# Patient Record
Sex: FEMALE | Race: BLACK OR AFRICAN AMERICAN | NOT HISPANIC OR LATINO | URBAN - METROPOLITAN AREA
[De-identification: names, ages, dates, MRNs, and addresses within clinical notes are randomized per-mention and may not be internally consistent; named-entity substitution may affect disease eponyms.]

---

## 2018-05-30 ENCOUNTER — INPATIENT (INPATIENT)
Facility: HOSPITAL | Age: 58
LOS: 21 days | Discharge: NURSING, REHAB OR LONG-TERM CARE | DRG: 981 | End: 2018-06-21
Attending: PSYCHIATRY & NEUROLOGY | Admitting: PSYCHIATRY & NEUROLOGY
Payer: COMMERCIAL

## 2018-05-30 VITALS
TEMPERATURE: 98 F | RESPIRATION RATE: 17 BRPM | OXYGEN SATURATION: 96 % | DIASTOLIC BLOOD PRESSURE: 84 MMHG | HEART RATE: 74 BPM | SYSTOLIC BLOOD PRESSURE: 138 MMHG

## 2018-05-30 DIAGNOSIS — Z98.891 HISTORY OF UTERINE SCAR FROM PREVIOUS SURGERY: Chronic | ICD-10-CM

## 2018-05-30 DIAGNOSIS — Z90.12 ACQUIRED ABSENCE OF LEFT BREAST AND NIPPLE: Chronic | ICD-10-CM

## 2018-05-30 LAB
ALBUMIN SERPL ELPH-MCNC: 3.2 G/DL — LOW (ref 3.3–5)
ALBUMIN SERPL ELPH-MCNC: 4 G/DL — SIGNIFICANT CHANGE UP (ref 3.3–5)
ALP SERPL-CCNC: 69 U/L — SIGNIFICANT CHANGE UP (ref 40–120)
ALP SERPL-CCNC: 92 U/L — SIGNIFICANT CHANGE UP (ref 40–120)
ALT FLD-CCNC: 19 U/L — SIGNIFICANT CHANGE UP (ref 10–45)
ALT FLD-CCNC: 25 U/L — SIGNIFICANT CHANGE UP (ref 10–45)
ANION GAP SERPL CALC-SCNC: 13 MMOL/L — SIGNIFICANT CHANGE UP (ref 5–17)
ANION GAP SERPL CALC-SCNC: 18 MMOL/L — HIGH (ref 5–17)
APTT BLD: 22.4 SEC — LOW (ref 27.5–37.4)
AST SERPL-CCNC: 25 U/L — SIGNIFICANT CHANGE UP (ref 10–40)
AST SERPL-CCNC: 31 U/L — SIGNIFICANT CHANGE UP (ref 10–40)
BASE EXCESS BLDA CALC-SCNC: -3 MMOL/L — LOW (ref -2–3)
BASOPHILS NFR BLD AUTO: 0.4 % — SIGNIFICANT CHANGE UP (ref 0–2)
BILIRUB SERPL-MCNC: 0.6 MG/DL — SIGNIFICANT CHANGE UP (ref 0.2–1.2)
BILIRUB SERPL-MCNC: 0.6 MG/DL — SIGNIFICANT CHANGE UP (ref 0.2–1.2)
BUN SERPL-MCNC: 12 MG/DL — SIGNIFICANT CHANGE UP (ref 7–23)
BUN SERPL-MCNC: 13 MG/DL — SIGNIFICANT CHANGE UP (ref 7–23)
CALCIUM SERPL-MCNC: 8.1 MG/DL — LOW (ref 8.4–10.5)
CALCIUM SERPL-MCNC: 9.5 MG/DL — SIGNIFICANT CHANGE UP (ref 8.4–10.5)
CHLORIDE SERPL-SCNC: 101 MMOL/L — SIGNIFICANT CHANGE UP (ref 96–108)
CHLORIDE SERPL-SCNC: 103 MMOL/L — SIGNIFICANT CHANGE UP (ref 96–108)
CO2 SERPL-SCNC: 22 MMOL/L — SIGNIFICANT CHANGE UP (ref 22–31)
CO2 SERPL-SCNC: 23 MMOL/L — SIGNIFICANT CHANGE UP (ref 22–31)
CREAT SERPL-MCNC: 0.61 MG/DL — SIGNIFICANT CHANGE UP (ref 0.5–1.3)
CREAT SERPL-MCNC: 0.7 MG/DL — SIGNIFICANT CHANGE UP (ref 0.5–1.3)
EOSINOPHIL NFR BLD AUTO: 1.2 % — SIGNIFICANT CHANGE UP (ref 0–6)
GAS PNL BLDA: SIGNIFICANT CHANGE UP
GLUCOSE BLDC GLUCOMTR-MCNC: 129 MG/DL — HIGH (ref 70–99)
GLUCOSE BLDC GLUCOMTR-MCNC: 213 MG/DL — HIGH (ref 70–99)
GLUCOSE SERPL-MCNC: 180 MG/DL — HIGH (ref 70–99)
GLUCOSE SERPL-MCNC: 190 MG/DL — HIGH (ref 70–99)
HCO3 BLDA-SCNC: 22 MMOL/L — SIGNIFICANT CHANGE UP (ref 21–28)
HCT VFR BLD CALC: 23.3 % — LOW (ref 34.5–45)
HCT VFR BLD CALC: 33.9 % — LOW (ref 34.5–45)
HGB BLD-MCNC: 11.3 G/DL — LOW (ref 11.5–15.5)
HGB BLD-MCNC: 7.5 G/DL — LOW (ref 11.5–15.5)
INR BLD: 0.97 — SIGNIFICANT CHANGE UP (ref 0.88–1.16)
LACTATE SERPL-SCNC: 1.3 MMOL/L — SIGNIFICANT CHANGE UP (ref 0.5–2)
LACTATE SERPL-SCNC: 3 MMOL/L — HIGH (ref 0.5–2)
LIDOCAIN IGE QN: 23 U/L — SIGNIFICANT CHANGE UP (ref 7–60)
LYMPHOCYTES # BLD AUTO: 11.6 % — LOW (ref 13–44)
MAGNESIUM SERPL-MCNC: 1.9 MG/DL — SIGNIFICANT CHANGE UP (ref 1.6–2.6)
MCHC RBC-ENTMCNC: 27.6 PG — SIGNIFICANT CHANGE UP (ref 27–34)
MCHC RBC-ENTMCNC: 27.9 PG — SIGNIFICANT CHANGE UP (ref 27–34)
MCHC RBC-ENTMCNC: 32.2 G/DL — SIGNIFICANT CHANGE UP (ref 32–36)
MCHC RBC-ENTMCNC: 33.3 G/DL — SIGNIFICANT CHANGE UP (ref 32–36)
MCV RBC AUTO: 82.7 FL — SIGNIFICANT CHANGE UP (ref 80–100)
MCV RBC AUTO: 86.6 FL — SIGNIFICANT CHANGE UP (ref 80–100)
MONOCYTES NFR BLD AUTO: 4.6 % — SIGNIFICANT CHANGE UP (ref 2–14)
NEUTROPHILS NFR BLD AUTO: 82.2 % — HIGH (ref 43–77)
PCO2 BLDA: 36 MMHG — SIGNIFICANT CHANGE UP (ref 32–45)
PH BLDA: 7.4 — SIGNIFICANT CHANGE UP (ref 7.35–7.45)
PHOSPHATE SERPL-MCNC: 4.7 MG/DL — HIGH (ref 2.5–4.5)
PLATELET # BLD AUTO: 311 K/UL — SIGNIFICANT CHANGE UP (ref 150–400)
PLATELET # BLD AUTO: 389 K/UL — SIGNIFICANT CHANGE UP (ref 150–400)
PO2 BLDA: 330 MMHG — HIGH (ref 83–108)
POTASSIUM SERPL-MCNC: 3.2 MMOL/L — LOW (ref 3.5–5.3)
POTASSIUM SERPL-MCNC: 3.5 MMOL/L — SIGNIFICANT CHANGE UP (ref 3.5–5.3)
POTASSIUM SERPL-SCNC: 3.2 MMOL/L — LOW (ref 3.5–5.3)
POTASSIUM SERPL-SCNC: 3.5 MMOL/L — SIGNIFICANT CHANGE UP (ref 3.5–5.3)
PROT SERPL-MCNC: 5.6 G/DL — LOW (ref 6–8.3)
PROT SERPL-MCNC: 6.9 G/DL — SIGNIFICANT CHANGE UP (ref 6–8.3)
PROTHROM AB SERPL-ACNC: 10.8 SEC — SIGNIFICANT CHANGE UP (ref 9.8–12.7)
RBC # BLD: 2.69 M/UL — LOW (ref 3.8–5.2)
RBC # BLD: 4.1 M/UL — SIGNIFICANT CHANGE UP (ref 3.8–5.2)
RBC # FLD: 15.1 % — SIGNIFICANT CHANGE UP (ref 10.3–16.9)
RBC # FLD: 15.6 % — SIGNIFICANT CHANGE UP (ref 10.3–16.9)
SAO2 % BLDA: 100 % — SIGNIFICANT CHANGE UP (ref 95–100)
SODIUM SERPL-SCNC: 138 MMOL/L — SIGNIFICANT CHANGE UP (ref 135–145)
SODIUM SERPL-SCNC: 142 MMOL/L — SIGNIFICANT CHANGE UP (ref 135–145)
WBC # BLD: 10 K/UL — SIGNIFICANT CHANGE UP (ref 3.8–10.5)
WBC # BLD: 14.7 K/UL — HIGH (ref 3.8–10.5)
WBC # FLD AUTO: 10 K/UL — SIGNIFICANT CHANGE UP (ref 3.8–10.5)
WBC # FLD AUTO: 14.7 K/UL — HIGH (ref 3.8–10.5)

## 2018-05-30 PROCEDURE — 76705 ECHO EXAM OF ABDOMEN: CPT | Mod: 26

## 2018-05-30 PROCEDURE — 70450 CT HEAD/BRAIN W/O DYE: CPT | Mod: 26,59

## 2018-05-30 PROCEDURE — 70496 CT ANGIOGRAPHY HEAD: CPT | Mod: 26

## 2018-05-30 PROCEDURE — 61322 CRNEC/CRNOT DCMPRV W/O LOBEC: CPT

## 2018-05-30 PROCEDURE — 0042T: CPT

## 2018-05-30 PROCEDURE — 22999 UNLISTED PX ABDOMEN MUSCSKEL: CPT

## 2018-05-30 PROCEDURE — 93010 ELECTROCARDIOGRAM REPORT: CPT

## 2018-05-30 PROCEDURE — 71045 X-RAY EXAM CHEST 1 VIEW: CPT | Mod: 26

## 2018-05-30 PROCEDURE — 36217 PLACE CATHETER IN ARTERY: CPT

## 2018-05-30 PROCEDURE — 99291 CRITICAL CARE FIRST HOUR: CPT

## 2018-05-30 PROCEDURE — 70498 CT ANGIOGRAPHY NECK: CPT | Mod: 26

## 2018-05-30 PROCEDURE — 36245 INS CATH ABD/L-EXT ART 1ST: CPT

## 2018-05-30 PROCEDURE — 37242 VASC EMBOLIZE/OCCLUDE ARTERY: CPT

## 2018-05-30 PROCEDURE — 76942 ECHO GUIDE FOR BIOPSY: CPT | Mod: 26,59

## 2018-05-30 PROCEDURE — 74177 CT ABD & PELVIS W/CONTRAST: CPT | Mod: 26

## 2018-05-30 PROCEDURE — 99223 1ST HOSP IP/OBS HIGH 75: CPT | Mod: GC

## 2018-05-30 PROCEDURE — 99232 SBSQ HOSP IP/OBS MODERATE 35: CPT | Mod: GC

## 2018-05-30 RX ORDER — GLUCAGON INJECTION, SOLUTION 0.5 MG/.1ML
1 INJECTION, SOLUTION SUBCUTANEOUS ONCE
Qty: 0 | Refills: 0 | Status: DISCONTINUED | OUTPATIENT
Start: 2018-05-30 | End: 2018-06-14

## 2018-05-30 RX ORDER — DEXTROSE 50 % IN WATER 50 %
25 SYRINGE (ML) INTRAVENOUS ONCE
Qty: 0 | Refills: 0 | Status: DISCONTINUED | OUTPATIENT
Start: 2018-05-30 | End: 2018-06-14

## 2018-05-30 RX ORDER — SODIUM CHLORIDE 9 MG/ML
1000 INJECTION INTRAMUSCULAR; INTRAVENOUS; SUBCUTANEOUS
Qty: 0 | Refills: 0 | Status: DISCONTINUED | OUTPATIENT
Start: 2018-05-30 | End: 2018-05-30

## 2018-05-30 RX ORDER — HYDROMORPHONE HYDROCHLORIDE 2 MG/ML
0.5 INJECTION INTRAMUSCULAR; INTRAVENOUS; SUBCUTANEOUS ONCE
Qty: 0 | Refills: 0 | Status: DISCONTINUED | OUTPATIENT
Start: 2018-05-30 | End: 2018-05-30

## 2018-05-30 RX ORDER — NALOXONE HYDROCHLORIDE 4 MG/.1ML
0.4 SPRAY NASAL ONCE
Qty: 0 | Refills: 0 | Status: COMPLETED | OUTPATIENT
Start: 2018-05-30 | End: 2018-05-30

## 2018-05-30 RX ORDER — THROMBIN (BOVINE) 10000 UNIT
5000 KIT TOPICAL ONCE
Qty: 0 | Refills: 0 | Status: COMPLETED | OUTPATIENT
Start: 2018-05-30 | End: 2018-05-30

## 2018-05-30 RX ORDER — SODIUM CHLORIDE 9 MG/ML
1000 INJECTION, SOLUTION INTRAVENOUS
Qty: 0 | Refills: 0 | Status: DISCONTINUED | OUTPATIENT
Start: 2018-05-30 | End: 2018-05-31

## 2018-05-30 RX ORDER — SODIUM CHLORIDE 9 MG/ML
1000 INJECTION INTRAMUSCULAR; INTRAVENOUS; SUBCUTANEOUS ONCE
Qty: 0 | Refills: 0 | Status: COMPLETED | OUTPATIENT
Start: 2018-05-30 | End: 2018-05-30

## 2018-05-30 RX ORDER — DEXTROSE 50 % IN WATER 50 %
12.5 SYRINGE (ML) INTRAVENOUS ONCE
Qty: 0 | Refills: 0 | Status: DISCONTINUED | OUTPATIENT
Start: 2018-05-30 | End: 2018-06-14

## 2018-05-30 RX ORDER — HYDROMORPHONE HYDROCHLORIDE 2 MG/ML
0.5 INJECTION INTRAMUSCULAR; INTRAVENOUS; SUBCUTANEOUS EVERY 4 HOURS
Qty: 0 | Refills: 0 | Status: DISCONTINUED | OUTPATIENT
Start: 2018-05-30 | End: 2018-05-30

## 2018-05-30 RX ORDER — MORPHINE SULFATE 50 MG/1
4 CAPSULE, EXTENDED RELEASE ORAL ONCE
Qty: 0 | Refills: 0 | Status: DISCONTINUED | OUTPATIENT
Start: 2018-05-30 | End: 2018-05-30

## 2018-05-30 RX ORDER — SODIUM CHLORIDE 9 MG/ML
1000 INJECTION INTRAMUSCULAR; INTRAVENOUS; SUBCUTANEOUS
Qty: 0 | Refills: 0 | Status: DISCONTINUED | OUTPATIENT
Start: 2018-05-30 | End: 2018-05-31

## 2018-05-30 RX ORDER — ATORVASTATIN CALCIUM 80 MG/1
80 TABLET, FILM COATED ORAL AT BEDTIME
Qty: 0 | Refills: 0 | Status: DISCONTINUED | OUTPATIENT
Start: 2018-05-30 | End: 2018-06-21

## 2018-05-30 RX ORDER — HYDROMORPHONE HYDROCHLORIDE 2 MG/ML
1 INJECTION INTRAMUSCULAR; INTRAVENOUS; SUBCUTANEOUS EVERY 4 HOURS
Qty: 0 | Refills: 0 | Status: DISCONTINUED | OUTPATIENT
Start: 2018-05-30 | End: 2018-05-30

## 2018-05-30 RX ORDER — SODIUM CHLORIDE 9 MG/ML
1000 INJECTION, SOLUTION INTRAVENOUS
Qty: 0 | Refills: 0 | Status: DISCONTINUED | OUTPATIENT
Start: 2018-05-30 | End: 2018-06-14

## 2018-05-30 RX ORDER — ONDANSETRON 8 MG/1
4 TABLET, FILM COATED ORAL EVERY 6 HOURS
Qty: 0 | Refills: 0 | Status: DISCONTINUED | OUTPATIENT
Start: 2018-05-30 | End: 2018-06-21

## 2018-05-30 RX ORDER — ACETAMINOPHEN 500 MG
1000 TABLET ORAL ONCE
Qty: 0 | Refills: 0 | Status: COMPLETED | OUTPATIENT
Start: 2018-05-30 | End: 2018-05-30

## 2018-05-30 RX ORDER — INSULIN LISPRO 100/ML
VIAL (ML) SUBCUTANEOUS EVERY 6 HOURS
Qty: 0 | Refills: 0 | Status: DISCONTINUED | OUTPATIENT
Start: 2018-05-30 | End: 2018-06-04

## 2018-05-30 RX ORDER — DEXTROSE 50 % IN WATER 50 %
15 SYRINGE (ML) INTRAVENOUS ONCE
Qty: 0 | Refills: 0 | Status: DISCONTINUED | OUTPATIENT
Start: 2018-05-30 | End: 2018-06-14

## 2018-05-30 RX ADMIN — SODIUM CHLORIDE 1000 MILLILITER(S): 9 INJECTION INTRAMUSCULAR; INTRAVENOUS; SUBCUTANEOUS at 11:45

## 2018-05-30 RX ADMIN — HYDROMORPHONE HYDROCHLORIDE 1 MILLIGRAM(S): 2 INJECTION INTRAMUSCULAR; INTRAVENOUS; SUBCUTANEOUS at 13:20

## 2018-05-30 RX ADMIN — SODIUM CHLORIDE 2000 MILLILITER(S): 9 INJECTION INTRAMUSCULAR; INTRAVENOUS; SUBCUTANEOUS at 13:42

## 2018-05-30 RX ADMIN — HYDROMORPHONE HYDROCHLORIDE 0.5 MILLIGRAM(S): 2 INJECTION INTRAMUSCULAR; INTRAVENOUS; SUBCUTANEOUS at 11:45

## 2018-05-30 RX ADMIN — Medication 400 MILLIGRAM(S): at 19:45

## 2018-05-30 RX ADMIN — HYDROMORPHONE HYDROCHLORIDE 1 MILLIGRAM(S): 2 INJECTION INTRAMUSCULAR; INTRAVENOUS; SUBCUTANEOUS at 14:49

## 2018-05-30 RX ADMIN — HYDROMORPHONE HYDROCHLORIDE 0.5 MILLIGRAM(S): 2 INJECTION INTRAMUSCULAR; INTRAVENOUS; SUBCUTANEOUS at 12:25

## 2018-05-30 RX ADMIN — MORPHINE SULFATE 4 MILLIGRAM(S): 50 CAPSULE, EXTENDED RELEASE ORAL at 11:40

## 2018-05-30 RX ADMIN — HYDROMORPHONE HYDROCHLORIDE 0.5 MILLIGRAM(S): 2 INJECTION INTRAMUSCULAR; INTRAVENOUS; SUBCUTANEOUS at 13:29

## 2018-05-30 RX ADMIN — Medication 5000 INTERNATIONAL UNIT(S): at 18:44

## 2018-05-30 RX ADMIN — MORPHINE SULFATE 4 MILLIGRAM(S): 50 CAPSULE, EXTENDED RELEASE ORAL at 13:29

## 2018-05-30 RX ADMIN — NALOXONE HYDROCHLORIDE 0.4 MILLIGRAM(S): 4 SPRAY NASAL at 18:56

## 2018-05-30 NOTE — CONSULT NOTE ADULT - ATTENDING COMMENTS
pt with lethargy related to iv analgesics and benzo given periprocedurally for treatment of recuts sheath hematoma.  pt does follow commands to mvoe all extremities. plan to admit to SICU to monitor respiratory status as she recovers from sedation. monitor CBC and abd exam.

## 2018-05-30 NOTE — ED ADULT TRIAGE NOTE - NS ED NURSE DIRECT TO ROOM YN
No Pain not relieved by Medications/Persistent Nausea and Vomiting/Swelling that continues/Fever greater than 101/Inability to Tolerate Liquids or Foods/Bleeding that does not stop/Unable to Urinate/Increased Irritability or Sluggishness

## 2018-05-30 NOTE — H&P ADULT - NSHPLABSRESULTS_GEN_ALL_CORE
11.3   10.0  )-----------( 311      ( 30 May 2018 11:52 )             33.9   05-30    142  |  101  |  13  ----------------------------<  180<H>  3.2<L>   |  23  |  0.70    Ca    9.5      30 May 2018 11:52    TPro  6.9  /  Alb  4.0  /  TBili  0.6  /  DBili  x   /  AST  31  /  ALT  25  /  AlkPhos  92  05-30    Lactate, Blood (05.30.18 @ 11:51)    Lactate, Blood: 3.0 mmoL/L    < from: CT Abdomen and Pelvis w/ IV Cont (05.30.18 @ 12:12) >      EXAM:  CT ABDOMEN AND PELVIS IC                          PROCEDURE DATE:  05/30/2018          INTERPRETATION:    CT ABDOMEN and PELVIS with IV contrast dated 5/30/2018 12:12 PM    INDICATION: 58-year-old female with severe abdominal pain after episode   of coughing. History of breast cancer status post left mastectomy and   flap reconstruction 5 years ago.    TECHNIQUE: CT of the abdomen and pelvis was performed after intravenous   administration of contrast material. Axial, sagittal and coronal images   were produced and reviewed.  Enteric contrast was not administered,   limiting complete evaluation of the gastrointestinal tract.    COMPARISON: None.    FINDINGS:  There is a large expanding hematoma within the proximal aspect of the   right rectus abdominis muscle, measuring up to 12.6 x 1.2 x 7.7 cm in   size, and contains tubular curvilinear areas of opacified cotrast   material, indicative of active extravasation. There is also a small   hemorrhagic component in the more inferior region of the right rectus   abdominis muscle. There is mild surrounding perifascial stranding. There   is mild mass effect on the underlying liver, without evidence of   hemorrhage extension into the peritoneal cavity.      IMPRESSION:  1. Large expanding intramuscular hematoma within the right rectus   abdominis, with evidence of active contrast extravasationinternally.    2. Intrathoracic herniation of nearly the entire stomach, without   evidence of compromise at this time.        JERALD BONILLA M.D., ATTENDING RADIOLOGIST  This document has been electronically signed. May 30 2018 12:42PM

## 2018-05-30 NOTE — ED PROVIDER NOTE - OBJECTIVE STATEMENT
57 yo f with PMH of breast cancer s/p TRAM flap 5 years prior, one , HTN, anxiety presents to ED with severe right sided abdominal pain with bulge.  Pt states she had cough for past several days, but then suddenly devleoped worsening pain over right side of abdomen this morning.  Complains of nausea, but denies vomiting.  Normal bowel movements for past several days.  Pt required my immediate attention secondary to pain and concern for acute abdomen. 59 yo f with PMH of breast cancer s/p TRAM flap 5 years prior, one , HTN, anxiety presents to ED with severe right sided abdominal pain with bulge.  Pt states she had cough for past several days, but then suddenly devleoped worsening pain over right side of abdomen this morning.  Complains of nausea, but denies vomiting.  Normal bowel movements for past several days.  Pt states she was recently started on plavix secondary to artherosclerotic disease (high calcium score).  But no hx of CAD or CVA.  Pt required my immediate attention secondary to pain and concern for acute abdomen.

## 2018-05-30 NOTE — H&P ADULT - NSHPPHYSICALEXAM_GEN_ALL_CORE
Vital Signs Last 24 Hrs  T(C): 36.6 (30 May 2018 11:14), Max: 36.6 (30 May 2018 11:14)  T(F): 97.8 (30 May 2018 11:14), Max: 97.8 (30 May 2018 11:14)  HR: 65 (30 May 2018 12:24) (65 - 74)  BP: 175/105 (30 May 2018 12:24) (138/84 - 175/105)  BP(mean): --  RR: 16 (30 May 2018 12:24) (16 - 17)  SpO2: 96% (30 May 2018 12:24) (96% - 96%)    Gen: Age appropriate female in moderate distress 2/2 pain  CV: RRR  Resp: No increased work of breathing however tachypneic 2/2 pain  Abd: Firm palpable mass of the right abd from costal margin down to low tranverse scar, tender, nondistended  Ext: WWP

## 2018-05-30 NOTE — ED PROVIDER NOTE - MEDICAL DECISION MAKING DETAILS
Pt with severe abd pain, palpable mass, surgery immediately called after evaluating patient for acute abdomen, bedside echo shows large mass from liver to pelvis with areas of echogenicity, concern for hematoma with bleed vs incarcerated bowel, pt taken for ct abd/pelvis with iv contrast after POC chemistry and found to have (+) hematoma with active bleeding, surgery at bedside and will admit for platelets and IR intervention, surgery called IR for procedure, pt admitted to surgical tele.

## 2018-05-30 NOTE — PATIENT PROFILE ADULT. - DOES PATIENT HAVE ADVANCE DIRECTIVE
Spoke with pt. Advised bg log sheet was reviewed. Please change dinner ratio from 1:20 to 1:18. Please send in more readings in 2 weeks. Pt verbalized understanding.    Linda Diego RN, BSN, CDE   [facility]   Yes

## 2018-05-30 NOTE — H&P ADULT - HISTORY OF PRESENT ILLNESS
58F with a hx of left breast cancer s/p left mastectomy with TRAM flap reconstruction (4-5 years prior), recently placed on plavix (about a month ago) by her cardiologist 2/2 calcified arterial disease and is now presenting to the [facility] ED with a painful bulge in her right abdomen.  Pt reports that she's had a cough for several days and yesterday noticed a bulge in her right abdomen that has grown and only become more painful.  She at times has difficulty breathing and some lightheadedness but otherwise denies fevers, chills, chest pain, nausea, vomiting, diarrhea, dysuria.    PMH: HTN, CAD?, HLD, Anxiety, Depression  Meds: bisoprolol 5mg qd, lorazepam 1mg q8?, Plavix 5qd, rosuvastatin, HCTZ, buproprion, baclofen  NKDA  PSH: Left mastectomy with TRAM rotational flap reconstruction  Social: 1/2 bottle of wine most nights, remote cigarette hx, denies IVDA

## 2018-05-30 NOTE — ED PROVIDER NOTE - GASTROINTESTINAL, MLM
right sided abdominal bulge extending from liver to pelvis with severe tenderness guarding and rebound

## 2018-05-30 NOTE — H&P ADULT - ASSESSMENT
58F with a hx of TRAM flap reconstruction on plavix now with a spontaneous right rectus expanding hematoma    - Will admit to surgical service, telemetry bed  - Discussed with IR, will attempt embolization  - Due to plavix use will administer Platelets   - Pain control PRN  - CIWA protocol  - Serial CBCs  - Will continue IVF resuscitation and recheck lactate  - No abx indicated at this point in time  - Discussed with Chief and Dr. Dozier

## 2018-05-30 NOTE — ED ADULT NURSE REASSESSMENT NOTE - NS ED NURSE REASSESS COMMENT FT1
Received patient from IR procedure assessed patient not responding to painful stimuli and verbal stimuli and patient snoring RRT called patient hypotensive BP L arm 80/64  R arm 92/62 hr 91 pt o2 sat RA 97% .As per RRT and surgery team and attending 2 L of fluids given ,narcan 0.4 mg iv given, and labs send ..As per RRT patient moved to  recess room and patient for ICU.  Patient endorsed to GIGI Mcnair .GIGI Mcnair at the bed side aware pt situation
pt started on platelet transfusion [id number], tolerated well. safety maintained. will continue to monitor.
received pt from [facility] as an RRT. pt returned from IR procedure not responding to painful or verbal stimuli & snoring. pt was given Narcan IV. ptnow  responds to verbal stimuli. pt remains agitated but able to open eyes on demand. aaox1, resp easy & unlabored. will continue to monitor.
pt received from days; VS stable, responds to verbal stimuli, breathing unlabored , closely monitored
pt asleep in bed. responding to verbal stimuli. pt has PERRLA, pt remains on NRBM, resp easy & unlabored. will continue to monitor.

## 2018-05-30 NOTE — ED PROVIDER NOTE - CRITICAL CARE PROVIDED
direct patient care (not related to procedure)/consultation with other physicians/conducted a detailed discussion of DNR status/documentation/interpretation of diagnostic studies/additional history taking/consult w/ pt's family directly relating to pts condition/telephone consultation with the patient's family

## 2018-05-30 NOTE — PROGRESS NOTE ADULT - SUBJECTIVE AND OBJECTIVE BOX
Event note:   Upon reevaluation of pt after admission to [facility], pt more awake and following commands but noted to have Left Upper extremity hemiplegia with Left Lower extremity weakness. Stroke code was called, Primary team and Dr Mckinney at bedside. Will follow up ct scan

## 2018-05-30 NOTE — CONSULT NOTE ADULT - SUBJECTIVE AND OBJECTIVE BOX
HPI:  58F with a hx of left breast cancer s/p left mastectomy with TRAM flap reconstruction (4-5 years prior), recently placed on plavix (about a month ago) by her cardiologist 2/2 calcified arterial disease and is now presenting to the [facility] ED with a painful bulge in her right abdomen.  Pt reports that she's had a cough for several days and yesterday noticed a bulge in her right abdomen that has grown and only become more painful.  CT scan + for right rectus hematoma. Now s/p IR Rt groin accesss for dx angio, and rt abd percutaneous thrombin injection of 2 pseudoaneurysms in abd wall. Pt over sedated post procedure Rapid response called and pt given narcan with good response.     PMH: HTN, CAD?, HLD, Anxiety, Depression  Meds: bisoprolol 5mg qd, lorazepam 1mg q8?, Plavix 5qd, rosuvastatin, HCTZ, buproprion, baclofen  NKDA  PSH: Left mastectomy with TRAM rotational flap reconstruction  Social: 1/2 bottle of wine most nights, remote cigarette hx, denies IVDA (30 May 2018 13:13)      PAST MEDICAL & SURGICAL HISTORY:  Anxiety  Hypertension  Breast CA  Previous  section  History of mastectomy, left: with TRAM flap recon      ROS: See HPI    MEDICATIONS  (STANDING):  dextrose 5%. 1000 milliLiter(s) (50 mL/Hr) IV Continuous <Continuous>  dextrose 50% Injectable 12.5 Gram(s) IV Push once  dextrose 50% Injectable 25 Gram(s) IV Push once  dextrose 50% Injectable 25 Gram(s) IV Push once  insulin lispro (HumaLOG) corrective regimen sliding scale   SubCutaneous three times a day before meals  sodium chloride 0.9% 1000 milliLiter(s) (100 mL/Hr) IV Continuous <Continuous>    MEDICATIONS  (PRN):  dextrose 40% Gel 15 Gram(s) Oral once PRN Blood Glucose LESS THAN 70 milliGRAM(s)/deciliter  glucagon  Injectable 1 milliGRAM(s) IntraMuscular once PRN Glucose LESS THAN 70 milligrams/deciliter  ondansetron Injectable 4 milliGRAM(s) IV Push every 6 hours PRN Nausea      Allergies    No Known Allergies    Intolerances        SOCIAL HISTORY:  Smoke: Never Smoker  EtOH: occasional    FAMILY HISTORY:      Vital Signs Last 24 Hrs  T(C): 36.3 (30 May 2018 20:11), Max: 36.6 (30 May 2018 11:14)  T(F): 97.3 (30 May 2018 20:11), Max: 97.8 (30 May 2018 11:14)  HR: 68 (30 May 2018 22:00) (61 - 92)  BP: 114/81 (30 May 2018 22:00) (80/64 - 175/105)  BP(mean): 92 (30 May 2018 22:00) (86 - 92)  RR: 9 (30 May 2018 22:00) (8 - 20)  SpO2: 100% (30 May 2018 22:00) (96% - 100%)    PHYSICAL EXAM    Gen: NAD lethargic, opens eyes to voice/name however falls back asleep after a few minutes.  Neuro: A&Ox1 moving all extremities at will or to painful stimuli  CV: RRR Reg s1s2 no M  Pulm: CTAB No w/r/r  Abd: Left abd soft non tender Rt sided fullness from midline to rt flank + Tegaderm lateral to umbilicus c/d, +Rt groin access site: Soft C/D/I no hematoma noted   Ext: No C/C/E extremities warm to touch  Pulses: + Radial pulse B/L +DP b/l   MSK: No noticeable joint swelling  Psych: Unable to assess    LABS:                        7.5    14.7  )-----------( 389      ( 30 May 2018 19:17 )             23.3     05-30    138  |  103  |  12  ----------------------------<  190<H>  3.5   |  22  |  0.61    Ca    8.1<L>      30 May 2018 19:17  Phos  4.7     05-30  Mg     1.9     05-30    TPro  5.6<L>  /  Alb  3.2<L>  /  TBili  0.6  /  DBili  x   /  AST  25  /  ALT  19  /  AlkPhos  69  05-30    PT/INR - ( 30 May 2018 11:52 )   PT: 10.8 sec;   INR: 0.97          PTT - ( 30 May 2018 11:52 )  PTT:22.4 sec      RADIOLOGY & ADDITIONAL STUDIES:    Assessment and Plan:  58F with a hx of TRAM flap reconstruction on plavix now with a spontaneous right rectus expanding hematoma taken to IR for Rt groin accesss for dx angio, and rt abd percutaneous thrombin injection of 2 pseudoaneurysms in abd wall. Pt over sedated post procedure and given narcan.     Neuro: no narcotics ETOH abuse  CV: HD stable f/u CBC q4 hrs Banana bag @100   Pulm: Non-rebreather   GI: NPO NGT ETOH  abuse: Banana bag q24.   : Voids    ID:None   Endo: ISS  PPx: SCD no SQH 2* Bleeding risk   Lines: PIV  Wounds: None   PT/OT: Not ordered

## 2018-05-30 NOTE — H&P ADULT - ATTENDING COMMENTS
Patient seen and examined at bedside in ED following IR procedure when rapid response called.  Minimally responsive.  Abdomen soft, palpable mass in R abdomen consistent with known hematoma.    Repeat labs pending.    Patient given Narcan as patient received 150 mcg fentanyl, 1 mg Dilaudid, and 7 mg Versed during procedure.    Impression: 58F with rectus sheath hematoma.  -Admit to SICU for monitoring given issues with responsiveness and possible need for repeat dosing of Narcan.  -NPO  -Serial CBCs to evaluate for persistent bleeding

## 2018-05-31 ENCOUNTER — RESULT REVIEW (OUTPATIENT)
Age: 58
End: 2018-05-31

## 2018-05-31 LAB
ALBUMIN SERPL ELPH-MCNC: 3.3 G/DL — SIGNIFICANT CHANGE UP (ref 3.3–5)
ALP SERPL-CCNC: 64 U/L — SIGNIFICANT CHANGE UP (ref 40–120)
ALT FLD-CCNC: 19 U/L — SIGNIFICANT CHANGE UP (ref 10–45)
ANION GAP SERPL CALC-SCNC: 12 MMOL/L — SIGNIFICANT CHANGE UP (ref 5–17)
ANION GAP SERPL CALC-SCNC: 16 MMOL/L — SIGNIFICANT CHANGE UP (ref 5–17)
APTT BLD: 22 SEC — LOW (ref 27.5–37.4)
APTT BLD: 24.8 SEC — LOW (ref 27.5–37.4)
AST SERPL-CCNC: 27 U/L — SIGNIFICANT CHANGE UP (ref 10–40)
BASE EXCESS BLDA CALC-SCNC: -2 MMOL/L — SIGNIFICANT CHANGE UP (ref -2–3)
BASE EXCESS BLDA CALC-SCNC: -2.2 MMOL/L — LOW (ref -2–3)
BILIRUB SERPL-MCNC: 0.9 MG/DL — SIGNIFICANT CHANGE UP (ref 0.2–1.2)
BLD GP AB SCN SERPL QL: NEGATIVE — SIGNIFICANT CHANGE UP
BUN SERPL-MCNC: 4 MG/DL — LOW (ref 7–23)
BUN SERPL-MCNC: 5 MG/DL — LOW (ref 7–23)
BUN SERPL-MCNC: 6 MG/DL — LOW (ref 7–23)
BUN SERPL-MCNC: 9 MG/DL — SIGNIFICANT CHANGE UP (ref 7–23)
CA-I BLDA-SCNC: 0.97 MMOL/L — LOW (ref 1.12–1.3)
CALCIUM SERPL-MCNC: 7.7 MG/DL — LOW (ref 8.4–10.5)
CALCIUM SERPL-MCNC: 8.2 MG/DL — LOW (ref 8.4–10.5)
CALCIUM SERPL-MCNC: 8.5 MG/DL — SIGNIFICANT CHANGE UP (ref 8.4–10.5)
CALCIUM SERPL-MCNC: 9.3 MG/DL — SIGNIFICANT CHANGE UP (ref 8.4–10.5)
CHLORIDE SERPL-SCNC: 101 MMOL/L — SIGNIFICANT CHANGE UP (ref 96–108)
CHLORIDE SERPL-SCNC: 105 MMOL/L — SIGNIFICANT CHANGE UP (ref 96–108)
CHLORIDE SERPL-SCNC: 110 MMOL/L — HIGH (ref 96–108)
CHLORIDE SERPL-SCNC: 115 MMOL/L — HIGH (ref 96–108)
CHOLEST SERPL-MCNC: 175 MG/DL — SIGNIFICANT CHANGE UP (ref 10–199)
CO2 SERPL-SCNC: 20 MMOL/L — LOW (ref 22–31)
CO2 SERPL-SCNC: 23 MMOL/L — SIGNIFICANT CHANGE UP (ref 22–31)
COHGB MFR BLDA: 0.4 % — SIGNIFICANT CHANGE UP
CREAT SERPL-MCNC: 0.52 MG/DL — SIGNIFICANT CHANGE UP (ref 0.5–1.3)
CREAT SERPL-MCNC: 0.52 MG/DL — SIGNIFICANT CHANGE UP (ref 0.5–1.3)
CREAT SERPL-MCNC: 0.54 MG/DL — SIGNIFICANT CHANGE UP (ref 0.5–1.3)
CREAT SERPL-MCNC: 0.58 MG/DL — SIGNIFICANT CHANGE UP (ref 0.5–1.3)
GAS PNL BLDA: SIGNIFICANT CHANGE UP
GAS PNL BLDA: SIGNIFICANT CHANGE UP
GLUCOSE BLDC GLUCOMTR-MCNC: 122 MG/DL — HIGH (ref 70–99)
GLUCOSE BLDC GLUCOMTR-MCNC: 154 MG/DL — HIGH (ref 70–99)
GLUCOSE SERPL-MCNC: 118 MG/DL — HIGH (ref 70–99)
GLUCOSE SERPL-MCNC: 139 MG/DL — HIGH (ref 70–99)
GLUCOSE SERPL-MCNC: 144 MG/DL — HIGH (ref 70–99)
GLUCOSE SERPL-MCNC: 150 MG/DL — HIGH (ref 70–99)
HCO3 BLDA-SCNC: 22 MMOL/L — SIGNIFICANT CHANGE UP (ref 21–28)
HCO3 BLDA-SCNC: 23 MMOL/L — SIGNIFICANT CHANGE UP (ref 21–28)
HCT VFR BLD CALC: 26 % — LOW (ref 34.5–45)
HCT VFR BLD CALC: 28.1 % — LOW (ref 34.5–45)
HCT VFR BLD CALC: 28.4 % — LOW (ref 34.5–45)
HDLC SERPL-MCNC: 52 MG/DL — SIGNIFICANT CHANGE UP (ref 40–125)
HGB BLD-MCNC: 8.6 G/DL — LOW (ref 11.5–15.5)
HGB BLD-MCNC: 9.4 G/DL — LOW (ref 11.5–15.5)
HGB BLD-MCNC: 9.5 G/DL — LOW (ref 11.5–15.5)
HGB BLDA-MCNC: 8.6 G/DL — LOW (ref 11.5–15.5)
INR BLD: 1.02 — SIGNIFICANT CHANGE UP (ref 0.88–1.16)
INR BLD: 1.05 — SIGNIFICANT CHANGE UP (ref 0.88–1.16)
LIPID PNL WITH DIRECT LDL SERPL: 106 MG/DL — SIGNIFICANT CHANGE UP
MAGNESIUM SERPL-MCNC: 1.8 MG/DL — SIGNIFICANT CHANGE UP (ref 1.6–2.6)
MAGNESIUM SERPL-MCNC: 2.1 MG/DL — SIGNIFICANT CHANGE UP (ref 1.6–2.6)
MAGNESIUM SERPL-MCNC: 2.2 MG/DL — SIGNIFICANT CHANGE UP (ref 1.6–2.6)
MCHC RBC-ENTMCNC: 27.7 PG — SIGNIFICANT CHANGE UP (ref 27–34)
MCHC RBC-ENTMCNC: 27.7 PG — SIGNIFICANT CHANGE UP (ref 27–34)
MCHC RBC-ENTMCNC: 28 PG — SIGNIFICANT CHANGE UP (ref 27–34)
MCHC RBC-ENTMCNC: 33.1 G/DL — SIGNIFICANT CHANGE UP (ref 32–36)
MCHC RBC-ENTMCNC: 33.1 G/DL — SIGNIFICANT CHANGE UP (ref 32–36)
MCHC RBC-ENTMCNC: 33.8 G/DL — SIGNIFICANT CHANGE UP (ref 32–36)
MCV RBC AUTO: 82.9 FL — SIGNIFICANT CHANGE UP (ref 80–100)
MCV RBC AUTO: 83.6 FL — SIGNIFICANT CHANGE UP (ref 80–100)
MCV RBC AUTO: 83.8 FL — SIGNIFICANT CHANGE UP (ref 80–100)
METHGB MFR BLDA: 0.8 % — SIGNIFICANT CHANGE UP
O2 CT VFR BLDA CALC: 12.5 ML/DL — SIGNIFICANT CHANGE UP (ref 15–23)
OXYHGB MFR BLDA: 98 % — SIGNIFICANT CHANGE UP (ref 94–100)
PCO2 BLDA: 37 MMHG — SIGNIFICANT CHANGE UP (ref 32–45)
PCO2 BLDA: 39 MMHG — SIGNIFICANT CHANGE UP (ref 32–45)
PH BLDA: 7.38 — SIGNIFICANT CHANGE UP (ref 7.35–7.45)
PH BLDA: 7.4 — SIGNIFICANT CHANGE UP (ref 7.35–7.45)
PHOSPHATE SERPL-MCNC: 1.9 MG/DL — LOW (ref 2.5–4.5)
PHOSPHATE SERPL-MCNC: 2.1 MG/DL — LOW (ref 2.5–4.5)
PHOSPHATE SERPL-MCNC: 3.5 MG/DL — SIGNIFICANT CHANGE UP (ref 2.5–4.5)
PLATELET # BLD AUTO: 227 K/UL — SIGNIFICANT CHANGE UP (ref 150–400)
PLATELET # BLD AUTO: 252 K/UL — SIGNIFICANT CHANGE UP (ref 150–400)
PLATELET # BLD AUTO: 311 K/UL — SIGNIFICANT CHANGE UP (ref 150–400)
PO2 BLDA: 115 MMHG — HIGH (ref 83–108)
PO2 BLDA: 250 MMHG — HIGH (ref 83–108)
POTASSIUM BLDA-SCNC: 2.7 MMOL/L — LOW (ref 3.5–4.9)
POTASSIUM SERPL-MCNC: 2.9 MMOL/L — CRITICAL LOW (ref 3.5–5.3)
POTASSIUM SERPL-MCNC: 3.2 MMOL/L — LOW (ref 3.5–5.3)
POTASSIUM SERPL-MCNC: 3.6 MMOL/L — SIGNIFICANT CHANGE UP (ref 3.5–5.3)
POTASSIUM SERPL-MCNC: SIGNIFICANT CHANGE UP MMOL/L (ref 3.5–5.3)
POTASSIUM SERPL-SCNC: 2.9 MMOL/L — CRITICAL LOW (ref 3.5–5.3)
POTASSIUM SERPL-SCNC: 3.2 MMOL/L — LOW (ref 3.5–5.3)
POTASSIUM SERPL-SCNC: 3.6 MMOL/L — SIGNIFICANT CHANGE UP (ref 3.5–5.3)
POTASSIUM SERPL-SCNC: SIGNIFICANT CHANGE UP MMOL/L (ref 3.5–5.3)
PROT SERPL-MCNC: 5.5 G/DL — LOW (ref 6–8.3)
PROTHROM AB SERPL-ACNC: 11.3 SEC — SIGNIFICANT CHANGE UP (ref 9.8–12.7)
PROTHROM AB SERPL-ACNC: 11.7 SEC — SIGNIFICANT CHANGE UP (ref 9.8–12.7)
RBC # BLD: 3.11 M/UL — LOW (ref 3.8–5.2)
RBC # BLD: 3.39 M/UL — LOW (ref 3.8–5.2)
RBC # BLD: 3.39 M/UL — LOW (ref 3.8–5.2)
RBC # FLD: 15.1 % — SIGNIFICANT CHANGE UP (ref 10.3–16.9)
RBC # FLD: 15.3 % — SIGNIFICANT CHANGE UP (ref 10.3–16.9)
RBC # FLD: 15.5 % — SIGNIFICANT CHANGE UP (ref 10.3–16.9)
RH IG SCN BLD-IMP: POSITIVE — SIGNIFICANT CHANGE UP
SAO2 % BLDA: 98 % — SIGNIFICANT CHANGE UP (ref 95–100)
SAO2 % BLDA: 99 % — SIGNIFICANT CHANGE UP (ref 95–100)
SODIUM BLDA-SCNC: 137 MMOL/L — LOW (ref 138–146)
SODIUM SERPL-SCNC: 137 MMOL/L — SIGNIFICANT CHANGE UP (ref 135–145)
SODIUM SERPL-SCNC: 140 MMOL/L — SIGNIFICANT CHANGE UP (ref 135–145)
SODIUM SERPL-SCNC: 145 MMOL/L — SIGNIFICANT CHANGE UP (ref 135–145)
SODIUM SERPL-SCNC: 150 MMOL/L — HIGH (ref 135–145)
TOTAL CHOLESTEROL/HDL RATIO MEASUREMENT: 3.4 RATIO — SIGNIFICANT CHANGE UP (ref 3.3–7.1)
TRIGL SERPL-MCNC: 84 MG/DL — SIGNIFICANT CHANGE UP (ref 10–149)
TSH SERPL-MCNC: 0.61 UIU/ML — SIGNIFICANT CHANGE UP (ref 0.35–4.94)
WBC # BLD: 11.4 K/UL — HIGH (ref 3.8–10.5)
WBC # BLD: 13.7 K/UL — HIGH (ref 3.8–10.5)
WBC # BLD: 15.1 K/UL — HIGH (ref 3.8–10.5)
WBC # FLD AUTO: 11.4 K/UL — HIGH (ref 3.8–10.5)
WBC # FLD AUTO: 13.7 K/UL — HIGH (ref 3.8–10.5)
WBC # FLD AUTO: 15.1 K/UL — HIGH (ref 3.8–10.5)

## 2018-05-31 PROCEDURE — 70450 CT HEAD/BRAIN W/O DYE: CPT | Mod: 26

## 2018-05-31 PROCEDURE — 99292 CRITICAL CARE ADDL 30 MIN: CPT | Mod: 24

## 2018-05-31 PROCEDURE — 36215 PLACE CATHETER IN ARTERY: CPT

## 2018-05-31 PROCEDURE — 71045 X-RAY EXAM CHEST 1 VIEW: CPT | Mod: 26,76

## 2018-05-31 PROCEDURE — 93306 TTE W/DOPPLER COMPLETE: CPT | Mod: 26

## 2018-05-31 PROCEDURE — 99291 CRITICAL CARE FIRST HOUR: CPT

## 2018-05-31 PROCEDURE — 99231 SBSQ HOSP IP/OBS SF/LOW 25: CPT | Mod: GC

## 2018-05-31 PROCEDURE — 70450 CT HEAD/BRAIN W/O DYE: CPT | Mod: 26,77

## 2018-05-31 PROCEDURE — 99291 CRITICAL CARE FIRST HOUR: CPT | Mod: 24

## 2018-05-31 RX ORDER — LEVETIRACETAM 250 MG/1
1000 TABLET, FILM COATED ORAL EVERY 12 HOURS
Qty: 0 | Refills: 0 | Status: DISCONTINUED | OUTPATIENT
Start: 2018-05-31 | End: 2018-06-02

## 2018-05-31 RX ORDER — SODIUM CHLORIDE 5 G/100ML
500 INJECTION, SOLUTION INTRAVENOUS
Qty: 0 | Refills: 0 | Status: DISCONTINUED | OUTPATIENT
Start: 2018-05-31 | End: 2018-05-31

## 2018-05-31 RX ORDER — MAGNESIUM SULFATE 500 MG/ML
1 VIAL (ML) INJECTION ONCE
Qty: 0 | Refills: 0 | Status: COMPLETED | OUTPATIENT
Start: 2018-05-31 | End: 2018-05-31

## 2018-05-31 RX ORDER — ACETAMINOPHEN 500 MG
650 TABLET ORAL EVERY 6 HOURS
Qty: 0 | Refills: 0 | Status: DISCONTINUED | OUTPATIENT
Start: 2018-05-31 | End: 2018-06-17

## 2018-05-31 RX ORDER — MANNITOL
25 POWDER (GRAM) MISCELLANEOUS ONCE
Qty: 0 | Refills: 0 | Status: COMPLETED | OUTPATIENT
Start: 2018-05-31 | End: 2018-05-31

## 2018-05-31 RX ORDER — LEVETIRACETAM 250 MG/1
1000 TABLET, FILM COATED ORAL ONCE
Qty: 0 | Refills: 0 | Status: COMPLETED | OUTPATIENT
Start: 2018-05-31 | End: 2018-05-31

## 2018-05-31 RX ORDER — PROPOFOL 10 MG/ML
5 INJECTION, EMULSION INTRAVENOUS
Qty: 1000 | Refills: 0 | Status: DISCONTINUED | OUTPATIENT
Start: 2018-05-31 | End: 2018-05-31

## 2018-05-31 RX ORDER — SENNA PLUS 8.6 MG/1
2 TABLET ORAL AT BEDTIME
Qty: 0 | Refills: 0 | Status: DISCONTINUED | OUTPATIENT
Start: 2018-05-31 | End: 2018-06-06

## 2018-05-31 RX ORDER — POTASSIUM PHOSPHATE, MONOBASIC POTASSIUM PHOSPHATE, DIBASIC 236; 224 MG/ML; MG/ML
30 INJECTION, SOLUTION INTRAVENOUS ONCE
Qty: 0 | Refills: 0 | Status: COMPLETED | OUTPATIENT
Start: 2018-05-31 | End: 2018-05-31

## 2018-05-31 RX ORDER — DOCUSATE SODIUM 100 MG
100 CAPSULE ORAL THREE TIMES A DAY
Qty: 0 | Refills: 0 | Status: DISCONTINUED | OUTPATIENT
Start: 2018-05-31 | End: 2018-06-02

## 2018-05-31 RX ORDER — PANTOPRAZOLE SODIUM 20 MG/1
40 TABLET, DELAYED RELEASE ORAL DAILY
Qty: 0 | Refills: 0 | Status: DISCONTINUED | OUTPATIENT
Start: 2018-05-31 | End: 2018-06-02

## 2018-05-31 RX ORDER — MIDAZOLAM HYDROCHLORIDE 1 MG/ML
2 INJECTION, SOLUTION INTRAMUSCULAR; INTRAVENOUS ONCE
Qty: 0 | Refills: 0 | Status: DISCONTINUED | OUTPATIENT
Start: 2018-05-31 | End: 2018-05-31

## 2018-05-31 RX ORDER — SODIUM CHLORIDE 5 G/100ML
500 INJECTION, SOLUTION INTRAVENOUS
Qty: 0 | Refills: 0 | Status: DISCONTINUED | OUTPATIENT
Start: 2018-05-31 | End: 2018-06-01

## 2018-05-31 RX ORDER — CEFAZOLIN SODIUM 1 G
1000 VIAL (EA) INJECTION EVERY 8 HOURS
Qty: 0 | Refills: 0 | Status: COMPLETED | OUTPATIENT
Start: 2018-05-31 | End: 2018-06-01

## 2018-05-31 RX ORDER — POTASSIUM CHLORIDE 20 MEQ
20 PACKET (EA) ORAL ONCE
Qty: 0 | Refills: 0 | Status: COMPLETED | OUTPATIENT
Start: 2018-05-31 | End: 2018-05-31

## 2018-05-31 RX ORDER — DEXTROSE MONOHYDRATE, SODIUM CHLORIDE, AND POTASSIUM CHLORIDE 50; .745; 4.5 G/1000ML; G/1000ML; G/1000ML
1000 INJECTION, SOLUTION INTRAVENOUS
Qty: 0 | Refills: 0 | Status: DISCONTINUED | OUTPATIENT
Start: 2018-05-31 | End: 2018-05-31

## 2018-05-31 RX ORDER — MANNITOL
20 POWDER (GRAM) MISCELLANEOUS ONCE
Qty: 0 | Refills: 0 | Status: COMPLETED | OUTPATIENT
Start: 2018-05-31 | End: 2018-05-31

## 2018-05-31 RX ORDER — POTASSIUM CHLORIDE 20 MEQ
10 PACKET (EA) ORAL
Qty: 0 | Refills: 0 | Status: COMPLETED | OUTPATIENT
Start: 2018-05-31 | End: 2018-05-31

## 2018-05-31 RX ORDER — SODIUM CHLORIDE 9 MG/ML
30 INJECTION INTRAMUSCULAR; INTRAVENOUS; SUBCUTANEOUS ONCE
Qty: 0 | Refills: 0 | Status: COMPLETED | OUTPATIENT
Start: 2018-05-31 | End: 2018-05-31

## 2018-05-31 RX ORDER — PROPOFOL 10 MG/ML
5 INJECTION, EMULSION INTRAVENOUS
Qty: 1000 | Refills: 0 | Status: DISCONTINUED | OUTPATIENT
Start: 2018-05-31 | End: 2018-06-01

## 2018-05-31 RX ORDER — ACETAMINOPHEN 500 MG
1000 TABLET ORAL ONCE
Qty: 0 | Refills: 0 | Status: COMPLETED | OUTPATIENT
Start: 2018-05-31 | End: 2018-05-31

## 2018-05-31 RX ORDER — FENTANYL CITRATE 50 UG/ML
50 INJECTION INTRAVENOUS ONCE
Qty: 0 | Refills: 0 | Status: DISCONTINUED | OUTPATIENT
Start: 2018-05-31 | End: 2018-05-31

## 2018-05-31 RX ORDER — ACETAMINOPHEN 500 MG
650 TABLET ORAL EVERY 6 HOURS
Qty: 0 | Refills: 0 | Status: DISCONTINUED | OUTPATIENT
Start: 2018-05-31 | End: 2018-06-21

## 2018-05-31 RX ORDER — NOREPINEPHRINE BITARTRATE/D5W 8 MG/250ML
0.05 PLASTIC BAG, INJECTION (ML) INTRAVENOUS
Qty: 8 | Refills: 0 | Status: DISCONTINUED | OUTPATIENT
Start: 2018-05-31 | End: 2018-05-31

## 2018-05-31 RX ORDER — CHLORHEXIDINE GLUCONATE 213 G/1000ML
15 SOLUTION TOPICAL
Qty: 0 | Refills: 0 | Status: DISCONTINUED | OUTPATIENT
Start: 2018-05-31 | End: 2018-06-06

## 2018-05-31 RX ADMIN — Medication 100 MILLIGRAM(S): at 22:02

## 2018-05-31 RX ADMIN — Medication 400 MILLIGRAM(S): at 06:55

## 2018-05-31 RX ADMIN — Medication 100 MILLIEQUIVALENT(S): at 05:37

## 2018-05-31 RX ADMIN — PROPOFOL 2.34 MICROGRAM(S)/KG/MIN: 10 INJECTION, EMULSION INTRAVENOUS at 17:55

## 2018-05-31 RX ADMIN — LEVETIRACETAM 400 MILLIGRAM(S): 250 TABLET, FILM COATED ORAL at 12:07

## 2018-05-31 RX ADMIN — Medication 100 MILLIEQUIVALENT(S): at 11:07

## 2018-05-31 RX ADMIN — ATORVASTATIN CALCIUM 80 MILLIGRAM(S): 80 TABLET, FILM COATED ORAL at 22:02

## 2018-05-31 RX ADMIN — SODIUM CHLORIDE 50 MILLILITER(S): 5 INJECTION, SOLUTION INTRAVENOUS at 17:48

## 2018-05-31 RX ADMIN — FENTANYL CITRATE 50 MICROGRAM(S): 50 INJECTION INTRAVENOUS at 12:00

## 2018-05-31 RX ADMIN — CHLORHEXIDINE GLUCONATE 15 MILLILITER(S): 213 SOLUTION TOPICAL at 19:12

## 2018-05-31 RX ADMIN — Medication 100 MILLIEQUIVALENT(S): at 19:12

## 2018-05-31 RX ADMIN — SENNA PLUS 2 TABLET(S): 8.6 TABLET ORAL at 22:02

## 2018-05-31 RX ADMIN — DEXTROSE MONOHYDRATE, SODIUM CHLORIDE, AND POTASSIUM CHLORIDE 100 MILLILITER(S): 50; .745; 4.5 INJECTION, SOLUTION INTRAVENOUS at 11:18

## 2018-05-31 RX ADMIN — FENTANYL CITRATE 50 MICROGRAM(S): 50 INJECTION INTRAVENOUS at 12:07

## 2018-05-31 RX ADMIN — Medication 500 GRAM(S): at 11:05

## 2018-05-31 RX ADMIN — Medication 250 GRAM(S): at 10:35

## 2018-05-31 RX ADMIN — MIDAZOLAM HYDROCHLORIDE 2 MILLIGRAM(S): 1 INJECTION, SOLUTION INTRAMUSCULAR; INTRAVENOUS at 12:07

## 2018-05-31 RX ADMIN — Medication 100 MILLIEQUIVALENT(S): at 08:47

## 2018-05-31 RX ADMIN — Medication 100 GRAM(S): at 07:28

## 2018-05-31 RX ADMIN — POTASSIUM PHOSPHATE, MONOBASIC POTASSIUM PHOSPHATE, DIBASIC 85 MILLIMOLE(S): 236; 224 INJECTION, SOLUTION INTRAVENOUS at 19:12

## 2018-05-31 RX ADMIN — SODIUM CHLORIDE 100 MILLILITER(S): 9 INJECTION, SOLUTION INTRAVENOUS at 00:30

## 2018-05-31 RX ADMIN — SODIUM CHLORIDE 4000 MILLILITER(S): 9 INJECTION INTRAMUSCULAR; INTRAVENOUS; SUBCUTANEOUS at 00:30

## 2018-05-31 RX ADMIN — SODIUM CHLORIDE 30 MILLILITER(S): 9 INJECTION INTRAMUSCULAR; INTRAVENOUS; SUBCUTANEOUS at 11:10

## 2018-05-31 RX ADMIN — Medication 1000 MILLIGRAM(S): at 07:28

## 2018-05-31 RX ADMIN — Medication 4 MILLIGRAM(S): at 11:46

## 2018-05-31 NOTE — PROGRESS NOTE ADULT - SUBJECTIVE AND OBJECTIVE BOX
=================================  NEUROCRITICAL CARE ATTENDING NOTE  =================================    VERONIKA CALERO   MRN-[id number]  Summary:  58F with h/o breast cancer, s/p TRAM flap reconstruction, recently started on plavix presented with painful bulge in R abdomen.  Imaging showed large expanding intramuscular hematoma R rects abdominis.  Admitted to surgery , diagnostic angio, R abd percutaneous thrombin injection of 2 pseudoaneurysms.  Post-procedure, noted to be hypotensive, low respiratory rate, altered mental status.  Naloxone given with improved respiratory status, but AMS persisted and noted L-sided weakness.  Stroke code called.  Post-op, noted  L UE hemiplegia / L LE weakness.  Stroke code called.  CT showed large R MCA small SELINA infarcts, with mass effect.  Mannitol 50g IV given, 23.4% x1 given.  Intubated for airway protection.  Seizure noted during intubation, given 4 ativan, started on propofol drip.  Brought to OR for decompressive hemicraniectomy.  Overnight Events: No significant events overnight.    Past Medical History: Anxiety Hypertension Breast CA dyslipidemia CAD Hypertension   Allergies:  No Known Allergies  Home Meds: bisoprolol 5mg qd, lorazepam 1mg q8?, Plavix 5qd, rosuvastatin, HCTZ, buproprion, baclofen  Social: 1/2 bottle of wine most nights, remote cigarette hx, denies IVDA     PHYSICAL EXAMINATION  T(C): 36.8 ( @ 09:00), Max: 36.8 ( @ 01:30) HR: 112 ( @ 12:15) (56 - 112) BP: 177/109 ( @ 12:15) (80/64 - 193/87) RR: 22 ( @ 12:15) (5 - 55) SpO2: 100% ( @ 12:15) (86% - 100%)  NEUROLOGIC EXAMINATION:  Patient does not open eyes, does not follow commands, no verbal output, R gaze preference, pupils 3mm reactive and equal, moves R UE/LE spontaneously, withdraws to noxious on L UE/LE   GENERAL:  not intubated  EENT: anicteric  CARDIOVASC:  (+) S1 S2, tachycardic and regular rhythm  PULMONARY:  clear to auscultation bilaterally  ABDOMEN:  soft, nontender, with normoactive bowel sounds  EXTREMITIES:  no edema  SKIN:  no rash    LABS:  CAPILLARY BLOOD GLUCOSE 142 154 122 129 213               9.5    15.1  )-----------( 252      ( 31 May 2018 10:55 )             28.1     137  |  101  |  6<L>  ----------------------------<  144<H>  see note   |  20<L>  |  0.52    Ca    8.2<L>      31 May 2018 10:55  Phos  1.9       Mg     2.2         TPro  5.5<L>  /  Alb  3.3  /  TBili  0.9  /  DBili  x   /  AST  27  /  ALT  19  /  AlkPhos  64   @ 07:01  -   @ 07:00  IN: 1706 mL / OUT: 2420 mL / NET: -714 mL    Bacteriology:  CSF studies:  EEG:  Neuroimagin/31 CT head: evolving R MCA / SELINA infarction, global mass effect, MLS to L, early L lateral ventricle entrapment, hemorrhagic transformation suspected   CTP:  abnormal perfusion, mismatch volume 12ml   CTA:  acute occlusion R M2   CT head:  acute R MCA infarction, no ICH   CT abd:  large expanding intramuscular hematoma within R rectus abdominiw  Other imaging:    MEDICATIONS: atorvastatin 80mg HS mod ISS ativan IV q6h    IV FLUIDS:  DRIPS: levophed, propofol  DIET: NPO  Lines: Lyons  Drains:  Eddy  Wounds:    CODE STATUS:  Full Code                       GOALS OF CARE:  aggressive                      DISPOSITION:  ICU

## 2018-05-31 NOTE — STROKE CODE NOTE - NIH STROKE SCALE: 6B. MOTOR LEG, RIGHT, QM
(2) Some effort against gravity
(3) No effort against gravity/Patient would have spontaneous movement but not against gravity and not on command

## 2018-05-31 NOTE — CONSULT NOTE ADULT - SUBJECTIVE AND OBJECTIVE BOX
**STROKE CODE CONSULT NOTE**    Last known well time/Time of onset of symptoms: Stroke code called at 09:55 for worsening headache this morning    HPI: HPI:  58F with a hx of left breast cancer s/p left mastectomy with TRAM flap reconstruction (4-5 years prior), recently placed on plavix (about a month ago) by her cardiologist 2/2 calcified arterial disease and is now presenting to the [facility] ED with a painful bulge in her right abdomen.  Pt reports that she's had a cough for several days and yesterday noticed a bulge in her right abdomen that has grown and only become more painful.  She at times has difficulty breathing and some lightheadedness but otherwise denies fevers, chills, chest pain, nausea, vomiting, diarrhea, dysuria.    PMH: HTN, CAD?, HLD, Anxiety, Depression  Meds: bisoprolol 5mg qd, lorazepam 1mg q8?, Plavix 5qd, rosuvastatin, HCTZ, buproprion, baclofen  NKDA  PSH: Left mastectomy with TRAM rotational flap reconstruction  Social: 1/2 bottle of wine most nights, remote cigarette hx, denies IVDA (30 May 2018 13:13)    Stroke code called at 20:04 for new alteration of mental status and L sided weakness      PAST MEDICAL & SURGICAL HISTORY:  Anxiety  Hypertension  Breast CA  Previous  section  History of mastectomy, left: with TRAM flap recon      FAMILY HISTORY: As above      SOCIAL HISTORY: As above    ROS:  Unable to obtain    MEDICATIONS  (STANDING):  atorvastatin 80 milliGRAM(s) Oral at bedtime  dextrose 5%. 1000 milliLiter(s) (50 mL/Hr) IV Continuous <Continuous>  dextrose 50% Injectable 12.5 Gram(s) IV Push once  dextrose 50% Injectable 25 Gram(s) IV Push once  dextrose 50% Injectable 25 Gram(s) IV Push once  insulin lispro (HumaLOG) corrective regimen sliding scale   SubCutaneous every 6 hours  LORazepam   Injectable 0.5 milliGRAM(s) IV Push every 6 hours  mannitol 20% IVPB 25 Gram(s) IV Intermittent once  norepinephrine Infusion 0.05 MICROgram(s)/kG/Min (7.313 mL/Hr) IV Continuous <Continuous>  sodium chloride 0.9% 1000 milliLiter(s) (100 mL/Hr) IV Continuous <Continuous>  sodium chloride 0.9% with potassium chloride 20 mEq/L 1000 milliLiter(s) (100 mL/Hr) IV Continuous <Continuous>  sodium chloride 23.4% Injectable 30 milliLiter(s) IV Push once  sodium chloride 3%. 500 milliLiter(s) (30 mL/Hr) IV Continuous <Continuous>    MEDICATIONS  (PRN):  dextrose 40% Gel 15 Gram(s) Oral once PRN Blood Glucose LESS THAN 70 milliGRAM(s)/deciliter  glucagon  Injectable 1 milliGRAM(s) IntraMuscular once PRN Glucose LESS THAN 70 milligrams/deciliter  ondansetron Injectable 4 milliGRAM(s) IV Push every 6 hours PRN Nausea      Allergies    No Known Allergies    Intolerances        Vital Signs Last 24 Hrs  T(C): 36.8 (31 May 2018 09:00), Max: 36.8 (31 May 2018 01:30)  T(F): 98.3 (31 May 2018 09:00), Max: 98.3 (31 May 2018 09:00)  HR: 72 (31 May 2018 10:45) (56 - 102)  BP: 170/86 (31 May 2018 10:45) (80/64 - 182/93)  BP(mean): 138 (31 May 2018 10:45) (81 - 152)  RR: 19 (31 May 2018 10:45) (5 - 28)  SpO2: 99% (31 May 2018 10:45) (86% - 100%)    PHYSICAL EXAM:  Constitutional: WDWN; NAD  Cardiovascular: RRR, no appreciable murmurs; no carotid bruits  Neurologic:  Mental status: Awake, alert, oriented to self, unable to relay date or time. Unable to assess memory. Attention/concentration poor. Noted dysarthria, and becoming progressively more aphasic during examination.    Cranial nerves: PERRLA roughly 3mm each, Patient not able to track past midline to the left on examination, able to track to the R, corneal reflexes positive b/l, only protecting to visual threat on the R, not L visual fields, notable L sided facial droop not correcting with intent    Motor:  Normal bulk and tone, strength 5/5 in RUE and RLE, patient with minimal to no movement of LUE and LLE.   strength 5/5 in RUE.  Not participating with rapid repetitive movements.  Sensation: Withdraw from pain in RUE and RLE, not present in LUE and LLE  Coordination: No ataxia though limited in evaluation  Reflexes: downgoing toes bilaterally  Gait: Deferred    NIHSS: 21 - please refer to stroke code note    Fingerstick Blood Glucose: CAPILLARY BLOOD GLUCOSE  154 (31 May 2018 10:44)      POCT Blood Glucose.: 154 mg/dL (31 May 2018 10:16)       LABS:                        9.5    15.1  )-----------( 252      ( 31 May 2018 10:55 )             28.1         140  |  105  |  9   ----------------------------<  118<H>  3.2<L>   |  23  |  0.58    Ca    7.7<L>      31 May 2018 04:21  Phos  3.5       Mg     1.8         TPro  5.5<L>  /  Alb  3.3  /  TBili  0.9  /  DBili  x   /  AST  27  /  ALT  19  /  AlkPhos  64  05-    PT/INR - ( 31 May 2018 04:21 )   PT: 11.3 sec;   INR: 1.02          PTT - ( 31 May 2018 04:21 )  PTT:22.0 sec          RADIOLOGY & ADDITIONAL STUDIES:   CT Had: Evolving large right MCA and smaller SELINA infarctions with development of   global mass effect and midline shift to the left. Early left lateral   ventricle entrapment. Subcentimeter focus of hemorrhagic transformation   is suspected. Close interval follow-up is advised.    IV-tPA (Y/N): N                            Bolus time: N/A  Reason IV-tPA not given: Intra-abdominal bleeding s/p thrombin injection    ASSESSMENT/PLAN: **STROKE CODE CONSULT NOTE**    Last known well time/Time of onset of symptoms: Stroke code called at 09:55 for worsening headache this morning    HPI: HPI:  58F with a hx of left breast cancer s/p left mastectomy with TRAM flap reconstruction (4-5 years prior), recently placed on plavix (about a month ago) by her cardiologist 2/2 calcified arterial disease and is now presenting to the [facility] ED with a painful bulge in her right abdomen.  Pt reports that she's had a cough for several days and yesterday noticed a bulge in her right abdomen that has grown and only become more painful.  She at times has difficulty breathing and some lightheadedness but otherwise denies fevers, chills, chest pain, nausea, vomiting, diarrhea, dysuria.    PMH: HTN, CAD?, HLD, Anxiety, Depression  Meds: bisoprolol 5mg qd, lorazepam 1mg q8?, Plavix 5qd, rosuvastatin, HCTZ, buproprion, baclofen  NKDA  PSH: Left mastectomy with TRAM rotational flap reconstruction  Social: 1/2 bottle of wine most nights, remote cigarette hx, denies IVDA (30 May 2018 13:13)    Stroke code called at 20:04 for new alteration of mental status and L sided weakness      PAST MEDICAL & SURGICAL HISTORY:  Anxiety  Hypertension  Breast CA  Previous  section  History of mastectomy, left: with TRAM flap recon      FAMILY HISTORY: As above      SOCIAL HISTORY: As above    ROS:  Unable to obtain    MEDICATIONS  (STANDING):  atorvastatin 80 milliGRAM(s) Oral at bedtime  dextrose 5%. 1000 milliLiter(s) (50 mL/Hr) IV Continuous <Continuous>  dextrose 50% Injectable 12.5 Gram(s) IV Push once  dextrose 50% Injectable 25 Gram(s) IV Push once  dextrose 50% Injectable 25 Gram(s) IV Push once  insulin lispro (HumaLOG) corrective regimen sliding scale   SubCutaneous every 6 hours  LORazepam   Injectable 0.5 milliGRAM(s) IV Push every 6 hours  mannitol 20% IVPB 25 Gram(s) IV Intermittent once  norepinephrine Infusion 0.05 MICROgram(s)/kG/Min (7.313 mL/Hr) IV Continuous <Continuous>  sodium chloride 0.9% 1000 milliLiter(s) (100 mL/Hr) IV Continuous <Continuous>  sodium chloride 0.9% with potassium chloride 20 mEq/L 1000 milliLiter(s) (100 mL/Hr) IV Continuous <Continuous>  sodium chloride 23.4% Injectable 30 milliLiter(s) IV Push once  sodium chloride 3%. 500 milliLiter(s) (30 mL/Hr) IV Continuous <Continuous>    MEDICATIONS  (PRN):  dextrose 40% Gel 15 Gram(s) Oral once PRN Blood Glucose LESS THAN 70 milliGRAM(s)/deciliter  glucagon  Injectable 1 milliGRAM(s) IntraMuscular once PRN Glucose LESS THAN 70 milligrams/deciliter  ondansetron Injectable 4 milliGRAM(s) IV Push every 6 hours PRN Nausea      Allergies    No Known Allergies    Intolerances        Vital Signs Last 24 Hrs  T(C): 36.8 (31 May 2018 09:00), Max: 36.8 (31 May 2018 01:30)  T(F): 98.3 (31 May 2018 09:00), Max: 98.3 (31 May 2018 09:00)  HR: 72 (31 May 2018 10:45) (56 - 102)  BP: 170/86 (31 May 2018 10:45) (80/64 - 182/93)  BP(mean): 138 (31 May 2018 10:45) (81 - 152)  RR: 19 (31 May 2018 10:45) (5 - 28)  SpO2: 99% (31 May 2018 10:45) (86% - 100%)    PHYSICAL EXAM:  Constitutional: WDWN; NAD  Cardiovascular: RRR, no appreciable murmurs; no carotid bruits  Neurologic:  Mental status: Awake, alert, oriented to self, unable to relay date or time. Unable to assess memory. Attention/concentration poor. Noted dysarthria, and becoming progressively more aphasic during examination.    Cranial nerves: PERRLA roughly 3mm each, Patient not able to track past midline to the left on examination, able to track to the R, corneal reflexes positive b/l, only protecting to visual threat on the R, not L visual fields, notable L sided facial droop not correcting with intent; unable to test gag as patient biting down on all examination equipment, unable to be reoriented  Motor:  Normal bulk and tone, strength 5/5 in RUE and RLE, patient with minimal to no movement of LUE and LLE.   strength 5/5 in RUE.  Not participating with rapid repetitive movements.  Sensation: Withdraw from pain in RUE and RLE, not present in LUE and LLE  Coordination: No ataxia though limited in evaluation  Reflexes: downgoing toes bilaterally  Gait: Deferred    NIHSS: 21 - please refer to stroke code note    Fingerstick Blood Glucose: CAPILLARY BLOOD GLUCOSE  154 (31 May 2018 10:44)      POCT Blood Glucose.: 154 mg/dL (31 May 2018 10:16)       LABS:                        9.5    15.1  )-----------( 252      ( 31 May 2018 10:55 )             28.1         140  |  105  |  9   ----------------------------<  118<H>  3.2<L>   |  23  |  0.58    Ca    7.7<L>      31 May 2018 04:21  Phos  3.5       Mg     1.8         TPro  5.5<L>  /  Alb  3.3  /  TBili  0.9  /  DBili  x   /  AST  27  /  ALT  19  /  AlkPhos  64      PT/INR - ( 31 May 2018 04:21 )   PT: 11.3 sec;   INR: 1.02          PTT - ( 31 May 2018 04:21 )  PTT:22.0 sec          RADIOLOGY & ADDITIONAL STUDIES:   CT Had: Evolving large right MCA and smaller SELINA infarctions with development of   global mass effect and midline shift to the left. Early left lateral   ventricle entrapment. Subcentimeter focus of hemorrhagic transformation   is suspected. Close interval follow-up is advised.    IV-tPA (Y/N): N                            Bolus time: N/A  Reason IV-tPA not given: Intra-abdominal bleeding s/p thrombin injection    ASSESSMENT/PLAN: 59yo F with PMHx of HTN, HLD, anxiety/depression, left breast cancer s/p left mastectomy with TRAM flap reconstruction (4-5 years prior), recently on Plavix (~1 month) by her cardiologist 2/2 calcified arterial disease, being treated for expanding intramuscular hematoma within the right rectus abdominis s/p IR management. Courseourse complicated with hypotension and ?compromise in ventilation 2/2 medications s/p Narcan. Patient with persistent alteration of mental status last night, now with worsening headache and mental status.  - Stroke code called at 09:55 to evaluate symptoms  - Large R MCA and small SELINA infarcts seen evolving with mass effect and midline shift of left on CT w/L lateral ventricle entrapment with suspicion for hemorrhagic transformation  - Start Mannitol 50g IV, begin 3% saline infusion at 30cc/hr  - Patient is a candidate for mechanical thrombectomy, Neurosurgery attending aware of patient's change in status  - Further care per Neurosurgery team **STROKE CODE CONSULT NOTE**    Last known well time/Time of onset of symptoms: Stroke code called at 09:55 for worsening headache this morning    HPI: HPI:  58F with a hx of left breast cancer s/p left mastectomy with TRAM flap reconstruction (4-5 years prior), recently placed on plavix (about a month ago) by her cardiologist 2/2 calcified arterial disease and is now presenting to the [facility] ED with a painful bulge in her right abdomen.  Pt reports that she's had a cough for several days and yesterday noticed a bulge in her right abdomen that has grown and only become more painful.  She at times has difficulty breathing and some lightheadedness but otherwise denies fevers, chills, chest pain, nausea, vomiting, diarrhea, dysuria.    PMH: HTN, CAD?, HLD, Anxiety, Depression  Meds: bisoprolol 5mg qd, lorazepam 1mg q8?, Plavix 5qd, rosuvastatin, HCTZ, buproprion, baclofen  NKDA  PSH: Left mastectomy with TRAM rotational flap reconstruction  Social: 1/2 bottle of wine most nights, remote cigarette hx, denies IVDA (30 May 2018 13:13)    Stroke code called at 20:04 for new alteration of mental status and L sided weakness      PAST MEDICAL & SURGICAL HISTORY:  Anxiety  Hypertension  Breast CA  Previous  section  History of mastectomy, left: with TRAM flap recon      FAMILY HISTORY: As above      SOCIAL HISTORY: As above    ROS:  Unable to obtain    MEDICATIONS  (STANDING):  atorvastatin 80 milliGRAM(s) Oral at bedtime  dextrose 5%. 1000 milliLiter(s) (50 mL/Hr) IV Continuous <Continuous>  dextrose 50% Injectable 12.5 Gram(s) IV Push once  dextrose 50% Injectable 25 Gram(s) IV Push once  dextrose 50% Injectable 25 Gram(s) IV Push once  insulin lispro (HumaLOG) corrective regimen sliding scale   SubCutaneous every 6 hours  LORazepam   Injectable 0.5 milliGRAM(s) IV Push every 6 hours  mannitol 20% IVPB 25 Gram(s) IV Intermittent once  norepinephrine Infusion 0.05 MICROgram(s)/kG/Min (7.313 mL/Hr) IV Continuous <Continuous>  sodium chloride 0.9% 1000 milliLiter(s) (100 mL/Hr) IV Continuous <Continuous>  sodium chloride 0.9% with potassium chloride 20 mEq/L 1000 milliLiter(s) (100 mL/Hr) IV Continuous <Continuous>  sodium chloride 23.4% Injectable 30 milliLiter(s) IV Push once  sodium chloride 3%. 500 milliLiter(s) (30 mL/Hr) IV Continuous <Continuous>    MEDICATIONS  (PRN):  dextrose 40% Gel 15 Gram(s) Oral once PRN Blood Glucose LESS THAN 70 milliGRAM(s)/deciliter  glucagon  Injectable 1 milliGRAM(s) IntraMuscular once PRN Glucose LESS THAN 70 milligrams/deciliter  ondansetron Injectable 4 milliGRAM(s) IV Push every 6 hours PRN Nausea      Allergies    No Known Allergies    Intolerances        Vital Signs Last 24 Hrs  T(C): 36.8 (31 May 2018 09:00), Max: 36.8 (31 May 2018 01:30)  T(F): 98.3 (31 May 2018 09:00), Max: 98.3 (31 May 2018 09:00)  HR: 72 (31 May 2018 10:45) (56 - 102)  BP: 170/86 (31 May 2018 10:45) (80/64 - 182/93)  BP(mean): 138 (31 May 2018 10:45) (81 - 152)  RR: 19 (31 May 2018 10:45) (5 - 28)  SpO2: 99% (31 May 2018 10:45) (86% - 100%)    PHYSICAL EXAM:  Constitutional: WDWN; NAD  Cardiovascular: RRR, no appreciable murmurs; no carotid bruits  Neurologic:  Mental status: Awake, alert, oriented to self, unable to relay date or time. Unable to assess memory. Attention/concentration poor. Noted dysarthria, and becoming progressively more aphasic during examination.    Cranial nerves: PERRLA roughly 3mm each, Patient not able to track past midline to the left on examination, able to track to the R, corneal reflexes positive b/l, only protecting to visual threat on the R, not L visual fields, notable L sided facial droop not correcting with intent; unable to test gag as patient biting down on all examination equipment, unable to be reoriented  Motor:  Normal bulk and tone, strength 5/5 in RUE and RLE, patient with minimal to no movement of LUE and LLE.   strength 5/5 in RUE.  Not participating with rapid repetitive movements.  Sensation: Withdraw from pain in RUE and RLE, not present in LUE and LLE  Coordination: No ataxia though limited in evaluation  Reflexes: downgoing toes bilaterally  Gait: Deferred    NIHSS: 21 - please refer to stroke code note    Fingerstick Blood Glucose: CAPILLARY BLOOD GLUCOSE  154 (31 May 2018 10:44)      POCT Blood Glucose.: 154 mg/dL (31 May 2018 10:16)       LABS:                        9.5    15.1  )-----------( 252      ( 31 May 2018 10:55 )             28.1         140  |  105  |  9   ----------------------------<  118<H>  3.2<L>   |  23  |  0.58    Ca    7.7<L>      31 May 2018 04:21  Phos  3.5       Mg     1.8         TPro  5.5<L>  /  Alb  3.3  /  TBili  0.9  /  DBili  x   /  AST  27  /  ALT  19  /  AlkPhos  64      PT/INR - ( 31 May 2018 04:21 )   PT: 11.3 sec;   INR: 1.02          PTT - ( 31 May 2018 04:21 )  PTT:22.0 sec          RADIOLOGY & ADDITIONAL STUDIES:   CT Had: Evolving large right MCA and smaller SELINA infarctions with development of   global mass effect and midline shift to the left. Early left lateral   ventricle entrapment. Subcentimeter focus of hemorrhagic transformation   is suspected. Close interval follow-up is advised.    IV-tPA (Y/N): N                            Bolus time: N/A  Reason IV-tPA not given: Intra-abdominal bleeding s/p thrombin injection    ASSESSMENT/PLAN: 57yo F with PMHx of HTN, HLD, anxiety/depression, left breast cancer s/p left mastectomy with TRAM flap reconstruction (4-5 years prior), recently on Plavix (~1 month) by her cardiologist 2/2 calcified arterial disease, being treated for expanding intramuscular hematoma within the right rectus abdominis s/p IR management. Courseourse complicated with hypotension and ?compromise in ventilation 2/2 medications s/p Narcan. Patient with persistent alteration of mental status last night, now with worsening headache and mental status.  - Stroke code called at 09:55 to evaluate symptoms  - Large R MCA and small SELINA infarcts seen evolving with mass effect and midline shift of left on CT w/L lateral ventricle entrapment with suspicion for hemorrhagic transformation  - Start Mannitol 50g IV, begin 3% saline infusion at 30cc/hr  - Given repetitive shaking of RUE, would Keppra load with 1000mg now, then 500mg BID  - Patient is a candidate for mechanical thrombectomy, Neurosurgery attending aware of patient's change in status  - Further care per Neurosurgery team

## 2018-05-31 NOTE — CHART NOTE - NSCHARTNOTEFT_GEN_A_CORE
Pt noticed to have LUE and LLE weakness at 2000 upon admission to [facility]. Stroke code called. CT head showed acute infarct in distribution of right MCA. CTA head showed acute occlusion of distal M2 segment. She is not a candidate for systemic tPA given abdominal wall hemorrhage today. CT perfusion head imaging unable to be performed due to patient moving too much, and it was determined that risks of sedating her to keep her still outweighed the benefits. Per neurology (Dr. Moses), she is not a candidate for mechanical thrombectomy due to distal nature of occlusion. Neurology recommends keeping MAP above 90, using vasopressors if necessary. We have contacted neurosurgery/neurointerventional for second opinion on thrombectomy, will await their recommendations.    Case discussed with Jose Mckinney and Aurelia, general surgery team, and neurosurgery/neurointerventional team. Pt noticed to have LUE and LLE weakness at 2000 on 5/30/18 upon admission to [facility]. Stroke code called. CT head showed acute infarct in distribution of right MCA. CTA head showed acute occlusion of distal M2 segment. She is not a candidate for systemic tPA given abdominal wall hemorrhage today. CT perfusion head imaging unable to be performed due to patient moving too much, and it was determined that risks of sedating her to keep her still outweighed the benefits. Per neurology (Dr. Moses), she is not a candidate for mechanical thrombectomy due to distal nature of occlusion. Neurology recommends keeping MAP above 90, using vasopressors if necessary. We have contacted neurosurgery/neurointerventional for second opinion on thrombectomy, will await their recommendations.    Case discussed with Jose Mckinney and Aurelia, general surgery team, and neurosurgery/neurointerventional team. Pt noticed to have LUE and LLE weakness at 2000 on 5/30/18 upon admission to [facility]. Stroke code called. CT head showed acute infarct in distribution of right MCA. CTA head showed acute occlusion of distal M2 segment. She is not a candidate for systemic tPA given abdominal wall hemorrhage today. Per the CT tech Johnathan, CT perfusion head scan was performed but suboptimal due to patient moving too much, and it was determined that risks of sedating her to keep her still outweighed the benefits. Tech stated he would still upload the images anyway, but upon further review, images were actually never acquired by tech.    Per neurology (Dr. Moses), she is not a candidate for mechanical thrombectomy due to distal nature of occlusion based on CTA, and repeating perfusion imaging would be of no yield at this time in terms of deciding on thrombectomy or not. Neurology recommends keeping MAP above 90, using vasopressors if necessary.     SICU team has contacted neurosurgery/neurointerventional team for second opinion on thrombectomy; will await their recommendations.    Case discussed with Jose Mckinney and Aurelia, general surgery team, and neurosurgery/neurointerventional team. Pt noticed to have LUE and LLE weakness at 2000 on 5/30/18 upon admission to [facility]. Stroke code called. CT head showed acute infarct in distribution of right MCA. CTA head showed acute occlusion of distal M2 segment. She is not a candidate for systemic tPA given abdominal wall hemorrhage today. Per the CT tech Jonhathan, CT perfusion head scan was performed but suboptimal due to patient moving too much, and it was determined that risks of sedating her to keep her still outweighed the benefits. Tech stated he would still upload the images anyway, but upon further review, images were actually never acquired by tech.    Per neurology (Dr. Moses), she is not a candidate for mechanical thrombectomy due to distal nature of occlusion based on CTA, and repeating perfusion imaging would be of no yield at this time in terms of deciding on thrombectomy or not. Neurology recommends keeping MAP above 90, using vasopressors if necessary.     SICU team has contacted neurosurgery/neurointerventional team for second opinion on thrombectomy. Per Dr. Maciel, he does not recommend thrombectomy as well due to distal location of occlusion and existing ischemic changes with risk of conversion to hemorrhagic stroke. Goal sodium greater than 140 for now.    Case discussed with Jose Mckinney and Aurelia, general surgery team, and neurosurgery/neurointerventional team.

## 2018-05-31 NOTE — PROGRESS NOTE ADULT - SUBJECTIVE AND OBJECTIVE BOX
Asked to intubate this unresponsive patient S/P massive intracranial for airway protection.    Patient actively seizing prior to intubation.    Propofol 50mg plus succinylcholine 60mg given.    UDV of VC times one atraumatic attempt passed 7.5mm cuffed ETT without event.    Cuff inflated BS=BL,, qualitative CO2 positive. SAT=100%    Patient to go emergently to OR.

## 2018-05-31 NOTE — PROCEDURE NOTE - NSPROCDETAILS_GEN_ALL_CORE
location identified, draped/prepped, sterile technique used, needle inserted/introduced/connected to a pressurized flush line/hemostasis with direct pressure, dressing applied/Seldinger technique/all materials/supplies accounted for at end of procedure/positive blood return obtained via catheter/sutured in place
sterile dressing applied/lumen(s) aspirated and flushed/sterile technique, catheter placed/guidewire recovered

## 2018-05-31 NOTE — PROGRESS NOTE ADULT - ASSESSMENT
58F with abdominal wall hematoma, s/p percutaneous thrombin injection, right inferior epigastric artery embolization and angiogram of the right internal mammary artery (with no embolization) now with large R MCA infarct, possible hemorrhagic transformation. Given the location of the patient's infarct, there is a possibility that this could have been caused by intra-procedural catheter manipulation when catheterizing the right internal mammary artery to perform R MELINA angiogram. Tortuosity of the proximal R subclavian artery was noted, which made catheterization of the R MELINA somewhat difficult. Please see details of procedure above and in PACS.   The above was discussed with Dr. Bentley, the attending neurologist.     -Continue care as per Neurosurgery and Neurology teams  -Will follow patient post-operatively

## 2018-05-31 NOTE — CONSULT NOTE ADULT - SUBJECTIVE AND OBJECTIVE BOX
**STROKE CODE CONSULT NOTE**    Last known well time/Time of onset of symptoms: 4:30pm    HPI: patient is a 59yo F with PMHx of HTN, HLD, anxiety/depression, left breast cancer s/p left mastectomy with TRAM flap reconstruction (4-5 years prior), recently placed on plavix (about a month ago) by her cardiologist 2/2 calcified arterial disease who presented to [facility] with a painful bulge in her right abdomen. CT A&P done showed a large expanding intramuscular hematoma within the right rectus abdominis, with evidence of active contrast extravasation internally. Patient went for IR and was found to have 2 pseudo-aneurysms of the artery supplying the R rectus abd muscle and had ultrasound guided thrombin injection of them. During the procedure patient received 125 of fentanyl, versed and dilaudid. After the procedure patient was in the waiting area off monitor and when reassessed (about 10min later) she was found to be hypotensive with low respiratory rate as well as altered mental status for which patient got Narcan with improvement in respiratory status but not in mental status. At 8:04pm stroke code called for persistent AMS and left sided weakness. Unable to obtain history from patient given that she is unable to cooperate.    PAST MEDICAL & SURGICAL HISTORY:  Anxiety  Hypertension  Breast CA  Previous  section  History of mastectomy, left: with TRAM flap recon    SOCIAL HISTORY:  Unable to assess.    ROS:  Unable to assess.     MEDICATIONS  (STANDING):  atorvastatin 80 milliGRAM(s) Oral at bedtime  dextrose 5%. 1000 milliLiter(s) (50 mL/Hr) IV Continuous <Continuous>  dextrose 50% Injectable 12.5 Gram(s) IV Push once  dextrose 50% Injectable 25 Gram(s) IV Push once  dextrose 50% Injectable 25 Gram(s) IV Push once  insulin lispro (HumaLOG) corrective regimen sliding scale   SubCutaneous three times a day before meals  norepinephrine Infusion 0.05 MICROgram(s)/kG/Min (7.313 mL/Hr) IV Continuous <Continuous>  sodium chloride 0.9% 1000 milliLiter(s) (100 mL/Hr) IV Continuous <Continuous>  sodium chloride 0.9% Bolus 1000 milliLiter(s) IV Bolus once  sodium chloride 0.9%. 1000 milliLiter(s) (100 mL/Hr) IV Continuous <Continuous>    MEDICATIONS  (PRN):  dextrose 40% Gel 15 Gram(s) Oral once PRN Blood Glucose LESS THAN 70 milliGRAM(s)/deciliter  glucagon  Injectable 1 milliGRAM(s) IntraMuscular once PRN Glucose LESS THAN 70 milligrams/deciliter  ondansetron Injectable 4 milliGRAM(s) IV Push every 6 hours PRN Nausea    Allergies  No Known Allergies    Vital Signs Last 24 Hrs  T(C): 36.8 (31 May 2018 01:30), Max: 36.8 (31 May 2018 01:30)  T(F): 98.2 (31 May 2018 01:30), Max: 98.2 (31 May 2018 01:30)  HR: 66 (31 May 2018 01:30) (61 - 92)  BP: 143/76 (31 May 2018 01:30) (80/64 - 175/105)  BP(mean): 91 (31 May 2018 01:30) (81 - 99)  RR: 20 (31 May 2018 01:30) (8 - 20)  SpO2: 100% (31 May 2018 01:30) (96% - 100%)    PHYSICAL EXAM:  Constitutional: WDWN; NAD  Cardiovascular: RRR, no appreciable murmurs; no carotid bruits  Neurologic:  Mental status: Awake, alert and oriented x3.  Recent and remote memory intact.  Naming, repetition and comprehension intact.  Attention/concentration intact.  No dysarthria, no aphasia.  Fund of knowledge appropriate.    Cranial nerves: Fundoscopic exam demonstrated no abnormalities, pupils equally round and reactive to light, visual fields full, no nystagmus, extraocular muscles intact, V1 through V3 intact bilaterally and symmetric, face symmetric, hearing intact to finger rub, palate elevation symmetric, tongue was midline, sternocleidomastoid/shoulder shrug strength bilaterally 5/5.    Motor:  Normal bulk and tone, strength 5/5 in bilateral upper and lower extremities.   strength 5/5.  Rapid alternating movements intact and symmetric.   Sensation: Intact to light touch, proprioception, and pinprick.  No neglect.   Coordination: No dysmetria on finger-to-nose and heel-to-shin.  No clumsiness.  Reflexes: 2+ in upper and lower extremities, downgoing toes bilaterally  Gait: Narrow and steady. No ataxia.  Romberg negative     NIH Stroke Scale:   · NIH Stroke Scale: LOC	(2) Not alert; requires repeated stimulation to attend, or is obtunded and requires strong or painful stimulation to make movements (not stereotyped)	  · NIH Stroke Scale: LOC Question	(2) Answers neither question correctly	  · NIH Stroke Scale: LOC Command	(2) Performs neither task correctly	  · NIH Stroke Scale: Gaze	(0) Normal	  · NIH Stroke Scale: Visual	(0) No visual loss	  · NIH Stroke Scale: Facial	(1) Minor paralysis (flattened nasolabial fold, asymmetry on smiling)	  · NIH Stroke Scale: Arm Left	(4) No movement	  · NIH Stroke Scale: Arm Right	(3) No effort against gravity; Patient would have spontaneous movement but not against gravity and not on command	  · NIH Stroke Scale: Leg Left	(4) No movement	  · NIH Stroke Scale: Leg Right	(3) No effort against gravity; Patient would have spontaneous movement but not against gravity and not on command	  · NIH Stroke Scale: Ataxia	(0) Absent  Unable to assess since patient not following commands	  · NIH Stroke Scale: Sensory	(0) Normal; no sensory loss	  · NIH Stroke Scale: Language	(3) Mute, global aphasia; no usable speech or auditory comprehension	  · NIH Stroke Scale: Dysarthria	(UN) Intubated or other physical barrier	  · NIH Stroke Scale: Extinct Inattention	(2) Profound anselmo-inattention/extinction more than 1 modality	  · NIH Stroke Scale: Total	26	    Unable to fully assess gaze since patient not cooperating. Also ataxia not able to be assessed for same reason.     Fingerstick Blood Glucose: CAPILLARY BLOOD GLUCOSE  129 (31 May 2018 01:14)    POCT Blood Glucose.: 129 mg/dL (30 May 2018 20:16)    LABS:                        7.5    14.7  )-----------( 389      ( 30 May 2018 19:17 )             23.3     05-30    138  |  103  |  12  ----------------------------<  190<H>  3.5   |  22  |  0.61    Ca    8.1<L>      30 May 2018 19:17  Phos  4.7     05-30  Mg     1.9     05-30    TPro  5.6<L>  /  Alb  3.2<L>  /  TBili  0.6  /  DBili  x   /  AST  25  /  ALT  19  /  AlkPhos  69  05-30    PT/INR - ( 30 May 2018 11:52 )   PT: 10.8 sec;   INR: 0.97       PTT - ( 30 May 2018 11:52 )  PTT:22.4 sec    RADIOLOGY & ADDITIONAL STUDIES:    IV-tPA (Y/N):            No               Reason IV-tPA not given:      patient with intraabdominal bleed **STROKE CODE CONSULT NOTE**    Last known well time/Time of onset of symptoms: 4:30pm    HPI: patient is a 59yo F with PMHx of HTN, HLD, anxiety/depression, left breast cancer s/p left mastectomy with TRAM flap reconstruction (4-5 years prior), recently placed on plavix (about a month ago) by her cardiologist 2/2 calcified arterial disease who presented to [facility] with a painful bulge in her right abdomen. CT A&P done showed a large expanding intramuscular hematoma within the right rectus abdominis, with evidence of active contrast extravasation internally. Patient went for IR and was found to have 2 pseudo-aneurysms of the artery supplying the R rectus abd muscle and had ultrasound guided thrombin injection of them. During the procedure patient received 125 of fentanyl, versed and dilaudid. After the procedure patient was in the waiting area off monitor and when reassessed (about 10min later) she was found to be hypotensive with low respiratory rate as well as altered mental status for which patient got Narcan with improvement in respiratory status but not in mental status. At 8:04pm stroke code called for persistent AMS and left sided weakness. Unable to obtain history from patient given that she is unable to cooperate.    PAST MEDICAL & SURGICAL HISTORY:  Anxiety  Hypertension  Breast CA  Previous  section  History of mastectomy, left: with TRAM flap recon    SOCIAL HISTORY:  Unable to assess.    ROS:  Unable to assess.     MEDICATIONS  (STANDING):  atorvastatin 80 milliGRAM(s) Oral at bedtime  dextrose 5%. 1000 milliLiter(s) (50 mL/Hr) IV Continuous <Continuous>  dextrose 50% Injectable 12.5 Gram(s) IV Push once  dextrose 50% Injectable 25 Gram(s) IV Push once  dextrose 50% Injectable 25 Gram(s) IV Push once  insulin lispro (HumaLOG) corrective regimen sliding scale   SubCutaneous three times a day before meals  norepinephrine Infusion 0.05 MICROgram(s)/kG/Min (7.313 mL/Hr) IV Continuous <Continuous>  sodium chloride 0.9% 1000 milliLiter(s) (100 mL/Hr) IV Continuous <Continuous>  sodium chloride 0.9% Bolus 1000 milliLiter(s) IV Bolus once  sodium chloride 0.9%. 1000 milliLiter(s) (100 mL/Hr) IV Continuous <Continuous>    MEDICATIONS  (PRN):  dextrose 40% Gel 15 Gram(s) Oral once PRN Blood Glucose LESS THAN 70 milliGRAM(s)/deciliter  glucagon  Injectable 1 milliGRAM(s) IntraMuscular once PRN Glucose LESS THAN 70 milligrams/deciliter  ondansetron Injectable 4 milliGRAM(s) IV Push every 6 hours PRN Nausea    Allergies  No Known Allergies    Vital Signs Last 24 Hrs  T(C): 36.8 (31 May 2018 01:30), Max: 36.8 (31 May 2018 01:30)  T(F): 98.2 (31 May 2018 01:30), Max: 98.2 (31 May 2018 01:30)  HR: 66 (31 May 2018 01:30) (61 - 92)  BP: 143/76 (31 May 2018 01:30) (80/64 - 175/105)  BP(mean): 91 (31 May 2018 01:30) (81 - 99)  RR: 20 (31 May 2018 01:30) (8 - 20)  SpO2: 100% (31 May 2018 01:30) (96% - 100%)    PHYSICAL EXAM:  Constitutional: WDWN; NAD  Cardiovascular: RRR, no appreciable murmurs; no carotid bruits  Neurologic:  Mental status: Awake, alert   Cranial nerves: pupils equally round and reactive to light, no nystagmus, face asymmetric with L sided facial droop  Motor:  Normal bulk and tone. Strength intact in RU and RLE, L side with no movement  Sensation: Withdraw from pain in right side, not present in left side  Coordination: unable to assess.  Gait: deferred     NIH Stroke Scale:   · NIH Stroke Scale: LOC	(2) Not alert; requires repeated stimulation to attend, or is obtunded and requires strong or painful stimulation to make movements (not stereotyped)	  · NIH Stroke Scale: LOC Question	(2) Answers neither question correctly	  · NIH Stroke Scale: LOC Command	(2) Performs neither task correctly	  · NIH Stroke Scale: Gaze	(0) Normal	  · NIH Stroke Scale: Visual	(0) No visual loss	  · NIH Stroke Scale: Facial	(1) Minor paralysis (flattened nasolabial fold, asymmetry on smiling)	  · NIH Stroke Scale: Arm Left	(4) No movement	  · NIH Stroke Scale: Arm Right	(3) No effort against gravity; Patient would have spontaneous movement but not against gravity and not on command	  · NIH Stroke Scale: Leg Left	(4) No movement	  · NIH Stroke Scale: Leg Right	(3) No effort against gravity; Patient would have spontaneous movement but not against gravity and not on command	  · NIH Stroke Scale: Ataxia	(0) Absent  Unable to assess since patient not following commands	  · NIH Stroke Scale: Sensory	(0) Normal; no sensory loss	  · NIH Stroke Scale: Language	(3) Mute, global aphasia; no usable speech or auditory comprehension	  · NIH Stroke Scale: Dysarthria	(UN) Intubated or other physical barrier	  · NIH Stroke Scale: Extinct Inattention	(2) Profound anselmo-inattention/extinction more than 1 modality	  · NIH Stroke Scale: Total	26	    Unable to fully assess gaze since patient not cooperating. Also ataxia not able to be assessed for same reason.     Fingerstick Blood Glucose: CAPILLARY BLOOD GLUCOSE  129 (31 May 2018 01:14)    POCT Blood Glucose.: 129 mg/dL (30 May 2018 20:16)    LABS:                        7.5    14.7  )-----------( 389      ( 30 May 2018 19:17 )             23.3     05-30    138  |  103  |  12  ----------------------------<  190<H>  3.5   |  22  |  0.61    Ca    8.1<L>      30 May 2018 19:17  Phos  4.7     05-30  Mg     1.9     05-30    TPro  5.6<L>  /  Alb  3.2<L>  /  TBili  0.6  /  DBili  x   /  AST  25  /  ALT  19  /  AlkPhos  69  05-30    PT/INR - ( 30 May 2018 11:52 )   PT: 10.8 sec;   INR: 0.97       PTT - ( 30 May 2018 11:52 )  PTT:22.4 sec    RADIOLOGY & ADDITIONAL STUDIES:    IV-tPA (Y/N):            No               Reason IV-tPA not given:      patient with intraabdominal bleed

## 2018-05-31 NOTE — PROGRESS NOTE ADULT - SUBJECTIVE AND OBJECTIVE BOX
Patient has a large right MCA distribution stroke. CT indicates hypodensity throughout the right hemisphere with punctate hemorrhagic conversion. The is mass effect, subfalcine herniation and midline shift to the left due to cytotoxic cerebral edema associated with the stroke. There is effacement of the right lateral ventricle. Patient became acutely obtunded this morning requiring emergent intubation. Right hemicraniectomy indicated to prevent further progression of herniation syndrome. Risk including but not limited to brain hemorrhage, infection, permanent neurological deficit, need for reoperation, death, other medical/surgical complication discussed with  (Richard Calloway). He is in agreement to move forward with craniectomy.    West Johnson M.D.  Neurosurgery

## 2018-05-31 NOTE — STROKE CODE NOTE - NIH STROKE SCALE: 1A. LEVEL OF CONSCIOUSNESS, QM
(2) Not alert; requires repeated stimulation to attend, or is obtunded and requires strong or painful stimulation to make movements (not stereotyped)
(0) Alert; keenly responsive

## 2018-05-31 NOTE — STROKE CODE NOTE - CT READING
Other/Findings consistent with an acute right MCA territory infarction
Multiple evolving R sided infarctions in MCA and SELINA territories/Other

## 2018-05-31 NOTE — PROGRESS NOTE ADULT - SUBJECTIVE AND OBJECTIVE BOX
58F who presented yesterday with expanding right abdominal wall hematoma and active bleeding noted on CT, then underwent percutaneous thrombin injection of 2 questionable small pseudoaneurysms in the right abdominal wall, as well as embolization of the right inferior epigastric artery and angiography of the right internal mammary artery with no embolization. Angiogram of R MELINA was performed to look for additional source of bleeding in the R abdominal wall, as the R MELINA gives off arterial branches that supply the right abdominal wall. Since no active bleeding was noted from branches of the R MELINA, it was not embolized. Of note, angiography of the right brachiocephalic artery was done to look for the origin of the R MELINA off the R subclavian, which showed moderate vascular tortuosity at the origin of the R subclavian artery but no significant arterial plaque.     Patient was given sedation with Versed and Fentanyl during the procedure, as she was in significant discomfort from the hematoma. Post-procedure, she was given narcan due to presumed over-sedation. However, over-night patient was noted to have hemiplegia, code stroke was called, CT showed acute R MCA infarction, for which tPA was not given due to abdominal wall hemorrhage and patient was not deemed candidate for thrombectomy. A follow-up CT this morning demonstrated evolving R MCA infarct with possible hemorrhagic transformation as well as lateral ventricle entrapment. Plan for patient to go to the OR with neurosurgery for further care. Of note, patient was on Plavix for calcific arterial disease/?CAD before the embolization.

## 2018-05-31 NOTE — CONSULT NOTE ADULT - SUBJECTIVE AND OBJECTIVE BOX
HISTORY OF PRESENT ILLNESS:  59 yo alcoholic F with hx of breast CA, s/p TRAM flap reconstruction, HTN, HLD, anxiety p/w abdominal hematoma s/p IR embolization of R anterior abdominal wall vessel pseudoaneurysm. Post procedure course c/b unresponsiveness from narcotics s/p narcan administration, now more awake, found to have LUE paralysis at 10:00 PM, for which stroke code was called. She was found to have R M2 occlusion in CTA and acute R MCA territory infarction. No TPA was given. Plavix was held. Neurosurgery was consulted to evaluate the need for thrombectomy. Patient was last seen with normal exam was at 07:00 PM.  Patient is now on Levophed for a MAP goal of 90.     PAST MEDICAL & SURGICAL HISTORY:  Anxiety  Hypertension  Breast CA  Previous  section  History of mastectomy, left: with TRAM flap recon    EtOH use: Alcoholic     Allergies    No Known Allergies    Intolerances    REVIEW OF SYSTEMS    MEDICATIONS:  Antibiotics:    Neuro:  ondansetron Injectable 4 milliGRAM(s) IV Push every 6 hours PRN    Anticoagulation:    OTHER:  atorvastatin 80 milliGRAM(s) Oral at bedtime  dextrose 40% Gel 15 Gram(s) Oral once PRN  dextrose 50% Injectable 12.5 Gram(s) IV Push once  dextrose 50% Injectable 25 Gram(s) IV Push once  dextrose 50% Injectable 25 Gram(s) IV Push once  glucagon  Injectable 1 milliGRAM(s) IntraMuscular once PRN  insulin lispro (HumaLOG) corrective regimen sliding scale   SubCutaneous three times a day before meals  norepinephrine Infusion 0.05 MICROgram(s)/kG/Min IV Continuous <Continuous>    IVF:  dextrose 5%. 1000 milliLiter(s) IV Continuous <Continuous>  sodium chloride 0.9% 1000 milliLiter(s) IV Continuous <Continuous>  sodium chloride 0.9%. 1000 milliLiter(s) IV Continuous <Continuous>      Vital Signs Last 24 Hrs  T(C): 36.8 (31 May 2018 01:30), Max: 36.8 (31 May 2018 01:30)  T(F): 98.2 (31 May 2018 01:30), Max: 98.2 (31 May 2018 01:30)  HR: 66 (31 May 2018 01:30) (61 - 92)  BP: 143/76 (31 May 2018 01:30) (80/64 - 175/105)  BP(mean): 91 (31 May 2018 01:30) (81 - 99)  RR: 20 (31 May 2018 01:30) (8 - 20)  SpO2: 100% (31 May 2018 01:30) (96% - 100%)    PHYSICAL EXAM:  Constitutional: not intubated,   Respiratory: breathing non-labored, symmetrical chest wall movement; on facemask supplemental O2  Neurological:  AAOX0, non-verbal, FC intermittently, R gaze deviation, left facial droop.   LUE paralysis, RUE spontaneous, b/l spontaneous movement.   PERRL;   Pronator Drift: not assessed due to patient status     LABS:                        7.5    14.7  )-----------( 389      ( 30 May 2018 19:17 )             23.3     05-30    138  |  103  |  12  ----------------------------<  190<H>  3.5   |  22  |  0.61    Ca    8.1<L>      30 May 2018 19:17  Phos  4.7       Mg     1.9     -30    TPro  5.6<L>  /  Alb  3.2<L>  /  TBili  0.6  /  DBili  x   /  AST  25  /  ALT  19  /  AlkPhos  69  05-30    PT/INR - ( 30 May 2018 11:52 )   PT: 10.8 sec;   INR: 0.97          PTT - ( 30 May 2018 11:52 )  PTT:22.4 sec    RADIOLOGY & ADDITIONAL STUDIES:  < from: CT Angio Head w/ IV Cont (18 @ 23:29) >  IMPRESSION:  Findings consistent with acute occlusion involving the distal right M2   branch at the sylvian fissure with marked diminished right MCA cortical   branches most pronounced at the right frontal convexity. Findings   consistent with abrupt occlusion involving a distal right M2 branch,   (superior division).    < end of copied text >  < from: CT Brain Stroke Protocol (18 @ 23:22) >  IMPRESSION:     Findings consistent with an acute right MCA territory infarction. No   acute intracranial hemorrhage.    < end of copied text >    Assessment: 59 yo alcoholic AA F with R M2 occlusion and R MCA territory infarction.     Plan:  - No neuroendovascular intervention required, ischemic changes seen in CT as well as location of occlusion.    - care as per stroke neurology team   - reconsult PRN if needed.   d/w Dr. mcdaniel and agrees with above

## 2018-05-31 NOTE — PROGRESS NOTE ADULT - ASSESSMENT
POD 0 s/p rt hemicraniectomy for acute MCA stroke  q1h neuro checks  propfol held currently for best exam but can resume with RASS to -2  vEEG for sz's  cont keppra  HOB 30   drains to suction'  rpt CTH 10pm tonight  NGT to suction  sampson to gravity  replete hypokalemia  post op ancef  SCDs only  d/w Dr. Johnson and ICU house staff

## 2018-05-31 NOTE — PROCEDURE NOTE - PROCEDURE
<<-----Click on this checkbox to enter Procedure Central venous catheter insertion  05/31/2018  left subclaian  Active  ASALVATORE

## 2018-05-31 NOTE — PROGRESS NOTE ADULT - ASSESSMENT
58F with   1.  acute cerebrovascular accident involving R MCA / SELINA, malignant infarction, cerebral edema, brain compression s/p decompressive hemicraniectomy (05/31/2018, Dr. Johnson)  2.  Hypertension dyslipidemia   3.  h/o breast CA  4.  CAD  5.  R rectus abdominis intramuscular hematoma  6.  seizures    PLAN:   NEURO: neurochecks q1h, PRN pain meds, sedation with propofol  malignant R MCA infarction, s/p DHC:  complete stroke core measures  seizure disorder:  continue levetiracetam 1G BID, VEEG  ICP crises:  given 23.4%, mannitol, check POsms q6h  REHAB:  physical therapy evaluation and management - defer   EARLY MOB:  HOB up, bedrest    PULM:  full vent support, AC 12 450 40% +5  CARDIO:  SBP goal 100-180mm Hg, check echo with bubble study, insert central line  ENDO:  Blood sugar goals 140-180 mg/dL, continue insulin sliding scale; f/u A1C lipid profile, continue high-dose statins  GI:  start PPI for GI prophylaxis  DIET: NPO  RENAL:  3%@50cc/hr, recheck BMP in 4 hours, Na goal 145-150  HEM/ONC: check post-op Hb  VTE Prophylaxis: SCDs only, no DVT chemoprophylaxis for now as patient is high risk for bleed (?hemorrhagic conversion + fresh post-op); baseline LE Doppler for DVT suspected on admission (h/o breast CA)  ID: afebrile, no leukocytosis, periop ancef then d/c  Social: will update family  MISC:  h/o ETOH abuse, on propofol sedation 58F with   1.  acute cerebrovascular accident involving R MCA / SELINA, malignant infarction, cerebral edema, brain compression s/p decompressive hemicraniectomy (05/31/2018, Dr. Johnson)  2.  Hypertension dyslipidemia   3.  h/o breast CA  4.  CAD  5.  R rectus abdominis intramuscular hematoma  6.  seizures    PLAN:   NEURO: neurochecks q1h, PRN pain meds, sedation with propofol  malignant R MCA infarction, s/p DHC:  complete stroke core measures; no ASA for now, hemorrhagic conversion noted  seizure disorder:  continue levetiracetam 1G BID, VEEG  ICP crises:  given 23.4%, mannitol, POsm next blood draw  REHAB:  physical therapy evaluation and management - defer   EARLY MOB:  HOB up, bedrest    PULM:  full vent support, AC 12 450 40% +5  CARDIO:  SBP goal 120-160mm Hg, check echo with bubble study, insert central line  ENDO:  Blood sugar goals 140-180 mg/dL, continue insulin sliding scale; f/u A1C, continue high-dose statins  GI:  start PPI for GI prophylaxis  DIET: NPO  RENAL:  3%@50cc/hr, recheck BMP in 4 hours, Na goal 145-150  HEM/ONC: check post-op Hb  VTE Prophylaxis: SCDs only, no DVT chemoprophylaxis for now as patient is high risk for bleed (?hemorrhagic conversion + fresh post-op); baseline LE Doppler for DVT suspected on admission (h/o breast CA)  ID: afebrile, no leukocytosis, periop ancef then d/c  Social: will update family  MISC:  h/o ETOH abuse, on propofol sedation 58F with   1.  acute cerebrovascular accident involving R MCA / SELINA, malignant infarction, cerebral edema, brain compression s/p decompressive hemicraniectomy (05/31/2018, Dr. Johnson)  2.  Hypertension dyslipidemia   3.  h/o breast CA  4.  CAD  5.  R rectus abdominis intramuscular hematoma  6.  seizures    PLAN:   NEURO: neurochecks q1h, PRN pain meds, sedation with propofol  malignant R MCA infarction, s/p DHC:  complete stroke core measures; no ASA for now, hemorrhagic conversion noted  seizure disorder:  continue levetiracetam 1G BID, VEEG  ICP crises:  given 23.4%, mannitol, POsm next blood draw  REHAB:  physical therapy evaluation and management - defer   EARLY MOB:  HOB up, bedrest    PULM:  full vent support, AC 12 450 40% +5  CARDIO:  SBP goal 120-160mm Hg, check echo with bubble study, insert central line  ENDO:  Blood sugar goals 140-180 mg/dL, continue insulin sliding scale; f/u A1C, continue high-dose statins  GI:  start PPI for GI prophylaxis  DIET: NPO  RENAL:  3%@50cc/hr, recheck BMP in 4 hours, Na goal 145-150  HEM/ONC: check post-op Hb  VTE Prophylaxis: SCDs only, no DVT chemoprophylaxis for now as patient is high risk for bleed (?hemorrhagic conversion + fresh post-op); baseline LE Doppler for DVT suspected on admission (h/o breast CA)  ID: afebrile, no leukocytosis, periop ancef then d/c  Social: will update family  MISC:  h/o ETOH abuse, on propofol sedation    ATTENDING ATTESTATION:  I was physically present for the key portions of the evaluation and management (E/M) service provided.  I agree with the above history, physical and plan, which I have reviewed and edited where appropriate.    Patient at high risk for neurological deterioration or death due to:  ICU delirium, aspiration PNA, DVT / PE.  Critical care time, excluding procedures: 60 minutes spent on total encounter, more than 50% of the visit was spent counseling and/or coordinating care by the attending physician.     Plan discussed with RN, house staff.      ADDENDUM:  7: 15 p.m.  [address] – 58F with malignant infarction noted after diagnostic angio, R abd percutaneous thrombin injection of 2 pseudoaneurysms.  Worsening mental status this morning, intubated for airway protection, seizures during intubation.  Given mannitol 50g x1 and 23.4% x1 preop, and given mannitol 80g intraop.  Underwent C (05/31).  Still fresh post-op, waking up from anesthesia.  Pre-op exam:  does not open eyes, does not follow commands, no verbal output, R gaze preference, equal pupils, reactive, moves R UE/LE spontaneously, withdraws to noxious on L UE/LE. Na goal 145-150, on 3% at 50cc/hr.  f/u repeat BMP tonight. Also h/o ETOH abuse.  On propofol, VEEG, keppra.  Hemorrhagic conversion noted on CT scans, needs stability CT around 10p.m. tonight.  Off sedation, localizes R UE, withdraws R LE / L LE.    Additional critical care time, excluding procedures: 30 minutes.    TOTAL critical care time:  90 minutes spent on total encounter, more than 50% of the visit was spent counseling and/or coordinating care by the attending physician.

## 2018-05-31 NOTE — PROGRESS NOTE ADULT - SUBJECTIVE AND OBJECTIVE BOX
Post op Note    Dx: Rt MCA stroke    58y    Female   s/p right decompressive hemicraniectomy transferred to [facility] immediately psot op and then to SICU. Intra op pt recieved 2 units platelets, 1 unit PRBCs, 80g mannitol. Post op Lt TLC placed o/w no acute events. Pt kept on propofol sedation post op but currently held for best exam. Some hemorrhagic ocnversion noted on post op CTH.           T(C): 36.2 (05-31-18 @ 16:22), Max: 36.8 (05-31-18 @ 01:30)  HR: 70 (05-31-18 @ 18:05) (56 - 112)  BP: 120/62 (05-31-18 @ 18:05) (80/64 - 193/87)  RR: 12 (05-31-18 @ 18:05) (5 - 55)  SpO2: 100% (05-31-18 @ 18:05) (86% - 100%)  Wt(kg): --      Physical exam  sedated but moved all extremities post op  intubated on VAC  NGT to suction  motor exaxm limited 2/2 sedation  Incision: C/D/I  Drains intact to suction  distal pulses plapbale  Lt radial a line  sampson to gravity

## 2018-05-31 NOTE — STROKE CODE NOTE - NIH STROKE SCALE: 5B. MOTOR ARM, RIGHT, QM
(2) Some effort against gravity
Patient would have spontaneous movement but not against gravity and not on command/(3) No effort against gravity

## 2018-05-31 NOTE — STROKE CODE NOTE - NIH STROKE SCALE: 11. EXTINCTION AND INATTENTION, QM
(2) Profound anselmo-inattention/extinction more than 1 modality
(2) Profound anselmo-inattention/extinction more than 1 modality

## 2018-05-31 NOTE — STROKE CODE NOTE - NIH STROKE SCALE: 9. BEST LANGUAGE, QM
(2) Severe aphasia; all communication is through fragmentary expression; great need for inference, questioning, and guessing by the listener. Range of information that can be exchanged is limited; listener carries burden of communication. Examiner cannot identify materials provided from patient response.
(3) Mute, global aphasia; no usable speech or auditory comprehension

## 2018-05-31 NOTE — PROGRESS NOTE ADULT - SUBJECTIVE AND OBJECTIVE BOX
Overnight Events:       Allergies    No Known Allergies    Intolerances        Vital Signs Last 24 Hrs  T(C): 36.8 (31 May 2018 09:00), Max: 36.8 (31 May 2018 01:30)  T(F): 98.3 (31 May 2018 09:00), Max: 98.3 (31 May 2018 09:00)  HR: 64 (31 May 2018 09:15) (56 - 102)  BP: 154/87 (31 May 2018 09:15) (80/64 - 182/93)  BP(mean): 116 (31 May 2018 09:15) (81 - 130)  RR: 25 (31 May 2018 09:15) (5 - 28)  SpO2: 97% (31 May 2018 09:15) (86% - 100%)    05-30 @ 07:01  -  05-31 @ 07:00  --------------------------------------------------------  IN: 1706 mL / OUT: 2420 mL / NET: -714 mL    05-31 @ 07:01  -  05-31 @ 09:41  --------------------------------------------------------  IN: 301.2 mL / OUT: 210 mL / NET: 91.2 mL      05-30 @ 07:01  -  05-31 @ 07:00  --------------------------------------------------------  IN: 1706 mL / OUT: 2420 mL / NET: -714 mL    05-31 @ 07:01  -  05-31 @ 09:41  --------------------------------------------------------  IN: 301.2 mL / OUT: 210 mL / NET: 91.2 mL          LABS:  ABG - ( 30 May 2018 19:33 )  pH, Arterial: 7.40  pH, Blood: x     /  pCO2: 36    /  pO2: 330   / HCO3: 22    / Base Excess: -3.0  /  SaO2: 100                 CBC Full  -  ( 31 May 2018 04:21 )  WBC Count : 11.4 K/uL  Hemoglobin : 9.4 g/dL  Hematocrit : 28.4 %  Platelet Count - Automated : 227 K/uL  Mean Cell Volume : 83.8 fL  Mean Cell Hemoglobin : 27.7 pg  Mean Cell Hemoglobin Concentration : 33.1 g/dL  Auto Neutrophil # : x  Auto Lymphocyte # : x  Auto Monocyte # : x  Auto Eosinophil # : x  Auto Basophil # : x  Auto Neutrophil % : x  Auto Lymphocyte % : x  Auto Monocyte % : x  Auto Eosinophil % : x  Auto Basophil % : x    05-31    140  |  105  |  9   ----------------------------<  118<H>  3.2<L>   |  23  |  0.58    Ca    7.7<L>      31 May 2018 04:21  Phos  3.5     05-31  Mg     1.8     05-31    TPro  5.5<L>  /  Alb  3.3  /  TBili  0.9  /  DBili  x   /  AST  27  /  ALT  19  /  AlkPhos  64  05-31    PT/INR - ( 31 May 2018 04:21 )   PT: 11.3 sec;   INR: 1.02          PTT - ( 31 May 2018 04:21 )  PTT:22.0 sec        RADIOLOGY & ADDITIONAL STUDIES (The following images were personally reviewed):      Physical Exam:   Gen: resting in bed comfortablt, in no acute distress  PERRLA, EOMI  CV: RRR, hypertensive  Resp: non-labored breathing, CTAB  Abd: right abdomen firmness and tenderness, no rebound or guarding, no palpable masses or visible hematoma  R groin soft, no hematoma  Ext: WWP    A/p:   58F with a hx of TRAM flap reconstruction on plavix now with a spontaneous right rectus expanding hematoma taken to IR for Rt groin access for dx angio, and rt abd percutaneous thrombin injection of 2 pseudoaneurysms in abd wall. Pt over sedated post procedure and given narcan.     Neuro:RMCA stoke, no narcotics Hx of ETOH abuse Anxiety on Ativan at home.  CV: HD stable f/u CBC q4 hrs Banana bag /NS w/KCl@100 Lipitor, Levophed prn for Goal MAP> 90 f/u, bubble echo in am   Pulm: Satting well on Non-rebreather   GI: NPO ETOH  abuse: Banana bag q24.   : Voids    ID:None   Endo: ISS  PPx: SCD no SQH 2* Bleeding risk   Lines: PIV  Wounds: None   PT/OT: Orderd 5/31 OOB Overnight Events:       Allergies    No Known Allergies    Intolerances        Vital Signs Last 24 Hrs  T(C): 36.8 (31 May 2018 09:00), Max: 36.8 (31 May 2018 01:30)  T(F): 98.3 (31 May 2018 09:00), Max: 98.3 (31 May 2018 09:00)  HR: 64 (31 May 2018 09:15) (56 - 102)  BP: 154/87 (31 May 2018 09:15) (80/64 - 182/93)  BP(mean): 116 (31 May 2018 09:15) (81 - 130)  RR: 25 (31 May 2018 09:15) (5 - 28)  SpO2: 97% (31 May 2018 09:15) (86% - 100%)    05-30 @ 07:01  -  05-31 @ 07:00  --------------------------------------------------------  IN: 1706 mL / OUT: 2420 mL / NET: -714 mL    05-31 @ 07:01  -  05-31 @ 09:41  --------------------------------------------------------  IN: 301.2 mL / OUT: 210 mL / NET: 91.2 mL      05-30 @ 07:01  -  05-31 @ 07:00  --------------------------------------------------------  IN: 1706 mL / OUT: 2420 mL / NET: -714 mL    05-31 @ 07:01  -  05-31 @ 09:41  --------------------------------------------------------  IN: 301.2 mL / OUT: 210 mL / NET: 91.2 mL          LABS:  ABG - ( 30 May 2018 19:33 )  pH, Arterial: 7.40  pH, Blood: x     /  pCO2: 36    /  pO2: 330   / HCO3: 22    / Base Excess: -3.0  /  SaO2: 100                 CBC Full  -  ( 31 May 2018 04:21 )  WBC Count : 11.4 K/uL  Hemoglobin : 9.4 g/dL  Hematocrit : 28.4 %  Platelet Count - Automated : 227 K/uL  Mean Cell Volume : 83.8 fL  Mean Cell Hemoglobin : 27.7 pg  Mean Cell Hemoglobin Concentration : 33.1 g/dL  Auto Neutrophil # : x  Auto Lymphocyte # : x  Auto Monocyte # : x  Auto Eosinophil # : x  Auto Basophil # : x  Auto Neutrophil % : x  Auto Lymphocyte % : x  Auto Monocyte % : x  Auto Eosinophil % : x  Auto Basophil % : x    05-31    140  |  105  |  9   ----------------------------<  118<H>  3.2<L>   |  23  |  0.58    Ca    7.7<L>      31 May 2018 04:21  Phos  3.5     05-31  Mg     1.8     05-31    TPro  5.5<L>  /  Alb  3.3  /  TBili  0.9  /  DBili  x   /  AST  27  /  ALT  19  /  AlkPhos  64  05-31    PT/INR - ( 31 May 2018 04:21 )   PT: 11.3 sec;   INR: 1.02          PTT - ( 31 May 2018 04:21 )  PTT:22.0 sec        RADIOLOGY & ADDITIONAL STUDIES (The following images were personally reviewed):      Physical Exam:   Gen: resting in bed comfortablt, in no acute distress  Neuro: L fascial drop, motor0/5 in LUE/ 1/5 in LLE, RLE and RUE - sensation and motor grossly intact  PERRLA, EOMI  CV: RRR, hypertensive  Resp: non-labored breathingvon RA, CTAB  Abd: right abdomen firmness and tenderness, no rebound or guarding, no palpable masses or visible hematoma  R groin soft, no hematoma  Ext: WWP    A/p:   58F with a hx of TRAM flap reconstruction on plavix now with a spontaneous right rectus expanding hematoma taken to IR for Rt groin access for dx angio, and rt abd percutaneous thrombin injection of 2 pseudoaneurysms in abd wall. Pt over sedated post procedure and given narcan.     Neuro:RMCA stoke, no narcotics Hx of ETOH abuse Anxiety on Ativan at home.  CV: HD stable f/u CBC q4 hrs Banana bag /NS w/KCl@100 Lipitor, Levophed prn for Goal MAP> 90 f/u, bubble echo in am   Pulm: Satting well on Non-rebreather   GI: NPO ETOH  abuse: Banana bag q24.   : Voids    ID:None   Endo: ISS  PPx: SCD no SQH 2* Bleeding risk   Lines: PIV  Wounds: None   PT/OT: Orderd 5/31 OOB Overnight Events: Admitted with R rectus abdominis hematoma, underwent IR angio ( R groin access) with rt abd percutaneous thrombin injection of 2 pseudoaneurysms in abd wall and embolization of . Post op pt lethargic and rapid response called pt unresponsive 2* over sedation - given narcan and placed on non-rebreather ABG Co2 36. HGB 7.5 from 11.6 - given  2UPRBC.repeat CBC@4am:9.4( appropriate).   @10:30PM Pt not moving Left upper ext- stroke code called CT Head: RMCA infarct given 1LNS Bolus given for MAP> 90 and started levo( currently 0.7mcg). NSx consulted but unable to perform Thrombectomy due to location of lesion. Na goal > 140( currently at goal)       Allergies    No Known Allergies    Intolerances        Vital Signs Last 24 Hrs  T(C): 36.8 (31 May 2018 09:00), Max: 36.8 (31 May 2018 01:30)  T(F): 98.3 (31 May 2018 09:00), Max: 98.3 (31 May 2018 09:00)  HR: 64 (31 May 2018 09:15) (56 - 102)  BP: 154/87 (31 May 2018 09:15) (80/64 - 182/93)  BP(mean): 116 (31 May 2018 09:15) (81 - 130)  RR: 25 (31 May 2018 09:15) (5 - 28)  SpO2: 97% (31 May 2018 09:15) (86% - 100%)    05-30 @ 07:01  -  05-31 @ 07:00  --------------------------------------------------------  IN: 1706 mL / OUT: 2420 mL / NET: -714 mL    05-31 @ 07:01  -  05-31 @ 09:41  --------------------------------------------------------  IN: 301.2 mL / OUT: 210 mL / NET: 91.2 mL      05-30 @ 07:01 - 05-31 @ 07:00  --------------------------------------------------------  IN: 1706 mL / OUT: 2420 mL / NET: -714 mL    05-31 @ 07:01  - 05-31 @ 09:41  --------------------------------------------------------  IN: 301.2 mL / OUT: 210 mL / NET: 91.2 mL          LABS:  ABG - ( 30 May 2018 19:33 )  pH, Arterial: 7.40  pH, Blood: x     /  pCO2: 36    /  pO2: 330   / HCO3: 22    / Base Excess: -3.0  /  SaO2: 100           CBC Full  -  ( 31 May 2018 04:21 )  WBC Count : 11.4 K/uL  Hemoglobin : 9.4 g/dL  Hematocrit : 28.4 %  Platelet Count - Automated : 227 K/uL  Mean Cell Volume : 83.8 fL  Mean Cell Hemoglobin : 27.7 pg  Mean Cell Hemoglobin Concentration : 33.1 g/dL  Auto Neutrophil # : x  Auto Lymphocyte # : x  Auto Monocyte # : x  Auto Eosinophil # : x  Auto Basophil # : x  Auto Neutrophil % : x  Auto Lymphocyte % : x  Auto Monocyte % : x  Auto Eosinophil % : x  Auto Basophil % : x    05-31    140  |  105  |  9   ----------------------------<  118<H>  3.2<L>   |  23  |  0.58    Ca    7.7<L>      31 May 2018 04:21  Phos  3.5     05-31  Mg     1.8     05-31    TPro  5.5<L>  /  Alb  3.3  /  TBili  0.9  /  DBili  x   /  AST  27  /  ALT  19  /  AlkPhos  64  05-31    PT/INR - ( 31 May 2018 04:21 )   PT: 11.3 sec;   INR: 1.02          PTT - ( 31 May 2018 04:21 )  PTT:22.0 sec        RADIOLOGY & ADDITIONAL STUDIES (The following images were personally reviewed):      Physical Exam:   Gen: resting in bed comfortablt, in no acute distress  Neuro: L fascial drop, motor 0/5 in LUE/ 1/5 in LLE, RLE and RUE - sensation and motor grossly intact  PERRLA, EOMI  CV: RRR, hypertensive  Resp: non-labored breathingvon RA, CTAB  Abd: right abdomen firmness and tenderness, no rebound or guarding, no palpable masses or visible hematoma  R groin soft, no hematoma  Ext: WWP    A/p:   58F with a hx of TRAM flap reconstruction on plavix now with a spontaneous right rectus expanding hematoma taken to IR for Rt groin access for dx angio, and rt abd percutaneous thrombin injection of 2 pseudoaneurysms in abd wall. Pt over sedated post procedure and given narcan.     Neuro:RMCA stoke, no narcotics Hx of ETOH abuse Anxiety on Ativan at home.  CV: HD stable f/u CBC q4 hrs Banana bag /NS w/KCl@100 Lipitor, Levophed prn for Goal MAP> 90 -  currently off levo, f/u, bubble echo in am   Pulm: Satting well on NC  GI: NPO ETOH  abuse: Banana bag q24.   : Voids    ID:None   Endo: ISS  PPx: SCD no SQH 2* Bleeding risk   Lines: PIV  Wounds: None   PT/OT: Orderd 5/31 OOB Overnight Events: Admitted with R rectus abdominis hematoma, underwent IR angio ( R groin access) with rt abd percutaneous thrombin injection of 2 pseudoaneurysms in abd wall and embolization of inferior epigastric artery.. Post op pt lethargic and rapid response called pt unresponsive 2* over sedation - given narcan and placed on non-rebreather ABG Co2 36. HGB 7.5 from 11.6 - given  2UPRBC.repeat CBC@4am:9.4( appropriate).   @10:30PM Pt not moving Left upper ext- stroke code called CT Head: RMCA infarct given 1LNS Bolus given for MAP> 90 and started levo( currently 0.7mcg). NSx consulted but unable to perform Thrombectomy due to location of lesion. Na goal > 140( currently at goal)     In the morning       Allergies    No Known Allergies    Intolerances        Vital Signs Last 24 Hrs  T(C): 36.8 (31 May 2018 09:00), Max: 36.8 (31 May 2018 01:30)  T(F): 98.3 (31 May 2018 09:00), Max: 98.3 (31 May 2018 09:00)  HR: 64 (31 May 2018 09:15) (56 - 102)  BP: 154/87 (31 May 2018 09:15) (80/64 - 182/93)  BP(mean): 116 (31 May 2018 09:15) (81 - 130)  RR: 25 (31 May 2018 09:15) (5 - 28)  SpO2: 97% (31 May 2018 09:15) (86% - 100%)    05-30 @ 07:01  -  05-31 @ 07:00  --------------------------------------------------------  IN: 1706 mL / OUT: 2420 mL / NET: -714 mL    05-31 @ 07:01  -  05-31 @ 09:41  --------------------------------------------------------  IN: 301.2 mL / OUT: 210 mL / NET: 91.2 mL      05-30 @ 07:01 - 05-31 @ 07:00  --------------------------------------------------------  IN: 1706 mL / OUT: 2420 mL / NET: -714 mL    05-31 @ 07:01 - 05-31 @ 09:41  --------------------------------------------------------  IN: 301.2 mL / OUT: 210 mL / NET: 91.2 mL          LABS:  ABG - ( 30 May 2018 19:33 )  pH, Arterial: 7.40  pH, Blood: x     /  pCO2: 36    /  pO2: 330   / HCO3: 22    / Base Excess: -3.0  /  SaO2: 100           CBC Full  -  ( 31 May 2018 04:21 )  WBC Count : 11.4 K/uL  Hemoglobin : 9.4 g/dL  Hematocrit : 28.4 %  Platelet Count - Automated : 227 K/uL  Mean Cell Volume : 83.8 fL  Mean Cell Hemoglobin : 27.7 pg  Mean Cell Hemoglobin Concentration : 33.1 g/dL  Auto Neutrophil # : x  Auto Lymphocyte # : x  Auto Monocyte # : x  Auto Eosinophil # : x  Auto Basophil # : x  Auto Neutrophil % : x  Auto Lymphocyte % : x  Auto Monocyte % : x  Auto Eosinophil % : x  Auto Basophil % : x    05-31    140  |  105  |  9   ----------------------------<  118<H>  3.2<L>   |  23  |  0.58    Ca    7.7<L>      31 May 2018 04:21  Phos  3.5     05-31  Mg     1.8     05-31    TPro  5.5<L>  /  Alb  3.3  /  TBili  0.9  /  DBili  x   /  AST  27  /  ALT  19  /  AlkPhos  64  05-31    PT/INR - ( 31 May 2018 04:21 )   PT: 11.3 sec;   INR: 1.02          PTT - ( 31 May 2018 04:21 )  PTT:22.0 sec        RADIOLOGY & ADDITIONAL STUDIES (The following images were personally reviewed):      Physical Exam:   Gen: resting in bed comfortablt, in no acute distress  Neuro: L fascial drop, motor 0/5 in LUE/ 1/5 in LLE, RLE and RUE - sensation and motor grossly intact  PERRLA, EOMI  CV: RRR, hypertensive  Resp: non-labored breathingvon RA, CTAB  Abd: right abdomen firmness and tenderness, no rebound or guarding, no palpable masses or visible hematoma  R groin soft, no hematoma  Ext: WWP    A/p:   58F with a hx of TRAM flap reconstruction on plavix now with a spontaneous right rectus expanding hematoma taken to IR for Rt groin access for dx angio, and rt abd percutaneous thrombin injection of 2 pseudoaneurysms in abd wall. Pt over sedated post procedure and given narcan.     Neuro:RMCA stoke, no narcotics Hx of ETOH abuse Anxiety on Ativan at home.  CV: HD stable f/u CBC q4 hrs Banana bag /NS w/KCl@100 Lipitor, Levophed prn for Goal MAP> 90 -  currently off levo, f/u, bubble echo in am   Pulm: Satting well on NC  GI: NPO ETOH  abuse: Banana bag q24.   : Voids    ID:None   Endo: ISS  PPx: SCD no SQH 2* Bleeding risk   Lines: PIV  Wounds: None   PT/OT: Orderd 5/31 OOB Overnight Events: Admitted with R rectus abdominis hematoma, underwent IR angio ( R groin access) with rt abd percutaneous thrombin injection of 2 pseudoaneurysms in abd wall and embolization of inferior epigastric artery.. Post op pt lethargic and rapid response called pt unresponsive 2* over sedation - given narcan and placed on non-rebreather ABG Co2 36. HGB 7.5 from 11.6 - given  2UPRBC.repeat CBC@4am:9.4( appropriate).   @10:30PM Pt not moving Left upper ext- stroke code called CT Head: RMCA infarct given 1LNS Bolus given for MAP> 90 and started levo( currently 0.7mcg). NSx consulted but unable to perform Thrombectomy due to location of lesion. Na goal > 140( currently at goal)     In the morning  neuro exam unchanged but patient developed acute severe headache with hypertension MAP>100, levo was stopped and Dr. Yates was contacted and evaluated patient at bedside- stroke code called again, CT head showing evolwing R MCA stroke with mass effect and midline shift to the left and ventricle entrapment - neurosurgery consulted emergently for OR, Mannitol 25g given STAT, repeated in 30min, bolused 23% saline and started on 3%, transferred to Neurosurgery service.         Allergies    No Known Allergies    Intolerances        Vital Signs Last 24 Hrs  T(C): 36.8 (31 May 2018 09:00), Max: 36.8 (31 May 2018 01:30)  T(F): 98.3 (31 May 2018 09:00), Max: 98.3 (31 May 2018 09:00)  HR: 64 (31 May 2018 09:15) (56 - 102)  BP: 154/87 (31 May 2018 09:15) (80/64 - 182/93)  BP(mean): 116 (31 May 2018 09:15) (81 - 130)  RR: 25 (31 May 2018 09:15) (5 - 28)  SpO2: 97% (31 May 2018 09:15) (86% - 100%)    05-30 @ 07:01  -  05-31 @ 07:00  --------------------------------------------------------  IN: 1706 mL / OUT: 2420 mL / NET: -714 mL    05-31 @ 07:01 - 05-31 @ 09:41  --------------------------------------------------------  IN: 301.2 mL / OUT: 210 mL / NET: 91.2 mL      05-30 @ 07:01 - 05-31 @ 07:00  --------------------------------------------------------  IN: 1706 mL / OUT: 2420 mL / NET: -714 mL    05-31 @ 07:01 - 05-31 @ 09:41  --------------------------------------------------------  IN: 301.2 mL / OUT: 210 mL / NET: 91.2 mL          LABS:  ABG - ( 30 May 2018 19:33 )  pH, Arterial: 7.40  pH, Blood: x     /  pCO2: 36    /  pO2: 330   / HCO3: 22    / Base Excess: -3.0  /  SaO2: 100           CBC Full  -  ( 31 May 2018 04:21 )  WBC Count : 11.4 K/uL  Hemoglobin : 9.4 g/dL  Hematocrit : 28.4 %  Platelet Count - Automated : 227 K/uL  Mean Cell Volume : 83.8 fL  Mean Cell Hemoglobin : 27.7 pg  Mean Cell Hemoglobin Concentration : 33.1 g/dL  Auto Neutrophil # : x  Auto Lymphocyte # : x  Auto Monocyte # : x  Auto Eosinophil # : x  Auto Basophil # : x  Auto Neutrophil % : x  Auto Lymphocyte % : x  Auto Monocyte % : x  Auto Eosinophil % : x  Auto Basophil % : x    05-31    140  |  105  |  9   ----------------------------<  118<H>  3.2<L>   |  23  |  0.58    Ca    7.7<L>      31 May 2018 04:21  Phos  3.5     05-31  Mg     1.8     05-31    TPro  5.5<L>  /  Alb  3.3  /  TBili  0.9  /  DBili  x   /  AST  27  /  ALT  19  /  AlkPhos  64  05-31    PT/INR - ( 31 May 2018 04:21 )   PT: 11.3 sec;   INR: 1.02          PTT - ( 31 May 2018 04:21 )  PTT:22.0 sec        RADIOLOGY & ADDITIONAL STUDIES (The following images were personally reviewed):      Physical Exam:   Gen: resting in bed comfortably although complaining of headache,  Neuro: L fascial drop, motor 0/5 in LUE/ 1/5 in LLE, RLE and RUE - sensation and motor grossly intact  PERRLA, EOMI  CV: RRR, hypertensive  Resp: non-labored breathing on RA, CTAB  Abd: right abdomen firmness and tenderness, no rebound or guarding, no palpable masses or visible hematoma  R groin soft, no hematoma  Ext: WWP    A/p:   58F with a hx of TRAM flap reconstruction on plavix now with a spontaneous right rectus expanding hematoma taken to IR for Rt groin access for dx angio, and rt abd percutaneous thrombin injection of 2 pseudoaneurysms in abd wall. Pt over sedated post procedure and given narcan.     Neuro:RMCA stoke, no narcotics Hx of ETOH abuse Anxiety on Ativan at home.  CV: HD stable f/u CBC q4 hrs Banana bag /NS w/KCl@100 Lipitor, Levophed prn for Goal MAP> 90 -  currently off levo, f/u, bubble echo in am   Pulm: Satting well on NC  GI: NPO ETOH  abuse: Banana bag q24.   : Voids    ID:None   Endo: ISS  PPx: SCD no SQH 2* Bleeding risk   Lines: PIV  Wounds: None   PT/OT: Orderd 5/31 OOB

## 2018-05-31 NOTE — CONSULT NOTE ADULT - ASSESSMENT
patient is a 59yo F with PMHx of HTN, HLD, anxiety/depression, left breast cancer s/p left mastectomy with TRAM flap reconstruction (4-5 years prior), recently placed on plavix (about a month ago) by her cardiologist 2/2 calcified arterial disease who is being treated at [facility] for expanding intramuscular hematoma within the right rectus abdominis s/p IR management with course complicated with hypotension and ?compromise in ventilation 2/2 medications s/p narcan, who now presents with persistent AMS and left sided weakness.    # CVA. Patient acute right MCA (M2) territory infarction as noticed on CT head and CTA. Considering bleed and patient being out of the window, no tpa recommended. Also, given time of presentation and last known baseline, patient not a candidate for thrombectomy.   - At this point would recommend to maintain blood pressure with a MAP of at least 90 and use pressors support if needed.  - Lipitor 80mg qhs.   - No acute intervention recommended.

## 2018-06-01 LAB
ANION GAP SERPL CALC-SCNC: 10 MMOL/L — SIGNIFICANT CHANGE UP (ref 5–17)
ANION GAP SERPL CALC-SCNC: 10 MMOL/L — SIGNIFICANT CHANGE UP (ref 5–17)
ANION GAP SERPL CALC-SCNC: 12 MMOL/L — SIGNIFICANT CHANGE UP (ref 5–17)
BUN SERPL-MCNC: 4 MG/DL — LOW (ref 7–23)
BUN SERPL-MCNC: 5 MG/DL — LOW (ref 7–23)
BUN SERPL-MCNC: 5 MG/DL — LOW (ref 7–23)
CALCIUM SERPL-MCNC: 8.1 MG/DL — LOW (ref 8.4–10.5)
CALCIUM SERPL-MCNC: 8.2 MG/DL — LOW (ref 8.4–10.5)
CALCIUM SERPL-MCNC: 8.3 MG/DL — LOW (ref 8.4–10.5)
CHLORIDE SERPL-SCNC: 112 MMOL/L — HIGH (ref 96–108)
CHLORIDE SERPL-SCNC: 113 MMOL/L — HIGH (ref 96–108)
CHLORIDE SERPL-SCNC: 113 MMOL/L — HIGH (ref 96–108)
CO2 SERPL-SCNC: 24 MMOL/L — SIGNIFICANT CHANGE UP (ref 22–31)
CO2 SERPL-SCNC: 24 MMOL/L — SIGNIFICANT CHANGE UP (ref 22–31)
CO2 SERPL-SCNC: 25 MMOL/L — SIGNIFICANT CHANGE UP (ref 22–31)
CREAT SERPL-MCNC: 0.53 MG/DL — SIGNIFICANT CHANGE UP (ref 0.5–1.3)
CREAT SERPL-MCNC: 0.53 MG/DL — SIGNIFICANT CHANGE UP (ref 0.5–1.3)
CREAT SERPL-MCNC: 0.58 MG/DL — SIGNIFICANT CHANGE UP (ref 0.5–1.3)
GLUCOSE SERPL-MCNC: 116 MG/DL — HIGH (ref 70–99)
GLUCOSE SERPL-MCNC: 131 MG/DL — HIGH (ref 70–99)
GLUCOSE SERPL-MCNC: 133 MG/DL — HIGH (ref 70–99)
HBA1C BLD-MCNC: 5.3 % — SIGNIFICANT CHANGE UP (ref 4–5.6)
HCT VFR BLD CALC: 23.1 % — LOW (ref 34.5–45)
HGB BLD-MCNC: 7.5 G/DL — LOW (ref 11.5–15.5)
MAGNESIUM SERPL-MCNC: 2 MG/DL — SIGNIFICANT CHANGE UP (ref 1.6–2.6)
MCHC RBC-ENTMCNC: 27.6 PG — SIGNIFICANT CHANGE UP (ref 27–34)
MCHC RBC-ENTMCNC: 32.5 G/DL — SIGNIFICANT CHANGE UP (ref 32–36)
MCV RBC AUTO: 84.9 FL — SIGNIFICANT CHANGE UP (ref 80–100)
PHOSPHATE SERPL-MCNC: 2.9 MG/DL — SIGNIFICANT CHANGE UP (ref 2.5–4.5)
PLATELET # BLD AUTO: 284 K/UL — SIGNIFICANT CHANGE UP (ref 150–400)
POTASSIUM SERPL-MCNC: 3 MMOL/L — LOW (ref 3.5–5.3)
POTASSIUM SERPL-MCNC: 3.3 MMOL/L — LOW (ref 3.5–5.3)
POTASSIUM SERPL-MCNC: 3.3 MMOL/L — LOW (ref 3.5–5.3)
POTASSIUM SERPL-SCNC: 3 MMOL/L — LOW (ref 3.5–5.3)
POTASSIUM SERPL-SCNC: 3.3 MMOL/L — LOW (ref 3.5–5.3)
POTASSIUM SERPL-SCNC: 3.3 MMOL/L — LOW (ref 3.5–5.3)
RBC # BLD: 2.72 M/UL — LOW (ref 3.8–5.2)
RBC # FLD: 15.6 % — SIGNIFICANT CHANGE UP (ref 10.3–16.9)
SODIUM SERPL-SCNC: 146 MMOL/L — HIGH (ref 135–145)
SODIUM SERPL-SCNC: 148 MMOL/L — HIGH (ref 135–145)
SODIUM SERPL-SCNC: 149 MMOL/L — HIGH (ref 135–145)
WBC # BLD: 11.7 K/UL — HIGH (ref 3.8–10.5)
WBC # FLD AUTO: 11.7 K/UL — HIGH (ref 3.8–10.5)

## 2018-06-01 PROCEDURE — 70450 CT HEAD/BRAIN W/O DYE: CPT | Mod: 26

## 2018-06-01 PROCEDURE — 71045 X-RAY EXAM CHEST 1 VIEW: CPT | Mod: 26

## 2018-06-01 PROCEDURE — 99231 SBSQ HOSP IP/OBS SF/LOW 25: CPT | Mod: GC

## 2018-06-01 PROCEDURE — 99291 CRITICAL CARE FIRST HOUR: CPT | Mod: 24

## 2018-06-01 PROCEDURE — 93970 EXTREMITY STUDY: CPT | Mod: 26

## 2018-06-01 RX ORDER — DEXMEDETOMIDINE HYDROCHLORIDE IN 0.9% SODIUM CHLORIDE 4 UG/ML
0.2 INJECTION INTRAVENOUS
Qty: 200 | Refills: 0 | Status: DISCONTINUED | OUTPATIENT
Start: 2018-06-01 | End: 2018-06-01

## 2018-06-01 RX ORDER — DEXMEDETOMIDINE HYDROCHLORIDE IN 0.9% SODIUM CHLORIDE 4 UG/ML
0.2 INJECTION INTRAVENOUS
Qty: 200 | Refills: 0 | Status: DISCONTINUED | OUTPATIENT
Start: 2018-06-01 | End: 2018-06-04

## 2018-06-01 RX ORDER — FOLIC ACID 0.8 MG
1 TABLET ORAL DAILY
Qty: 0 | Refills: 0 | Status: DISCONTINUED | OUTPATIENT
Start: 2018-06-01 | End: 2018-06-04

## 2018-06-01 RX ORDER — SODIUM CHLORIDE 9 MG/ML
500 INJECTION INTRAMUSCULAR; INTRAVENOUS; SUBCUTANEOUS ONCE
Qty: 0 | Refills: 0 | Status: COMPLETED | OUTPATIENT
Start: 2018-06-01 | End: 2018-06-01

## 2018-06-01 RX ORDER — POTASSIUM CHLORIDE 20 MEQ
10 PACKET (EA) ORAL ONCE
Qty: 0 | Refills: 0 | Status: COMPLETED | OUTPATIENT
Start: 2018-06-01 | End: 2018-06-01

## 2018-06-01 RX ORDER — ACETAMINOPHEN 500 MG
1000 TABLET ORAL ONCE
Qty: 0 | Refills: 0 | Status: COMPLETED | OUTPATIENT
Start: 2018-06-01 | End: 2018-06-01

## 2018-06-01 RX ORDER — POTASSIUM CHLORIDE 20 MEQ
40 PACKET (EA) ORAL ONCE
Qty: 0 | Refills: 0 | Status: COMPLETED | OUTPATIENT
Start: 2018-06-01 | End: 2018-06-02

## 2018-06-01 RX ORDER — POTASSIUM CHLORIDE 20 MEQ
40 PACKET (EA) ORAL ONCE
Qty: 0 | Refills: 0 | Status: COMPLETED | OUTPATIENT
Start: 2018-06-01 | End: 2018-06-01

## 2018-06-01 RX ORDER — THIAMINE MONONITRATE (VIT B1) 100 MG
100 TABLET ORAL DAILY
Qty: 0 | Refills: 0 | Status: DISCONTINUED | OUTPATIENT
Start: 2018-06-01 | End: 2018-06-04

## 2018-06-01 RX ORDER — POTASSIUM CHLORIDE 20 MEQ
40 PACKET (EA) ORAL ONCE
Qty: 0 | Refills: 0 | Status: DISCONTINUED | OUTPATIENT
Start: 2018-06-01 | End: 2018-06-01

## 2018-06-01 RX ORDER — PROPOFOL 10 MG/ML
5 INJECTION, EMULSION INTRAVENOUS
Qty: 1000 | Refills: 0 | Status: DISCONTINUED | OUTPATIENT
Start: 2018-06-01 | End: 2018-06-01

## 2018-06-01 RX ORDER — SODIUM CHLORIDE 5 G/100ML
500 INJECTION, SOLUTION INTRAVENOUS
Qty: 0 | Refills: 0 | Status: DISCONTINUED | OUTPATIENT
Start: 2018-06-01 | End: 2018-06-02

## 2018-06-01 RX ORDER — FENTANYL CITRATE 50 UG/ML
25 INJECTION INTRAVENOUS ONCE
Qty: 0 | Refills: 0 | Status: DISCONTINUED | OUTPATIENT
Start: 2018-06-01 | End: 2018-06-01

## 2018-06-01 RX ORDER — FENTANYL CITRATE 50 UG/ML
50 INJECTION INTRAVENOUS ONCE
Qty: 0 | Refills: 0 | Status: DISCONTINUED | OUTPATIENT
Start: 2018-06-01 | End: 2018-06-01

## 2018-06-01 RX ADMIN — Medication 1 TABLET(S): at 15:19

## 2018-06-01 RX ADMIN — PROPOFOL 2.34 MICROGRAM(S)/KG/MIN: 10 INJECTION, EMULSION INTRAVENOUS at 13:54

## 2018-06-01 RX ADMIN — Medication 100 MILLIEQUIVALENT(S): at 07:24

## 2018-06-01 RX ADMIN — LEVETIRACETAM 400 MILLIGRAM(S): 250 TABLET, FILM COATED ORAL at 06:47

## 2018-06-01 RX ADMIN — Medication 400 MILLIGRAM(S): at 15:19

## 2018-06-01 RX ADMIN — FENTANYL CITRATE 50 MICROGRAM(S): 50 INJECTION INTRAVENOUS at 16:58

## 2018-06-01 RX ADMIN — CHLORHEXIDINE GLUCONATE 15 MILLILITER(S): 213 SOLUTION TOPICAL at 18:38

## 2018-06-01 RX ADMIN — SODIUM CHLORIDE 20 MILLILITER(S): 5 INJECTION, SOLUTION INTRAVENOUS at 07:24

## 2018-06-01 RX ADMIN — SENNA PLUS 2 TABLET(S): 8.6 TABLET ORAL at 23:01

## 2018-06-01 RX ADMIN — Medication 40 MILLIEQUIVALENT(S): at 10:28

## 2018-06-01 RX ADMIN — Medication 2 MILLIGRAM(S): at 16:59

## 2018-06-01 RX ADMIN — PROPOFOL 2.34 MICROGRAM(S)/KG/MIN: 10 INJECTION, EMULSION INTRAVENOUS at 05:00

## 2018-06-01 RX ADMIN — SODIUM CHLORIDE 50 MILLILITER(S): 5 INJECTION, SOLUTION INTRAVENOUS at 23:00

## 2018-06-01 RX ADMIN — CHLORHEXIDINE GLUCONATE 15 MILLILITER(S): 213 SOLUTION TOPICAL at 06:48

## 2018-06-01 RX ADMIN — FENTANYL CITRATE 50 MICROGRAM(S): 50 INJECTION INTRAVENOUS at 18:22

## 2018-06-01 RX ADMIN — PANTOPRAZOLE SODIUM 40 MILLIGRAM(S): 20 TABLET, DELAYED RELEASE ORAL at 12:38

## 2018-06-01 RX ADMIN — LEVETIRACETAM 400 MILLIGRAM(S): 250 TABLET, FILM COATED ORAL at 00:11

## 2018-06-01 RX ADMIN — Medication 100 MILLIGRAM(S): at 15:19

## 2018-06-01 RX ADMIN — Medication 100 MILLIGRAM(S): at 06:47

## 2018-06-01 RX ADMIN — Medication 400 MILLIGRAM(S): at 03:07

## 2018-06-01 RX ADMIN — DEXMEDETOMIDINE HYDROCHLORIDE IN 0.9% SODIUM CHLORIDE 3.9 MICROGRAM(S)/KG/HR: 4 INJECTION INTRAVENOUS at 23:00

## 2018-06-01 RX ADMIN — FENTANYL CITRATE 25 MICROGRAM(S): 50 INJECTION INTRAVENOUS at 10:27

## 2018-06-01 RX ADMIN — FENTANYL CITRATE 25 MICROGRAM(S): 50 INJECTION INTRAVENOUS at 10:28

## 2018-06-01 RX ADMIN — LEVETIRACETAM 400 MILLIGRAM(S): 250 TABLET, FILM COATED ORAL at 18:37

## 2018-06-01 RX ADMIN — Medication 1 MILLIGRAM(S): at 15:19

## 2018-06-01 RX ADMIN — PROPOFOL 2.34 MICROGRAM(S)/KG/MIN: 10 INJECTION, EMULSION INTRAVENOUS at 00:49

## 2018-06-01 RX ADMIN — SODIUM CHLORIDE 2000 MILLILITER(S): 9 INJECTION INTRAMUSCULAR; INTRAVENOUS; SUBCUTANEOUS at 18:37

## 2018-06-01 RX ADMIN — Medication 2 MILLIGRAM(S): at 16:58

## 2018-06-01 RX ADMIN — Medication 1000 MILLIGRAM(S): at 15:30

## 2018-06-01 NOTE — DIETITIAN INITIAL EVALUATION ADULT. - ENERGY NEEDS
Height 67"; #; #; 127%IBW  BMI 26.9  Ideal body weight used for calculations as pt >120% of IBW. Needs adjusted for vent, s/p CVA

## 2018-06-01 NOTE — PROGRESS NOTE ADULT - SUBJECTIVE AND OBJECTIVE BOX
patient seen and examined at bedside. intubated.     40/450/  vitals: 85 160/72 99%    Propofol  3% NaCl    No significant events overnight.       MEDICATIONS  (STANDING):  atorvastatin 80 milliGRAM(s) Oral at bedtime  chlorhexidine 0.12% Liquid 15 milliLiter(s) Swish and Spit two times a day  dexmedetomidine Infusion 0.2 MICROgram(s)/kG/Hr (3.9 mL/Hr) IV Continuous <Continuous>  dextrose 5%. 1000 milliLiter(s) (50 mL/Hr) IV Continuous <Continuous>  dextrose 50% Injectable 12.5 Gram(s) IV Push once  dextrose 50% Injectable 25 Gram(s) IV Push once  dextrose 50% Injectable 25 Gram(s) IV Push once  docusate sodium 100 milliGRAM(s) Oral three times a day  folic acid 1 milliGRAM(s) Oral daily  insulin lispro (HumaLOG) corrective regimen sliding scale   SubCutaneous every 6 hours  levETIRAcetam  IVPB 1000 milliGRAM(s) IV Intermittent every 12 hours  multivitamin 1 Tablet(s) Oral daily  pantoprazole  Injectable 40 milliGRAM(s) IV Push daily  senna 2 Tablet(s) Oral at bedtime  sodium chloride 3%. 500 milliLiter(s) (50 mL/Hr) IV Continuous <Continuous>  thiamine 100 milliGRAM(s) Oral daily    MEDICATIONS  (PRN):  acetaminophen   Tablet 650 milliGRAM(s) Oral every 6 hours PRN For Temp greater than 38 C (100.4 F)  acetaminophen   Tablet. 650 milliGRAM(s) Oral every 6 hours PRN Mild Pain (1 - 3)  dextrose 40% Gel 15 Gram(s) Oral once PRN Blood Glucose LESS THAN 70 milliGRAM(s)/deciliter  glucagon  Injectable 1 milliGRAM(s) IntraMuscular once PRN Glucose LESS THAN 70 milligrams/deciliter  ondansetron Injectable 4 milliGRAM(s) IV Push every 6 hours PRN Nausea      Drug Dosing Weight  Height (cm): 170.18 (31 May 2018 12:13)  Weight (kg): 78 (31 May 2018 12:13)  BMI (kg/m2): 26.9 (31 May 2018 12:13)  BSA (m2): 1.9 (31 May 2018 12:13)    CENTRAL LINE: [ ] YES [ ] NO  LOCATION:   DATE INSERTED:  REMOVE: [ ] YES [ ] NO  EXPLAIN:    HAMM: [ ] YES [ ] NO    DATE INSERTED:  REMOVE: [ ] YES [ ] NO  EXPLAIN:    A-LINE: [ ] YES [ ] NO  LOCATION:   DATE INSERTED:  REMOVE: [ ] YES [ ] NO  EXPLAIN:    PAST MEDICAL & SURGICAL HISTORY:  Anxiety  Hypertension  Breast CA  Previous  section  History of mastectomy, left: with TRAM flap recon      ICU Vital Signs Last 24 Hrs  T(C): 37.7 (2018 22:05), Max: 38.2 (2018 02:05)  T(F): 99.9 (2018 22:05), Max: 100.7 (2018 02:05)  HR: 96 (2018 20:08) (74 - 109)  BP: 132/69 (2018 20:08) (104/59 - 159/83)  BP(mean): 96 (2018 20:08) (73 - 126)  ABP: 154/72 (2018 20:08) (110/56 - 192/90)  ABP(mean): 104 (2018 20:08) (76 - 130)  RR: 12 (2018 20:08) (12 - 90)  SpO2: 100% (2018 20:08) (99% - 100%)      ABG - ( 31 May 2018 16:49 )  pH, Arterial: 7.38  pH, Blood: x     /  pCO2: 39    /  pO2: 115   / HCO3: 23    / Base Excess: -2.2  /  SaO2: 98                    31 May 2018 07:01  -  2018 07:00  --------------------------------------------------------  IN:    Enteral Tube Flush: 40 mL    IV PiggyBack: 500 mL    norepinephrine Infusion: 1.2 mL    propofol Infusion: 36 mL    propofol Infusion: 282.4 mL    sodium chloride 0.9%: 300 mL    sodium chloride 3%: 160 mL    sodium chloride 3%: 300 mL    Solution: 595 mL    Solution: 250 mL  Total IN: 2464.6 mL    OUT:    Accordian: 55 mL    Bulb: 55 mL    Bulb: 12 mL    Indwelling Catheter - Urethral: 2155 mL    Nasoenteral Tube: 150 mL  Total OUT: 2427 mL    Total NET: 37.6 mL      2018 07:01  -  2018 22:40  --------------------------------------------------------  IN:    dexmedetomidine Infusion: 25 mL    IV PiggyBack: 150 mL    Jevity: 120 mL    propofol Infusion: 23.5 mL    propofol Infusion: 188.5 mL    sodium chloride 3%: 160 mL    sodium chloride 3%.: 100 mL  Total IN: 767 mL    OUT:    Accordian: 180 mL    Bulb: 30 mL    Bulb: 80 mL    Indwelling Catheter - Urethral: 580 mL  Total OUT: 870 mL    Total NET: -103 mL      PHYSICAL EXAM:  Gen: intubated, sedated  Neuro: no motor or response to pain in LUE/LLE, reponds spontaneously to pain in RUE/RLE  PERRLA, EOMI  CV: RRR  Resp: non-labored breathing on RA, CTAB  Abd: soft, ND, mild right abdomen firmness, no guarding or grimacing to palpation, no palpable masses or visible hematoma  R groin soft, no hematoma  Ext: WWP      LABS:  CBC Full  -  ( 2018 05:20 )  WBC Count : 11.7 K/uL  Hemoglobin : 7.5 g/dL  Hematocrit : 23.1 %  Platelet Count - Automated : 284 K/uL  Mean Cell Volume : 84.9 fL  Mean Cell Hemoglobin : 27.6 pg  Mean Cell Hemoglobin Concentration : 32.5 g/dL  Auto Neutrophil # : x  Auto Lymphocyte # : x  Auto Monocyte # : x  Auto Eosinophil # : x  Auto Basophil # : x  Auto Neutrophil % : x  Auto Lymphocyte % : x  Auto Monocyte % : x  Auto Eosinophil % : x  Auto Basophil % : x    06    146<H>  |  112<H>  |  4<L>  ----------------------------<  131<H>  3.3<L>   |  24  |  0.53    Ca    8.2<L>      2018 15:32  Phos  2.9     06-  Mg     2.0     -    TPro  5.5<L>  /  Alb  3.3  /  TBili  0.9  /  DBili  x   /  AST  27  /  ALT  19  /  AlkPhos  64      PT/INR - ( 31 May 2018 16:41 )   PT: 11.7 sec;   INR: 1.05          PTT - ( 31 May 2018 16:41 )  PTT:24.8 sec      RADIOLOGY & ADDITIONAL STUDIES:    ASSESSMENT AND PLAN:  58F with a hx of TRAM flap reconstruction on plavix now with a spontaneous right rectus expanding hematoma taken to IR for Rt groin access for dx angio, and rt abd percutaneous thrombin injection of 2 pseudoaneurysms in abd wall. Pt over sedated post procedure and given narcan. acute cerebrovascular accident involving R MCA / SELINA, malignant infarction, cerebral edema, brain compression s/p decompressive hemicraniectomy (2018, Dr. Johnson).     - Abdominal exam remains stable. Hemoglobin is downtrending while patient remains HD stable. No transfusions required.   - No acute surgical intervention indicated.   - Continue to trend CBC.   - Large rectal sheath hematoma remains a potential nidus for infection.   - Surgery continue to follow.

## 2018-06-01 NOTE — PROGRESS NOTE ADULT - SUBJECTIVE AND OBJECTIVE BOX
=================================  NEUROCRITICAL CARE ATTENDING NOTE  =================================    VERONIKA CALERO   MRN-[id number]  Summary:  58F with h/o breast cancer, s/p TRAM flap reconstruction, recently started on plavix presented with painful bulge in R abdomen.  Imaging showed large expanding intramuscular hematoma R rects abdominis.  Admitted to surgery , diagnostic angio, R abd percutaneous thrombin injection of 2 pseudoaneurysms.  Post-procedure, noted to be hypotensive, low respiratory rate, altered mental status.  Naloxone given with improved respiratory status, but AMS persisted and noted L-sided weakness.  Stroke code called.  Post-op, noted  L UE hemiplegia / L LE weakness.  Stroke code called.  CT showed large R MCA small SELINA infarcts, with mass effect.  Mannitol 50g IV given, 23.4% x1 given.  Intubated for airway protection.  Seizure noted during intubation, given 4 ativan, started on propofol drip.  Brought to OR for decompressive hemicraniectomy.  Overnight Events: No significant events overnight.    Past Medical History: Anxiety Hypertension Breast CA dyslipidemia CAD Hypertension   Allergies:  No Known Allergies  Home Meds: bisoprolol 5mg qd, lorazepam 1mg q8?, Plavix 5qd, rosuvastatin, HCTZ, buproprion, baclofen  Social: 1/2 bottle of wine most nights, remote cigarette hx, denies IVDA     PHYSICAL EXAMINATION  T(C): 37.4 ( @ 06:38), Max: 38.2 ( @ 02:05) HR: 82 ( @ 07:00) (60 - 112) BP: 129/66 ( @ 07:00) (116/64 - 193/87) RR: 12 ( @ 07:00) (5 - 90) SpO2: 100% ( @ 07:00) (86% - 100%)  NEUROLOGIC EXAMINATION:  Patient does not open eyes, does not follow commands, no verbal output, R gaze preference, pupils 3mm reactive and equal, moves R UE/LE spontaneously, withdraws to noxious on L UE/LE   GENERAL:  not intubated  EENT: anicteric  CARDIOVASC:  (+) S1 S2, tachycardic and regular rhythm  PULMONARY:  clear to auscultation bilaterally  ABDOMEN:  soft, nontender, with normoactive bowel sounds  EXTREMITIES:  no edema  SKIN:  no rash    LABS:  CAPILLARY BLOOD GLUCOSE 109 138 123 134 158 142 154                        7.5    11.7  )-----------( 284      ( 2018 05:20 )             23.1     149<H>  |  113<H>  |  5<L>  ----------------------------<  133<H>  3.0<L>   |  24  |  0.58    Ca    8.3<L>      2018 05:20  Phos  2.9       Mg     2.0         TPro  5.5<L>  /  Alb  3.3  /  TBili  0.9  /  DBili  x   /  AST  27  /  ALT  19  /  AlkPhos  64   @ 07:01  -  06- @ 07:00  IN: 2464.6 mL / OUT: 2427 mL / NET: 37.6 mL    Bacteriology:  CSF studies:  EEG:  Neuroimagin/31 CT head: evolving R MCA / SELINA infarction, global mass effect, MLS to L, early L lateral ventricle entrapment, hemorrhagic transformation suspected   CTP:  abnormal perfusion, mismatch volume 12ml   CTA:  acute occlusion R M2   CT head:  acute R MCA infarction, no ICH   CT abd:  large expanding intramuscular hematoma within R rectus abdomin  Other imaging:    MEDICATIONS: atorvastatin 80mg HS cefazolin 1g q8h peridex docusate mod ISS levetiracetam 1g IV q12h pantoprazole 40 IV daily  senna 2mg HS     IV FLUIDS:  DRIPS: propofol  DIET: NPO  Lines: [place]  Drains:  [place]  Wounds:    CODE STATUS:  Full Code                       GOALS OF CARE:  aggressive                      DISPOSITION:  ICU =================================  NEUROCRITICAL CARE ATTENDING NOTE  =================================    VERONIKA CALERO   MRN-[id number]  Summary:  58F with h/o breast cancer, s/p TRAM flap reconstruction, recently started on plavix presented with painful bulge in R abdomen.  Imaging showed large expanding intramuscular hematoma R rects abdominis.  Admitted to surgery , diagnostic angio, R abd percutaneous thrombin injection of 2 pseudoaneurysms.  Post-procedure, noted to be hypotensive, low respiratory rate, altered mental status.  Naloxone given with improved respiratory status, but AMS persisted and noted L-sided weakness.  Stroke code called.  Post-op, noted  L UE hemiplegia / L LE weakness.  Stroke code called.  CT showed large R MCA small SELINA infarcts, with mass effect.  Mannitol 50g IV given, 23.4% x1 given.  Intubated for airway protection.  Seizure noted during intubation, given 4 ativan, started on propofol drip.  Brought to OR for decompressive hemicraniectomy.  Overnight Events: No significant events overnight.    Past Medical History: Anxiety Hypertension Breast CA dyslipidemia CAD Hypertension   Allergies:  No Known Allergies  Home Meds: bisoprolol 5mg qd, lorazepam 1mg q8?, Plavix 5qd, rosuvastatin, HCTZ, buproprion, baclofen  Social: 1/2 bottle of wine most nights, remote cigarette hx, denies IVDA     PHYSICAL EXAMINATION  T(C): 37.4 ( @ 06:38), Max: 38.2 ( @ 02:05) HR: 82 ( @ 07:00) (60 - 112) BP: 129/66 ( @ 07:00) (116/64 - 193/87) RR: 12 ( @ 07:00) (5 - 90) SpO2: 100% ( @ 07:00) (86% - 100%)  NEUROLOGIC EXAMINATION:  Patient does not open eyes, does not follow commands, no verbal output, ?R gaze preference, pupils 3mm reactive and equal, briskly localizes R UE/LE , withdraws to noxious on L UE/ TF L LE  GENERAL:   intubated 12 450 40% +5  EENT: anicteric  CARDIOVASC:  (+) S1 S2, normal rate and regular rhythm  PULMONARY:  clear to auscultation bilaterally  ABDOMEN:  soft, nontender, with normoactive bowel sounds  EXTREMITIES:  no edema  SKIN:  no rash    LABS:  CAPILLARY BLOOD GLUCOSE 109 138 123 134 158 142 154             (13.7)     7.5  (8.6, 9.5)  11.7  )-----------( 284      ( 2018 05:20 )             23.1     149<H>  |  113<H>  |  5<L>  ----------------------------<  133<H>  3.0<L>   |  24  |  0.58    Ca    8.3<L>      2018 05:20  Phos  2.9       Mg     2.0         HbA1C = 5.3 ()  LDL = 106   /  HDL = 52 ()   /  TG = 84 ()  TSH = 0.614 ()    TPro  5.5<L>  /  Alb  3.3  /  TBili  0.9  /  DBili  x   /  AST  27  /  ALT  19  /  AlkPhos  64   @ 07:01  -  06 @ 07:00  IN: 2464.6 mL / OUT: 2427 mL / NET: 37.6 mL    Bacteriology:  CSF studies:  EEG:  Neuroimagin/31 CT head:    CT head: evolving R MCA / SELINA infarction, global mass effect, MLS to L, early L lateral ventricle entrapment, hemorrhagic transformation suspected   CTP:  abnormal perfusion, mismatch volume 12ml   CTA:  acute occlusion R M2   CT head:  acute R MCA infarction, no ICH   CT abd:  large expanding intramuscular hematoma within R rectus abdomin  Other imaging:    MEDICATIONS: atorvastatin 80mg HS cefazolin 1g q8h peridex docusate mod ISS levetiracetam 1g IV q12h pantoprazole 40 IV daily  senna 2mg HS     IV FLUIDS: 3%@20cc/hr  DRIPS: propofol  DIET: NPO  Lines: [place] L SC 4 drains SG 55  55   12    Drains:  Eddy  Wounds:    CODE STATUS:  Full Code                       GOALS OF CARE:  aggressive                      DISPOSITION:  ICU

## 2018-06-01 NOTE — PROGRESS NOTE ADULT - SUBJECTIVE AND OBJECTIVE BOX
HPI:  58F with a hx of left breast cancer s/p left mastectomy with TRAM flap reconstruction (4-5 years prior), recently placed on plavix (about a month ago) by her cardiologist 2/2 calcified arterial disease and is now presenting to the [facility] ED with a painful bulge in her right abdomen.  Pt reports that she's had a cough for several days and yesterday noticed a bulge in her right abdomen that has grown and only become more painful.  She at times has difficulty breathing and some lightheadedness but otherwise denies fevers, chills, chest pain, nausea, vomiting, diarrhea, dysuria.    PMH: HTN, CAD?, HLD, Anxiety, Depression  Meds: bisoprolol 5mg qd, lorazepam 1mg q8?, Plavix 5qd, rosuvastatin, HCTZ, buproprion, baclofen  NKDA  PSH: Left mastectomy with TRAM rotational flap reconstruction  Social: 1/2 bottle of wine most nights, remote cigarette hx, denies IVDA (30 May 2018 13:13)    58y    Female   s/p right decompressive hemicraniectomy transferred to [facility] immediately psot op and then to SICU. Intra op pt recieved 2 units platelets, 1 unit PRBCs, 80g mannitol. Post op Lt TLC placed o/w no acute events. Pt kept on propofol sedation post op but currently held for best exam. Some hemorrhagic ocnversion noted on post op CTH.    POD 1: emergent crani yesterday, transferred back to [facility], sedated overnight, rpt [facility] with hemorrhagic conversion o/w stable. stable exam overnight. Drains remains to suction. no acute events overnight    OVERNIGHT EVENTS:  Vital Signs Last 24 Hrs  T(C): 37.4 (01 Jun 2018 06:38), Max: 38.2 (01 Jun 2018 02:05)  T(F): 99.4 (01 Jun 2018 06:38), Max: 100.7 (01 Jun 2018 02:05)  HR: 84 (01 Jun 2018 08:00) (60 - 112)  BP: 121/63 (01 Jun 2018 08:00) (116/64 - 193/87)  BP(mean): 82 (01 Jun 2018 08:00) (79 - 152)  RR: 12 (01 Jun 2018 08:00) (5 - 90)  SpO2: 100% (01 Jun 2018 08:00) (97% - 100%)    I&O's Detail    31 May 2018 07:01  -  01 Jun 2018 07:00  --------------------------------------------------------  IN:    Enteral Tube Flush: 40 mL    IV PiggyBack: 500 mL    norepinephrine Infusion: 1.2 mL    propofol Infusion: 36 mL    propofol Infusion: 282.4 mL    sodium chloride 0.9%: 300 mL    sodium chloride 3%: 300 mL    sodium chloride 3%.: 160 mL    Solution: 595 mL    Solution: 250 mL  Total IN: 2464.6 mL    OUT:    Accordian: 55 mL    Bulb: 55 mL    Bulb: 12 mL    Indwelling Catheter - Urethral: 2155 mL    Nasoenteral Tube: 150 mL  Total OUT: 2427 mL    Total NET: 37.6 mL      01 Jun 2018 07:01  -  01 Jun 2018 08:40  --------------------------------------------------------  IN:    propofol Infusion: 23.5 mL    sodium chloride 3%.: 20 mL  Total IN: 43.5 mL    OUT:    Accordian: 120 mL    Indwelling Catheter - Urethral: 40 mL  Total OUT: 160 mL    Total NET: -116.5 mL        I&O's Summary    31 May 2018 07:01  -  01 Jun 2018 07:00  --------------------------------------------------------  IN: 2464.6 mL / OUT: 2427 mL / NET: 37.6 mL    01 Jun 2018 07:01  -  01 Jun 2018 08:40  --------------------------------------------------------  IN: 43.5 mL / OUT: 160 mL / NET: -116.5 mL        PHYSICAL EXAM:  Neurological:  intubated, sedated  not OE, PERRL  flap soft, drains intact  briskly localizes right side, trace withdrawal LUE, TF LLE    TUBES/LINES:  [x] CVC  [x] A-line  [] Lumbar Drain  [] Ventriculostomy  [x] Other: hmv/JPs    DIET:  [x] NPO  [] Mechanical  [] Tube feeds    LABS:                        7.5    11.7  )-----------( 284      ( 01 Jun 2018 05:20 )             23.1     06-01    149<H>  |  113<H>  |  5<L>  ----------------------------<  133<H>  3.0<L>   |  24  |  0.58    Ca    8.3<L>      01 Jun 2018 05:20  Phos  2.9     06-01  Mg     2.0     06-01    TPro  5.5<L>  /  Alb  3.3  /  TBili  0.9  /  DBili  x   /  AST  27  /  ALT  19  /  AlkPhos  64  05-31    PT/INR - ( 31 May 2018 16:41 )   PT: 11.7 sec;   INR: 1.05          PTT - ( 31 May 2018 16:41 )  PTT:24.8 sec        CAPILLARY BLOOD GLUCOSE  154 (31 May 2018 10:44)      POCT Blood Glucose.: 109 mg/dL (01 Jun 2018 06:45)  POCT Blood Glucose.: 138 mg/dL (01 Jun 2018 00:13)  POCT Blood Glucose.: 123 mg/dL (31 May 2018 21:30)  POCT Blood Glucose.: 134 mg/dL (31 May 2018 18:17)  POCT Blood Glucose.: 158 mg/dL (31 May 2018 15:16)  POCT Blood Glucose.: 142 mg/dL (31 May 2018 13:44)  POCT Blood Glucose.: 154 mg/dL (31 May 2018 10:16)      Drug Levels: [] N/A    CSF Analysis: [] N/A      Allergies    No Known Allergies    Intolerances      MEDICATIONS:  Antibiotics:  ceFAZolin   IVPB 1000 milliGRAM(s) IV Intermittent every 8 hours    Neuro:  acetaminophen   Tablet 650 milliGRAM(s) Oral every 6 hours PRN  acetaminophen   Tablet. 650 milliGRAM(s) Oral every 6 hours PRN  levETIRAcetam  IVPB 1000 milliGRAM(s) IV Intermittent every 12 hours  ondansetron Injectable 4 milliGRAM(s) IV Push every 6 hours PRN  propofol Infusion 5 MICROgram(s)/kG/Min IV Continuous <Continuous>    Anticoagulation:    OTHER:  atorvastatin 80 milliGRAM(s) Oral at bedtime  chlorhexidine 0.12% Liquid 15 milliLiter(s) Swish and Spit two times a day  dextrose 40% Gel 15 Gram(s) Oral once PRN  dextrose 50% Injectable 12.5 Gram(s) IV Push once  dextrose 50% Injectable 25 Gram(s) IV Push once  dextrose 50% Injectable 25 Gram(s) IV Push once  docusate sodium 100 milliGRAM(s) Oral three times a day  glucagon  Injectable 1 milliGRAM(s) IntraMuscular once PRN  insulin lispro (HumaLOG) corrective regimen sliding scale   SubCutaneous every 6 hours  pantoprazole  Injectable 40 milliGRAM(s) IV Push daily  senna 2 Tablet(s) Oral at bedtime    IVF:  dextrose 5%. 1000 milliLiter(s) IV Continuous <Continuous>  sodium chloride 3%. 500 milliLiter(s) IV Continuous <Continuous>    CULTURES:    RADIOLOGY & ADDITIONAL TESTS:  < from: CT Head No Cont (05.31.18 @ 23:49) >  Continued evolution of a large right MCA territory infarction status post   decompressive right hemicraniectomy. Interval slight increase in size of   petechial hemorrhage in the right parietal lobe.    < end of copied text >      ASSESSMENT:  58y Female s/p hemicrani POD 1 for rt MCA infarct    PLAN:  NEURO:  q1h neuro checks  propofol to RASS -2  vEEG after rpt CTH this am  cont keppra    CARDIOVASCULAR:  SBP goal 120-160  keep a line, TLC    PULMONARY:  full vent support    RENAL:  3% titrated to Na goal 145-150  replete electrolytes    GI:  NPO   NGT to suction  bowel regimen  PPI    HEME:  trend h/h    ID:  afeb  no abx    ENDO:  ISS    DVT PROPHYLAXIS:  [x] Venodynes                                [] Heparin/Lovenox    DISPOSITION:   ICU status  full code  pt/ot pending  d/w Dr. Sorto and ICU house staff HPI:  58F with a hx of left breast cancer s/p left mastectomy with TRAM flap reconstruction (4-5 years prior), recently placed on plavix (about a month ago) by her cardiologist 2/2 calcified arterial disease and is now presenting to the [facility] ED with a painful bulge in her right abdomen.  Pt reports that she's had a cough for several days and yesterday noticed a bulge in her right abdomen that has grown and only become more painful.  She at times has difficulty breathing and some lightheadedness but otherwise denies fevers, chills, chest pain, nausea, vomiting, diarrhea, dysuria.    PMH: HTN, CAD?, HLD, Anxiety, Depression  Meds: bisoprolol 5mg qd, lorazepam 1mg q8?, Plavix 5qd, rosuvastatin, HCTZ, buproprion, baclofen  NKDA  PSH: Left mastectomy with TRAM rotational flap reconstruction  Social: 1/2 bottle of wine most nights, remote cigarette hx, denies IVDA (30 May 2018 13:13)    58y    Female   s/p right decompressive hemicraniectomy transferred to [facility] immediately psot op and then to SICU. Intra op pt recieved 2 units platelets, 1 unit PRBCs, 80g mannitol. Post op Lt TLC placed o/w no acute events. Pt kept on propofol sedation post op but currently held for best exam. Some hemorrhagic ocnversion noted on post op CTH.    POD 1: emergent crani yesterday, transferred back to [facility], sedated overnight, rpt [facility] with hemorrhagic conversion o/w stable. stable exam overnight. Drains remains to suction. no acute events overnight    OVERNIGHT EVENTS:  Vital Signs Last 24 Hrs  T(C): 37.4 (01 Jun 2018 06:38), Max: 38.2 (01 Jun 2018 02:05)  T(F): 99.4 (01 Jun 2018 06:38), Max: 100.7 (01 Jun 2018 02:05)  HR: 84 (01 Jun 2018 08:00) (60 - 112)  BP: 121/63 (01 Jun 2018 08:00) (116/64 - 193/87)  BP(mean): 82 (01 Jun 2018 08:00) (79 - 152)  RR: 12 (01 Jun 2018 08:00) (5 - 90)  SpO2: 100% (01 Jun 2018 08:00) (97% - 100%)    I&O's Detail    31 May 2018 07:01  -  01 Jun 2018 07:00  --------------------------------------------------------  IN:    Enteral Tube Flush: 40 mL    IV PiggyBack: 500 mL    norepinephrine Infusion: 1.2 mL    propofol Infusion: 36 mL    propofol Infusion: 282.4 mL    sodium chloride 0.9%: 300 mL    sodium chloride 3%: 300 mL    sodium chloride 3%.: 160 mL    Solution: 595 mL    Solution: 250 mL  Total IN: 2464.6 mL    OUT:    Accordian: 55 mL    Bulb: 55 mL    Bulb: 12 mL    Indwelling Catheter - Urethral: 2155 mL    Nasoenteral Tube: 150 mL  Total OUT: 2427 mL    Total NET: 37.6 mL      01 Jun 2018 07:01  -  01 Jun 2018 08:40  --------------------------------------------------------  IN:    propofol Infusion: 23.5 mL    sodium chloride 3%.: 20 mL  Total IN: 43.5 mL    OUT:    Accordian: 120 mL    Indwelling Catheter - Urethral: 40 mL  Total OUT: 160 mL    Total NET: -116.5 mL        I&O's Summary    31 May 2018 07:01  -  01 Jun 2018 07:00  --------------------------------------------------------  IN: 2464.6 mL / OUT: 2427 mL / NET: 37.6 mL    01 Jun 2018 07:01  -  01 Jun 2018 08:40  --------------------------------------------------------  IN: 43.5 mL / OUT: 160 mL / NET: -116.5 mL        PHYSICAL EXAM:  Neurological:  intubated, sedated  not OE, PERRL  flap soft, drains intact  briskly localizes right side, trace withdrawal LUE, TF LLE    TUBES/LINES:  [x] CVC  [x] A-line  [] Lumbar Drain  [] Ventriculostomy  [x] Other: hmv/JPs    DIET:  [x] NPO  [] Mechanical  [] Tube feeds    LABS:                        7.5    11.7  )-----------( 284      ( 01 Jun 2018 05:20 )             23.1     06-01    149<H>  |  113<H>  |  5<L>  ----------------------------<  133<H>  3.0<L>   |  24  |  0.58    Ca    8.3<L>      01 Jun 2018 05:20  Phos  2.9     06-01  Mg     2.0     06-01    TPro  5.5<L>  /  Alb  3.3  /  TBili  0.9  /  DBili  x   /  AST  27  /  ALT  19  /  AlkPhos  64  05-31    PT/INR - ( 31 May 2018 16:41 )   PT: 11.7 sec;   INR: 1.05          PTT - ( 31 May 2018 16:41 )  PTT:24.8 sec        CAPILLARY BLOOD GLUCOSE  154 (31 May 2018 10:44)      POCT Blood Glucose.: 109 mg/dL (01 Jun 2018 06:45)  POCT Blood Glucose.: 138 mg/dL (01 Jun 2018 00:13)  POCT Blood Glucose.: 123 mg/dL (31 May 2018 21:30)  POCT Blood Glucose.: 134 mg/dL (31 May 2018 18:17)  POCT Blood Glucose.: 158 mg/dL (31 May 2018 15:16)  POCT Blood Glucose.: 142 mg/dL (31 May 2018 13:44)  POCT Blood Glucose.: 154 mg/dL (31 May 2018 10:16)      Drug Levels: [] N/A    CSF Analysis: [] N/A      Allergies    No Known Allergies    Intolerances      MEDICATIONS:  Antibiotics:  ceFAZolin   IVPB 1000 milliGRAM(s) IV Intermittent every 8 hours    Neuro:  acetaminophen   Tablet 650 milliGRAM(s) Oral every 6 hours PRN  acetaminophen   Tablet. 650 milliGRAM(s) Oral every 6 hours PRN  levETIRAcetam  IVPB 1000 milliGRAM(s) IV Intermittent every 12 hours  ondansetron Injectable 4 milliGRAM(s) IV Push every 6 hours PRN  propofol Infusion 5 MICROgram(s)/kG/Min IV Continuous <Continuous>    Anticoagulation:    OTHER:  atorvastatin 80 milliGRAM(s) Oral at bedtime  chlorhexidine 0.12% Liquid 15 milliLiter(s) Swish and Spit two times a day  dextrose 40% Gel 15 Gram(s) Oral once PRN  dextrose 50% Injectable 12.5 Gram(s) IV Push once  dextrose 50% Injectable 25 Gram(s) IV Push once  dextrose 50% Injectable 25 Gram(s) IV Push once  docusate sodium 100 milliGRAM(s) Oral three times a day  glucagon  Injectable 1 milliGRAM(s) IntraMuscular once PRN  insulin lispro (HumaLOG) corrective regimen sliding scale   SubCutaneous every 6 hours  pantoprazole  Injectable 40 milliGRAM(s) IV Push daily  senna 2 Tablet(s) Oral at bedtime    IVF:  dextrose 5%. 1000 milliLiter(s) IV Continuous <Continuous>  sodium chloride 3%. 500 milliLiter(s) IV Continuous <Continuous>    CULTURES:    RADIOLOGY & ADDITIONAL TESTS:  < from: CT Head No Cont (05.31.18 @ 23:49) >  Continued evolution of a large right MCA territory infarction status post   decompressive right hemicraniectomy. Interval slight increase in size of   petechial hemorrhage in the right parietal lobe.    < end of copied text >      ASSESSMENT:  58y Female s/p hemicrani POD 1 for rt MCA infarct    PLAN:  NEURO:  q1h neuro checks  propofol to RASS -2  vEEG after rpt CTH this am  cont keppra  transition propofol to precedex if no sz's on vEEG    CARDIOVASCULAR:  SBP goal 120-160  keep a line, TLC    PULMONARY:  full vent support    RENAL:  3% titrated to Na goal 145-150  replete electrolytes  dc  sampson  f/u BMP    GI:  NPO start TFs  NGT to suction  bowel regimen  PPI    HEME:  trend h/h    ID:  afeb  no abx    ENDO:  ISS    DVT PROPHYLAXIS:  [x] Venodynes                                [] Heparin/Lovenox    DISPOSITION:   ICU status  full code  pt/ot pending  d/w Dr. Sorto and ICU house staff

## 2018-06-01 NOTE — DIETITIAN INITIAL EVALUATION ADULT. - NS AS NUTRI INTERV ENTERAL NUTRITION
Route/Composition/Concentration/Rate/Volume/Schedule/Insert enteral feeding tube/At current rate of propofol, will be difficult to meet protein needs, but recommend Jevity 1.2 Sreedhar @ 35mL/hr x 24hrs plus ProStat Sugar Free BID (200 kcal, 30g protein) (route per team) to provide: 840 mL TV, 1208 kcal (w/o propofol kcal), 77g protein, 678 mL free h2O, 84% RDI, 1.26g/kg IBW protein. Recommend starting at 20mL/hr and advancing by 10mLQhrs. Monitor for s/s intolerance. Adjust rate of EN w/lower rate of propofol to meet needs.

## 2018-06-01 NOTE — PROGRESS NOTE ADULT - ASSESSMENT
58F with   1.  acute cerebrovascular accident involving R MCA / SELINA, malignant infarction, cerebral edema, brain compression s/p decompressive hemicraniectomy (05/31/2018, Dr. Johnson)  2.  Hypertension dyslipidemia   3.  h/o breast CA  4.  CAD  5.  R rectus abdominis intramuscular hematoma  6.  seizures    PLAN:   NEURO: neurochecks q1h, PRN pain meds, sedation with propofol  malignant R MCA infarction, s/p DHC:  complete stroke core measures; no ASA for now, hemorrhagic conversion noted  seizure disorder:  continue levetiracetam 1G BID, VEEG  ICP crises:  given 23.4%, mannitol, POsm next blood draw  REHAB:  physical therapy evaluation and management - defer   EARLY MOB:  HOB up, bedrest    PULM:  full vent support, AC 12 450 40% +5  CARDIO:  SBP goal 120-160mm Hg, check echo with bubble study, insert central line  ENDO:  Blood sugar goals 140-180 mg/dL, continue insulin sliding scale; f/u A1C, continue high-dose statins  GI:  start PPI for GI prophylaxis  DIET: NPO  RENAL:  3%@50cc/hr, recheck BMP in 4 hours, Na goal 145-150  HEM/ONC: check post-op Hb  VTE Prophylaxis: SCDs only, no DVT chemoprophylaxis for now as patient is high risk for bleed (?hemorrhagic conversion + fresh post-op); baseline LE Doppler for DVT suspected on admission (h/o breast CA)  ID: afebrile, no leukocytosis, periop ancef then d/c  Social: will update family  MISC:  h/o ETOH abuse, on propofol sedation    ATTENDING ATTESTATION:  I was physically present for the key portions of the evaluation and management (E/M) service provided.  I agree with the above history, physical and plan, which I have reviewed and edited where appropriate.    Patient at high risk for neurological deterioration or death due to:  ICU delirium, aspiration PNA, DVT / PE.  Critical care time, excluding procedures: 60 minutes spent on total encounter, more than 50% of the visit was spent counseling and/or coordinating care by the attending physician.     Plan discussed with RN, house staff.      ADDENDUM:  7: 15 p.m.  [address] – 58F with malignant infarction noted after diagnostic angio, R abd percutaneous thrombin injection of 2 pseudoaneurysms.  Worsening mental status this morning, intubated for airway protection, seizures during intubation.  Given mannitol 50g x1 and 23.4% x1 preop, and given mannitol 80g intraop.  Underwent C (05/31).  Still fresh post-op, waking up from anesthesia.  Pre-op exam:  does not open eyes, does not follow commands, no verbal output, R gaze preference, equal pupils, reactive, moves R UE/LE spontaneously, withdraws to noxious on L UE/LE. Na goal 145-150, on 3% at 50cc/hr.  f/u repeat BMP tonight. Also h/o ETOH abuse.  On propofol, VEEG, keppra.  Hemorrhagic conversion noted on CT scans, needs stability CT around 10p.m. tonight.  Off sedation, localizes R UE, withdraws R LE / L LE.    Additional critical care time, excluding procedures: 30 minutes.    TOTAL critical care time:  90 minutes spent on total encounter, more than 50% of the visit was spent counseling and/or coordinating care by the attending physician. 58F with   1.  acute cerebrovascular accident involving R MCA / SELINA, malignant infarction, cerebral edema, brain compression s/p decompressive hemicraniectomy (05/31/2018, Dr. Johnson)  2.  Hypertension dyslipidemia   3.  h/o breast CA  4.  CAD  5.  R rectus abdominis intramuscular hematoma  6.  seizures    PLAN:   NEURO: neurochecks q1h, PRN pain meds tylenol,  sedation with propofol; will switch to precedex / fentanyl PRN if no seizures   malignant R MCA infarction, s/p DHC:  complete stroke core measures; no ASA for now, hemorrhagic conversion noted  seizure disorder:  continue levetiracetam 1G BID, VEEG; CT this morning  ICP crises:  given 23.4%, mannitol, POsm next blood draw  REHAB:  physical therapy evaluation and management - defer   EARLY MOB:  HOB up, bedrest    PULM:  full vent support, AC 12 450 40% +5  CARDIO:  SBP goal 120-160mm Hg, echo EF 55-60% mod dilated LA, probable AVM, no PFOs, keep central line  ENDO:  Blood sugar goals 140-180 mg/dL, continue insulin sliding scale; continue high-dose statins  GI:  PPI for GI prophylaxis  DIET: start jevity  RENAL:  cont 3%@20cc/hr, recheck BMP this PM, Na goal 145-150  HEM/ONC: Hb drop - will monitor  VTE Prophylaxis: SCDs only, no DVT chemoprophylaxis for now as patient is high risk for bleed (?hemorrhagic conversion + fresh post-op); f/u baseline LE Doppler   ID: afebrile, no leukocytosis, d/c ancef  Social: will update family  MISC:  h/o ETOH abuse, on propofol sedation    ATTENDING ATTESTATION:  I was physically present for the key portions of the evaluation and management (E/M) service provided.  I agree with the above history, physical and plan, which I have reviewed and edited where appropriate.    Patient at high risk for neurological deterioration or death due to:  ICU delirium, aspiration PNA, DVT / PE.  Critical care time, excluding procedures: 60 minutes spent on total encounter, more than 50% of the visit was spent counseling and/or coordinating care by the attending physician.     Plan discussed with RN, house staff.      ADDENDUM:  7: 15 p.m.  [address] – 58F with malignant infarction noted after diagnostic angio, R abd percutaneous thrombin injection of 2 pseudoaneurysms.  Worsening mental status this morning, intubated for airway protection, seizures during intubation.  Given mannitol 50g x1 and 23.4% x1 preop, and given mannitol 80g intraop.  Underwent [facility] (05/31).  Still fresh post-op, waking up from anesthesia.  Pre-op exam:  does not open eyes, does not follow commands, no verbal output, R gaze preference, equal pupils, reactive, moves R UE/LE spontaneously, withdraws to noxious on L UE/LE. Na goal 145-150, on 3% at 50cc/hr.  f/u repeat BMP tonight. Also h/o ETOH abuse.  On propofol, VEEG, keppra.  Hemorrhagic conversion noted on CT scans, needs stability CT around 10p.m. tonight.  Off sedation, localizes R UE, withdraws R LE / L LE.    Additional critical care time, excluding procedures: 30 minutes.    TOTAL critical care time:  90 minutes spent on total encounter, more than 50% of the visit was spent counseling and/or coordinating care by the attending physician. 58F with   1.  acute cerebrovascular accident involving R MCA / SELINA, malignant infarction, cerebral edema, brain compression s/p decompressive hemicraniectomy (05/31/2018, Dr. Johnson)  2.  Hypertension dyslipidemia   3.  h/o breast CA  4.  CAD  5.  R rectus abdominis intramuscular hematoma  6.  seizures    PLAN:   NEURO: neurochecks q1h, PRN pain meds tylenol,  sedation with propofol; will switch to precedex / fentanyl PRN if no seizures   malignant R MCA infarction, s/p DHC:  complete stroke core measures; no ASA for now, hemorrhagic conversion noted  seizure disorder:  continue levetiracetam 1G BID, VEEG; CT this morning  ICP crises:  given 23.4%, mannitol, POsm next blood draw  REHAB:  physical therapy evaluation and management - defer   EARLY MOB:  HOB up, bedrest    PULM:  full vent support, AC 12 450 40% +5  CARDIO:  SBP goal 120-160mm Hg, echo EF 55-60% mod dilated LA, probable AVM, no PFOs, keep central line  ENDO:  Blood sugar goals 140-180 mg/dL, continue insulin sliding scale; continue high-dose statins  GI:  PPI for GI prophylaxis  DIET: start jevity  RENAL:  cont 3%@20cc/hr, recheck BMP this PM, Na goal 145-150  HEM/ONC: Hb drop - will monitor  VTE Prophylaxis: SCDs only, no DVT chemoprophylaxis for now as patient is high risk for bleed (?hemorrhagic conversion + fresh post-op); f/u baseline LE Doppler   ID: afebrile, no leukocytosis, d/c ancef  Social: will update family  MISC:  h/o ETOH abuse, on propofol sedation    ATTENDING ATTESTATION:  I was physically present for the key portions of the evaluation and management (E/M) service provided.  I agree with the above history, physical and plan, which I have reviewed and edited where appropriate.    Patient at high risk for neurological deterioration or death due to:  ICU delirium, aspiration PNA, DVT / PE.  Critical care time, excluding procedures: 45 minutes spent on total encounter, more than 50% of the visit was spent counseling and/or coordinating care by the attending physician.     Plan discussed with RN, house staff.

## 2018-06-01 NOTE — DIETITIAN INITIAL EVALUATION ADULT. - OTHER INFO
57 yo/female with PMHx L. breast cancer s/p mastectomy w/flap reconstruction, HTN, admitted with abdominal pain/bulge. Found to have R. rectus expanding hematoma. Course c/b large R. MCA stroke. S/p decompressive craniotomy. Seen in room, remains intubated on VC/AC mode. Sedated on propofol @ 23.5mL/hr providing 620 kcal/day from fat. . NGT in place for LIWS. Multiple surgical wounds and DREW drains. NKFA. No family to obtain nutrition hx from. Pt appears well nourished.

## 2018-06-02 ENCOUNTER — TRANSCRIPTION ENCOUNTER (OUTPATIENT)
Age: 58
End: 2018-06-02

## 2018-06-02 LAB
ANION GAP SERPL CALC-SCNC: 12 MMOL/L — SIGNIFICANT CHANGE UP (ref 5–17)
ANION GAP SERPL CALC-SCNC: 8 MMOL/L — SIGNIFICANT CHANGE UP (ref 5–17)
BUN SERPL-MCNC: 4 MG/DL — LOW (ref 7–23)
BUN SERPL-MCNC: 5 MG/DL — LOW (ref 7–23)
CALCIUM SERPL-MCNC: 8.2 MG/DL — LOW (ref 8.4–10.5)
CALCIUM SERPL-MCNC: 8.5 MG/DL — SIGNIFICANT CHANGE UP (ref 8.4–10.5)
CHLORIDE SERPL-SCNC: 114 MMOL/L — HIGH (ref 96–108)
CHLORIDE SERPL-SCNC: 117 MMOL/L — HIGH (ref 96–108)
CO2 SERPL-SCNC: 24 MMOL/L — SIGNIFICANT CHANGE UP (ref 22–31)
CO2 SERPL-SCNC: 25 MMOL/L — SIGNIFICANT CHANGE UP (ref 22–31)
CREAT SERPL-MCNC: 0.48 MG/DL — LOW (ref 0.5–1.3)
CREAT SERPL-MCNC: 0.48 MG/DL — LOW (ref 0.5–1.3)
GLUCOSE SERPL-MCNC: 139 MG/DL — HIGH (ref 70–99)
GLUCOSE SERPL-MCNC: 141 MG/DL — HIGH (ref 70–99)
HCT VFR BLD CALC: 20.9 % — CRITICAL LOW (ref 34.5–45)
HCT VFR BLD CALC: 24.9 % — LOW (ref 34.5–45)
HGB BLD-MCNC: 6.7 G/DL — CRITICAL LOW (ref 11.5–15.5)
HGB BLD-MCNC: 8.3 G/DL — LOW (ref 11.5–15.5)
MAGNESIUM SERPL-MCNC: 2.2 MG/DL — SIGNIFICANT CHANGE UP (ref 1.6–2.6)
MCHC RBC-ENTMCNC: 28.3 PG — SIGNIFICANT CHANGE UP (ref 27–34)
MCHC RBC-ENTMCNC: 28.6 PG — SIGNIFICANT CHANGE UP (ref 27–34)
MCHC RBC-ENTMCNC: 32.1 G/DL — SIGNIFICANT CHANGE UP (ref 32–36)
MCHC RBC-ENTMCNC: 33.3 G/DL — SIGNIFICANT CHANGE UP (ref 32–36)
MCV RBC AUTO: 85.9 FL — SIGNIFICANT CHANGE UP (ref 80–100)
MCV RBC AUTO: 88.2 FL — SIGNIFICANT CHANGE UP (ref 80–100)
PHOSPHATE SERPL-MCNC: 1.8 MG/DL — LOW (ref 2.5–4.5)
PLATELET # BLD AUTO: 237 K/UL — SIGNIFICANT CHANGE UP (ref 150–400)
PLATELET # BLD AUTO: 240 K/UL — SIGNIFICANT CHANGE UP (ref 150–400)
POTASSIUM SERPL-MCNC: 3.4 MMOL/L — LOW (ref 3.5–5.3)
POTASSIUM SERPL-MCNC: 3.8 MMOL/L — SIGNIFICANT CHANGE UP (ref 3.5–5.3)
POTASSIUM SERPL-SCNC: 3.4 MMOL/L — LOW (ref 3.5–5.3)
POTASSIUM SERPL-SCNC: 3.8 MMOL/L — SIGNIFICANT CHANGE UP (ref 3.5–5.3)
RBC # BLD: 2.37 M/UL — LOW (ref 3.8–5.2)
RBC # BLD: 2.9 M/UL — LOW (ref 3.8–5.2)
RBC # FLD: 16 % — SIGNIFICANT CHANGE UP (ref 10.3–16.9)
RBC # FLD: 16.4 % — SIGNIFICANT CHANGE UP (ref 10.3–16.9)
SODIUM SERPL-SCNC: 150 MMOL/L — HIGH (ref 135–145)
SODIUM SERPL-SCNC: 150 MMOL/L — HIGH (ref 135–145)
WBC # BLD: 9.7 K/UL — SIGNIFICANT CHANGE UP (ref 3.8–10.5)
WBC # BLD: 9.8 K/UL — SIGNIFICANT CHANGE UP (ref 3.8–10.5)
WBC # FLD AUTO: 9.7 K/UL — SIGNIFICANT CHANGE UP (ref 3.8–10.5)
WBC # FLD AUTO: 9.8 K/UL — SIGNIFICANT CHANGE UP (ref 3.8–10.5)

## 2018-06-02 PROCEDURE — 99291 CRITICAL CARE FIRST HOUR: CPT | Mod: 24

## 2018-06-02 PROCEDURE — 71045 X-RAY EXAM CHEST 1 VIEW: CPT | Mod: 26

## 2018-06-02 RX ORDER — FENTANYL CITRATE 50 UG/ML
25 INJECTION INTRAVENOUS
Qty: 0 | Refills: 0 | Status: DISCONTINUED | OUTPATIENT
Start: 2018-06-02 | End: 2018-06-09

## 2018-06-02 RX ORDER — SODIUM CHLORIDE 5 G/100ML
500 INJECTION, SOLUTION INTRAVENOUS
Qty: 0 | Refills: 0 | Status: DISCONTINUED | OUTPATIENT
Start: 2018-06-02 | End: 2018-06-03

## 2018-06-02 RX ORDER — POTASSIUM CHLORIDE 20 MEQ
40 PACKET (EA) ORAL ONCE
Qty: 0 | Refills: 0 | Status: COMPLETED | OUTPATIENT
Start: 2018-06-02 | End: 2018-06-02

## 2018-06-02 RX ORDER — FENTANYL CITRATE 50 UG/ML
25 INJECTION INTRAVENOUS ONCE
Qty: 0 | Refills: 0 | Status: DISCONTINUED | OUTPATIENT
Start: 2018-06-02 | End: 2018-06-02

## 2018-06-02 RX ORDER — POTASSIUM PHOSPHATE, MONOBASIC POTASSIUM PHOSPHATE, DIBASIC 236; 224 MG/ML; MG/ML
30 INJECTION, SOLUTION INTRAVENOUS ONCE
Qty: 0 | Refills: 0 | Status: COMPLETED | OUTPATIENT
Start: 2018-06-02 | End: 2018-06-02

## 2018-06-02 RX ORDER — DOCUSATE SODIUM 100 MG
100 CAPSULE ORAL
Qty: 0 | Refills: 0 | Status: DISCONTINUED | OUTPATIENT
Start: 2018-06-02 | End: 2018-06-18

## 2018-06-02 RX ORDER — SODIUM CHLORIDE 9 MG/ML
2 INJECTION INTRAMUSCULAR; INTRAVENOUS; SUBCUTANEOUS EVERY 8 HOURS
Qty: 0 | Refills: 0 | Status: DISCONTINUED | OUTPATIENT
Start: 2018-06-02 | End: 2018-06-03

## 2018-06-02 RX ORDER — POTASSIUM CHLORIDE 20 MEQ
20 PACKET (EA) ORAL
Qty: 0 | Refills: 0 | Status: COMPLETED | OUTPATIENT
Start: 2018-06-02 | End: 2018-06-02

## 2018-06-02 RX ORDER — LEVETIRACETAM 250 MG/1
1000 TABLET, FILM COATED ORAL
Qty: 0 | Refills: 0 | Status: DISCONTINUED | OUTPATIENT
Start: 2018-06-02 | End: 2018-06-08

## 2018-06-02 RX ORDER — PANTOPRAZOLE SODIUM 20 MG/1
40 TABLET, DELAYED RELEASE ORAL DAILY
Qty: 0 | Refills: 0 | Status: DISCONTINUED | OUTPATIENT
Start: 2018-06-02 | End: 2018-06-06

## 2018-06-02 RX ADMIN — Medication 100 MILLIGRAM(S): at 00:22

## 2018-06-02 RX ADMIN — FENTANYL CITRATE 25 MICROGRAM(S): 50 INJECTION INTRAVENOUS at 13:05

## 2018-06-02 RX ADMIN — Medication 100 MILLIGRAM(S): at 19:01

## 2018-06-02 RX ADMIN — Medication 1 TABLET(S): at 13:15

## 2018-06-02 RX ADMIN — POTASSIUM PHOSPHATE, MONOBASIC POTASSIUM PHOSPHATE, DIBASIC 85 MILLIMOLE(S): 236; 224 INJECTION, SOLUTION INTRAVENOUS at 10:56

## 2018-06-02 RX ADMIN — ATORVASTATIN CALCIUM 80 MILLIGRAM(S): 80 TABLET, FILM COATED ORAL at 00:21

## 2018-06-02 RX ADMIN — Medication 100 MILLIEQUIVALENT(S): at 10:56

## 2018-06-02 RX ADMIN — DEXMEDETOMIDINE HYDROCHLORIDE IN 0.9% SODIUM CHLORIDE 3.9 MICROGRAM(S)/KG/HR: 4 INJECTION INTRAVENOUS at 13:21

## 2018-06-02 RX ADMIN — SENNA PLUS 2 TABLET(S): 8.6 TABLET ORAL at 22:10

## 2018-06-02 RX ADMIN — Medication 1 MILLIGRAM(S): at 13:16

## 2018-06-02 RX ADMIN — FENTANYL CITRATE 25 MICROGRAM(S): 50 INJECTION INTRAVENOUS at 09:37

## 2018-06-02 RX ADMIN — SODIUM CHLORIDE 2 GRAM(S): 9 INJECTION INTRAMUSCULAR; INTRAVENOUS; SUBCUTANEOUS at 16:15

## 2018-06-02 RX ADMIN — CHLORHEXIDINE GLUCONATE 15 MILLILITER(S): 213 SOLUTION TOPICAL at 19:02

## 2018-06-02 RX ADMIN — FENTANYL CITRATE 25 MICROGRAM(S): 50 INJECTION INTRAVENOUS at 17:20

## 2018-06-02 RX ADMIN — Medication 40 MILLIEQUIVALENT(S): at 00:22

## 2018-06-02 RX ADMIN — LEVETIRACETAM 400 MILLIGRAM(S): 250 TABLET, FILM COATED ORAL at 06:13

## 2018-06-02 RX ADMIN — LEVETIRACETAM 1000 MILLIGRAM(S): 250 TABLET, FILM COATED ORAL at 19:02

## 2018-06-02 RX ADMIN — FENTANYL CITRATE 25 MICROGRAM(S): 50 INJECTION INTRAVENOUS at 16:45

## 2018-06-02 RX ADMIN — CHLORHEXIDINE GLUCONATE 15 MILLILITER(S): 213 SOLUTION TOPICAL at 06:14

## 2018-06-02 RX ADMIN — SODIUM CHLORIDE 30 MILLILITER(S): 5 INJECTION, SOLUTION INTRAVENOUS at 10:55

## 2018-06-02 RX ADMIN — Medication 100 MILLIEQUIVALENT(S): at 10:23

## 2018-06-02 RX ADMIN — DEXMEDETOMIDINE HYDROCHLORIDE IN 0.9% SODIUM CHLORIDE 3.9 MICROGRAM(S)/KG/HR: 4 INJECTION INTRAVENOUS at 06:13

## 2018-06-02 RX ADMIN — Medication 100 MILLIGRAM(S): at 06:14

## 2018-06-02 RX ADMIN — SODIUM CHLORIDE 2 GRAM(S): 9 INJECTION INTRAMUSCULAR; INTRAVENOUS; SUBCUTANEOUS at 22:10

## 2018-06-02 RX ADMIN — PANTOPRAZOLE SODIUM 40 MILLIGRAM(S): 20 TABLET, DELAYED RELEASE ORAL at 13:17

## 2018-06-02 RX ADMIN — Medication 40 MILLIEQUIVALENT(S): at 19:02

## 2018-06-02 RX ADMIN — ATORVASTATIN CALCIUM 80 MILLIGRAM(S): 80 TABLET, FILM COATED ORAL at 22:11

## 2018-06-02 RX ADMIN — Medication 100 MILLIGRAM(S): at 13:16

## 2018-06-02 RX ADMIN — FENTANYL CITRATE 25 MICROGRAM(S): 50 INJECTION INTRAVENOUS at 23:42

## 2018-06-02 NOTE — PROGRESS NOTE ADULT - SUBJECTIVE AND OBJECTIVE BOX
=================================  NEUROCRITICAL CARE ATTENDING NOTE  =================================    VERONIKA CALERO   [id number]  Summary:  58F with h/o breast cancer, s/p TRAM flap reconstruction, recently started on plavix presented with painful bulge in R abdomen.  Imaging showed large expanding intramuscular hematoma R rects abdominis.  Admitted to surgery , diagnostic angio, R abd percutaneous thrombin injection of 2 pseudoaneurysms.  Post-procedure, noted to be hypotensive, low respiratory rate, altered mental status.  Naloxone given with improved respiratory status, but AMS persisted and noted L-sided weakness.  Stroke code called.  Post-op, noted  L UE hemiplegia / L LE weakness.  Stroke code called.  CT showed large R MCA small SELINA infarcts, with mass effect.  Mannitol 50g IV given, 23.4% x1 given.  Intubated for airway protection.  Seizure noted during intubation, given 4 ativan, started on propofol drip.  Brought to OR for decompressive hemicraniectomy.  Overnight Events: No significant events overnight, Hb drop to 6.7 this morning    Past Medical History: Anxiety Hypertension Breast CA dyslipidemia CAD Hypertension   Allergies:  No Known Allergies  Home Meds: bisoprolol 5mg qd, lorazepam 1mg q8?, Plavix 5qd, rosuvastatin, HCTZ, buproprion, baclofen  Social: 1/2 bottle of wine most nights, remote cigarette hx, denies IVDA     PHYSICAL EXAMINATION  T(C): 37.4 ( @ 06:17), Max: 38.2 ( @ 02:00) HR: 74 ( @ 08:00) (72 - 109) BP: 143/77 ( @ 08:00) (104/59 - 168/73) RR: 12 ( @ 20:08) (12 - 16) SpO2: 100% ( @ 08:00) (89% - 100%)  NEUROLOGIC EXAMINATION:  Patient does not open eyes, does not follow commands, no verbal output, ?R gaze preference, pupils 3mm reactive and equal, briskly localizes R UE/LE , withdraws to noxious on L UE/ TF L LE  GENERAL:   intubated 12 450 40% +5  EENT: anicteric  CARDIOVASC:  (+) S1 S2, normal rate and regular rhythm  PULMONARY:  clear to auscultation bilaterally  ABDOMEN:  soft, nontender, with normoactive bowel sounds  EXTREMITIES:  no edema  SKIN:  no rash    LABS:  CAPILLARY BLOOD GLUCOSE 145 124 135 111    (11.7)    6.7  (7.5)  9.7   )-----------( 237      ( 2018 05:21 )             20.9     150<H>  |  117<H>  |  4<L>  ----------------------------<  139<H>  3.4<L>   |  25  |  0.48<L>    Ca    8.2<L>      2018 05:20  Phos  1.8       Mg     2.2      @ 07:01  -   @ 07:00  IN: 1861 mL / OUT: 950 mL / NET: 911 mL    HbA1C = 5.3 ()  LDL = 106   /  HDL = 52 ()   /  TG = 84 ()  TSH = 0.614 ()    Bacteriology:  CSF studies:  EEG:  Neuroimagin/01 CT head:  no change in small foci of hemorrhage, continued evolution large R MCA infarction small R SELINA territory infarct   CT head: evolving R MCA / SELINA infarction, global mass effect, MLS to L, early L lateral ventricle entrapment, hemorrhagic transformation suspected   CTP:  abnormal perfusion, mismatch volume 12ml   CTA:  acute occlusion R M2   CT head:  acute R MCA infarction, no ICH   CT abd:  large expanding intramuscular hematoma within R rectus abdomin  Other imagin/01  LE Doppler limited exam, NEG    MEDICATIONS: atorvastatin 80mg HS cefazolin 1g q8h peridex docusate mod ISS levetiracetam 1g IV q12h pantoprazole 40 IV daily  senna 2mg HS     IV FLUIDS: 3%@20cc/hr  DRIPS: propofol  DIET: NPO  Lines: Cary L SC 4 drains  45 110  Drains:  Eddy  Wounds:    CODE STATUS:  Full Code                       GOALS OF CARE:  aggressive                      DISPOSITION:  ICU =================================  NEUROCRITICAL CARE ATTENDING NOTE  =================================    VERONIKA CALERO   [id number]  Summary:  58F with h/o breast cancer, s/p TRAM flap reconstruction, recently started on plavix presented with painful bulge in R abdomen.  Imaging showed large expanding intramuscular hematoma R rects abdominis.  Admitted to surgery , diagnostic angio, R abd percutaneous thrombin injection of 2 pseudoaneurysms.  Post-procedure, noted to be hypotensive, low respiratory rate, altered mental status.  Naloxone given with improved respiratory status, but AMS persisted and noted L-sided weakness.  Stroke code called.  Post-op, noted  L UE hemiplegia / L LE weakness.  Stroke code called.  CT showed large R MCA small SELINA infarcts, with mass effect.  Mannitol 50g IV given, 23.4% x1 given.  Intubated for airway protection.  Seizure noted during intubation, given 4 ativan, started on propofol drip.  Brought to OR for decompressive hemicraniectomy.  Overnight Events: No significant events overnight, Hb drop to 6.7 this morning    Past Medical History: Anxiety Hypertension Breast CA dyslipidemia CAD Hypertension   Allergies:  No Known Allergies  Home Meds: bisoprolol 5mg qd, lorazepam 1mg q8?, Plavix 5qd, rosuvastatin, HCTZ, buproprion, baclofen  Social: 1/2 bottle of wine most nights, remote cigarette hx, denies IVDA     PHYSICAL EXAMINATION  T(C): 37.4 ( @ 06:17), Max: 38.2 ( @ 02:00) HR: 74 ( @ 08:00) (72 - 109) BP: 143/77 ( @ 08:00) (104/59 - 168/73) RR: 12 ( @ 20:08) (12 - 16) SpO2: 100% ( @ 08:00) (89% - 100%)  NEUROLOGIC EXAMINATION:  Patient does not open eyes, does not follow commands, no verbal output, ?R gaze preference, pupils 3mm reactive and equal, moves R UE/LE spontaneously , withdraws to noxious on L LE 0/5 L UE  GENERAL:   intubated 12 450 40% +5  EENT: anicteric, flap full, soft  CARDIOVASC:  (+) S1 S2, normal rate and regular rhythm  PULMONARY:  clear to auscultation bilaterally  ABDOMEN:  soft, nontender, with normoactive bowel sounds  EXTREMITIES:  no edema  SKIN:  no rash    LABS:  CAPILLARY BLOOD GLUCOSE 145 124 135 111    (11.7)    6.7  (7.5)  9.7   )-----------( 237      ( 2018 05:21 )             20.9     150<H>  |  117<H>  |  4<L>  ----------------------------<  139<H>  3.4<L>   |  25  |  0.48<L>    Ca    8.2<L>      2018 05:20  Phos  1.8       Mg     2.2      @ 07:01  -   @ 07:00  IN: 1861 mL / OUT: 950 mL / NET: 911 mL    HbA1C = 5.3 ()  LDL = 106   /  HDL = 52 ()   /  TG = 84 ()  TSH = 0.614 ()    Bacteriology:  CSF studies:  EEG:  Neuroimagin/01 CT head:  no change in small foci of hemorrhage, continued evolution large R MCA infarction small R SELINA territory infarct   CT head: evolving R MCA / SELINA infarction, global mass effect, MLS to L, early L lateral ventricle entrapment, hemorrhagic transformation suspected   CTP:  abnormal perfusion, mismatch volume 12ml   CTA:  acute occlusion R M2   CT head:  acute R MCA infarction, no ICH   CT abd:  large expanding intramuscular hematoma within R rectus abdomin  Other imagin/01  LE Doppler limited exam, NEG    MEDICATIONS: atorvastatin 80mg HS cefazolin 1g q8h Peridex docusate mod ISS levetiracetam 1g IV q12h pantoprazole 40 IV daily  senna 2mg HS     IV FLUIDS: 3%@50cc/hr  DRIPS: precedex  DIET: Jevity 1.2 @30cc to goal 70  Lines: Cary L SC 4 drains  45 110  Drains:    Wounds:    CODE STATUS:  Full Code                       GOALS OF CARE:  aggressive                      DISPOSITION:  ICU

## 2018-06-02 NOTE — PROGRESS NOTE ADULT - SUBJECTIVE AND OBJECTIVE BOX
S/Overnight events:        Hospital Course:   POD#1: emergent crani yesterday, transferred back to [facility], sedated overnight, [facility] with hemorrhagic conversion o/w stable. stable exam overnight. Drains remains to suction. no acute events overnight  POD#2:    Vital Signs Last 24 Hrs  T(C): 37.4 (02 Jun 2018 06:17), Max: 38.2 (02 Jun 2018 02:00)  T(F): 99.3 (02 Jun 2018 06:17), Max: 100.7 (02 Jun 2018 02:00)  HR: 74 (02 Jun 2018 05:48) (72 - 109)  BP: 138/64 (02 Jun 2018 05:00) (104/59 - 168/73)  BP(mean): 89 (02 Jun 2018 05:00) (73 - 126)  RR: 12 (01 Jun 2018 20:08) (12 - 16)  SpO2: 100% (02 Jun 2018 05:48) (89% - 100%)    I&O's Detail    31 May 2018 07:01  -  01 Jun 2018 07:00  --------------------------------------------------------  IN:    Enteral Tube Flush: 40 mL    IV PiggyBack: 500 mL    norepinephrine Infusion: 1.2 mL    propofol Infusion: 36 mL    propofol Infusion: 282.4 mL    sodium chloride 0.9%: 300 mL    sodium chloride 3%: 160 mL    sodium chloride 3%: 300 mL    Solution: 595 mL    Solution: 250 mL  Total IN: 2464.6 mL    OUT:    Accordian: 55 mL    Bulb: 55 mL    Bulb: 12 mL    Indwelling Catheter - Urethral: 2155 mL    Nasoenteral Tube: 150 mL  Total OUT: 2427 mL    Total NET: 37.6 mL      01 Jun 2018 07:01  -  02 Jun 2018 06:32  --------------------------------------------------------  IN:    dexmedetomidine Infusion: 95 mL    Enteral Tube Flush: 100 mL    IV PiggyBack: 150 mL    Jevity: 410 mL    propofol Infusion: 23.5 mL    propofol Infusion: 188.5 mL    sodium chloride 3%: 160 mL    sodium chloride 3%.: 600 mL  Total IN: 1727 mL    OUT:    Accordian: 180 mL    Bulb: 30 mL    Bulb: 80 mL    Indwelling Catheter - Urethral: 580 mL  Total OUT: 870 mL    Total NET: 857 mL        I&O's Summary    31 May 2018 07:01  -  01 Jun 2018 07:00  --------------------------------------------------------  IN: 2464.6 mL / OUT: 2427 mL / NET: 37.6 mL    01 Jun 2018 07:01  -  02 Jun 2018 06:32  --------------------------------------------------------  IN: 1727 mL / OUT: 870 mL / NET: 857 mL        PHYSICAL EXAM:      TUBES/LINES:  [x] CVC  [x] A-line  [] Lumbar Drain  [] Ventriculostomy  [] Other    DIET:  [x] NPO  [] Mechanical  [] Tube feeds    LABS:                        7.5    11.7  )-----------( 284      ( 01 Jun 2018 05:20 )             23.1     06-02    150<H>  |  117<H>  |  4<L>  ----------------------------<  139<H>  3.4<L>   |  25  |  0.48<L>    Ca    8.2<L>      02 Jun 2018 05:20  Phos  1.8     06-02  Mg     2.2     06-02      PT/INR - ( 31 May 2018 16:41 )   PT: 11.7 sec;   INR: 1.05          PTT - ( 31 May 2018 16:41 )  PTT:24.8 sec        CAPILLARY BLOOD GLUCOSE      POCT Blood Glucose.: 145 mg/dL (02 Jun 2018 06:10)  POCT Blood Glucose.: 124 mg/dL (02 Jun 2018 00:41)  POCT Blood Glucose.: 135 mg/dL (01 Jun 2018 18:29)  POCT Blood Glucose.: 111 mg/dL (01 Jun 2018 11:35)  POCT Blood Glucose.: 109 mg/dL (01 Jun 2018 06:45)      Drug Levels: [] N/A    CSF Analysis: [] N/A      Allergies    No Known Allergies    Intolerances      MEDICATIONS:  Antibiotics:    Neuro:  acetaminophen   Tablet 650 milliGRAM(s) Oral every 6 hours PRN  acetaminophen   Tablet. 650 milliGRAM(s) Oral every 6 hours PRN  dexmedetomidine Infusion 0.2 MICROgram(s)/kG/Hr IV Continuous <Continuous>  levETIRAcetam  IVPB 1000 milliGRAM(s) IV Intermittent every 12 hours  ondansetron Injectable 4 milliGRAM(s) IV Push every 6 hours PRN    Anticoagulation:    OTHER:  atorvastatin 80 milliGRAM(s) Oral at bedtime  chlorhexidine 0.12% Liquid 15 milliLiter(s) Swish and Spit two times a day  dextrose 40% Gel 15 Gram(s) Oral once PRN  dextrose 50% Injectable 12.5 Gram(s) IV Push once  dextrose 50% Injectable 25 Gram(s) IV Push once  dextrose 50% Injectable 25 Gram(s) IV Push once  docusate sodium 100 milliGRAM(s) Oral three times a day  glucagon  Injectable 1 milliGRAM(s) IntraMuscular once PRN  insulin lispro (HumaLOG) corrective regimen sliding scale   SubCutaneous every 6 hours  pantoprazole  Injectable 40 milliGRAM(s) IV Push daily  senna 2 Tablet(s) Oral at bedtime    IVF:  dextrose 5%. 1000 milliLiter(s) IV Continuous <Continuous>  folic acid 1 milliGRAM(s) Oral daily  multivitamin 1 Tablet(s) Oral daily  potassium chloride  20 mEq/100 mL IVPB 20 milliEquivalent(s) IV Intermittent every 2 hours  potassium phosphate IVPB 30 milliMole(s) IV Intermittent once  sodium chloride 3%. 500 milliLiter(s) IV Continuous <Continuous>  thiamine 100 milliGRAM(s) Oral daily    CULTURES:    RADIOLOGY & ADDITIONAL TESTS:      ASSESSMENT:  58y Female s/p hemicrani POD 1 for rt MCA infarct      PLAN:  NEURO:  q1h neuro checks  propofol to RASS -2  vEEG after rpt CTH this am  cont keppra  transition propofol to precedex if no sz's on vEEG    CARDIOVASCULAR:  SBP goal 120-160  keep a line, TLC    PULMONARY:  full vent support    RENAL:  3% titrated to Na goal 145-150  replete electrolytes  dc  sampson  f/u BMP    GI:  NPO start TFs  NGT to suction  bowel regimen  PPI    HEME:  trend h/h    ID:  afeb  no abx    ENDO:  ISS    DVT PROPHYLAXIS:  [x] Venodynes                                [] Heparin/Lovenox    DISPOSITION:   ICU status  full code  pt/ot pending  d/w Dr. Sorto and ICU house staff S/Overnight events:  No events overnight. Hmv 45cc, DREW #1 50cc, DREW #2 35cc      Hospital Course:   POD#1: emergent crani yesterday, transferred back to [facility], sedated overnight, [facility] with hemorrhagic conversion o/w stable. stable exam overnight. Drains remains to suction. no acute events overnight  POD#2: Seizure like activity reported overnight, on EEG. Continue post-op drains. Following commands on right side.    Vital Signs Last 24 Hrs  T(C): 37.4 (02 Jun 2018 06:17), Max: 38.2 (02 Jun 2018 02:00)  T(F): 99.3 (02 Jun 2018 06:17), Max: 100.7 (02 Jun 2018 02:00)  HR: 74 (02 Jun 2018 05:48) (72 - 109)  BP: 138/64 (02 Jun 2018 05:00) (104/59 - 168/73)  BP(mean): 89 (02 Jun 2018 05:00) (73 - 126)  RR: 12 (01 Jun 2018 20:08) (12 - 16)  SpO2: 100% (02 Jun 2018 05:48) (89% - 100%)    I&O's Detail    31 May 2018 07:01  -  01 Jun 2018 07:00  --------------------------------------------------------  IN:    Enteral Tube Flush: 40 mL    IV PiggyBack: 500 mL    norepinephrine Infusion: 1.2 mL    propofol Infusion: 36 mL    propofol Infusion: 282.4 mL    sodium chloride 0.9%: 300 mL    sodium chloride 3%: 160 mL    sodium chloride 3%: 300 mL    Solution: 595 mL    Solution: 250 mL  Total IN: 2464.6 mL    OUT:    Accordian: 55 mL    Bulb: 55 mL    Bulb: 12 mL    Indwelling Catheter - Urethral: 2155 mL    Nasoenteral Tube: 150 mL  Total OUT: 2427 mL    Total NET: 37.6 mL      01 Jun 2018 07:01  -  02 Jun 2018 06:32  --------------------------------------------------------  IN:    dexmedetomidine Infusion: 95 mL    Enteral Tube Flush: 100 mL    IV PiggyBack: 150 mL    Jevity: 410 mL    propofol Infusion: 23.5 mL    propofol Infusion: 188.5 mL    sodium chloride 3%: 160 mL    sodium chloride 3%.: 600 mL  Total IN: 1727 mL    OUT:    Accordian: 180 mL    Bulb: 30 mL    Bulb: 80 mL    Indwelling Catheter - Urethral: 580 mL  Total OUT: 870 mL    Total NET: 857 mL        I&O's Summary    31 May 2018 07:01  -  01 Jun 2018 07:00  --------------------------------------------------------  IN: 2464.6 mL / OUT: 2427 mL / NET: 37.6 mL    01 Jun 2018 07:01  -  02 Jun 2018 06:32  --------------------------------------------------------  IN: 1727 mL / OUT: 870 mL / NET: 857 mL        PHYSICAL EXAM:  Gen: NAD, Intubated on ventilator. Sedated.  HEENT: PERRL. + Corneals, + Gag, +NGT. +Head dressing C/D/I  Neck: Left subclavian, C/D/I  Lungs: Clear b/l  Heart: s1, S2. NSR.  Abd: Soft, NT/ND. +BS  Exts: Pulses 2+ throughout  Neuro: CNs limited. Followign commands with RUE/RLE, moving purposefully. Withdrawal to noxoius in LUE/LLE.    TUBES/LINES:  [x] CVC  [x] A-line  [] Lumbar Drain  [] Ventriculostomy  [] Other    DIET:  [x] NPO  [] Mechanical  [] Tube feeds    LABS:                        7.5    11.7  )-----------( 284      ( 01 Jun 2018 05:20 )             23.1     06-02    150<H>  |  117<H>  |  4<L>  ----------------------------<  139<H>  3.4<L>   |  25  |  0.48<L>    Ca    8.2<L>      02 Jun 2018 05:20  Phos  1.8     06-02  Mg     2.2     06-02      PT/INR - ( 31 May 2018 16:41 )   PT: 11.7 sec;   INR: 1.05          PTT - ( 31 May 2018 16:41 )  PTT:24.8 sec        CAPILLARY BLOOD GLUCOSE      POCT Blood Glucose.: 145 mg/dL (02 Jun 2018 06:10)  POCT Blood Glucose.: 124 mg/dL (02 Jun 2018 00:41)  POCT Blood Glucose.: 135 mg/dL (01 Jun 2018 18:29)  POCT Blood Glucose.: 111 mg/dL (01 Jun 2018 11:35)  POCT Blood Glucose.: 109 mg/dL (01 Jun 2018 06:45)      Drug Levels: [] N/A    CSF Analysis: [] N/A      Allergies    No Known Allergies    Intolerances      MEDICATIONS:  Antibiotics:    Neuro:  acetaminophen   Tablet 650 milliGRAM(s) Oral every 6 hours PRN  acetaminophen   Tablet. 650 milliGRAM(s) Oral every 6 hours PRN  dexmedetomidine Infusion 0.2 MICROgram(s)/kG/Hr IV Continuous <Continuous>  levETIRAcetam  IVPB 1000 milliGRAM(s) IV Intermittent every 12 hours  ondansetron Injectable 4 milliGRAM(s) IV Push every 6 hours PRN    Anticoagulation:    OTHER:  atorvastatin 80 milliGRAM(s) Oral at bedtime  chlorhexidine 0.12% Liquid 15 milliLiter(s) Swish and Spit two times a day  dextrose 40% Gel 15 Gram(s) Oral once PRN  dextrose 50% Injectable 12.5 Gram(s) IV Push once  dextrose 50% Injectable 25 Gram(s) IV Push once  dextrose 50% Injectable 25 Gram(s) IV Push once  docusate sodium 100 milliGRAM(s) Oral three times a day  glucagon  Injectable 1 milliGRAM(s) IntraMuscular once PRN  insulin lispro (HumaLOG) corrective regimen sliding scale   SubCutaneous every 6 hours  pantoprazole  Injectable 40 milliGRAM(s) IV Push daily  senna 2 Tablet(s) Oral at bedtime    IVF:  dextrose 5%. 1000 milliLiter(s) IV Continuous <Continuous>  folic acid 1 milliGRAM(s) Oral daily  multivitamin 1 Tablet(s) Oral daily  potassium chloride  20 mEq/100 mL IVPB 20 milliEquivalent(s) IV Intermittent every 2 hours  potassium phosphate IVPB 30 milliMole(s) IV Intermittent once  sodium chloride 3%. 500 milliLiter(s) IV Continuous <Continuous>  thiamine 100 milliGRAM(s) Oral daily    CULTURES:    RADIOLOGY & ADDITIONAL TESTS:      ASSESSMENT:  58y Female s/p hemicrani POD 1 for rt MCA infarct      PLAN:  NEURO:  q1h neuro checks  Precedex for sedation,  Tylenol/Fentanyl for pain  Continue EEG, will f/u findings, on Keppra    CARDIOVASCULAR:  SBP goal 100-180  Daily labs, electrolyte repletion ordered  Continue statin therapy    PULMONARY:  CPAP trials, continue ventilator support,  Daily CXR    RENAL:  3% titrated to Na goal 145-150, on 30cc/hr    GI:  NPO start TFs to goal  bowel regimen  PPI    HEME:  H/H low, 1 unit PRBC transfusion,  FOB pending    ID:  Low grade fevers, will panculture if spikes    ENDO:  ISS    DVT PROPHYLAXIS:  SCDs, no chemoppx at this time  LE dopplers negative    DISPOSITION:   ICU status  full code  pt/ot pending  d/w Dr. Sorto and ICU house staff S/Overnight events:  No events overnight. Hmv 45cc, DREW #1 50cc, DREW #2 35cc      Hospital Course:   POD#1: emergent crani yesterday, transferred back to [facility], sedated overnight, [facility] with hemorrhagic conversion o/w stable. stable exam overnight. Drains remains to suction. no acute events overnight  POD#2: No seizure activity on EEG. Following commands on right side. Continue DREW x2 and Hemovac x1. Low grade fevers.         Vital Signs Last 24 Hrs  T(C): 37.4 (02 Jun 2018 06:17), Max: 38.2 (02 Jun 2018 02:00)  T(F): 99.3 (02 Jun 2018 06:17), Max: 100.7 (02 Jun 2018 02:00)  HR: 74 (02 Jun 2018 05:48) (72 - 109)  BP: 138/64 (02 Jun 2018 05:00) (104/59 - 168/73)  BP(mean): 89 (02 Jun 2018 05:00) (73 - 126)  RR: 12 (01 Jun 2018 20:08) (12 - 16)  SpO2: 100% (02 Jun 2018 05:48) (89% - 100%)    I&O's Detail    31 May 2018 07:01  -  01 Jun 2018 07:00  --------------------------------------------------------  IN:    Enteral Tube Flush: 40 mL    IV PiggyBack: 500 mL    norepinephrine Infusion: 1.2 mL    propofol Infusion: 36 mL    propofol Infusion: 282.4 mL    sodium chloride 0.9%: 300 mL    sodium chloride 3%: 160 mL    sodium chloride 3%: 300 mL    Solution: 595 mL    Solution: 250 mL  Total IN: 2464.6 mL    OUT:    Accordian: 55 mL    Bulb: 55 mL    Bulb: 12 mL    Indwelling Catheter - Urethral: 2155 mL    Nasoenteral Tube: 150 mL  Total OUT: 2427 mL    Total NET: 37.6 mL      01 Jun 2018 07:01  -  02 Jun 2018 06:32  --------------------------------------------------------  IN:    dexmedetomidine Infusion: 95 mL    Enteral Tube Flush: 100 mL    IV PiggyBack: 150 mL    Jevity: 410 mL    propofol Infusion: 23.5 mL    propofol Infusion: 188.5 mL    sodium chloride 3%: 160 mL    sodium chloride 3%.: 600 mL  Total IN: 1727 mL    OUT:    Accordian: 180 mL    Bulb: 30 mL    Bulb: 80 mL    Indwelling Catheter - Urethral: 580 mL  Total OUT: 870 mL    Total NET: 857 mL        I&O's Summary    31 May 2018 07:01  -  01 Jun 2018 07:00  --------------------------------------------------------  IN: 2464.6 mL / OUT: 2427 mL / NET: 37.6 mL    01 Jun 2018 07:01  -  02 Jun 2018 06:32  --------------------------------------------------------  IN: 1727 mL / OUT: 870 mL / NET: 857 mL        PHYSICAL EXAM:  Gen: NAD, Intubated on ventilator. Sedated.  HEENT: PERRL. + Corneals, + Gag, +NGT. +Head dressing C/D/I  Neck: Left subclavian, C/D/I  Lungs: Clear b/l  Heart: s1, S2. NSR.  Abd: Soft, NT/ND. +BS. Incision C/D/I.  Exts: Pulses 2+ throughout  Neuro: CNs limited. Following commands with RUE/RLE, moving purposefully. Withdrawal to noxoius in LUE/LLE. Eye opening to stimulus, not tracking examiner.    TUBES/LINES:  [x] CVC  [x] A-line  [] Lumbar Drain  [] Ventriculostomy  [] Other    DIET:  [x] NPO  [] Mechanical  [] Tube feeds    LABS:                        7.5    11.7  )-----------( 284      ( 01 Jun 2018 05:20 )             23.1     06-02    150<H>  |  117<H>  |  4<L>  ----------------------------<  139<H>  3.4<L>   |  25  |  0.48<L>    Ca    8.2<L>      02 Jun 2018 05:20  Phos  1.8     06-02  Mg     2.2     06-02      PT/INR - ( 31 May 2018 16:41 )   PT: 11.7 sec;   INR: 1.05          PTT - ( 31 May 2018 16:41 )  PTT:24.8 sec        CAPILLARY BLOOD GLUCOSE      POCT Blood Glucose.: 145 mg/dL (02 Jun 2018 06:10)  POCT Blood Glucose.: 124 mg/dL (02 Jun 2018 00:41)  POCT Blood Glucose.: 135 mg/dL (01 Jun 2018 18:29)  POCT Blood Glucose.: 111 mg/dL (01 Jun 2018 11:35)  POCT Blood Glucose.: 109 mg/dL (01 Jun 2018 06:45)      Drug Levels: [] N/A    CSF Analysis: [] N/A      Allergies    No Known Allergies    Intolerances      MEDICATIONS:  Antibiotics:    Neuro:  acetaminophen   Tablet 650 milliGRAM(s) Oral every 6 hours PRN  acetaminophen   Tablet. 650 milliGRAM(s) Oral every 6 hours PRN  dexmedetomidine Infusion 0.2 MICROgram(s)/kG/Hr IV Continuous <Continuous>  levETIRAcetam  IVPB 1000 milliGRAM(s) IV Intermittent every 12 hours  ondansetron Injectable 4 milliGRAM(s) IV Push every 6 hours PRN    Anticoagulation:    OTHER:  atorvastatin 80 milliGRAM(s) Oral at bedtime  chlorhexidine 0.12% Liquid 15 milliLiter(s) Swish and Spit two times a day  dextrose 40% Gel 15 Gram(s) Oral once PRN  dextrose 50% Injectable 12.5 Gram(s) IV Push once  dextrose 50% Injectable 25 Gram(s) IV Push once  dextrose 50% Injectable 25 Gram(s) IV Push once  docusate sodium 100 milliGRAM(s) Oral three times a day  glucagon  Injectable 1 milliGRAM(s) IntraMuscular once PRN  insulin lispro (HumaLOG) corrective regimen sliding scale   SubCutaneous every 6 hours  pantoprazole  Injectable 40 milliGRAM(s) IV Push daily  senna 2 Tablet(s) Oral at bedtime    IVF:  dextrose 5%. 1000 milliLiter(s) IV Continuous <Continuous>  folic acid 1 milliGRAM(s) Oral daily  multivitamin 1 Tablet(s) Oral daily  potassium chloride  20 mEq/100 mL IVPB 20 milliEquivalent(s) IV Intermittent every 2 hours  potassium phosphate IVPB 30 milliMole(s) IV Intermittent once  sodium chloride 3%. 500 milliLiter(s) IV Continuous <Continuous>  thiamine 100 milliGRAM(s) Oral daily    CULTURES:    RADIOLOGY & ADDITIONAL TESTS:      ASSESSMENT:  58y Female w/ HTN, HLD, Anxiety, Depression,  h/o Breast Cancer w/ TRAM flap reconstruction admitted for expanding right rectus hematoma complicated by right MCA infarction now s/p hemicraniectomy POD #2    PLAN:  NEURO:  q1h neuro checks  Precedex for sedation,  Tylenol/Fentanyl for pain  Continue EEG, will f/u findings, on Keppra    CARDIOVASCULAR:  SBP goal 100-180  Daily labs, electrolyte repletion ordered  Continue statin therapy    PULMONARY:  CPAP trials, continue ventilator support,  Daily CXR    RENAL:  3% titrated to Na goal 145-150, on 30cc/hr    GI:  NPO start TFs to goal  bowel regimen  PPI    HEME:  H/H low, 1 unit PRBC transfusion,  FOB pending    ID:  Low grade fevers, will panculture if spikes    ENDO:  ISS    DVT PROPHYLAXIS:  SCDs, no chemoppx at this time  LE dopplers negative    DISPOSITION:   ICU status  full code  pt/ot pending  d/w Dr. Sorto, Dr. BRADFORD

## 2018-06-02 NOTE — PROGRESS NOTE ADULT - ASSESSMENT
58F with a hx of TRAM flap reconstruction on plavix now with a spontaneous right rectus expanding hematoma taken to IR for Rt groin accesss for dx angio, and rt abd percutaneous thrombin injection of 2 pseudoaneurysms in abd wall. Pt over sedated post procedure and given narcan. c/b herniation of cerebral tonsils, transferred to neurosurgery for decompressive hemicraniectomy 5/31    Hemoglobin dropped to 6.7 (from 7.5)    Plan : 58F with a hx of TRAM flap reconstruction on plavix now with a spontaneous right rectus expanding hematoma taken to IR for Rt groin accesss for dx angio, and rt abd percutaneous thrombin injection of 2 pseudoaneurysms in abd wall. Pt over sedated post procedure and given narcan. c/b herniation of cerebral tonsils, transferred to neurosurgery for decompressive hemicraniectomy 5/31    Hemoglobin dropped to 6.7 (from 7.5), abd soft, Hernan bloody outoput    Plan :  Not for surgical intervention now  trend CBC and transfused as needed  team 4c will follow    d/w chief and

## 2018-06-02 NOTE — PROGRESS NOTE ADULT - ASSESSMENT
58F with   1.  acute cerebrovascular accident involving R MCA / SELINA, malignant infarction, cerebral edema, brain compression s/p decompressive hemicraniectomy (05/31/2018, Dr. Johnson)  2.  Hypertension dyslipidemia   3.  h/o breast CA  4.  CAD  5.  R rectus abdominis intramuscular hematoma  6.  seizures    PLAN:   NEURO: neurochecks q1h, PRN pain meds tylenol,  sedation with propofol; will switch to precedex / fentanyl PRN if no seizures   malignant R MCA infarction, s/p DHC:  complete stroke core measures; no ASA for now, hemorrhagic conversion noted  seizure disorder:  continue levetiracetam 1G BID, VEEG; CT this morning  ICP crises:  given 23.4%, mannitol, POsm next blood draw  REHAB:  physical therapy evaluation and management - defer   EARLY MOB:  HOB up, bedrest    PULM:  full vent support, AC 12 450 40% +5  CARDIO:  SBP goal 120-160mm Hg, echo EF 55-60% mod dilated LA, probable AVM, no PFOs, keep central line  ENDO:  Blood sugar goals 140-180 mg/dL, continue insulin sliding scale; continue high-dose statins  GI:  PPI for GI prophylaxis  DIET: start jevity  RENAL:  cont 3%@20cc/hr, recheck BMP this PM, Na goal 145-150  HEM/ONC: Hb drop - will monitor  VTE Prophylaxis: SCDs only, no DVT chemoprophylaxis for now as patient is high risk for bleed (?hemorrhagic conversion + fresh post-op); f/u baseline LE Doppler   ID: afebrile, no leukocytosis, d/c ancef  Social: will update family  MISC:  h/o ETOH abuse, on propofol sedation    ATTENDING ATTESTATION:  I was physically present for the key portions of the evaluation and management (E/M) service provided.  I agree with the above history, physical and plan, which I have reviewed and edited where appropriate.    Patient at high risk for neurological deterioration or death due to:  ICU delirium, aspiration PNA, DVT / PE.  Critical care time, excluding procedures: 45 minutes spent on total encounter, more than 50% of the visit was spent counseling and/or coordinating care by the attending physician.     Plan discussed with RN, house staff. 58F with   1.  acute cerebrovascular accident involving R MCA / SELINA, malignant infarction, cerebral edema, brain compression s/p decompressive hemicraniectomy (05/31/2018, Dr. Johnson)  2.  Hypertension dyslipidemia   3.  h/o breast CA  4.  CAD  5.  R rectus abdominis intramuscular hematoma  6.  seizures    PLAN:   NEURO: neurochecks q1h, PRN pain meds tylenol, fentanyl, sedation with precedex  to RASS of 0  malignant R MCA infarction, s/p DHC:  no ASA for now, hemorrhagic conversion noted  seizure disorder:  continue levetiracetam 1G BID, f/u VEEG will d/c if no seizures  ICP crises:  s/p [facility]  REHAB:  physical therapy evaluation and management - defer   EARLY MOB:  HOB up, bedrest    PULM:  CPAP 8/5 40%  CARDIO:  SBP goal 100-180 mm Hg, echo EF 55-60% mod dilated LA, probable AVM, no PFOs, keep central line  ENDO:  Blood sugar goals 140-180 mg/dL, continue insulin sliding scale; continue high-dose statins  GI:  PPI for GI prophylaxis  DIET: jevity  RENAL:  dec 3%@30cc/hr, recheck BMP in 4 hours, Na goal 145-150, start salt tabs 2g q8h  HEM/ONC: Hb drop - will transfuse 1 unit PRBC, check FOBT  VTE Prophylaxis: SCDs only, no DVT chemoprophylaxis for now as patient is high risk for bleed (?hemorrhagic conversion + fresh post-op); neg dopplers  ID: afebrile, no leukocytosis  Social: will update family  MISC:  h/o ETOH abuse, on propofol sedation    ATTENDING ATTESTATION:  I was physically present for the key portions of the evaluation and management (E/M) service provided.  I agree with the above history, physical and plan, which I have reviewed and edited where appropriate.    Patient at high risk for neurological deterioration or death due to:  ICU delirium, aspiration PNA, DVT / PE.  Critical care time, excluding procedures: 45 minutes spent on total encounter, more than 50% of the visit was spent counseling and/or coordinating care by the attending physician.     Plan discussed with RN, house staff.

## 2018-06-02 NOTE — PROGRESS NOTE ADULT - SUBJECTIVE AND OBJECTIVE BOX
SUBJECTIVE: Patient seen and examined bedside by chief resident. sedated and intubated    Vital Signs Last 24 Hrs  T(C): 37.4 (02 Jun 2018 06:17), Max: 38.2 (02 Jun 2018 02:00)  T(F): 99.3 (02 Jun 2018 06:17), Max: 100.7 (02 Jun 2018 02:00)  HR: 74 (02 Jun 2018 08:00) (72 - 109)  BP: 143/77 (02 Jun 2018 08:00) (104/59 - 168/73)  BP(mean): 96 (02 Jun 2018 08:00) (73 - 126)  RR: 12 (01 Jun 2018 20:08) (12 - 16)  SpO2: 100% (02 Jun 2018 08:00) (89% - 100%)  I&O's Detail    01 Jun 2018 07:01  -  02 Jun 2018 07:00  --------------------------------------------------------  IN:    dexmedetomidine Infusion: 99 mL    Enteral Tube Flush: 100 mL    IV PiggyBack: 200 mL    Jevity: 440 mL    propofol Infusion: 23.5 mL    propofol Infusion: 188.5 mL    sodium chloride 3%: 160 mL    sodium chloride 3%.: 650 mL  Total IN: 1861 mL    OUT:    Accordian: 215 mL    Bulb: 45 mL    Bulb: 110 mL    Indwelling Catheter - Urethral: 580 mL  Total OUT: 950 mL    Total NET: 911 mL      02 Jun 2018 07:01  -  02 Jun 2018 08:46  --------------------------------------------------------  IN:    dexmedetomidine Infusion: 12 mL    Jevity: 60 mL    sodium chloride 3%.: 100 mL  Total IN: 172 mL    OUT:  Total OUT: 0 mL    Total NET: 172 mL    NEUROLOGIC EXAMINATION:  Patient does not open eyes, does not follow commands, no verbal output,   GENERAL:   intubated 12 450 40% +5  EENT: anicteric  CARDIOVASC:  (+) S1 S2, normal rate and regular rhythm  PULMONARY:  clear to auscultation bilaterally  ABDOMEN:    EXTREMITIES:  no edema  SKIN:  no rash      LABS:                        6.7    9.7   )-----------( 237      ( 02 Jun 2018 05:21 )             20.9     06-02    150<H>  |  117<H>  |  4<L>  ----------------------------<  139<H>  3.4<L>   |  25  |  0.48<L>    Ca    8.2<L>      02 Jun 2018 05:20  Phos  1.8     06-02  Mg     2.2     06-02      PT/INR - ( 31 May 2018 16:41 )   PT: 11.7 sec;   INR: 1.05          PTT - ( 31 May 2018 16:41 )  PTT:24.8 sec      RADIOLOGY & ADDITIONAL STUDIES: SUBJECTIVE: Patient seen and examined bedside by chief resident. sedated and intubated    Vital Signs Last 24 Hrs  T(C): 37.4 (02 Jun 2018 06:17), Max: 38.2 (02 Jun 2018 02:00)  T(F): 99.3 (02 Jun 2018 06:17), Max: 100.7 (02 Jun 2018 02:00)  HR: 74 (02 Jun 2018 08:00) (72 - 109)  BP: 143/77 (02 Jun 2018 08:00) (104/59 - 168/73)  BP(mean): 96 (02 Jun 2018 08:00) (73 - 126)  RR: 12 (01 Jun 2018 20:08) (12 - 16)  SpO2: 100% (02 Jun 2018 08:00) (89% - 100%)  I&O's Detail    01 Jun 2018 07:01  -  02 Jun 2018 07:00  --------------------------------------------------------  IN:    dexmedetomidine Infusion: 99 mL    Enteral Tube Flush: 100 mL    IV PiggyBack: 200 mL    Jevity: 440 mL    propofol Infusion: 23.5 mL    propofol Infusion: 188.5 mL    sodium chloride 3%: 160 mL    sodium chloride 3%.: 650 mL  Total IN: 1861 mL    OUT:    Accordian: 215 mL    Bulb: 45 mL    Bulb: 110 mL    Indwelling Catheter - Urethral: 580 mL  Total OUT: 950 mL    Total NET: 911 mL      02 Jun 2018 07:01  -  02 Jun 2018 08:46  --------------------------------------------------------  IN:    dexmedetomidine Infusion: 12 mL    Jevity: 60 mL    sodium chloride 3%.: 100 mL  Total IN: 172 mL    OUT:  Total OUT: 0 mL    Total NET: 172 mL    NEUROLOGIC EXAMINATION:  Patient does not open eyes, does not follow commands, no verbal output,   GENERAL:   intubated 12 450 40% +5  EENT: anicteric  CARDIOVASC:  (+) S1 S2, normal rate and regular rhythm  PULMONARY:  clear to auscultation bilaterally  ABDOMEN:  abd soft, unable to elicit tenderness, DREW drain bloody  EXTREMITIES:  no edema  SKIN:  no rash      LABS:                        6.7    9.7   )-----------( 237      ( 02 Jun 2018 05:21 )             20.9     06-02    150<H>  |  117<H>  |  4<L>  ----------------------------<  139<H>  3.4<L>   |  25  |  0.48<L>    Ca    8.2<L>      02 Jun 2018 05:20  Phos  1.8     06-02  Mg     2.2     06-02      PT/INR - ( 31 May 2018 16:41 )   PT: 11.7 sec;   INR: 1.05          PTT - ( 31 May 2018 16:41 )  PTT:24.8 sec      RADIOLOGY & ADDITIONAL STUDIES:

## 2018-06-03 LAB
ANION GAP SERPL CALC-SCNC: 11 MMOL/L — SIGNIFICANT CHANGE UP (ref 5–17)
ANION GAP SERPL CALC-SCNC: 15 MMOL/L — SIGNIFICANT CHANGE UP (ref 5–17)
ANION GAP SERPL CALC-SCNC: 15 MMOL/L — SIGNIFICANT CHANGE UP (ref 5–17)
BASE EXCESS BLDA CALC-SCNC: 1.7 MMOL/L — SIGNIFICANT CHANGE UP (ref -2–3)
BUN SERPL-MCNC: 6 MG/DL — LOW (ref 7–23)
CALCIUM SERPL-MCNC: 8.7 MG/DL — SIGNIFICANT CHANGE UP (ref 8.4–10.5)
CALCIUM SERPL-MCNC: 8.9 MG/DL — SIGNIFICANT CHANGE UP (ref 8.4–10.5)
CALCIUM SERPL-MCNC: 8.9 MG/DL — SIGNIFICANT CHANGE UP (ref 8.4–10.5)
CHLORIDE SERPL-SCNC: 104 MMOL/L — SIGNIFICANT CHANGE UP (ref 96–108)
CHLORIDE SERPL-SCNC: 105 MMOL/L — SIGNIFICANT CHANGE UP (ref 96–108)
CHLORIDE SERPL-SCNC: 107 MMOL/L — SIGNIFICANT CHANGE UP (ref 96–108)
CO2 SERPL-SCNC: 24 MMOL/L — SIGNIFICANT CHANGE UP (ref 22–31)
CO2 SERPL-SCNC: 24 MMOL/L — SIGNIFICANT CHANGE UP (ref 22–31)
CO2 SERPL-SCNC: 26 MMOL/L — SIGNIFICANT CHANGE UP (ref 22–31)
CREAT SERPL-MCNC: 0.4 MG/DL — LOW (ref 0.5–1.3)
CREAT SERPL-MCNC: 0.41 MG/DL — LOW (ref 0.5–1.3)
CREAT SERPL-MCNC: 0.44 MG/DL — LOW (ref 0.5–1.3)
GAS PNL BLDA: SIGNIFICANT CHANGE UP
GLUCOSE SERPL-MCNC: 122 MG/DL — HIGH (ref 70–99)
GLUCOSE SERPL-MCNC: 156 MG/DL — HIGH (ref 70–99)
GLUCOSE SERPL-MCNC: 169 MG/DL — HIGH (ref 70–99)
HCO3 BLDA-SCNC: 25 MMOL/L — SIGNIFICANT CHANGE UP (ref 21–28)
HCT VFR BLD CALC: 27.2 % — LOW (ref 34.5–45)
HGB BLD-MCNC: 9 G/DL — LOW (ref 11.5–15.5)
MAGNESIUM SERPL-MCNC: 2 MG/DL — SIGNIFICANT CHANGE UP (ref 1.6–2.6)
MCHC RBC-ENTMCNC: 28.1 PG — SIGNIFICANT CHANGE UP (ref 27–34)
MCHC RBC-ENTMCNC: 33.1 G/DL — SIGNIFICANT CHANGE UP (ref 32–36)
MCV RBC AUTO: 85 FL — SIGNIFICANT CHANGE UP (ref 80–100)
PCO2 BLDA: 35 MMHG — SIGNIFICANT CHANGE UP (ref 32–45)
PH BLDA: 7.47 — HIGH (ref 7.35–7.45)
PHOSPHATE SERPL-MCNC: 2.6 MG/DL — SIGNIFICANT CHANGE UP (ref 2.5–4.5)
PLATELET # BLD AUTO: 248 K/UL — SIGNIFICANT CHANGE UP (ref 150–400)
PO2 BLDA: 138 MMHG — HIGH (ref 83–108)
POTASSIUM SERPL-MCNC: 3.2 MMOL/L — LOW (ref 3.5–5.3)
POTASSIUM SERPL-MCNC: 3.4 MMOL/L — LOW (ref 3.5–5.3)
POTASSIUM SERPL-MCNC: 4.1 MMOL/L — SIGNIFICANT CHANGE UP (ref 3.5–5.3)
POTASSIUM SERPL-SCNC: 3.2 MMOL/L — LOW (ref 3.5–5.3)
POTASSIUM SERPL-SCNC: 3.4 MMOL/L — LOW (ref 3.5–5.3)
POTASSIUM SERPL-SCNC: 4.1 MMOL/L — SIGNIFICANT CHANGE UP (ref 3.5–5.3)
RBC # BLD: 3.2 M/UL — LOW (ref 3.8–5.2)
RBC # FLD: 15.7 % — SIGNIFICANT CHANGE UP (ref 10.3–16.9)
SAO2 % BLDA: 99 % — SIGNIFICANT CHANGE UP (ref 95–100)
SODIUM SERPL-SCNC: 143 MMOL/L — SIGNIFICANT CHANGE UP (ref 135–145)
SODIUM SERPL-SCNC: 144 MMOL/L — SIGNIFICANT CHANGE UP (ref 135–145)
SODIUM SERPL-SCNC: 144 MMOL/L — SIGNIFICANT CHANGE UP (ref 135–145)
WBC # BLD: 10.1 K/UL — SIGNIFICANT CHANGE UP (ref 3.8–10.5)
WBC # FLD AUTO: 10.1 K/UL — SIGNIFICANT CHANGE UP (ref 3.8–10.5)

## 2018-06-03 PROCEDURE — 99291 CRITICAL CARE FIRST HOUR: CPT | Mod: 24

## 2018-06-03 PROCEDURE — 71045 X-RAY EXAM CHEST 1 VIEW: CPT | Mod: 26,77

## 2018-06-03 PROCEDURE — 71045 X-RAY EXAM CHEST 1 VIEW: CPT | Mod: 26

## 2018-06-03 PROCEDURE — 36556 INSERT NON-TUNNEL CV CATH: CPT

## 2018-06-03 RX ORDER — PROPOFOL 10 MG/ML
5 INJECTION, EMULSION INTRAVENOUS
Qty: 1000 | Refills: 0 | Status: DISCONTINUED | OUTPATIENT
Start: 2018-06-03 | End: 2018-06-04

## 2018-06-03 RX ORDER — SODIUM CHLORIDE 5 G/100ML
500 INJECTION, SOLUTION INTRAVENOUS
Qty: 0 | Refills: 0 | Status: DISCONTINUED | OUTPATIENT
Start: 2018-06-03 | End: 2018-06-04

## 2018-06-03 RX ORDER — HYDRALAZINE HCL 50 MG
10 TABLET ORAL EVERY 4 HOURS
Qty: 0 | Refills: 0 | Status: DISCONTINUED | OUTPATIENT
Start: 2018-06-03 | End: 2018-06-06

## 2018-06-03 RX ORDER — SODIUM CHLORIDE 9 MG/ML
2 INJECTION INTRAMUSCULAR; INTRAVENOUS; SUBCUTANEOUS EVERY 6 HOURS
Qty: 0 | Refills: 0 | Status: DISCONTINUED | OUTPATIENT
Start: 2018-06-03 | End: 2018-06-06

## 2018-06-03 RX ORDER — POTASSIUM CHLORIDE 20 MEQ
40 PACKET (EA) ORAL ONCE
Qty: 0 | Refills: 0 | Status: COMPLETED | OUTPATIENT
Start: 2018-06-03 | End: 2018-06-03

## 2018-06-03 RX ORDER — HEPARIN SODIUM 5000 [USP'U]/ML
5000 INJECTION INTRAVENOUS; SUBCUTANEOUS EVERY 8 HOURS
Qty: 0 | Refills: 0 | Status: DISCONTINUED | OUTPATIENT
Start: 2018-06-03 | End: 2018-06-21

## 2018-06-03 RX ORDER — POTASSIUM PHOSPHATE, MONOBASIC POTASSIUM PHOSPHATE, DIBASIC 236; 224 MG/ML; MG/ML
30 INJECTION, SOLUTION INTRAVENOUS ONCE
Qty: 0 | Refills: 0 | Status: COMPLETED | OUTPATIENT
Start: 2018-06-03 | End: 2018-06-03

## 2018-06-03 RX ADMIN — Medication 2: at 17:20

## 2018-06-03 RX ADMIN — PROPOFOL 2.34 MICROGRAM(S)/KG/MIN: 10 INJECTION, EMULSION INTRAVENOUS at 14:51

## 2018-06-03 RX ADMIN — FENTANYL CITRATE 25 MICROGRAM(S): 50 INJECTION INTRAVENOUS at 09:42

## 2018-06-03 RX ADMIN — Medication 40 MILLIEQUIVALENT(S): at 09:42

## 2018-06-03 RX ADMIN — LEVETIRACETAM 1000 MILLIGRAM(S): 250 TABLET, FILM COATED ORAL at 22:53

## 2018-06-03 RX ADMIN — SENNA PLUS 2 TABLET(S): 8.6 TABLET ORAL at 22:53

## 2018-06-03 RX ADMIN — LEVETIRACETAM 1000 MILLIGRAM(S): 250 TABLET, FILM COATED ORAL at 06:44

## 2018-06-03 RX ADMIN — Medication 100 MILLIGRAM(S): at 06:45

## 2018-06-03 RX ADMIN — ATORVASTATIN CALCIUM 80 MILLIGRAM(S): 80 TABLET, FILM COATED ORAL at 22:53

## 2018-06-03 RX ADMIN — Medication 10 MILLIGRAM(S): at 13:53

## 2018-06-03 RX ADMIN — DEXMEDETOMIDINE HYDROCHLORIDE IN 0.9% SODIUM CHLORIDE 3.9 MICROGRAM(S)/KG/HR: 4 INJECTION INTRAVENOUS at 00:31

## 2018-06-03 RX ADMIN — FENTANYL CITRATE 25 MICROGRAM(S): 50 INJECTION INTRAVENOUS at 00:36

## 2018-06-03 RX ADMIN — PANTOPRAZOLE SODIUM 40 MILLIGRAM(S): 20 TABLET, DELAYED RELEASE ORAL at 12:26

## 2018-06-03 RX ADMIN — DEXMEDETOMIDINE HYDROCHLORIDE IN 0.9% SODIUM CHLORIDE 3.9 MICROGRAM(S)/KG/HR: 4 INJECTION INTRAVENOUS at 10:44

## 2018-06-03 RX ADMIN — Medication 1 TABLET(S): at 12:26

## 2018-06-03 RX ADMIN — CHLORHEXIDINE GLUCONATE 15 MILLILITER(S): 213 SOLUTION TOPICAL at 06:45

## 2018-06-03 RX ADMIN — HEPARIN SODIUM 5000 UNIT(S): 5000 INJECTION INTRAVENOUS; SUBCUTANEOUS at 22:54

## 2018-06-03 RX ADMIN — SODIUM CHLORIDE 2 GRAM(S): 9 INJECTION INTRAMUSCULAR; INTRAVENOUS; SUBCUTANEOUS at 06:44

## 2018-06-03 RX ADMIN — HEPARIN SODIUM 5000 UNIT(S): 5000 INJECTION INTRAVENOUS; SUBCUTANEOUS at 14:07

## 2018-06-03 RX ADMIN — POTASSIUM PHOSPHATE, MONOBASIC POTASSIUM PHOSPHATE, DIBASIC 85 MILLIMOLE(S): 236; 224 INJECTION, SOLUTION INTRAVENOUS at 10:44

## 2018-06-03 RX ADMIN — SODIUM CHLORIDE 2 GRAM(S): 9 INJECTION INTRAMUSCULAR; INTRAVENOUS; SUBCUTANEOUS at 19:57

## 2018-06-03 RX ADMIN — SODIUM CHLORIDE 2 GRAM(S): 9 INJECTION INTRAMUSCULAR; INTRAVENOUS; SUBCUTANEOUS at 12:34

## 2018-06-03 RX ADMIN — DEXMEDETOMIDINE HYDROCHLORIDE IN 0.9% SODIUM CHLORIDE 3.9 MICROGRAM(S)/KG/HR: 4 INJECTION INTRAVENOUS at 05:14

## 2018-06-03 RX ADMIN — PROPOFOL 2.34 MICROGRAM(S)/KG/MIN: 10 INJECTION, EMULSION INTRAVENOUS at 19:59

## 2018-06-03 RX ADMIN — Medication 100 MILLIGRAM(S): at 12:26

## 2018-06-03 RX ADMIN — FENTANYL CITRATE 25 MICROGRAM(S): 50 INJECTION INTRAVENOUS at 11:00

## 2018-06-03 RX ADMIN — CHLORHEXIDINE GLUCONATE 15 MILLILITER(S): 213 SOLUTION TOPICAL at 17:21

## 2018-06-03 RX ADMIN — SODIUM CHLORIDE 50 MILLILITER(S): 5 INJECTION, SOLUTION INTRAVENOUS at 14:10

## 2018-06-03 RX ADMIN — Medication 100 MILLIGRAM(S): at 17:20

## 2018-06-03 RX ADMIN — Medication 1 MILLIGRAM(S): at 12:26

## 2018-06-03 NOTE — DISCHARGE NOTE ADULT - NS AS DC STROKE ED MATERIALS
Stroke Warning Signs and Symptoms/Call 911 for Stroke/Stroke Education Booklet/Risk Factors for Stroke/Prescribed Medications/Need for Followup After Discharge

## 2018-06-03 NOTE — PROGRESS NOTE ADULT - SUBJECTIVE AND OBJECTIVE BOX
S/Overnight events:  No events overnight.      Hospital Course:   POD#1: emergent crani yesterday, transferred back to [facility], sedated overnight, rpt [facility] with hemorrhagic conversion o/w stable. stable exam overnight. Drains remains to suction. no acute events overnight  POD#2: No seizure activity on EEG. Following commands on right side. Continue DREW x2 and Hemovac x1. Low grade fevers.       Vital Signs Last 24 Hrs  T(C): 37.1 (03 Jun 2018 06:00), Max: 37.3 (02 Jun 2018 10:19)  T(F): 98.8 (03 Jun 2018 06:00), Max: 99.1 (02 Jun 2018 10:19)  HR: 68 (03 Jun 2018 07:00) (56 - 102)  BP: 158/81 (03 Jun 2018 07:00) (148/98 - 189/93)  BP(mean): 121 (03 Jun 2018 07:00) (107 - 146)  RR: 14 (03 Jun 2018 07:00) (9 - 21)  SpO2: 100% (03 Jun 2018 06:15) (100% - 100%)    I&O's Detail    02 Jun 2018 07:01  -  03 Jun 2018 07:00  --------------------------------------------------------  IN:    dexmedetomidine Infusion: 260 mL    Enteral Tube Flush: 50 mL    IV PiggyBack: 200 mL    Jevity: 1150 mL    sodium chloride 3%: 100 mL    sodium chloride 3%.: 660 mL    Solution: 499.8 mL  Total IN: 2919.8 mL    OUT:    Accordian: 50 mL    Bulb: 70 mL    Bulb: 30 mL  Total OUT: 150 mL    Total NET: 2769.8 mL      03 Jun 2018 07:01  -  03 Jun 2018 08:10  --------------------------------------------------------  IN:    dexmedetomidine Infusion: 10 mL    Jevity: 60 mL    sodium chloride 3%.: 30 mL  Total IN: 100 mL    OUT:  Total OUT: 0 mL    Total NET: 100 mL        I&O's Summary    02 Jun 2018 07:01  -  03 Jun 2018 07:00  --------------------------------------------------------  IN: 2919.8 mL / OUT: 150 mL / NET: 2769.8 mL    03 Jun 2018 07:01  -  03 Jun 2018 08:10  --------------------------------------------------------  IN: 100 mL / OUT: 0 mL / NET: 100 mL        PHYSICAL EXAM:  Gen: NAD, Intubated on ventilator. Sedated.  HEENT: PERRL. + Corneals, + Gag, +NGT. +Head dressing C/D/I  Neck: Left subclavian, C/D/I  Lungs: Clear b/l  Heart: s1, S2. NSR.  Abd: Soft, NT/ND. +BS. Incision C/D/I.  Exts: Pulses 2+ throughout  Neuro: CNs limited. Following commands with RUE/RLE, moving purposefully. Withdrawal to noxoius in LUE/LLE. Eye opening to stimulus, not tracking examiner.    TUBES/LINES:  [x] CVC  [] A-line  [] Lumbar Drain  [] Ventriculostomy  [] Other    DIET:  [] NPO  [] Mechanical  [x] Tube feeds    LABS:                        9.0    10.1  )-----------( 248      ( 03 Jun 2018 05:45 )             27.2     06-03    144  |  107  |  6<L>  ----------------------------<  169<H>  3.4<L>   |  26  |  0.40<L>    Ca    8.9      03 Jun 2018 05:45  Phos  2.6     06-03  Mg     2.0     06-03              CAPILLARY BLOOD GLUCOSE      POCT Blood Glucose.: 107 mg/dL (03 Jun 2018 05:36)  POCT Blood Glucose.: 145 mg/dL (02 Jun 2018 23:47)  POCT Blood Glucose.: 132 mg/dL (02 Jun 2018 16:38)  POCT Blood Glucose.: 131 mg/dL (02 Jun 2018 14:14)      Drug Levels: [] N/A    CSF Analysis: [] N/A      Allergies    No Known Allergies    Intolerances      MEDICATIONS:  Antibiotics:    Neuro:  acetaminophen   Tablet 650 milliGRAM(s) Oral every 6 hours PRN  acetaminophen   Tablet. 650 milliGRAM(s) Oral every 6 hours PRN  dexmedetomidine Infusion 0.2 MICROgram(s)/kG/Hr IV Continuous <Continuous>  fentaNYL    Injectable 25 MICROGram(s) IV Push every 2 hours PRN  levETIRAcetam  Solution 1000 milliGRAM(s) Oral two times a day  ondansetron Injectable 4 milliGRAM(s) IV Push every 6 hours PRN    Anticoagulation:    OTHER:  atorvastatin 80 milliGRAM(s) Oral at bedtime  chlorhexidine 0.12% Liquid 15 milliLiter(s) Swish and Spit two times a day  dextrose 40% Gel 15 Gram(s) Oral once PRN  dextrose 50% Injectable 12.5 Gram(s) IV Push once  dextrose 50% Injectable 25 Gram(s) IV Push once  dextrose 50% Injectable 25 Gram(s) IV Push once  docusate sodium Liquid 100 milliGRAM(s) Oral two times a day  glucagon  Injectable 1 milliGRAM(s) IntraMuscular once PRN  insulin lispro (HumaLOG) corrective regimen sliding scale   SubCutaneous every 6 hours  pantoprazole   Suspension 40 milliGRAM(s) Oral daily  senna 2 Tablet(s) Oral at bedtime    IVF:  dextrose 5%. 1000 milliLiter(s) IV Continuous <Continuous>  folic acid 1 milliGRAM(s) Oral daily  multivitamin 1 Tablet(s) Oral daily  potassium chloride   Powder 40 milliEquivalent(s) Oral once  potassium phosphate IVPB 30 milliMole(s) IV Intermittent once  sodium chloride 2 Gram(s) Oral every 8 hours  sodium chloride 3%. 500 milliLiter(s) IV Continuous <Continuous>  thiamine 100 milliGRAM(s) Oral daily    CULTURES:    RADIOLOGY & ADDITIONAL TESTS:      ASSESSMENT: 58y Female w/ HTN, HLD, Anxiety, Depression,  h/o Breast Cancer w/ TRAM flap reconstruction admitted for expanding right rectus hematoma complicated by right MCA infarction now s/p hemicraniectomy POD #3    PLAN:  NEURO:  q1h neuro checks  Precedex for sedation,  Tylenol/Fentanyl for pain  Continue EEG, will f/u findings, on Keppra    CARDIOVASCULAR:  SBP goal 100-180  Daily labs, electrolyte repletion ordered  Continue statin therapy    PULMONARY:  CPAP trials, continue ventilator support,  Daily CXR    RENAL:  3% titrated to Na goal 145-150, on 30cc/hr    GI:  NPO start TFs to goal  bowel regimen  PPI    HEME:  H/H low, 1 unit PRBC transfusion,  FOB pending    ID:  Low grade fevers, will panculture if spikes    ENDO:  ISS    DVT PROPHYLAXIS:  SCDs, no chemoppx at this time  LE dopplers negative    DISPOSITION:   ICU status  full code  pt/ot pending  d/w Dr. SUZETTE Lepe S/Overnight events:  No events overnight. Hemovac 20cc overnight, DREW #1 40cc, DREW #2 20cc      Hospital Course:   POD#1: emergent crani yesterday, transferred back to [facility], sedated overnight, [facility] with hemorrhagic conversion o/w stable. stable exam overnight. Drains remains to suction. no acute events overnight  POD#2: No seizure activity on EEG. Following commands on right side. Continue DREW x2 and Hemovac x1. Low grade fevers.   POD#3: Tolerating CPAP, but kept intubated.    Vital Signs Last 24 Hrs  T(C): 37.1 (03 Jun 2018 06:00), Max: 37.3 (02 Jun 2018 10:19)  T(F): 98.8 (03 Jun 2018 06:00), Max: 99.1 (02 Jun 2018 10:19)  HR: 68 (03 Jun 2018 07:00) (56 - 102)  BP: 158/81 (03 Jun 2018 07:00) (148/98 - 189/93)  BP(mean): 121 (03 Jun 2018 07:00) (107 - 146)  RR: 14 (03 Jun 2018 07:00) (9 - 21)  SpO2: 100% (03 Jun 2018 06:15) (100% - 100%)    I&O's Detail    02 Jun 2018 07:01  -  03 Jun 2018 07:00  --------------------------------------------------------  IN:    dexmedetomidine Infusion: 260 mL    Enteral Tube Flush: 50 mL    IV PiggyBack: 200 mL    Jevity: 1150 mL    sodium chloride 3%: 100 mL    sodium chloride 3%.: 660 mL    Solution: 499.8 mL  Total IN: 2919.8 mL    OUT:    Accordian: 50 mL    Bulb: 70 mL    Bulb: 30 mL  Total OUT: 150 mL    Total NET: 2769.8 mL      03 Jun 2018 07:01  -  03 Jun 2018 08:10  --------------------------------------------------------  IN:    dexmedetomidine Infusion: 10 mL    Jevity: 60 mL    sodium chloride 3%.: 30 mL  Total IN: 100 mL    OUT:  Total OUT: 0 mL    Total NET: 100 mL        I&O's Summary    02 Jun 2018 07:01  -  03 Jun 2018 07:00  --------------------------------------------------------  IN: 2919.8 mL / OUT: 150 mL / NET: 2769.8 mL    03 Jun 2018 07:01  -  03 Jun 2018 08:10  --------------------------------------------------------  IN: 100 mL / OUT: 0 mL / NET: 100 mL        PHYSICAL EXAM:  Gen: NAD, Intubated on ventilator. Sedated.  HEENT: PERRL. + Corneals, + Gag, +NGT. +Head dressing C/D/I  Neck: Left subclavian, C/D/I  Lungs: Clear b/l  Heart: s1, S2. NSR.  Abd: Soft, NT/ND. +BS. Incision C/D/I.  Exts: Pulses 2+ throughout  Neuro: CNs limited. Following commands with RUE/RLE, moving purposefully. Withdrawal to noxious in LUE/LLE. Eye opening to stimulus, not tracking examiner.    TUBES/LINES:  [x] CVC  [] A-line  [] Lumbar Drain  [] Ventriculostomy  [] Other    DIET:  [] NPO  [] Mechanical  [x] Tube feeds    LABS:                        9.0    10.1  )-----------( 248      ( 03 Jun 2018 05:45 )             27.2     06-03    144  |  107  |  6<L>  ----------------------------<  169<H>  3.4<L>   |  26  |  0.40<L>    Ca    8.9      03 Jun 2018 05:45  Phos  2.6     06-03  Mg     2.0     06-03              CAPILLARY BLOOD GLUCOSE      POCT Blood Glucose.: 107 mg/dL (03 Jun 2018 05:36)  POCT Blood Glucose.: 145 mg/dL (02 Jun 2018 23:47)  POCT Blood Glucose.: 132 mg/dL (02 Jun 2018 16:38)  POCT Blood Glucose.: 131 mg/dL (02 Jun 2018 14:14)      Drug Levels: [] N/A    CSF Analysis: [] N/A      Allergies    No Known Allergies    Intolerances      MEDICATIONS:  Antibiotics:    Neuro:  acetaminophen   Tablet 650 milliGRAM(s) Oral every 6 hours PRN  acetaminophen   Tablet. 650 milliGRAM(s) Oral every 6 hours PRN  dexmedetomidine Infusion 0.2 MICROgram(s)/kG/Hr IV Continuous <Continuous>  fentaNYL    Injectable 25 MICROGram(s) IV Push every 2 hours PRN  levETIRAcetam  Solution 1000 milliGRAM(s) Oral two times a day  ondansetron Injectable 4 milliGRAM(s) IV Push every 6 hours PRN    Anticoagulation:    OTHER:  atorvastatin 80 milliGRAM(s) Oral at bedtime  chlorhexidine 0.12% Liquid 15 milliLiter(s) Swish and Spit two times a day  dextrose 40% Gel 15 Gram(s) Oral once PRN  dextrose 50% Injectable 12.5 Gram(s) IV Push once  dextrose 50% Injectable 25 Gram(s) IV Push once  dextrose 50% Injectable 25 Gram(s) IV Push once  docusate sodium Liquid 100 milliGRAM(s) Oral two times a day  glucagon  Injectable 1 milliGRAM(s) IntraMuscular once PRN  insulin lispro (HumaLOG) corrective regimen sliding scale   SubCutaneous every 6 hours  pantoprazole   Suspension 40 milliGRAM(s) Oral daily  senna 2 Tablet(s) Oral at bedtime    IVF:  dextrose 5%. 1000 milliLiter(s) IV Continuous <Continuous>  folic acid 1 milliGRAM(s) Oral daily  multivitamin 1 Tablet(s) Oral daily  potassium chloride   Powder 40 milliEquivalent(s) Oral once  potassium phosphate IVPB 30 milliMole(s) IV Intermittent once  sodium chloride 2 Gram(s) Oral every 8 hours  sodium chloride 3%. 500 milliLiter(s) IV Continuous <Continuous>  thiamine 100 milliGRAM(s) Oral daily    CULTURES:    RADIOLOGY & ADDITIONAL TESTS:      ASSESSMENT: 58y Female w/ HTN, HLD, Anxiety, Depression,  h/o Breast Cancer w/ TRAM flap reconstruction admitted for expanding right rectus hematoma complicated by right MCA infarction now s/p hemicraniectomy POD #3    PLAN:  NEURO:  q1h neuro checks  Precedex for sedation,  Tylenol/Fentanyl for pain  Continue Keppra, off EEG    CARDIOVASCULAR:  SBP goal 100-180  Daily labs, electrolyte repletion ordered  Continue statin therapy  ECHO performed    PULMONARY:  CPAP trials, continue ventilator support,  Daily CXR    RENAL:  3% titrated to Na goal 145-150, on 30cc/hr    GI:  NPO start TFs to goal  bowel regimen  PPI    HEME:  H/H low, 1 unit PRBC transfusion,  FOB pending    ID:  Low grade fevers, will panculture if spikes    ENDO:  ISS    DVT PROPHYLAXIS:  SCDs, no chemoppx at this time  LE dopplers negative    DISPOSITION:   ICU status  full code  pt/ot pending  d/w Dr. Sorto, Dr. BRADFORD S/Overnight events:  No events overnight. Hemovac 20cc overnight, DREW #1 40cc, DREW #2 20cc      Hospital Course:   POD#1: emergent crani yesterday, transferred back to [facility], sedated overnight, [facility] with hemorrhagic conversion o/w stable. stable exam overnight. Drains remains to suction. no acute events overnight  POD#2: No seizure activity on EEG. Following commands on right side. Continue DREW x2 and Hemovac x1. Low grade fevers.   POD#3: Tolerating CPAP. Self extubated. Hemovac removed.     Vital Signs Last 24 Hrs  T(C): 37.1 (03 Jun 2018 06:00), Max: 37.3 (02 Jun 2018 10:19)  T(F): 98.8 (03 Jun 2018 06:00), Max: 99.1 (02 Jun 2018 10:19)  HR: 68 (03 Jun 2018 07:00) (56 - 102)  BP: 158/81 (03 Jun 2018 07:00) (148/98 - 189/93)  BP(mean): 121 (03 Jun 2018 07:00) (107 - 146)  RR: 14 (03 Jun 2018 07:00) (9 - 21)  SpO2: 100% (03 Jun 2018 06:15) (100% - 100%)    I&O's Detail    02 Jun 2018 07:01  -  03 Jun 2018 07:00  --------------------------------------------------------  IN:    dexmedetomidine Infusion: 260 mL    Enteral Tube Flush: 50 mL    IV PiggyBack: 200 mL    Jevity: 1150 mL    sodium chloride 3%: 100 mL    sodium chloride 3%.: 660 mL    Solution: 499.8 mL  Total IN: 2919.8 mL    OUT:    Accordian: 50 mL    Bulb: 70 mL    Bulb: 30 mL  Total OUT: 150 mL    Total NET: 2769.8 mL      03 Jun 2018 07:01  -  03 Jun 2018 08:10  --------------------------------------------------------  IN:    dexmedetomidine Infusion: 10 mL    Jevity: 60 mL    sodium chloride 3%.: 30 mL  Total IN: 100 mL    OUT:  Total OUT: 0 mL    Total NET: 100 mL        I&O's Summary    02 Jun 2018 07:01  -  03 Jun 2018 07:00  --------------------------------------------------------  IN: 2919.8 mL / OUT: 150 mL / NET: 2769.8 mL    03 Jun 2018 07:01  -  03 Jun 2018 08:10  --------------------------------------------------------  IN: 100 mL / OUT: 0 mL / NET: 100 mL        PHYSICAL EXAM:  Gen: NAD, Intubated on ventilator. Sedated.  HEENT: PERRL, +NGT. Right scalp incision c/d/I, flap full but soft.  Neck: Left subclavian, C/D/I  Lungs: Clear b/l  Heart: s1, S2. NSR.  Abd: Soft, NT/ND. +BS. Incision C/D/I.  Exts: Pulses 2+ throughout  Neuro: CNs limited. Following commands with RUE/RLE, moving purposefully. Withdrawal to noxious in LUE, triple flexion of LLE. Eye opening to stimulus and tracking examiner from left side of bed.    TUBES/LINES:  [x] CVC  [] A-line  [] Lumbar Drain  [] Ventriculostomy  [] Other    DIET:  [] NPO  [] Mechanical  [x] Tube feeds    LABS:                        9.0    10.1  )-----------( 248      ( 03 Jun 2018 05:45 )             27.2     06-03    144  |  107  |  6<L>  ----------------------------<  169<H>  3.4<L>   |  26  |  0.40<L>    Ca    8.9      03 Jun 2018 05:45  Phos  2.6     06-03  Mg     2.0     06-03              CAPILLARY BLOOD GLUCOSE      POCT Blood Glucose.: 107 mg/dL (03 Jun 2018 05:36)  POCT Blood Glucose.: 145 mg/dL (02 Jun 2018 23:47)  POCT Blood Glucose.: 132 mg/dL (02 Jun 2018 16:38)  POCT Blood Glucose.: 131 mg/dL (02 Jun 2018 14:14)      Drug Levels: [] N/A    CSF Analysis: [] N/A      Allergies    No Known Allergies    Intolerances      MEDICATIONS:  Antibiotics:    Neuro:  acetaminophen   Tablet 650 milliGRAM(s) Oral every 6 hours PRN  acetaminophen   Tablet. 650 milliGRAM(s) Oral every 6 hours PRN  dexmedetomidine Infusion 0.2 MICROgram(s)/kG/Hr IV Continuous <Continuous>  fentaNYL    Injectable 25 MICROGram(s) IV Push every 2 hours PRN  levETIRAcetam  Solution 1000 milliGRAM(s) Oral two times a day  ondansetron Injectable 4 milliGRAM(s) IV Push every 6 hours PRN    Anticoagulation:    OTHER:  atorvastatin 80 milliGRAM(s) Oral at bedtime  chlorhexidine 0.12% Liquid 15 milliLiter(s) Swish and Spit two times a day  dextrose 40% Gel 15 Gram(s) Oral once PRN  dextrose 50% Injectable 12.5 Gram(s) IV Push once  dextrose 50% Injectable 25 Gram(s) IV Push once  dextrose 50% Injectable 25 Gram(s) IV Push once  docusate sodium Liquid 100 milliGRAM(s) Oral two times a day  glucagon  Injectable 1 milliGRAM(s) IntraMuscular once PRN  insulin lispro (HumaLOG) corrective regimen sliding scale   SubCutaneous every 6 hours  pantoprazole   Suspension 40 milliGRAM(s) Oral daily  senna 2 Tablet(s) Oral at bedtime    IVF:  dextrose 5%. 1000 milliLiter(s) IV Continuous <Continuous>  folic acid 1 milliGRAM(s) Oral daily  multivitamin 1 Tablet(s) Oral daily  potassium chloride   Powder 40 milliEquivalent(s) Oral once  potassium phosphate IVPB 30 milliMole(s) IV Intermittent once  sodium chloride 2 Gram(s) Oral every 8 hours  sodium chloride 3%. 500 milliLiter(s) IV Continuous <Continuous>  thiamine 100 milliGRAM(s) Oral daily    CULTURES:    RADIOLOGY & ADDITIONAL TESTS:      ASSESSMENT: 58y Female w/ HTN, HLD, Anxiety, Depression,  h/o Breast Cancer w/ TRAM flap reconstruction admitted for expanding right rectus hematoma complicated by right MCA infarction now s/p hemicraniectomy POD #3    PLAN:  NEURO:  q1h neuro checks  Precedex for sedation,  Tylenol/Fentanyl for pain  Continue Keppra, off EEG    CARDIOVASCULAR:  SBP goal 100-180  Daily labs, electrolyte repletion ordered  Continue statin therapy  ECHO performed    PULMONARY:  Patient self extubated early this afternoon, repeat ABG.  Monitor closely, may require reintubation    RENAL:  3% titrated to Na goal 145-150, on 30cc/hr    GI:  TFs at goal  bowel regimen  PPI    HEME:  H/H improving, s/p 1 unit PRBC  FOB negative    ID:  Low grade fevers, will panculture if spikes    ENDO:  ISS    DVT PROPHYLAXIS:  SCDs, SQH to start today  LE dopplers negative    DISPOSITION:   ICU status  full code  pt/ot pending  d/w Dr. Sorto, Dr. BRADFORD

## 2018-06-03 NOTE — PROGRESS NOTE ADULT - ASSESSMENT
58F with   1.  acute cerebrovascular accident involving R MCA / SELINA, malignant infarction, cerebral edema, brain compression s/p decompressive hemicraniectomy (05/31/2018, Dr. Johnson)  2.  Hypertension dyslipidemia   3.  h/o breast CA  4.  CAD  5.  R rectus abdominis intramuscular hematoma  6.  seizures    PLAN:   NEURO: neurochecks q1h, PRN pain meds tylenol, fentanyl, sedation with precedex  to RASS of 0  malignant R MCA infarction, s/p DHC:  no ASA for now, hemorrhagic conversion noted  seizure disorder:  continue levetiracetam 1G BID, f/u VEEG will d/c if no seizures  ICP crises:  s/p [facility]  REHAB:  physical therapy evaluation and management - defer   EARLY MOB:  HOB up, bedrest    PULM:  CPAP 8/5 40%  CARDIO:  SBP goal 100-180 mm Hg, echo EF 55-60% mod dilated LA, probable AVM, no PFOs, keep central line  ENDO:  Blood sugar goals 140-180 mg/dL, continue insulin sliding scale; continue high-dose statins  GI:  PPI for GI prophylaxis  DIET: jevity  RENAL:  dec 3%@30cc/hr, recheck BMP in 4 hours, Na goal 145-150, start salt tabs 2g q8h  HEM/ONC: Hb drop - will transfuse 1 unit PRBC, check FOBT  VTE Prophylaxis: SCDs only, no DVT chemoprophylaxis for now as patient is high risk for bleed (?hemorrhagic conversion + fresh post-op); neg dopplers  ID: afebrile, no leukocytosis  Social: will update family  MISC:  h/o ETOH abuse, on propofol sedation    ATTENDING ATTESTATION:  I was physically present for the key portions of the evaluation and management (E/M) service provided.  I agree with the above history, physical and plan, which I have reviewed and edited where appropriate.    Patient at high risk for neurological deterioration or death due to:  ICU delirium, aspiration PNA, DVT / PE.  Critical care time, excluding procedures: 45 minutes spent on total encounter, more than 50% of the visit was spent counseling and/or coordinating care by the attending physician.     Plan discussed with RN, house staff. 58F with   1.  acute cerebrovascular accident involving R MCA / SELINA, malignant infarction, cerebral edema, brain compression s/p decompressive hemicraniectomy (05/31/2018, Dr. Johnson)  2.  Hypertension dyslipidemia   3.  h/o breast CA  4.  CAD  5.  R rectus abdominis intramuscular hematoma  6.  seizures    PLAN:   NEURO: neurochecks q1h, PRN pain meds tylenol, fentanyl, sedation with precedex  to RASS of 0  malignant R MCA infarction, s/p DHC:  no ASA for now (will prob start tomorrow or Day 5), hemorrhagic conversion noted  seizure disorder:  continue levetiracetam 1G BID  ICP crises:  s/p [facility]  REHAB:  physical therapy evaluation and management - defer   EARLY MOB:  HOB up, bedrest    PULM:  CPAP 8/5 40%  CARDIO:  SBP goal 100-180 mm Hg, echo EF 55-60% mod dilated LA, probable AVM, no PFOs, keep central line  ENDO:  Blood sugar goals 140-180 mg/dL, continue insulin sliding scale; continue high-dose statins  GI:  PPI for GI prophylaxis  DIET: jevity to goal of 70  RENAL:  increase 3%@50cc/hr, recheck BMP this aftenroon; Na goal 145-150, increase salt tabs 2g q6h  HEM/ONC: s/p transfusion - with adequate response of Hb  VTE Prophylaxis: SCDs only, start SQH if ok with NS, neg dopplers  ID: afebrile, no leukocytosis  Social:  updated yesterday  MISC:  h/o ETOH abuse, continue MVI thiamine folate    ATTENDING ATTESTATION:  I was physically present for the key portions of the evaluation and management (E/M) service provided.  I agree with the above history, physical and plan, which I have reviewed and edited where appropriate.    Patient at high risk for neurological deterioration or death due to:  ICU delirium, aspiration PNA, DVT / PE.  Critical care time, excluding procedures: 45 minutes spent on total encounter, more than 50% of the visit was spent counseling and/or coordinating care by the attending physician.     Plan discussed with RN, house staff.

## 2018-06-03 NOTE — DISCHARGE NOTE ADULT - HOSPITAL COURSE
58F with h/o breast cancer, s/p TRAM flap reconstruction, recently started on plavix presented with painful bulge in R abdomen.  Imaging showed large expanding intramuscular hematoma R rects abdominis.  Admitted to surgery 05/30, diagnostic angio, R abd percutaneous thrombin injection of 2 pseudoaneurysms.  Post-procedure, noted to be hypotensive, low respiratory rate, altered mental status.  Naloxone given with improved respiratory status, but AMS persisted and noted L-sided weakness.  Stroke code called.  Post-op, noted  L UE hemiplegia / L LE weakness.  Stroke code called.  CT showed large R MCA small SELINA infarcts, with mass effect.  Mannitol 50g IV given, 23.4% x1 given.  Intubated for airway protection.  Seizure noted during intubation, given 4 ativan, started on propofol drip.  Brought to OR for decompressive hemicraniectomy. EEG showed no seizure activity. Patient self extubated and was reintubated. Patient was then extubated. Had left sided neglect with facial droop and left sided weakness. Patient developed fevers and was found to have a UTI and bacterial vaginosis and was treated with ceftriaxone and metronidazole. Patient then developed fevers up to 101.2 and waxing and waning mental status. Infectious work up was negative and the fevers were thought to be due to microaspiration. She improved and was ready to go to [facility]. Patient was clinically stable, laboratory and radiology studies were reviewed, and she was deemed appropriate for discharge by the internal medicine team. 58F with h/o breast cancer, s/p TRAM flap reconstruction, recently started on plavix presented with painful bulge in R abdomen.  Imaging showed large expanding intramuscular hematoma R rects abdominis.  Admitted to surgery 05/30, diagnostic angio, R abd percutaneous thrombin injection of 2 pseudoaneurysms.  Post-procedure, noted to be hypotensive, low respiratory rate, altered mental status.  Naloxone given with improved respiratory status, but AMS persisted and noted L-sided weakness.  Stroke code called.  Post-op, noted  L UE hemiplegia / L LE weakness.  Stroke code called.  CT showed large R MCA small SELINA infarcts, with mass effect.  Mannitol 50g IV given, 23.4% x1 given.  Intubated for airway protection.  Seizure noted during intubation, given 4 ativan, started on propofol drip.  Brought to OR for decompressive hemicraniectomy. EEG showed no seizure activity. Patient self extubated and was reintubated. Patient was then extubated. Had left sided neglect with facial droop and left sided weakness. Patient developed fevers and was found to have a UTI and bacterial vaginosis and was treated with ceftriaxone and metronidazole. Patient then developed fevers up to 101.2 and waxing and waning mental status. Infectious work up was negative (gallium scan and CT chest were negative for infection) and the fevers were thought to be due to microaspiration. She improved and was ready to go to [facility]. Patient was clinically stable, laboratory and radiology studies were reviewed, and she was deemed appropriate for discharge by the internal medicine team.

## 2018-06-03 NOTE — PROVIDER CONTACT NOTE (OTHER) - BACKGROUND
Pt post decompressive craniectomy. Intubated and on precedex infusion for sedation. Bilateral soft wrist restraints in place.

## 2018-06-03 NOTE — DISCHARGE NOTE ADULT - PLAN OF CARE
Return to baseline functional capacity - patient is status post decompressive hemicraniectomy (skull bone removed for decompression). Patient should see Dr. Johnson after rehab is complete. Hematoma resolution - General surgery service requests CT abdomen at some point to evaluation hematoma resolution  - Improving left flank ecchymosis, nothing to do per general surgery. You had a stroke after you received a right abdominal percutaneous thrombin injection of 2 pseudoaneurysms. You then wore up with hypotension, low respiratory rate, and alteerd mental status. You were found to have a stroke and a post decompressive hemicraniectomy (skull bone removed for decompression) was performed. Please see Dr. Johnson and your primary care doctor after rehab is complete. Improving left flank ecchymosis, nothing to do per general surgery. Please with general surgery as an outpatient Please take lisinopril 40mg for your blood pressure Please follow up with your primary care doctor for management. Please continue taking your aspirin daily You had a stroke after you received a right abdominal percutaneous thrombin injection of 2 pseudoaneurysms. You then wore up with hypotension, low respiratory rate, and altered mental status. You were found to have a stroke and a post decompressive hemicraniectomy (skull bone removed for decompression) was performed. Please see Dr. Johnson in 1-2 weeks and your primary care doctor after rehab is complete. Improving left flank ecchymosis, nothing to do per general surgery. Please with general surgery (Dr. Dozier) as an outpatient

## 2018-06-03 NOTE — DISCHARGE NOTE ADULT - CARE PLAN
Principal Discharge DX:	CVA (cerebral vascular accident)  Goal:	Return to baseline functional capacity  Assessment and plan of treatment:	- patient is status post decompressive hemicraniectomy (skull bone removed for decompression). Patient should see Dr. Johnson after rehab is complete.  Secondary Diagnosis:	Abdominal hematoma  Goal:	Hematoma resolution  Assessment and plan of treatment:	- General surgery service requests CT abdomen at some point to evaluation hematoma resolution  - Improving left flank ecchymosis, nothing to do per general surgery. Principal Discharge DX:	CVA (cerebral vascular accident)  Goal:	Return to baseline functional capacity  Assessment and plan of treatment:	You had a stroke after you received a right abdominal percutaneous thrombin injection of 2 pseudoaneurysms. You then wore up with hypotension, low respiratory rate, and alteerd mental status. You were found to have a stroke and a post decompressive hemicraniectomy (skull bone removed for decompression) was performed. Please see Dr. Johnson and your primary care doctor after rehab is complete.  Secondary Diagnosis:	Abdominal hematoma  Goal:	Hematoma resolution  Assessment and plan of treatment:	Improving left flank ecchymosis, nothing to do per general surgery. Please with general surgery as an outpatient  Secondary Diagnosis:	Hypertension  Assessment and plan of treatment:	Please take lisinopril 40mg for your blood pressure  Secondary Diagnosis:	Breast CA  Assessment and plan of treatment:	Please follow up with your primary care doctor for management.  Secondary Diagnosis:	CAD (coronary artery disease)  Assessment and plan of treatment:	Please continue taking your aspirin daily Principal Discharge DX:	CVA (cerebral vascular accident)  Goal:	Return to baseline functional capacity  Assessment and plan of treatment:	You had a stroke after you received a right abdominal percutaneous thrombin injection of 2 pseudoaneurysms. You then wore up with hypotension, low respiratory rate, and altered mental status. You were found to have a stroke and a post decompressive hemicraniectomy (skull bone removed for decompression) was performed. Please see Dr. Johnson in 1-2 weeks and your primary care doctor after rehab is complete.  Secondary Diagnosis:	Abdominal hematoma  Goal:	Hematoma resolution  Assessment and plan of treatment:	Improving left flank ecchymosis, nothing to do per general surgery. Please with general surgery (Dr. Dozier) as an outpatient  Secondary Diagnosis:	Hypertension  Assessment and plan of treatment:	Please take lisinopril 40mg for your blood pressure  Secondary Diagnosis:	Breast CA  Assessment and plan of treatment:	Please follow up with your primary care doctor for management.  Secondary Diagnosis:	CAD (coronary artery disease)  Assessment and plan of treatment:	Please continue taking your aspirin daily

## 2018-06-03 NOTE — DISCHARGE NOTE ADULT - MEDICATION SUMMARY - MEDICATIONS TO TAKE
I will START or STAY ON the medications listed below when I get home from the hospital:    Helmet  -- Apply on skin to affected area once a day   -- Indication: For hemicraniectomy    acetaminophen 325 mg oral tablet  -- 2 tab(s) by mouth every 6 hours, As needed, Mild Pain (1 - 3)  -- Indication: For Pain    oxyCODONE 5 mg oral tablet  -- 1 tab(s) by mouth every 6 hours, As needed, Severe Pain (7 - 10)  -- Indication: For Pain    aspirin 81 mg oral tablet, chewable  -- 1 tab(s) by mouth once a day  -- Indication: For CAD (coronary artery disease)    lisinopril 40 mg oral tablet  -- 1 tab(s) by mouth once a day  -- Indication: For HTN    levETIRAcetam 1000 mg oral tablet  -- 1 tab(s) by mouth 2 times a day  -- Indication: For Seizure    LORazepam 1 mg oral tablet  -- orally once a day  -- Indication: For Anxiety    escitalopram 10 mg oral tablet  -- 1 tab(s) by mouth once a day  -- Indication: For depression    atorvastatin 80 mg oral tablet  -- 1 tab(s) by mouth once a day (at bedtime)  -- Indication: For CVA (cerebral vascular accident)    bisoprolol 5 mg oral tablet  -- 1 tab(s) by mouth once a day  -- Indication: For CAD (coronary artery disease)    bisacodyl 10 mg rectal suppository  -- 1 suppository(ies) rectally once a day, As needed, Constipation  -- Indication: For Constipation    polyethylene glycol 3350 oral powder for reconstitution  -- 17 gram(s) by mouth once a day (at bedtime)  -- Indication: For Constipation    senna oral tablet  -- 2 tab(s) by mouth once a day (at bedtime)  -- Indication: For Constipation    pantoprazole 40 mg oral delayed release tablet  -- 1 tab(s) by mouth once a day (before a meal)  -- Indication: For GI prophylaxis    Multiple Vitamins oral tablet  -- 1 tab(s) by mouth once a day  -- Indication: For Prophylactic measure I will START or STAY ON the medications listed below when I get home from the hospital:    Helmet  -- Apply on skin to affected area once a day   -- Indication: For hemicraniectomy    acetaminophen 325 mg oral tablet  -- 2 tab(s) by mouth every 6 hours, As needed, Mild Pain (1 - 3)  -- Indication: For Pain    oxyCODONE 5 mg oral tablet  -- 1 tab(s) by mouth every 6 hours, As needed, Severe Pain (7 - 10)  -- Indication: For Pain    aspirin 81 mg oral tablet, chewable  -- 1 tab(s) by mouth once a day  -- Indication: For CAD (coronary artery disease)    lisinopril 40 mg oral tablet  -- 1 tab(s) by mouth once a day  -- Indication: For HTN    levETIRAcetam 1000 mg oral tablet  -- 1 tab(s) by mouth 2 times a day  -- Indication: For Seizure    escitalopram 10 mg oral tablet  -- 1 tab(s) by mouth once a day  -- Indication: For depression    atorvastatin 80 mg oral tablet  -- 1 tab(s) by mouth once a day (at bedtime)  -- Indication: For CVA (cerebral vascular accident)    bisoprolol 5 mg oral tablet  -- 1 tab(s) by mouth once a day  -- Indication: For CAD (coronary artery disease)    bisacodyl 10 mg rectal suppository  -- 1 suppository(ies) rectally once a day, As needed, Constipation  -- Indication: For Constipation    polyethylene glycol 3350 oral powder for reconstitution  -- 17 gram(s) by mouth once a day (at bedtime)  -- Indication: For Constipation    senna oral tablet  -- 2 tab(s) by mouth once a day (at bedtime)  -- Indication: For Constipation    pantoprazole 40 mg oral delayed release tablet  -- 1 tab(s) by mouth once a day (before a meal)  -- Indication: For GI prophylaxis    Multiple Vitamins oral tablet  -- 1 tab(s) by mouth once a day  -- Indication: For Prophylactic measure

## 2018-06-03 NOTE — DISCHARGE NOTE ADULT - CARE PROVIDER_API CALL
West Johnson), Neurosurgery  [address] [address]  Phone: [phone]  Fax: [phone]    Vinayak Yates), Neurology; Vascular Neurology  [address] [address]  Phone: [phone]  Fax: [phone]    Kimberly Dozier), Surgery  [address] [address]  Phone: [phone]  Fax: [phone]

## 2018-06-03 NOTE — PROGRESS NOTE ADULT - SUBJECTIVE AND OBJECTIVE BOX
=================================  NEUROCRITICAL CARE ATTENDING NOTE  =================================    VERONIKA CALERO   [id number]  Summary:  58F with h/o breast cancer, s/p TRAM flap reconstruction, recently started on plavix presented with painful bulge in R abdomen.  Imaging showed large expanding intramuscular hematoma R rects abdominis.  Admitted to surgery , diagnostic angio, R abd percutaneous thrombin injection of 2 pseudoaneurysms.  Post-procedure, noted to be hypotensive, low respiratory rate, altered mental status.  Naloxone given with improved respiratory status, but AMS persisted and noted L-sided weakness.  Stroke code called.  Post-op, noted  L UE hemiplegia / L LE weakness.  Stroke code called.  CT showed large R MCA small SELINA infarcts, with mass effect.  Mannitol 50g IV given, 23.4% x1 given.  Intubated for airway protection.  Seizure noted during intubation, given 4 ativan, started on propofol drip.  Brought to OR for decompressive hemicraniectomy.  Overnight Events: No significant events overnight, Hb drop to 6.7 this morning    Past Medical History: Anxiety Hypertension Breast CA dyslipidemia CAD Hypertension   Allergies:  No Known Allergies  Home Meds: bisoprolol 5mg qd, lorazepam 1mg q8?, Plavix 5qd, rosuvastatin, HCTZ, buproprion, baclofen  Social: 1/2 bottle of wine most nights, remote cigarette hx, denies IVDA     PHYSICAL EXAMINATION  T(C): 37.1 ( @ 06:00), Max: 37.3 ( @ 10:19) HR: 68 ( @ 07:00) (56 - 102) BP: 158/81 ( @ 07:00) (143/77 - 189/93) RR: 14 ( @ 07:00) (9 - 21) SpO2: 100% ( @ 06:15) (100% - 100%)  NEUROLOGIC EXAMINATION:  Patient does not open eyes, does not follow commands, no verbal output, ?R gaze preference, pupils 3mm reactive and equal, moves R UE/LE spontaneously , withdraws to noxious on L LE 0/5 L UE  GENERAL:   intubated 12 450 40% +5  EENT: anicteric, flap full, soft  CARDIOVASC:  (+) S1 S2, normal rate and regular rhythm  PULMONARY:  clear to auscultation bilaterally  ABDOMEN:  soft, nontender, with normoactive bowel sounds  EXTREMITIES:  no edema  SKIN:  no rash    LABS:  CAPILLARY BLOOD GLUCOSE 107 145 132 131                        9.0    10.1  )-----------( 248      ( 2018 05:45 )             27.2     144  |  107  |  6<L>  ----------------------------<  169<H>  3.4<L>   |  26  |  0.40<L>    Ca    8.9      2018 05:45  Phos  2.6     -03  Mg     2.0     - @ 07:01  -   @ 07:00  IN: 2919.8 mL / OUT: 150 mL / NET: 2769.8 mL    HbA1C = 5.3 ()  LDL = 106   /  HDL = 52 ()   /  TG = 84 ()  TSH = 0.614 ()    Bacteriology:  CSF studies:  EEG:  Neuroimagin/01 CT head:  no change in small foci of hemorrhage, continued evolution large R MCA infarction small R SELINA territory infarct   CT head: evolving R MCA / SELINA infarction, global mass effect, MLS to L, early L lateral ventricle entrapment, hemorrhagic transformation suspected   CTP:  abnormal perfusion, mismatch volume 12ml   CTA:  acute occlusion R M2   CT head:  acute R MCA infarction, no ICH   CT abd:  large expanding intramuscular hematoma within R rectus abdomin  Other imagin/02 CXR small bilateral effusions    LE Doppler limited exam, NEG    MEDICATIONS: atorvastatin 80mg HS cefazolin 1g q8h Peridex docusate mod ISS levetiracetam 1g IV q12h pantoprazole 40 IV daily  senna 2mg HS   MEDICATIONS: atorvastatin 80mg HS peridex docusate fentanyl PRN folic acid mod ISS levetiracetam 1G BID MVI pantoprazole 40 daily senna HS salt 2g q8h thiamine  dexmedetomidine    IV FLUIDS: 3%@50cc/hr  DRIPS: precedex  DIET: Jevity 1.2 @30cc to goal 70  Lines: [place] L SC 4 drains  45 110  Drains:    Wounds:    CODE STATUS:  Full Code                       GOALS OF CARE:  aggressive                      DISPOSITION:  ICU =================================  NEUROCRITICAL CARE ATTENDING NOTE  =================================    VERONIKA CALERO   [id number]  Summary:  58F with h/o breast cancer, s/p TRAM flap reconstruction, recently started on plavix presented with painful bulge in R abdomen.  Imaging showed large expanding intramuscular hematoma R rects abdominis.  Admitted to surgery , diagnostic angio, R abd percutaneous thrombin injection of 2 pseudoaneurysms.  Post-procedure, noted to be hypotensive, low respiratory rate, altered mental status.  Naloxone given with improved respiratory status, but AMS persisted and noted L-sided weakness.  Stroke code called.  Post-op, noted  L UE hemiplegia / L LE weakness.  Stroke code called.  CT showed large R MCA small SELINA infarcts, with mass effect.  Mannitol 50g IV given, 23.4% x1 given.  Intubated for airway protection.  Seizure noted during intubation, given 4 ativan, started on propofol drip.  Brought to OR for decompressive hemicraniectomy.  Overnight Events: transfused 1 unit pRBC yesterday , no significant events overnight    Past Medical History: Anxiety Hypertension Breast CA dyslipidemia CAD Hypertension   Allergies:  No Known Allergies  Home Meds: bisoprolol 5mg qd, lorazepam 1mg q8?, Plavix 5qd, rosuvastatin, HCTZ, buproprion, baclofen  Social: 1/2 bottle of wine most nights, remote cigarette hx, denies IVDA     PHYSICAL EXAMINATION  T(C): 37.1 ( @ 06:00), Max: 37.3 ( @ 10:19) HR: 68 ( @ 07:00) (56 - 102) BP: 158/81 ( @ 07:00) (143/77 - 189/93) RR: 14 ( @ 07:00) (9 - 21) SpO2: 100% ( @ 06:15) (100% - 100%)  NEUROLOGIC EXAMINATION:  Patient does not open eyes, follows commands on R, no verbal output, no gaze preference, pupils 3mm reactive and equal, moves R UE/LE spontaneously , withdraws to noxious on L LE 0/5 L UE  GENERAL:   intubated CPAP 8/5 40%  EENT: anicteric, flap full, soft  CARDIOVASC:  (+) S1 S2, normal rate and regular rhythm  PULMONARY:  clear to auscultation bilaterally  ABDOMEN:  soft, nontender, with normoactive bowel sounds  EXTREMITIES:  no edema  SKIN:  no rash    LABS:  CAPILLARY BLOOD GLUCOSE 107 145 132 131                        9.0    10.1  )-----------( 248      ( 2018 05:45 )             27.2   (150)  144  |  107  |  6<L>  ----------------------------<  169<H>  3.4<L>   |  26  |  0.40<L>    Ca    8.9      2018 05:45  Phos  2.6       Mg     2.0      @ 07:01  -   @ 07:00  IN: 2919.8 mL / OUT: 150 mL / NET: 2769.8 mL    HbA1C = 5.3 ()  LDL = 106   /  HDL = 52 ()  /  TG = 84 ()  TSH = 0.614 ()    Bacteriology:  CSF studies:  EEG:  Neuroimagin/01 CT head:  no change in small foci of hemorrhage, continued evolution large R MCA infarction small R SELINA territory infarct   CT head: evolving R MCA / SELINA infarction, global mass effect, MLS to L, early L lateral ventricle entrapment, hemorrhagic transformation suspected   CTP:  abnormal perfusion, mismatch volume 12ml   CTA:  acute occlusion R M2   CT head:  acute R MCA infarction, no ICH   CT abd:  large expanding intramuscular hematoma within R rectus abdomin  Other imagin/02 CXR small bilateral effusions    LE Doppler limited exam, NEG    MEDICATIONS: atorvastatin 80mg HS peridex docusate fentanyl PRN folic acid mod ISS levetiracetam 1G BID MVI pantoprazole 40 daily senna HS salt 2g q8h thiamine    IV FLUIDS: 3%@30cc/hr  DRIPS: precedex  DIET: Jevity 1.2 @ 70  Lines: [place] L SC 3 drains   Drains:  SG 50 70 30  Wounds:    CODE STATUS:  Full Code                       GOALS OF CARE:  aggressive                      DISPOSITION:  ICU

## 2018-06-03 NOTE — DISCHARGE NOTE ADULT - MEDICATION SUMMARY - MEDICATIONS TO STOP TAKING
I will STOP taking the medications listed below when I get home from the hospital:  None I will STOP taking the medications listed below when I get home from the hospital:    LORazepam 1 mg oral tablet  -- orally once a day

## 2018-06-03 NOTE — PROVIDER CONTACT NOTE (OTHER) - ASSESSMENT
Precedex infusion stopped. Provider/RN/RT at bedside. Pt placed on aerosol mask, sats 100%. Does not appear to have labored breathing at this time. Weak cough, soft/hoarse voice. Oral suctioned for small amount of thin secretions.

## 2018-06-03 NOTE — DISCHARGE NOTE ADULT - CARE PROVIDERS DIRECT ADDRESSES
,DirectAddress_Unknown,[email or web address]

## 2018-06-04 LAB
ANION GAP SERPL CALC-SCNC: 12 MMOL/L — SIGNIFICANT CHANGE UP (ref 5–17)
ANION GAP SERPL CALC-SCNC: 13 MMOL/L — SIGNIFICANT CHANGE UP (ref 5–17)
BASE EXCESS BLDA CALC-SCNC: 2.9 MMOL/L — SIGNIFICANT CHANGE UP (ref -2–3)
BUN SERPL-MCNC: 7 MG/DL — SIGNIFICANT CHANGE UP (ref 7–23)
BUN SERPL-MCNC: 8 MG/DL — SIGNIFICANT CHANGE UP (ref 7–23)
CALCIUM SERPL-MCNC: 8.9 MG/DL — SIGNIFICANT CHANGE UP (ref 8.4–10.5)
CALCIUM SERPL-MCNC: 8.9 MG/DL — SIGNIFICANT CHANGE UP (ref 8.4–10.5)
CHLORIDE SERPL-SCNC: 109 MMOL/L — HIGH (ref 96–108)
CHLORIDE SERPL-SCNC: 111 MMOL/L — HIGH (ref 96–108)
CO2 SERPL-SCNC: 25 MMOL/L — SIGNIFICANT CHANGE UP (ref 22–31)
CO2 SERPL-SCNC: 25 MMOL/L — SIGNIFICANT CHANGE UP (ref 22–31)
CREAT SERPL-MCNC: 0.48 MG/DL — LOW (ref 0.5–1.3)
CREAT SERPL-MCNC: 0.48 MG/DL — LOW (ref 0.5–1.3)
GAS PNL BLDA: SIGNIFICANT CHANGE UP
GLUCOSE SERPL-MCNC: 165 MG/DL — HIGH (ref 70–99)
GLUCOSE SERPL-MCNC: 171 MG/DL — HIGH (ref 70–99)
HCO3 BLDA-SCNC: 26 MMOL/L — SIGNIFICANT CHANGE UP (ref 21–28)
HCT VFR BLD CALC: 26.5 % — LOW (ref 34.5–45)
HGB BLD-MCNC: 8.6 G/DL — LOW (ref 11.5–15.5)
MAGNESIUM SERPL-MCNC: 2.2 MG/DL — SIGNIFICANT CHANGE UP (ref 1.6–2.6)
MCHC RBC-ENTMCNC: 28.8 PG — SIGNIFICANT CHANGE UP (ref 27–34)
MCHC RBC-ENTMCNC: 32.5 G/DL — SIGNIFICANT CHANGE UP (ref 32–36)
MCV RBC AUTO: 88.6 FL — SIGNIFICANT CHANGE UP (ref 80–100)
PCO2 BLDA: 34 MMHG — SIGNIFICANT CHANGE UP (ref 32–45)
PH BLDA: 7.5 — HIGH (ref 7.35–7.45)
PHOSPHATE SERPL-MCNC: 3 MG/DL — SIGNIFICANT CHANGE UP (ref 2.5–4.5)
PLATELET # BLD AUTO: 297 K/UL — SIGNIFICANT CHANGE UP (ref 150–400)
PO2 BLDA: 466 MMHG — HIGH (ref 83–108)
POTASSIUM SERPL-MCNC: 3.4 MMOL/L — LOW (ref 3.5–5.3)
POTASSIUM SERPL-MCNC: 3.7 MMOL/L — SIGNIFICANT CHANGE UP (ref 3.5–5.3)
POTASSIUM SERPL-SCNC: 3.4 MMOL/L — LOW (ref 3.5–5.3)
POTASSIUM SERPL-SCNC: 3.7 MMOL/L — SIGNIFICANT CHANGE UP (ref 3.5–5.3)
RBC # BLD: 2.99 M/UL — LOW (ref 3.8–5.2)
RBC # FLD: 16 % — SIGNIFICANT CHANGE UP (ref 10.3–16.9)
SAO2 % BLDA: 100 % — SIGNIFICANT CHANGE UP (ref 95–100)
SODIUM SERPL-SCNC: 147 MMOL/L — HIGH (ref 135–145)
SODIUM SERPL-SCNC: 148 MMOL/L — HIGH (ref 135–145)
WBC # BLD: 9.7 K/UL — SIGNIFICANT CHANGE UP (ref 3.8–10.5)
WBC # FLD AUTO: 9.7 K/UL — SIGNIFICANT CHANGE UP (ref 3.8–10.5)

## 2018-06-04 PROCEDURE — 99292 CRITICAL CARE ADDL 30 MIN: CPT | Mod: 24

## 2018-06-04 PROCEDURE — 99291 CRITICAL CARE FIRST HOUR: CPT | Mod: 24

## 2018-06-04 PROCEDURE — 71045 X-RAY EXAM CHEST 1 VIEW: CPT | Mod: 26

## 2018-06-04 PROCEDURE — 99231 SBSQ HOSP IP/OBS SF/LOW 25: CPT | Mod: GC

## 2018-06-04 RX ORDER — SODIUM CHLORIDE 5 G/100ML
500 INJECTION, SOLUTION INTRAVENOUS
Qty: 0 | Refills: 0 | Status: DISCONTINUED | OUTPATIENT
Start: 2018-06-04 | End: 2018-06-04

## 2018-06-04 RX ORDER — POTASSIUM CHLORIDE 20 MEQ
40 PACKET (EA) ORAL ONCE
Qty: 0 | Refills: 0 | Status: COMPLETED | OUTPATIENT
Start: 2018-06-04 | End: 2018-06-04

## 2018-06-04 RX ORDER — POTASSIUM CHLORIDE 20 MEQ
20 PACKET (EA) ORAL ONCE
Qty: 0 | Refills: 0 | Status: COMPLETED | OUTPATIENT
Start: 2018-06-04 | End: 2018-06-04

## 2018-06-04 RX ORDER — DEXMEDETOMIDINE HYDROCHLORIDE IN 0.9% SODIUM CHLORIDE 4 UG/ML
0.2 INJECTION INTRAVENOUS
Qty: 200 | Refills: 0 | Status: DISCONTINUED | OUTPATIENT
Start: 2018-06-04 | End: 2018-06-06

## 2018-06-04 RX ORDER — SODIUM CHLORIDE 5 G/100ML
500 INJECTION, SOLUTION INTRAVENOUS
Qty: 0 | Refills: 0 | Status: DISCONTINUED | OUTPATIENT
Start: 2018-06-04 | End: 2018-06-05

## 2018-06-04 RX ADMIN — Medication 100 MILLIEQUIVALENT(S): at 12:58

## 2018-06-04 RX ADMIN — FENTANYL CITRATE 25 MICROGRAM(S): 50 INJECTION INTRAVENOUS at 19:15

## 2018-06-04 RX ADMIN — CHLORHEXIDINE GLUCONATE 15 MILLILITER(S): 213 SOLUTION TOPICAL at 18:08

## 2018-06-04 RX ADMIN — CHLORHEXIDINE GLUCONATE 15 MILLILITER(S): 213 SOLUTION TOPICAL at 06:58

## 2018-06-04 RX ADMIN — Medication 650 MILLIGRAM(S): at 06:57

## 2018-06-04 RX ADMIN — ATORVASTATIN CALCIUM 80 MILLIGRAM(S): 80 TABLET, FILM COATED ORAL at 21:21

## 2018-06-04 RX ADMIN — LEVETIRACETAM 1000 MILLIGRAM(S): 250 TABLET, FILM COATED ORAL at 09:40

## 2018-06-04 RX ADMIN — HEPARIN SODIUM 5000 UNIT(S): 5000 INJECTION INTRAVENOUS; SUBCUTANEOUS at 13:16

## 2018-06-04 RX ADMIN — Medication 10 MILLIGRAM(S): at 18:08

## 2018-06-04 RX ADMIN — SODIUM CHLORIDE 2 GRAM(S): 9 INJECTION INTRAMUSCULAR; INTRAVENOUS; SUBCUTANEOUS at 00:39

## 2018-06-04 RX ADMIN — LEVETIRACETAM 1000 MILLIGRAM(S): 250 TABLET, FILM COATED ORAL at 21:21

## 2018-06-04 RX ADMIN — DEXMEDETOMIDINE HYDROCHLORIDE IN 0.9% SODIUM CHLORIDE 3.9 MICROGRAM(S)/KG/HR: 4 INJECTION INTRAVENOUS at 21:21

## 2018-06-04 RX ADMIN — HEPARIN SODIUM 5000 UNIT(S): 5000 INJECTION INTRAVENOUS; SUBCUTANEOUS at 21:20

## 2018-06-04 RX ADMIN — SODIUM CHLORIDE 2 GRAM(S): 9 INJECTION INTRAMUSCULAR; INTRAVENOUS; SUBCUTANEOUS at 08:35

## 2018-06-04 RX ADMIN — Medication 2: at 06:57

## 2018-06-04 RX ADMIN — Medication 100 MILLIGRAM(S): at 06:58

## 2018-06-04 RX ADMIN — SENNA PLUS 2 TABLET(S): 8.6 TABLET ORAL at 21:21

## 2018-06-04 RX ADMIN — Medication 650 MILLIGRAM(S): at 01:02

## 2018-06-04 RX ADMIN — SODIUM CHLORIDE 50 MILLILITER(S): 5 INJECTION, SOLUTION INTRAVENOUS at 04:54

## 2018-06-04 RX ADMIN — HEPARIN SODIUM 5000 UNIT(S): 5000 INJECTION INTRAVENOUS; SUBCUTANEOUS at 06:57

## 2018-06-04 RX ADMIN — SODIUM CHLORIDE 2 GRAM(S): 9 INJECTION INTRAMUSCULAR; INTRAVENOUS; SUBCUTANEOUS at 18:08

## 2018-06-04 RX ADMIN — Medication 650 MILLIGRAM(S): at 13:16

## 2018-06-04 RX ADMIN — Medication 40 MILLIEQUIVALENT(S): at 09:40

## 2018-06-04 RX ADMIN — PANTOPRAZOLE SODIUM 40 MILLIGRAM(S): 20 TABLET, DELAYED RELEASE ORAL at 12:57

## 2018-06-04 RX ADMIN — SODIUM CHLORIDE 2 GRAM(S): 9 INJECTION INTRAMUSCULAR; INTRAVENOUS; SUBCUTANEOUS at 13:00

## 2018-06-04 RX ADMIN — FENTANYL CITRATE 25 MICROGRAM(S): 50 INJECTION INTRAVENOUS at 19:37

## 2018-06-04 RX ADMIN — Medication 2: at 11:42

## 2018-06-04 RX ADMIN — Medication 1 TABLET(S): at 12:57

## 2018-06-04 RX ADMIN — Medication 100 MILLIGRAM(S): at 18:08

## 2018-06-04 NOTE — PROGRESS NOTE ADULT - SUBJECTIVE AND OBJECTIVE BOX
HPI:  self extubated yesterday, failed, re-intubated successfully     Hospital Course:   POD#1: emergent crani yesterday, transferred back to [facility], sedated overnight, rpt [facility] with hemorrhagic conversion o/w stable. stable exam overnight. Drains remains to suction. no acute events overnight  POD#2: No seizure activity on EEG. Following commands on right side. Continue DREW x2 and Hemovac x1. Low grade fevers.   POD#3: Tolerating CPAP. Self extubated. Hemovac removed.   POD #4: Re-intubated yesterday, precedex resumed, no acute events overnight    OVERNIGHT EVENTS:  Vital Signs Last 24 Hrs  T(C): 38 (04 Jun 2018 06:00), Max: 38 (04 Jun 2018 06:00)  T(F): 100.4 (04 Jun 2018 06:00), Max: 100.4 (04 Jun 2018 06:00)  HR: 84 (04 Jun 2018 05:21) (56 - 101)  BP: 147/75 (04 Jun 2018 03:00) (147/75 - 186/87)  BP(mean): 108 (04 Jun 2018 03:00) (89 - 147)  RR: 19 (04 Jun 2018 05:21) (10 - 26)  SpO2: 100% (04 Jun 2018 05:21) (98% - 100%)    I&O's Detail    02 Jun 2018 07:01  -  03 Jun 2018 07:00  --------------------------------------------------------  IN:    dexmedetomidine Infusion: 260 mL    Enteral Tube Flush: 50 mL    IV PiggyBack: 200 mL    Jevity: 1150 mL    sodium chloride 3%: 100 mL    sodium chloride 3%: 660 mL    Solution: 499.8 mL  Total IN: 2919.8 mL    OUT:    Accordian: 50 mL    Bulb: 70 mL    Bulb: 30 mL  Total OUT: 150 mL    Total NET: 2769.8 mL      03 Jun 2018 07:01  -  04 Jun 2018 06:52  --------------------------------------------------------  IN:    dexmedetomidine Infusion: 70 mL    Enteral Tube Flush: 240 mL    Jevity: 1390 mL    propofol Infusion: 160.2 mL    sodium chloride 3%: 60 mL    sodium chloride 3%.: 900 mL    Solution: 500 mL  Total IN: 3320.2 mL    OUT:  Total OUT: 0 mL    Total NET: 3320.2 mL        I&O's Summary    02 Jun 2018 07:01  -  03 Jun 2018 07:00  --------------------------------------------------------  IN: 2919.8 mL / OUT: 150 mL / NET: 2769.8 mL    03 Jun 2018 07:01  -  04 Jun 2018 06:52  --------------------------------------------------------  IN: 3320.2 mL / OUT: 0 mL / NET: 3320.2 mL        PHYSICAL EXAM:  Neurological:  intubated, comfortable on precedex infusion  PERRL, +NGT. Right scalp incision c/d/I, flap full but soft  Clear b/l  RRR  Soft, NT/ND. +BS. Incision C/D/I.  Pulses 2+ throughout  Neuro: CNs limited. Follows simple commands with RUE/RLE, moving purposefully. Withdrawal to noxious in LUE, triple flexion of LLE. Eye opens to stimulus, + tracking    TUBES/LINES:  [x] CVC  [x] A-line  [] Lumbar Drain  [] Ventriculostomy  [] Other    DIET:  [] NPO  [] Mechanical  [x] Tube feeds    LABS:                        8.6    9.7   )-----------( 297      ( 04 Jun 2018 06:29 )             26.5     06-03    143  |  104  |  6<L>  ----------------------------<  156<H>  3.2<L>   |  24  |  0.44<L>    Ca    8.7      03 Jun 2018 22:41  Phos  2.6     06-03  Mg     2.0     06-03              CAPILLARY BLOOD GLUCOSE      POCT Blood Glucose.: 186 mg/dL (04 Jun 2018 06:38)  POCT Blood Glucose.: 147 mg/dL (03 Jun 2018 22:40)  POCT Blood Glucose.: 153 mg/dL (03 Jun 2018 16:30)  POCT Blood Glucose.: 137 mg/dL (03 Jun 2018 12:29)      Drug Levels: [] N/A    CSF Analysis: [] N/A      Allergies    No Known Allergies    Intolerances      MEDICATIONS:  Antibiotics:    Neuro:  acetaminophen   Tablet 650 milliGRAM(s) Oral every 6 hours PRN  acetaminophen   Tablet. 650 milliGRAM(s) Oral every 6 hours PRN  dexmedetomidine Infusion 0.2 MICROgram(s)/kG/Hr IV Continuous <Continuous>  fentaNYL    Injectable 25 MICROGram(s) IV Push every 2 hours PRN  levETIRAcetam  Solution 1000 milliGRAM(s) Oral two times a day  ondansetron Injectable 4 milliGRAM(s) IV Push every 6 hours PRN  propofol Infusion 5 MICROgram(s)/kG/Min IV Continuous <Continuous>    Anticoagulation:  heparin  Injectable 5000 Unit(s) SubCutaneous every 8 hours    OTHER:  atorvastatin 80 milliGRAM(s) Oral at bedtime  chlorhexidine 0.12% Liquid 15 milliLiter(s) Swish and Spit two times a day  dextrose 40% Gel 15 Gram(s) Oral once PRN  dextrose 50% Injectable 12.5 Gram(s) IV Push once  dextrose 50% Injectable 25 Gram(s) IV Push once  dextrose 50% Injectable 25 Gram(s) IV Push once  docusate sodium Liquid 100 milliGRAM(s) Oral two times a day  glucagon  Injectable 1 milliGRAM(s) IntraMuscular once PRN  hydrALAZINE Injectable 10 milliGRAM(s) IV Push every 4 hours PRN  insulin lispro (HumaLOG) corrective regimen sliding scale   SubCutaneous every 6 hours  pantoprazole   Suspension 40 milliGRAM(s) Oral daily  senna 2 Tablet(s) Oral at bedtime    IVF:  dextrose 5%. 1000 milliLiter(s) IV Continuous <Continuous>  folic acid 1 milliGRAM(s) Oral daily  multivitamin 1 Tablet(s) Oral daily  sodium chloride 2 Gram(s) Oral every 6 hours  sodium chloride 3%. 500 milliLiter(s) IV Continuous <Continuous>  thiamine 100 milliGRAM(s) Oral daily    CULTURES:    RADIOLOGY & ADDITIONAL TESTS:      ASSESSMENT:  58y Female w/ HTN, HLD, Anxiety, Depression,  h/o Breast Cancer w/ TRAM flap reconstruction admitted for expanding right rectus hematoma complicated by right MCA infarction now s/p hemicraniectomy POD #4    PLAN:  NEURO:    CARDIOVASCULAR:    PULMONARY:    RENAL:    GI:    HEME:    ID:    ENDO:    DVT PROPHYLAXIS:  [] Venodynes                                [] Heparin/Lovenox    FALL RISK:  [] Low Risk                                    [] Impulsive    DISPOSITION: HPI:  self extubated yesterday, failed, re-intubated successfully     Hospital Course:   POD#1: emergent crani yesterday, transferred back to [facility], sedated overnight, rpt [facility] with hemorrhagic conversion o/w stable. stable exam overnight. Drains remains to suction. no acute events overnight  POD#2: No seizure activity on EEG. Following commands on right side. Continue DREW x2 and Hemovac x1. Low grade fevers.   POD#3: Tolerating CPAP. Self extubated. Hemovac removed.   POD #4: Re-intubated yesterday, precedex resumed, no acute events overnight    OVERNIGHT EVENTS:  Vital Signs Last 24 Hrs  T(C): 38 (04 Jun 2018 06:00), Max: 38 (04 Jun 2018 06:00)  T(F): 100.4 (04 Jun 2018 06:00), Max: 100.4 (04 Jun 2018 06:00)  HR: 84 (04 Jun 2018 05:21) (56 - 101)  BP: 147/75 (04 Jun 2018 03:00) (147/75 - 186/87)  BP(mean): 108 (04 Jun 2018 03:00) (89 - 147)  RR: 19 (04 Jun 2018 05:21) (10 - 26)  SpO2: 100% (04 Jun 2018 05:21) (98% - 100%)    I&O's Detail    02 Jun 2018 07:01  -  03 Jun 2018 07:00  --------------------------------------------------------  IN:    dexmedetomidine Infusion: 260 mL    Enteral Tube Flush: 50 mL    IV PiggyBack: 200 mL    Jevity: 1150 mL    sodium chloride 3%: 100 mL    sodium chloride 3%: 660 mL    Solution: 499.8 mL  Total IN: 2919.8 mL    OUT:    Accordian: 50 mL    Bulb: 70 mL    Bulb: 30 mL  Total OUT: 150 mL    Total NET: 2769.8 mL      03 Jun 2018 07:01  -  04 Jun 2018 06:52  --------------------------------------------------------  IN:    dexmedetomidine Infusion: 70 mL    Enteral Tube Flush: 240 mL    Jevity: 1390 mL    propofol Infusion: 160.2 mL    sodium chloride 3%: 60 mL    sodium chloride 3%.: 900 mL    Solution: 500 mL  Total IN: 3320.2 mL    OUT:  Total OUT: 0 mL    Total NET: 3320.2 mL        I&O's Summary    02 Jun 2018 07:01  -  03 Jun 2018 07:00  --------------------------------------------------------  IN: 2919.8 mL / OUT: 150 mL / NET: 2769.8 mL    03 Jun 2018 07:01  -  04 Jun 2018 06:52  --------------------------------------------------------  IN: 3320.2 mL / OUT: 0 mL / NET: 3320.2 mL        PHYSICAL EXAM:  Neurological:  intubated, comfortable on precedex infusion  PERRL, +NGT. Right scalp incision c/d/I, flap full but soft  Clear b/l  RRR  Soft, NT/ND. +BS. Incision C/D/I.  Pulses 2+ throughout  Neuro: CNs limited. Follows simple commands with RUE/RLE, moving purposefully. Withdrawal to noxious in LUE, triple flexion of LLE. Eye opens to stimulus, + tracking    TUBES/LINES:  [x] CVC  [x] A-line  [] Lumbar Drain  [] Ventriculostomy  [] Other    DIET:  [] NPO  [] Mechanical  [x] Tube feeds    LABS:                        8.6    9.7   )-----------( 297      ( 04 Jun 2018 06:29 )             26.5     06-03    143  |  104  |  6<L>  ----------------------------<  156<H>  3.2<L>   |  24  |  0.44<L>    Ca    8.7      03 Jun 2018 22:41  Phos  2.6     06-03  Mg     2.0     06-03              CAPILLARY BLOOD GLUCOSE      POCT Blood Glucose.: 186 mg/dL (04 Jun 2018 06:38)  POCT Blood Glucose.: 147 mg/dL (03 Jun 2018 22:40)  POCT Blood Glucose.: 153 mg/dL (03 Jun 2018 16:30)  POCT Blood Glucose.: 137 mg/dL (03 Jun 2018 12:29)      Drug Levels: [] N/A    CSF Analysis: [] N/A      Allergies    No Known Allergies    Intolerances      MEDICATIONS:  Antibiotics:    Neuro:  acetaminophen   Tablet 650 milliGRAM(s) Oral every 6 hours PRN  acetaminophen   Tablet. 650 milliGRAM(s) Oral every 6 hours PRN  dexmedetomidine Infusion 0.2 MICROgram(s)/kG/Hr IV Continuous <Continuous>  fentaNYL    Injectable 25 MICROGram(s) IV Push every 2 hours PRN  levETIRAcetam  Solution 1000 milliGRAM(s) Oral two times a day  ondansetron Injectable 4 milliGRAM(s) IV Push every 6 hours PRN  propofol Infusion 5 MICROgram(s)/kG/Min IV Continuous <Continuous>    Anticoagulation:  heparin  Injectable 5000 Unit(s) SubCutaneous every 8 hours    OTHER:  atorvastatin 80 milliGRAM(s) Oral at bedtime  chlorhexidine 0.12% Liquid 15 milliLiter(s) Swish and Spit two times a day  dextrose 40% Gel 15 Gram(s) Oral once PRN  dextrose 50% Injectable 12.5 Gram(s) IV Push once  dextrose 50% Injectable 25 Gram(s) IV Push once  dextrose 50% Injectable 25 Gram(s) IV Push once  docusate sodium Liquid 100 milliGRAM(s) Oral two times a day  glucagon  Injectable 1 milliGRAM(s) IntraMuscular once PRN  hydrALAZINE Injectable 10 milliGRAM(s) IV Push every 4 hours PRN  insulin lispro (HumaLOG) corrective regimen sliding scale   SubCutaneous every 6 hours  pantoprazole   Suspension 40 milliGRAM(s) Oral daily  senna 2 Tablet(s) Oral at bedtime    IVF:  dextrose 5%. 1000 milliLiter(s) IV Continuous <Continuous>  folic acid 1 milliGRAM(s) Oral daily  multivitamin 1 Tablet(s) Oral daily  sodium chloride 2 Gram(s) Oral every 6 hours  sodium chloride 3%. 500 milliLiter(s) IV Continuous <Continuous>  thiamine 100 milliGRAM(s) Oral daily    CULTURES:    RADIOLOGY & ADDITIONAL TESTS:      ASSESSMENT:  58y Female w/ HTN, HLD, Anxiety, Depression,  h/o Breast Cancer w/ TRAM flap reconstruction admitted for expanding right rectus hematoma complicated by right MCA infarction now s/p hemicraniectomy POD #4    PLAN:  NEURO:  q1h neuro checks  pain control with prn fentanyl  dc propofol  resume precedex  cont keppra 1g BID  HOB 30 deg    CARDIOVASCULAR:  SBP < 180  prn hydralazine  cont TLC/a line    PULMONARY:  full vent support  am cxr    RENAL:  taper 3%  re-check this afternoon    GI:  NGT feeds to goal  bowel regimen    HEME:  h/h stable    ID:  afeb, low grade  no abx    ENDO:  ISS    DVT PROPHYLAXIS:  [x] Venodynes                                [x] Heparin/Lovenox    DISPOSITION:   ICU status  full code  PT/Ot early mobilization  d/w Dr. Vick and ICU house staff

## 2018-06-04 NOTE — PROGRESS NOTE ADULT - SUBJECTIVE AND OBJECTIVE BOX
NEUROCRITICAL CARE PROGRESS NOTE    VERONIKA CALERO   MRN-[id number]  Summary:  /  HPI:  58F with a hx of left breast cancer s/p left mastectomy with TRAM flap reconstruction (4-5 years prior), recently placed on plavix (about a month ago) by her cardiologist 2/2 calcified arterial disease and is now presenting to the [facility] ED with a painful bulge in her right abdomen.  Pt reports that she's had a cough for several days and yesterday noticed a bulge in her right abdomen that has grown and only become more painful.  She at times has difficulty breathing and some lightheadedness but otherwise denies fevers, chills, chest pain, nausea, vomiting, diarrhea, dysuria.    PMH: HTN, CAD?, HLD, Anxiety, Depression  Meds: bisoprolol 5mg qd, lorazepam 1mg q8?, Plavix 5qd, rosuvastatin, HCTZ, buproprion, baclofen  NKDA  PSH: Left mastectomy with TRAM rotational flap reconstruction  Social: 1/2 bottle of wine most nights, remote cigarette hx, denies IVDA (30 May 2018 13:13)    S/Overnight events:    POD#     Vital Signs Last 24 Hrs  T(C): 38 (2018 06:00), Max: 38 (2018 06:00)  T(F): 100.4 (2018 06:00), Max: 100.4 (2018 06:00)  HR: 82 (2018 07:00) (56 - 101)  BP: 155/87 (2018 07:00) (147/75 - 186/87)  BP(mean): 113 (2018 07:00) (89 - 147)  RR: 16 (2018 07:00) (10 - 26)  SpO2: 100% (2018 07:00) (98% - 100%)    Mode: AC/ CMV (Assist Control/ Continuous Mandatory Ventilation), RR (machine): 12, TV (machine): 450, FiO2: 40, PEEP: 5, ITime: 1, MAP: 6.7, PIP: 10    I&O's Detail    2018 07:01  -  2018 07:00  --------------------------------------------------------  IN:    dexmedetomidine Infusion: 70 mL    Enteral Tube Flush: 300 mL    Jevity: 1670 mL    propofol Infusion: 217.8 mL    sodium chloride 3%: 60 mL    sodium chloride 3%.: 1100 mL    Solution: 500 mL  Total IN: 3917.8 mL    OUT:    Bulb: 5 mL    Bulb: 50 mL  Total OUT: 55 mL    Total NET: 3862.8 mL    2018 07:01  -  2018 07:43  --------------------------------------------------------  IN:    Jevity: 70 mL    propofol Infusion: 14.4 mL    sodium chloride 3%.: 50 mL  Total IN: 134.4 mL    OUT:  Total OUT: 0 mL    Total NET: 134.4 mL    LABS:                        8.6    9.7   )-----------( 297      ( 2018 06:29 )             26.5     06-04    147<H>  |  109<H>  |  7   ----------------------------<  171<H>  3.4<L>   |  25  |  0.48<L>    Ca    8.9      2018 06:29  Phos  3.0     06-04  Mg     2.2     06-04    CAPILLARY BLOOD GLUCOSE    POCT Blood Glucose.: 186 mg/dL (2018 06:38)  POCT Blood Glucose.: 147 mg/dL (2018 22:40)  POCT Blood Glucose.: 153 mg/dL (2018 16:30)  POCT Blood Glucose.: 137 mg/dL (2018 12:29)    Drug Levels: [] N/A    CSF Analysis: [] N/A      Allergies    No Known Allergies    Intolerances      MEDICATIONS:  Antibiotics:    Neuro:  acetaminophen   Tablet 650 milliGRAM(s) Oral every 6 hours PRN  acetaminophen   Tablet. 650 milliGRAM(s) Oral every 6 hours PRN  dexmedetomidine Infusion 0.2 MICROgram(s)/kG/Hr IV Continuous <Continuous>  fentaNYL    Injectable 25 MICROGram(s) IV Push every 2 hours PRN  levETIRAcetam  Solution 1000 milliGRAM(s) Oral two times a day  ondansetron Injectable 4 milliGRAM(s) IV Push every 6 hours PRN  propofol Infusion 5 MICROgram(s)/kG/Min IV Continuous <Continuous>    Anticoagulation:  heparin  Injectable 5000 Unit(s) SubCutaneous every 8 hours    OTHER:  atorvastatin 80 milliGRAM(s) Oral at bedtime  chlorhexidine 0.12% Liquid 15 milliLiter(s) Swish and Spit two times a day  dextrose 40% Gel 15 Gram(s) Oral once PRN  dextrose 50% Injectable 12.5 Gram(s) IV Push once  dextrose 50% Injectable 25 Gram(s) IV Push once  dextrose 50% Injectable 25 Gram(s) IV Push once  docusate sodium Liquid 100 milliGRAM(s) Oral two times a day  glucagon  Injectable 1 milliGRAM(s) IntraMuscular once PRN  hydrALAZINE Injectable 10 milliGRAM(s) IV Push every 4 hours PRN  insulin lispro (HumaLOG) corrective regimen sliding scale   SubCutaneous every 6 hours  pantoprazole   Suspension 40 milliGRAM(s) Oral daily  senna 2 Tablet(s) Oral at bedtime    IVF:  dextrose 5%. 1000 milliLiter(s) IV Continuous <Continuous>  folic acid 1 milliGRAM(s) Oral daily  multivitamin 1 Tablet(s) Oral daily  potassium chloride   Powder 40 milliEquivalent(s) Enteral Tube once  potassium chloride  20 mEq/100 mL IVPB 20 milliEquivalent(s) IV Intermittent once  sodium chloride 2 Gram(s) Oral every 6 hours  sodium chloride 3%. 500 milliLiter(s) IV Continuous <Continuous>  thiamine 100 milliGRAM(s) Oral daily    CULTURES:    RADIOLOGY & ADDITIONAL TESTS:    Bacteriology:  CSF studies:  EEG:  Neuroimagin/01 CT head:  no change in small foci of hemorrhage, continued evolution large R MCA infarction small R SELINA territory infarct   CT head: evolving R MCA / SELINA infarction, global mass effect, MLS to L, early L lateral ventricle entrapment, hemorrhagic transformation suspected   CTP:  abnormal perfusion, mismatch volume 12ml   CTA:  acute occlusion R M2   CT head:  acute R MCA infarction, no ICH   CT abd:  large expanding intramuscular hematoma within R rectus abdomin  Other imagin/02 CXR small bilateral effusions    LE Doppler limited exam, NEG    IV FLUIDS: 3%@30cc/hr  DRIPS: precedex  DIET: Jevity 1.2 @ 70  Lines: [place] L SC 3 drains   Drains:  SG 50 70 30  Wounds:    CODE STATUS:  Full Code                       GOALS OF CARE:  aggressive                      DISPOSITION:  ICU NEUROCRITICAL CARE PROGRESS NOTE    VERONIKA CALERO   MRN-[id number]  Summary:  /  HPI:  58F with a hx of left breast cancer s/p left mastectomy with TRAM flap reconstruction (4-5 years prior), recently placed on plavix (about a month ago) by her cardiologist 2/2 calcified arterial disease and is now presenting to the [facility] ED with a painful bulge in her right abdomen.  Pt reports that she's had a cough for several days and yesterday noticed a bulge in her right abdomen that has grown and only become more painful.  She at times has difficulty breathing and some lightheadedness but otherwise denies fevers, chills, chest pain, nausea, vomiting, diarrhea, dysuria.    PMH: HTN, CAD?, HLD, Anxiety, Depression  Meds: bisoprolol 5mg qd, lorazepam 1mg q8?, Plavix 5qd, rosuvastatin, HCTZ, buproprion, baclofen  NKDA  PSH: Left mastectomy with TRAM rotational flap reconstruction  Social: 1/2 bottle of wine most nights, remote cigarette hx, denies IVDA (30 May 2018 13:13)    S/Overnight events:  failed self-extubation yesterday.  tolerated acceptable SaO2 for an hour, on nasal cannula, talking a little, but had trouble handling her secretions, therefore re-intubated electively per Dr. Bentley.  Anesthesia called.  On propofol in PM, overnight.  moves right side and tries to open eyes on propofol.       Vital Signs Last 24 Hrs  T(C): 38 (2018 06:00), Max: 38 (2018 06:00)  T(F): 100.4 (2018 06:00), Max: 100.4 (2018 06:00)  HR: 82 (2018 07:00) (56 - 101)  BP: 155/87 (2018 07:00) (147/75 - 186/87)  BP(mean): 113 (2018 07:00) (89 - 147)  RR: 16 (2018 07:00) (10 - 26)  SpO2: 100% (2018 07:00) (98% - 100%)    Mode: AC/ CMV (Assist Control/ Continuous Mandatory Ventilation), RR (machine): 12, TV (machine): 450, FiO2: 40, PEEP: 5, ITime: 1, MAP: 6.7, PIP: 10    I&O's Detail    2018 07:01  -  2018 07:00  --------------------------------------------------------  IN:    dexmedetomidine Infusion: 70 mL    Enteral Tube Flush: 300 mL    Jevity: 1670 mL    propofol Infusion: 217.8 mL    sodium chloride 3%: 60 mL    sodium chloride 3%.: 1100 mL    Solution: 500 mL  Total IN: 3917.8 mL    OUT:    Bulb: 5 mL    Bulb: 50 mL  Total OUT: 55 mL    Total NET: 3862.8 mL    2018 07:01  -  2018 07:43  --------------------------------------------------------  IN:    Jevity: 70 mL    propofol Infusion: 14.4 mL    sodium chloride 3%.: 50 mL  Total IN: 134.4 mL    OUT:  Total OUT: 0 mL    Total NET: 134.4 mL    LABS:                        8.6    9.7   )-----------( 297      ( 2018 06:29 )             26.5     06-04    147<H>  |  109<H>  |  7   ----------------------------<  171<H>  3.4<L>   |  25  |  0.48<L>    Ca    8.9      2018 06:29  Phos  3.0     06-04  Mg     2.2     06-04    CAPILLARY BLOOD GLUCOSE    POCT Blood Glucose.: 186 mg/dL (2018 06:38)  POCT Blood Glucose.: 147 mg/dL (2018 22:40)  POCT Blood Glucose.: 153 mg/dL (2018 16:30)  POCT Blood Glucose.: 137 mg/dL (2018 12:29)    Drug Levels: [] N/A    CSF Analysis: [] N/A      Allergies    No Known Allergies    Intolerances      MEDICATIONS:  Antibiotics:    Neuro:  acetaminophen   Tablet 650 milliGRAM(s) Oral every 6 hours PRN  acetaminophen   Tablet. 650 milliGRAM(s) Oral every 6 hours PRN  dexmedetomidine Infusion 0.2 MICROgram(s)/kG/Hr IV Continuous <Continuous>  fentaNYL    Injectable 25 MICROGram(s) IV Push every 2 hours PRN  levETIRAcetam  Solution 1000 milliGRAM(s) Oral two times a day  ondansetron Injectable 4 milliGRAM(s) IV Push every 6 hours PRN  propofol Infusion 5 MICROgram(s)/kG/Min IV Continuous <Continuous>    Anticoagulation:  heparin  Injectable 5000 Unit(s) SubCutaneous every 8 hours    OTHER:  atorvastatin 80 milliGRAM(s) Oral at bedtime  chlorhexidine 0.12% Liquid 15 milliLiter(s) Swish and Spit two times a day  dextrose 40% Gel 15 Gram(s) Oral once PRN  dextrose 50% Injectable 12.5 Gram(s) IV Push once  dextrose 50% Injectable 25 Gram(s) IV Push once  dextrose 50% Injectable 25 Gram(s) IV Push once  docusate sodium Liquid 100 milliGRAM(s) Oral two times a day  glucagon  Injectable 1 milliGRAM(s) IntraMuscular once PRN  hydrALAZINE Injectable 10 milliGRAM(s) IV Push every 4 hours PRN  insulin lispro (HumaLOG) corrective regimen sliding scale   SubCutaneous every 6 hours  pantoprazole   Suspension 40 milliGRAM(s) Oral daily  senna 2 Tablet(s) Oral at bedtime    IVF:  dextrose 5%. 1000 milliLiter(s) IV Continuous <Continuous>  folic acid 1 milliGRAM(s) Oral daily  multivitamin 1 Tablet(s) Oral daily  potassium chloride   Powder 40 milliEquivalent(s) Enteral Tube once  potassium chloride  20 mEq/100 mL IVPB 20 milliEquivalent(s) IV Intermittent once  sodium chloride 2 Gram(s) Oral every 6 hours  sodium chloride 3%. 500 milliLiter(s) IV Continuous <Continuous>  thiamine 100 milliGRAM(s) Oral daily    CULTURES:    RADIOLOGY & ADDITIONAL TESTS:    Bacteriology:  CSF studies:  EEG:  Neuroimagin/01 CT head:  no change in small foci of hemorrhage, continued evolution large R MCA infarction small R SELINA territory infarct   CT head: evolving R MCA / SELINA infarction, global mass effect, MLS to L, early L lateral ventricle entrapment, hemorrhagic transformation suspected   CTP:  abnormal perfusion, mismatch volume 12ml  05/30 CTA:  acute occlusion R M2   CT head:  acute R MCA infarction, no ICH   CT abd:  large expanding intramuscular hematoma within R rectus abdomin  Other imagin/02 CXR small bilateral effusions    LE Doppler limited exam, NEG    IV FLUIDS: 3%@30cc/hr  DRIPS: precedex  DIET: Jevity 1.2 @ 70  Lines: [place] L SC 3 drains   Drains:  SG 50 70 30  Wounds:    CODE STATUS:  Full Code                       GOALS OF CARE:  aggressive                      DISPOSITION:  ICU

## 2018-06-04 NOTE — PHYSICAL THERAPY INITIAL EVALUATION ADULT - IMPAIRMENTS CONTRIBUTING IMPAIRED BED MOBILITY, REHAB EVAL
impaired balance/cognition/impaired coordination/abnormal muscle tone/pain/impaired postural control/impaired motor control/decreased strength

## 2018-06-04 NOTE — PROCEDURE NOTE - GENERAL PROCEDURE DETAILS
DREW subgaleal drains x 2 removal, anchoring sutures were removed, drain off suction was pulled gently, staples were applied at the puncture site
Patient's right posterior scalp incision site cleaned and prepped. [place] sutures holding hemovac tubing x2 cut and drain tubing removed without resistance. Staples placed over exit sites without further bleeding.

## 2018-06-04 NOTE — PHYSICAL THERAPY INITIAL EVALUATION ADULT - CRITERIA FOR SKILLED THERAPEUTIC INTERVENTIONS
anticipated discharge recommendation/rehab potential/impairments found/functional limitations in following categories/therapy frequency/risk reduction/prevention

## 2018-06-04 NOTE — PHYSICAL THERAPY INITIAL EVALUATION ADULT - BALANCE DISTURBANCE, IDENTIFIED IMPAIRMENT CONTRIBUTE, REHAB EVAL
impaired coordination/impaired motor control/impaired postural control/abnormal muscle tone/pain/decreased sensation/decreased strength

## 2018-06-04 NOTE — PHYSICAL THERAPY INITIAL EVALUATION ADULT - IMPAIRMENTS FOUND, PT EVAL
gross motor/posture/gait, locomotion, and balance/decreased midline orientation/muscle strength/poor safety awareness/aerobic capacity/endurance/cognitive impairment/fine motor

## 2018-06-04 NOTE — PROGRESS NOTE ADULT - ASSESSMENT
58F with   1.  acute cerebrovascular accident involving R MCA / SELINA, malignant infarction, cerebral edema, brain compression s/p decompressive hemicraniectomy (05/31/2018, Dr. Johnson)  2.  Hypertension dyslipidemia   3.  h/o breast CA  4.  CAD  5.  R rectus abdominis intramuscular hematoma  6.  seizures    PLAN:   NEURO: neurochecks q1h, PRN pain meds tylenol, fentanyl, sedation with precedex  to RASS of 0  malignant R MCA infarction, s/p DHC:  no ASA for now (will prob start tomorrow or Day 5), hemorrhagic conversion noted  seizure disorder:  continue levetiracetam 1G BID  ICP crises:  s/p [facility]  REHAB:  physical therapy evaluation and management - defer   EARLY MOB:  HOB up, bedrest    PULM:  CPAP 8/5 40%  CARDIO:  SBP goal 100-180 mm Hg, echo EF 55-60% mod dilated LA, probable AVM, no PFOs, keep central line  ENDO:  Blood sugar goals 140-180 mg/dL, continue insulin sliding scale; continue high-dose statins  GI:  PPI for GI prophylaxis  DIET: jevity to goal of 70  RENAL:  increase 3%@50cc/hr, recheck BMP this aftenroon; Na goal 145-150, increase salt tabs 2g q6h  HEM/ONC: s/p transfusion - with adequate response of Hb  VTE Prophylaxis: SCDs only, start SQH if ok with NS, neg dopplers  ID: afebrile, no leukocytosis  Social:  updated yesterday  MISC:  h/o ETOH abuse, continue MVI thiamine folate 58F with   1.  acute cerebrovascular accident involving R MCA / SELINA, malignant infarction, cerebral edema, brain compression s/p decompressive hemicraniectomy (05/31/2018, Dr. Johnson)  2.  Hypertension dyslipidemia   3.  h/o breast CA  4.  CAD  5.  R rectus abdominis intramuscular hematoma  6.  seizures    PLAN:   NEURO: neurochecks q1h, PRN pain meds tylenol, fentanyl, sedation with precedex  to RASS of 0  malignant R MCA infarction, s/p DHC:  no ASA for now (will prob start tomorrow or Day 5), hemorrhagic conversion noted  seizure disorder:  continue levetiracetam 1G BID  ICP crises:  s/p C  REHAB:  physical therapy evaluation and management - defer   EARLY MOB:  HOB up, bedrest    PULM:  CPAP 8/5 40%, if on propofol place on AC  CARDIO:  SBP goal 100-180 mm Hg, echo EF 55-60% mod dilated LA, probable AVM, no PFOs, keep central line  ENDO:  Blood sugar goals 140-180 mg/dL, continue insulin sliding scale; continue high-dose statins  GI:  PPI for GI prophylaxis  DIET: jevity to goal of 70  RENAL:  decrease 3%@ 40cc/hr, goal Na > 140-145 recheck BMP this aftenroon;  increase salt tabs 2g q6h  HEM/ONC: s/p transfusion - with adequate response of Hb  VTE Prophylaxis: SCDs only, start SQH if ok with NS, neg dopplers  ID: afebrile, no leukocytosis  Social:  updated yesterday  MISC:  h/o ETOH abuse, continue MVI    I spent 75 minutes of critical care time examining patient, reviewing vitals, labs, medications, imaging and discussing with the team goals of care to prevent life-threatening in this patient who is at high risk for neurological deterioration or death due to:  herniation, seizures, strokes, PE, cardiac arrest. 58F with   1.  acute cerebrovascular accident involving R MCA / SELINA, malignant infarction, cerebral edema, brain compression s/p decompressive hemicraniectomy (05/31/2018, Dr. Johnson)  2.  Hypertension dyslipidemia   3.  h/o breast CA  4.  CAD  5.  R rectus abdominis intramuscular hematoma  6.  seizures    PLAN:   NEURO: neurochecks q1h, PRN pain meds tylenol, fentanyl, sedation with precedex  to RASS of 0  malignant R MCA infarction, s/p DHC:  no ASA for now (will prob start tomorrow or Day 5), hemorrhagic conversion noted  seizure disorder:  continue levetiracetam 1G BID  ICP crises:  s/p C  REHAB:  physical therapy evaluation and management - defer   EARLY MOB:  HOB up, bedrest    PULM:  CPAP 8/5 40%, if on propofol place on AC  CARDIO:  SBP goal 100-180 mm Hg, echo EF 55-60% mod dilated LA, probable AVM, no PFOs, keep central line  ENDO:  Blood sugar goals 140-180 mg/dL, continue insulin sliding scale; continue high-dose statins  GI:  PPI for GI prophylaxis  DIET: jevity to goal of 70  RENAL:  decrease 3%@ 40cc/hr, goal Na > 140-145 recheck BMP this aftenroon;  increase salt tabs 2g q6h  HEM/ONC: s/p transfusion - with adequate response of Hb; repeat CT abd/pelvis no contrast if and when she goes down for CT  VTE Prophylaxis: SCDs only, start SQH if ok with NS, neg dopplers  ID: afebrile, no leukocytosis  Social:  updated yesterday  MISC:  h/o ETOH abuse, continue MVI    I spent 75 minutes of critical care time examining patient, reviewing vitals, labs, medications, imaging and discussing with the team goals of care to prevent life-threatening in this patient who is at high risk for neurological deterioration or death due to:  herniation, seizures, strokes, PE, cardiac arrest.

## 2018-06-04 NOTE — PHYSICAL THERAPY INITIAL EVALUATION ADULT - GENERAL OBSERVATIONS, REHAB EVAL
pt received/ semi-supine in bed +IV, +L radial A line, +NG, +R cranial incision, +B/L SCDs, +intubation VC-AC FiO2 40% PEEP 4.6, +B/L wrist restraints, B/L eye apraxia, cleared for PT by Dr. Victor

## 2018-06-05 LAB
ANION GAP SERPL CALC-SCNC: 10 MMOL/L — SIGNIFICANT CHANGE UP (ref 5–17)
ANION GAP SERPL CALC-SCNC: 10 MMOL/L — SIGNIFICANT CHANGE UP (ref 5–17)
ANION GAP SERPL CALC-SCNC: 8 MMOL/L — SIGNIFICANT CHANGE UP (ref 5–17)
ANION GAP SERPL CALC-SCNC: 9 MMOL/L — SIGNIFICANT CHANGE UP (ref 5–17)
APPEARANCE UR: CLEAR — SIGNIFICANT CHANGE UP
BASE EXCESS BLDA CALC-SCNC: 0.2 MMOL/L — SIGNIFICANT CHANGE UP (ref -2–3)
BILIRUB UR-MCNC: NEGATIVE — SIGNIFICANT CHANGE UP
BUN SERPL-MCNC: 10 MG/DL — SIGNIFICANT CHANGE UP (ref 7–23)
BUN SERPL-MCNC: 11 MG/DL — SIGNIFICANT CHANGE UP (ref 7–23)
BUN SERPL-MCNC: 11 MG/DL — SIGNIFICANT CHANGE UP (ref 7–23)
BUN SERPL-MCNC: 12 MG/DL — SIGNIFICANT CHANGE UP (ref 7–23)
CALCIUM SERPL-MCNC: 8.5 MG/DL — SIGNIFICANT CHANGE UP (ref 8.4–10.5)
CALCIUM SERPL-MCNC: 8.7 MG/DL — SIGNIFICANT CHANGE UP (ref 8.4–10.5)
CALCIUM SERPL-MCNC: 8.8 MG/DL — SIGNIFICANT CHANGE UP (ref 8.4–10.5)
CALCIUM SERPL-MCNC: 8.9 MG/DL — SIGNIFICANT CHANGE UP (ref 8.4–10.5)
CHLORIDE SERPL-SCNC: 106 MMOL/L — SIGNIFICANT CHANGE UP (ref 96–108)
CHLORIDE SERPL-SCNC: 110 MMOL/L — HIGH (ref 96–108)
CHLORIDE SERPL-SCNC: 113 MMOL/L — HIGH (ref 96–108)
CHLORIDE SERPL-SCNC: 114 MMOL/L — HIGH (ref 96–108)
CO2 SERPL-SCNC: 25 MMOL/L — SIGNIFICANT CHANGE UP (ref 22–31)
COLOR SPEC: YELLOW — SIGNIFICANT CHANGE UP
CREAT SERPL-MCNC: 0.39 MG/DL — LOW (ref 0.5–1.3)
CREAT SERPL-MCNC: 0.4 MG/DL — LOW (ref 0.5–1.3)
CREAT SERPL-MCNC: 0.42 MG/DL — LOW (ref 0.5–1.3)
CREAT SERPL-MCNC: 0.48 MG/DL — LOW (ref 0.5–1.3)
DIFF PNL FLD: NEGATIVE — SIGNIFICANT CHANGE UP
GAS PNL BLDA: SIGNIFICANT CHANGE UP
GLUCOSE SERPL-MCNC: 122 MG/DL — HIGH (ref 70–99)
GLUCOSE SERPL-MCNC: 170 MG/DL — HIGH (ref 70–99)
GLUCOSE SERPL-MCNC: 175 MG/DL — HIGH (ref 70–99)
GLUCOSE SERPL-MCNC: 190 MG/DL — HIGH (ref 70–99)
GLUCOSE UR QL: NEGATIVE — SIGNIFICANT CHANGE UP
HCO3 BLDA-SCNC: 24 MMOL/L — SIGNIFICANT CHANGE UP (ref 21–28)
HCT VFR BLD CALC: 25.7 % — LOW (ref 34.5–45)
HGB BLD-MCNC: 7.9 G/DL — LOW (ref 11.5–15.5)
KETONES UR-MCNC: NEGATIVE — SIGNIFICANT CHANGE UP
LEUKOCYTE ESTERASE UR-ACNC: NEGATIVE — SIGNIFICANT CHANGE UP
MAGNESIUM SERPL-MCNC: 2.2 MG/DL — SIGNIFICANT CHANGE UP (ref 1.6–2.6)
MCHC RBC-ENTMCNC: 27.9 PG — SIGNIFICANT CHANGE UP (ref 27–34)
MCHC RBC-ENTMCNC: 30.7 G/DL — LOW (ref 32–36)
MCV RBC AUTO: 90.8 FL — SIGNIFICANT CHANGE UP (ref 80–100)
NITRITE UR-MCNC: NEGATIVE — SIGNIFICANT CHANGE UP
PCO2 BLDA: 33 MMHG — SIGNIFICANT CHANGE UP (ref 32–45)
PH BLDA: 7.48 — HIGH (ref 7.35–7.45)
PH UR: 6 — SIGNIFICANT CHANGE UP (ref 5–8)
PHOSPHATE SERPL-MCNC: 2.5 MG/DL — SIGNIFICANT CHANGE UP (ref 2.5–4.5)
PLATELET # BLD AUTO: 219 K/UL — SIGNIFICANT CHANGE UP (ref 150–400)
PO2 BLDA: 153 MMHG — HIGH (ref 83–108)
POTASSIUM SERPL-MCNC: 3.1 MMOL/L — LOW (ref 3.5–5.3)
POTASSIUM SERPL-MCNC: 3.5 MMOL/L — SIGNIFICANT CHANGE UP (ref 3.5–5.3)
POTASSIUM SERPL-MCNC: 3.8 MMOL/L — SIGNIFICANT CHANGE UP (ref 3.5–5.3)
POTASSIUM SERPL-MCNC: 4.2 MMOL/L — SIGNIFICANT CHANGE UP (ref 3.5–5.3)
POTASSIUM SERPL-SCNC: 3.1 MMOL/L — LOW (ref 3.5–5.3)
POTASSIUM SERPL-SCNC: 3.5 MMOL/L — SIGNIFICANT CHANGE UP (ref 3.5–5.3)
POTASSIUM SERPL-SCNC: 3.8 MMOL/L — SIGNIFICANT CHANGE UP (ref 3.5–5.3)
POTASSIUM SERPL-SCNC: 4.2 MMOL/L — SIGNIFICANT CHANGE UP (ref 3.5–5.3)
PROT UR-MCNC: NEGATIVE MG/DL — SIGNIFICANT CHANGE UP
RBC # BLD: 2.83 M/UL — LOW (ref 3.8–5.2)
RBC # FLD: 16.1 % — SIGNIFICANT CHANGE UP (ref 10.3–16.9)
SAO2 % BLDA: 99 % — SIGNIFICANT CHANGE UP (ref 95–100)
SODIUM SERPL-SCNC: 139 MMOL/L — SIGNIFICANT CHANGE UP (ref 135–145)
SODIUM SERPL-SCNC: 144 MMOL/L — SIGNIFICANT CHANGE UP (ref 135–145)
SODIUM SERPL-SCNC: 148 MMOL/L — HIGH (ref 135–145)
SODIUM SERPL-SCNC: 149 MMOL/L — HIGH (ref 135–145)
SP GR SPEC: 1.02 — SIGNIFICANT CHANGE UP (ref 1–1.03)
SURGICAL PATHOLOGY STUDY: SIGNIFICANT CHANGE UP
UROBILINOGEN FLD QL: 0.2 E.U./DL — SIGNIFICANT CHANGE UP
WBC # BLD: 8.4 K/UL — SIGNIFICANT CHANGE UP (ref 3.8–10.5)
WBC # FLD AUTO: 8.4 K/UL — SIGNIFICANT CHANGE UP (ref 3.8–10.5)

## 2018-06-05 PROCEDURE — 70450 CT HEAD/BRAIN W/O DYE: CPT | Mod: 26

## 2018-06-05 PROCEDURE — 99292 CRITICAL CARE ADDL 30 MIN: CPT | Mod: 24

## 2018-06-05 PROCEDURE — 99231 SBSQ HOSP IP/OBS SF/LOW 25: CPT | Mod: GC

## 2018-06-05 PROCEDURE — 99291 CRITICAL CARE FIRST HOUR: CPT | Mod: 24

## 2018-06-05 PROCEDURE — 71045 X-RAY EXAM CHEST 1 VIEW: CPT | Mod: 26

## 2018-06-05 RX ORDER — POTASSIUM CHLORIDE 20 MEQ
20 PACKET (EA) ORAL
Qty: 0 | Refills: 0 | Status: DISCONTINUED | OUTPATIENT
Start: 2018-06-05 | End: 2018-06-05

## 2018-06-05 RX ORDER — POTASSIUM CHLORIDE 20 MEQ
20 PACKET (EA) ORAL
Qty: 0 | Refills: 0 | Status: COMPLETED | OUTPATIENT
Start: 2018-06-05 | End: 2018-06-05

## 2018-06-05 RX ORDER — POTASSIUM CHLORIDE 20 MEQ
40 PACKET (EA) ORAL ONCE
Qty: 0 | Refills: 0 | Status: COMPLETED | OUTPATIENT
Start: 2018-06-05 | End: 2018-06-05

## 2018-06-05 RX ORDER — BISOPROLOL FUMARATE 10 MG/1
2.5 TABLET, FILM COATED ORAL DAILY
Qty: 0 | Refills: 0 | Status: DISCONTINUED | OUTPATIENT
Start: 2018-06-05 | End: 2018-06-07

## 2018-06-05 RX ORDER — SODIUM CHLORIDE 5 G/100ML
500 INJECTION, SOLUTION INTRAVENOUS
Qty: 0 | Refills: 0 | Status: DISCONTINUED | OUTPATIENT
Start: 2018-06-05 | End: 2018-06-06

## 2018-06-05 RX ORDER — ASPIRIN/CALCIUM CARB/MAGNESIUM 324 MG
81 TABLET ORAL DAILY
Qty: 0 | Refills: 0 | Status: DISCONTINUED | OUTPATIENT
Start: 2018-06-05 | End: 2018-06-08

## 2018-06-05 RX ORDER — POTASSIUM CHLORIDE 20 MEQ
40 PACKET (EA) ORAL EVERY 4 HOURS
Qty: 0 | Refills: 0 | Status: DISCONTINUED | OUTPATIENT
Start: 2018-06-05 | End: 2018-06-06

## 2018-06-05 RX ADMIN — HEPARIN SODIUM 5000 UNIT(S): 5000 INJECTION INTRAVENOUS; SUBCUTANEOUS at 13:42

## 2018-06-05 RX ADMIN — Medication 20 MILLIEQUIVALENT(S): at 13:42

## 2018-06-05 RX ADMIN — SENNA PLUS 2 TABLET(S): 8.6 TABLET ORAL at 21:15

## 2018-06-05 RX ADMIN — CHLORHEXIDINE GLUCONATE 15 MILLILITER(S): 213 SOLUTION TOPICAL at 17:43

## 2018-06-05 RX ADMIN — FENTANYL CITRATE 25 MICROGRAM(S): 50 INJECTION INTRAVENOUS at 23:27

## 2018-06-05 RX ADMIN — SODIUM CHLORIDE 2 GRAM(S): 9 INJECTION INTRAMUSCULAR; INTRAVENOUS; SUBCUTANEOUS at 06:14

## 2018-06-05 RX ADMIN — Medication 1 TABLET(S): at 12:31

## 2018-06-05 RX ADMIN — Medication 81 MILLIGRAM(S): at 13:42

## 2018-06-05 RX ADMIN — DEXMEDETOMIDINE HYDROCHLORIDE IN 0.9% SODIUM CHLORIDE 3.9 MICROGRAM(S)/KG/HR: 4 INJECTION INTRAVENOUS at 17:53

## 2018-06-05 RX ADMIN — LEVETIRACETAM 1000 MILLIGRAM(S): 250 TABLET, FILM COATED ORAL at 11:11

## 2018-06-05 RX ADMIN — HEPARIN SODIUM 5000 UNIT(S): 5000 INJECTION INTRAVENOUS; SUBCUTANEOUS at 21:15

## 2018-06-05 RX ADMIN — SODIUM CHLORIDE 2 GRAM(S): 9 INJECTION INTRAMUSCULAR; INTRAVENOUS; SUBCUTANEOUS at 01:21

## 2018-06-05 RX ADMIN — Medication 100 MILLIGRAM(S): at 17:43

## 2018-06-05 RX ADMIN — DEXMEDETOMIDINE HYDROCHLORIDE IN 0.9% SODIUM CHLORIDE 3.9 MICROGRAM(S)/KG/HR: 4 INJECTION INTRAVENOUS at 09:11

## 2018-06-05 RX ADMIN — Medication 10 MILLIGRAM(S): at 11:19

## 2018-06-05 RX ADMIN — FENTANYL CITRATE 25 MICROGRAM(S): 50 INJECTION INTRAVENOUS at 17:43

## 2018-06-05 RX ADMIN — Medication 20 MILLIEQUIVALENT(S): at 12:31

## 2018-06-05 RX ADMIN — ATORVASTATIN CALCIUM 80 MILLIGRAM(S): 80 TABLET, FILM COATED ORAL at 21:16

## 2018-06-05 RX ADMIN — FENTANYL CITRATE 25 MICROGRAM(S): 50 INJECTION INTRAVENOUS at 18:00

## 2018-06-05 RX ADMIN — SODIUM CHLORIDE 2 GRAM(S): 9 INJECTION INTRAMUSCULAR; INTRAVENOUS; SUBCUTANEOUS at 23:12

## 2018-06-05 RX ADMIN — Medication 20 MILLIEQUIVALENT(S): at 11:10

## 2018-06-05 RX ADMIN — LEVETIRACETAM 1000 MILLIGRAM(S): 250 TABLET, FILM COATED ORAL at 21:15

## 2018-06-05 RX ADMIN — BISOPROLOL FUMARATE 2.5 MILLIGRAM(S): 10 TABLET, FILM COATED ORAL at 12:32

## 2018-06-05 RX ADMIN — Medication 650 MILLIGRAM(S): at 04:05

## 2018-06-05 RX ADMIN — Medication 40 MILLIEQUIVALENT(S): at 21:16

## 2018-06-05 RX ADMIN — CHLORHEXIDINE GLUCONATE 15 MILLILITER(S): 213 SOLUTION TOPICAL at 06:14

## 2018-06-05 RX ADMIN — HEPARIN SODIUM 5000 UNIT(S): 5000 INJECTION INTRAVENOUS; SUBCUTANEOUS at 06:14

## 2018-06-05 RX ADMIN — DEXMEDETOMIDINE HYDROCHLORIDE IN 0.9% SODIUM CHLORIDE 3.9 MICROGRAM(S)/KG/HR: 4 INJECTION INTRAVENOUS at 21:15

## 2018-06-05 RX ADMIN — SODIUM CHLORIDE 2 GRAM(S): 9 INJECTION INTRAMUSCULAR; INTRAVENOUS; SUBCUTANEOUS at 12:31

## 2018-06-05 RX ADMIN — PANTOPRAZOLE SODIUM 40 MILLIGRAM(S): 20 TABLET, DELAYED RELEASE ORAL at 12:32

## 2018-06-05 RX ADMIN — FENTANYL CITRATE 25 MICROGRAM(S): 50 INJECTION INTRAVENOUS at 23:11

## 2018-06-05 RX ADMIN — SODIUM CHLORIDE 30 MILLILITER(S): 5 INJECTION, SOLUTION INTRAVENOUS at 06:14

## 2018-06-05 RX ADMIN — SODIUM CHLORIDE 2 GRAM(S): 9 INJECTION INTRAMUSCULAR; INTRAVENOUS; SUBCUTANEOUS at 19:21

## 2018-06-05 RX ADMIN — Medication 100 MILLIGRAM(S): at 06:14

## 2018-06-05 NOTE — PROGRESS NOTE ADULT - SUBJECTIVE AND OBJECTIVE BOX
S/Overnight events:    59 y/o female intubated POD 5, no issues overnight. Pt had a fever spike around 6 am. No ventilation overnight.    Hospital Course:   POD#1: emergent crani yesterday, transferred back to [facility], sedated overnight, [facility] with hemorrhagic conversion o/w stable. stable exam overnight. Drains remains to suction. no acute events overnight  POD#2: No seizure activity on EEG. Following commands on right side. Continue DREW x2 and Hemovac x1. Low grade fevers.   POD#3: Tolerating CPAP. Self extubated. Hemovac removed.   POD #4: Re-intubated yesterday, precedex resumed, no acute events overnight      Vital Signs Last 24 Hrs  T(C): 37.7 (2018 09:00), Max: 38.3 (2018 03:00)  T(F): 99.8 (2018 09:00), Max: 101 (2018 03:00)  HR: 80 (2018 13:00) (58 - 108)  BP: 171/89 (2018 11:00) (123/66 - 190/93)  BP(mean): 119 (2018 11:00) (87 - 138)  RR: 12 (2018 13:00) (10 - 22)  SpO2: 100% (2018 13:00) (99% - 100%)    I&O's Detail    2018 07:01  -  2018 07:00  --------------------------------------------------------  IN:    dexmedetomidine Infusion: 118 mL    Enteral Tube Flush: 260 mL    IV PiggyBack: 100 mL    Jevity: 1540 mL    propofol Infusion: 57.6 mL    sodium chloride 3%: 330 mL    sodium chloride 3%: 250 mL    sodium chloride 3%: 240 mL  Total IN: 2895.6 mL    OUT:  Total OUT: 0 mL    Total NET: 2895.6 mL      2018 07:01  -  2018 13:38  --------------------------------------------------------  IN:    dexmedetomidine Infusion: 62 mL    Jevity: 350 mL    sodium chloride 3%: 60 mL    sodium chloride 3%.: 30 mL  Total IN: 502 mL    OUT:    Indwelling Catheter - Urethral: 1 mL  Total OUT: 1 mL    Total NET: 501 mL        I&O's Summary    2018 07:  -  2018 07:00  --------------------------------------------------------  IN: 2895.6 mL / OUT: 0 mL / NET: 2895.6 mL    2018 07:  -  2018 13:38  --------------------------------------------------------  IN: 502 mL / OUT: 1 mL / NET: 501 mL        PHYSICAL EXAM:  Neurological: Drowsy, alert to name nad pain. Pupils are equally round and reactive to light and accomodation Face is grossly symmetrical. Follow commands, opens eyes with stimulation, squeezes hands bilaterally, left side plegic, pupils equal and reactive bilaterally.  HEENT: Midline scalp incision stapled intact, clean and no signs of infection or drainage.   Respiratory: Clear lung sounds bilaterally, no wheezes, rales or rhonchi  Cardiovascular: Regular rate and rhythm, S1 and S2 presents, no murmurs, rubs or gallops  Gastrointestinal: No lesions, scars or pulsating mass present, normoactive bowel sounds x4, soft, non-tender, non-distended in all 4 quadrants, no palpable mass  Extremities: Warm, well perfused    TUBES/LINES:  [] CVC  [] A-line  [] Lumbar Drain  [] Ventriculostomy  [] Other    DIET:  [] NPO  [] Mechanical  [] Tube feeds    LABS:                        7.9    8.4   )-----------( 219      ( 2018 05:16 )             25.7     06-05    148<H>  |  113<H>  |  12  ----------------------------<  190<H>  3.1<L>   |  25  |  0.48<L>    Ca    8.7      2018 05:13  Phos  2.5     06-  Mg     2.2     06-05        Urinalysis Basic - ( 2018 09:58 )    Color: Yellow / Appearance: Clear / S.020 / pH: x  Gluc: x / Ketone: NEGATIVE  / Bili: Negative / Urobili: 0.2 E.U./dL   Blood: x / Protein: NEGATIVE mg/dL / Nitrite: NEGATIVE   Leuk Esterase: NEGATIVE / RBC: x / WBC x   Sq Epi: x / Non Sq Epi: x / Bacteria: x          CAPILLARY BLOOD GLUCOSE      POCT Blood Glucose.: 139 mg/dL (2018 11:19)  POCT Blood Glucose.: 159 mg/dL (2018 16:17)      Drug Levels: [] N/A    CSF Analysis: [] N/A      Allergies    No Known Allergies    Intolerances      MEDICATIONS:  Antibiotics:    Neuro:  acetaminophen   Tablet 650 milliGRAM(s) Oral every 6 hours PRN  acetaminophen   Tablet. 650 milliGRAM(s) Oral every 6 hours PRN  dexmedetomidine Infusion 0.2 MICROgram(s)/kG/Hr IV Continuous <Continuous>  fentaNYL    Injectable 25 MICROGram(s) IV Push every 2 hours PRN  levETIRAcetam  Solution 1000 milliGRAM(s) Oral two times a day  ondansetron Injectable 4 milliGRAM(s) IV Push every 6 hours PRN    Anticoagulation:  aspirin  chewable 81 milliGRAM(s) Oral daily  heparin  Injectable 5000 Unit(s) SubCutaneous every 8 hours    OTHER:  atorvastatin 80 milliGRAM(s) Oral at bedtime  bisoprolol   Tablet 2.5 milliGRAM(s) Oral daily  chlorhexidine 0.12% Liquid 15 milliLiter(s) Swish and Spit two times a day  dextrose 40% Gel 15 Gram(s) Oral once PRN  dextrose 50% Injectable 12.5 Gram(s) IV Push once  dextrose 50% Injectable 25 Gram(s) IV Push once  dextrose 50% Injectable 25 Gram(s) IV Push once  docusate sodium Liquid 100 milliGRAM(s) Oral two times a day  glucagon  Injectable 1 milliGRAM(s) IntraMuscular once PRN  hydrALAZINE Injectable 10 milliGRAM(s) IV Push every 4 hours PRN  pantoprazole   Suspension 40 milliGRAM(s) Oral daily  senna 2 Tablet(s) Oral at bedtime    IVF:  dextrose 5%. 1000 milliLiter(s) IV Continuous <Continuous>  multivitamin 1 Tablet(s) Oral daily  potassium chloride   Powder 20 milliEquivalent(s) Oral every 2 hours  sodium chloride 2 Gram(s) Oral every 6 hours  sodium chloride 3%. 500 milliLiter(s) IV Continuous <Continuous>    CULTURES:  -Pending sputum culture    RADIOLOGY & ADDITIONAL TESTS:  CXR: Hiatal hernia       ASSESSMENT:  58y Female w/ HTN, HLD, Anxiety, Depression, h/o Breast Cancer w/ TRAM flap reconstruction admitted for expanding right rectus hematoma complicated by right MCA infarction now s/p hemicraniectomy POD #5.     PLAN:  NEURO:  -ASA if CTH results are ok  - Continue Precedex for agitation   -f/u CTH  -Continue helmet       CARDIOVASCULAR:  -Goal of SBP <180  -Restart bisprolol  -Remove A-line    PULMONARY:  -Serial ABG  -Obtain culture sputum    RENAL:  -Repeat Na at 3pm  -Change fluids from 15% to 3%    GI:  -Bowel regumen  -Continue indwelling catheter   -Obtain CT abd/pelvis     HEME:    ID:  -f/u blood culture   -Continue f/u on WBC and temperature     ENDO:    DVT PROPHYLAXIS:  -SQH    DISPOSITION: S/Overnight events:    59 y/o female intubated s/p right hemicrani evaluated at bedside. No issues overnight. Pt had a fever spike around 6 am. No ventilation overnight.    Hospital Course:   POD#1: emergent crani yesterday, transferred back to [facility], sedated overnight, [facility] with hemorrhagic conversion o/w stable. stable exam overnight. Drains remains to suction. no acute events overnight  POD#2: No seizure activity on EEG. Following commands on right side. Continue DREW x2 and Hemovac x1. Low grade fevers.   POD#3: Tolerating CPAP. Self extubated. Hemovac removed.   POD #4: Re-intubated yesterday, precedex resumed, no acute events overnight      Vital Signs Last 24 Hrs  T(C): 37.7 (2018 09:00), Max: 38.3 (2018 03:00)  T(F): 99.8 (2018 09:00), Max: 101 (2018 03:00)  HR: 80 (2018 13:00) (58 - 108)  BP: 171/89 (2018 11:00) (123/66 - 190/93)  BP(mean): 119 (2018 11:00) (87 - 138)  RR: 12 (2018 13:00) (10 - 22)  SpO2: 100% (2018 13:00) (99% - 100%)    I&O's Detail    2018 07:01  -  2018 07:00  --------------------------------------------------------  IN:    dexmedetomidine Infusion: 118 mL    Enteral Tube Flush: 260 mL    IV PiggyBack: 100 mL    Jevity: 1540 mL    propofol Infusion: 57.6 mL    sodium chloride 3%: 330 mL    sodium chloride 3%: 250 mL    sodium chloride 3%: 240 mL  Total IN: 2895.6 mL    OUT:  Total OUT: 0 mL    Total NET: 2895.6 mL      2018 07:01  -  2018 13:38  --------------------------------------------------------  IN:    dexmedetomidine Infusion: 62 mL    Jevity: 350 mL    sodium chloride 3%: 60 mL    sodium chloride 3%.: 30 mL  Total IN: 502 mL    OUT:    Indwelling Catheter - Urethral: 1 mL  Total OUT: 1 mL    Total NET: 501 mL        I&O's Summary    2018 07:  -  2018 07:00  --------------------------------------------------------  IN: 2895.6 mL / OUT: 0 mL / NET: 2895.6 mL    2018 07:  -  2018 13:38  --------------------------------------------------------  IN: 502 mL / OUT: 1 mL / NET: 501 mL        PHYSICAL EXAM:  Neurological: Drowsy, alert to name and pain. Pupils are equally round and reactive to light and accomodation Face is grossly symmetrical. Follow commands, opens eyes with stimulation, squeezes hands bilaterally, left side plegic, pupils equal and reactive bilaterally.   HEENT: Midline scalp incision stapled intact, clean and no signs of infection or drainage.   Respiratory: Clear lung sounds bilaterally, no wheezes, rales or rhonchi  Cardiovascular: Regular rate and rhythm, S1 and S2 presents, no murmurs, rubs or gallops  Gastrointestinal: No lesions, scars or pulsating mass present, normoactive bowel sounds x4, soft, non-tender, non-distended in all 4 quadrants, no palpable mass  Extremities: Warm, well perfused    TUBES/LINES:  [] CVC  [] A-line  [] Lumbar Drain  [] Ventriculostomy  [] Other    DIET:  [] NPO  [] Mechanical  [] Tube feeds    LABS:                        7.9    8.4   )-----------( 219      ( 2018 05:16 )             25.7     06-05    148<H>  |  113<H>  |  12  ----------------------------<  190<H>  3.1<L>   |  25  |  0.48<L>    Ca    8.7      2018 05:13  Phos  2.5     06-05  Mg     2.2     06-05        Urinalysis Basic - ( 2018 09:58 )    Color: Yellow / Appearance: Clear / S.020 / pH: x  Gluc: x / Ketone: NEGATIVE  / Bili: Negative / Urobili: 0.2 E.U./dL   Blood: x / Protein: NEGATIVE mg/dL / Nitrite: NEGATIVE   Leuk Esterase: NEGATIVE / RBC: x / WBC x   Sq Epi: x / Non Sq Epi: x / Bacteria: x          CAPILLARY BLOOD GLUCOSE      POCT Blood Glucose.: 139 mg/dL (2018 11:19)  POCT Blood Glucose.: 159 mg/dL (2018 16:17)      Drug Levels: [] N/A    CSF Analysis: [] N/A      Allergies    No Known Allergies    Intolerances      MEDICATIONS:  Antibiotics:    Neuro:  acetaminophen   Tablet 650 milliGRAM(s) Oral every 6 hours PRN  acetaminophen   Tablet. 650 milliGRAM(s) Oral every 6 hours PRN  dexmedetomidine Infusion 0.2 MICROgram(s)/kG/Hr IV Continuous <Continuous>  fentaNYL    Injectable 25 MICROGram(s) IV Push every 2 hours PRN  levETIRAcetam  Solution 1000 milliGRAM(s) Oral two times a day  ondansetron Injectable 4 milliGRAM(s) IV Push every 6 hours PRN    Anticoagulation:  aspirin  chewable 81 milliGRAM(s) Oral daily  heparin  Injectable 5000 Unit(s) SubCutaneous every 8 hours    OTHER:  atorvastatin 80 milliGRAM(s) Oral at bedtime  bisoprolol   Tablet 2.5 milliGRAM(s) Oral daily  chlorhexidine 0.12% Liquid 15 milliLiter(s) Swish and Spit two times a day  dextrose 40% Gel 15 Gram(s) Oral once PRN  dextrose 50% Injectable 12.5 Gram(s) IV Push once  dextrose 50% Injectable 25 Gram(s) IV Push once  dextrose 50% Injectable 25 Gram(s) IV Push once  docusate sodium Liquid 100 milliGRAM(s) Oral two times a day  glucagon  Injectable 1 milliGRAM(s) IntraMuscular once PRN  hydrALAZINE Injectable 10 milliGRAM(s) IV Push every 4 hours PRN  pantoprazole   Suspension 40 milliGRAM(s) Oral daily  senna 2 Tablet(s) Oral at bedtime    IVF:  dextrose 5%. 1000 milliLiter(s) IV Continuous <Continuous>  multivitamin 1 Tablet(s) Oral daily  potassium chloride   Powder 20 milliEquivalent(s) Oral every 2 hours  sodium chloride 2 Gram(s) Oral every 6 hours  sodium chloride 3%. 500 milliLiter(s) IV Continuous <Continuous>    CULTURES:  -Pending sputum culture    RADIOLOGY & ADDITIONAL TESTS:  CXR: Hiatal hernia       ASSESSMENT:  58y Female w/ HTN, HLD, Anxiety, Depression, h/o Breast Cancer w/ TRAM flap reconstruction admitted for expanding right rectus hematoma complicated by right MCA infarction now s/p hemicraniectomy POD #5.     PLAN:  NEURO:  -ASA if CTH results are ok  - Continue Precedex for agitation   -f/u CTH  -Continue helmet       CARDIOVASCULAR:  -Goal of SBP <180  -Restart bisprolol  -Remove A-line    PULMONARY:  -Serial ABG  -Obtain culture sputum    RENAL:  -Repeat Na at 3pm  -Change fluids from 15% to 3%    GI:  -Bowel regumen  -Continue indwelling catheter   -Obtain CT abd/pelvis     HEME:    ID:  -f/u blood culture   -Continue f/u on WBC and temperature     ENDO:    DVT PROPHYLAXIS:  -SQH    DISPOSITION: S/Overnight events:    59 y/o female intubated s/p right hemicraniectomy evaluated at bedside. No issues overnight. Pt had a fever spike around 6 am. Denies any pain upon questioning. No significant issues reported by nursing.    Hospital Course:   POD#1: emergent crani yesterday, transferred back to [facility], sedated overnight, [facility] with hemorrhagic conversion o/w stable. stable exam overnight. Drains remains to suction. no acute events overnight  POD#2: No seizure activity on EEG. Following commands on right side. Continue DREW x2 and Hemovac x1. Low grade fevers.   POD#3: Tolerating CPAP. Self extubated. Hemovac removed.   POD #4: Re-intubated yesterday, precedex resumed, no acute events overnight  POD#5: Pancultured for fevers. Tolerating CPAP trials. Continues to follow commands on right side. Bedside PT.    Vital Signs Last 24 Hrs  T(C): 37.7 (2018 09:00), Max: 38.3 (2018 03:00)  T(F): 99.8 (2018 09:00), Max: 101 (2018 03:00)  HR: 80 (2018 13:00) (58 - 108)  BP: 171/89 (2018 11:00) (123/66 - 190/93)  BP(mean): 119 (2018 11:00) (87 - 138)  RR: 12 (2018 13:00) (10 - 22)  SpO2: 100% (2018 13:00) (99% - 100%)    I&O's Detail    2018 07:01  -  2018 07:00  --------------------------------------------------------  IN:    dexmedetomidine Infusion: 118 mL    Enteral Tube Flush: 260 mL    IV PiggyBack: 100 mL    Jevity: 1540 mL    propofol Infusion: 57.6 mL    sodium chloride 3%: 330 mL    sodium chloride 3%: 250 mL    sodium chloride 3%: 240 mL  Total IN: 2895.6 mL    OUT:  Total OUT: 0 mL    Total NET: 2895.6 mL      2018 07:  -  2018 13:38  --------------------------------------------------------  IN:    dexmedetomidine Infusion: 62 mL    Jevity: 350 mL    sodium chloride 3%: 60 mL    sodium chloride 3%.: 30 mL  Total IN: 502 mL    OUT:    Indwelling Catheter - Urethral: 1 mL  Total OUT: 1 mL    Total NET: 501 mL        I&O's Summary    2018 07:  -  2018 07:00  --------------------------------------------------------  IN: 2895.6 mL / OUT: 0 mL / NET: 2895.6 mL    2018 07: 13:38  --------------------------------------------------------  IN: 502 mL / OUT: 1 mL / NET: 501 mL        PHYSICAL EXAM:  Gen: No acute distress. Intubated with ventilator support. Awake and alert to voice.  HEENT: Midline scalp incision stapled, intact, clean and no signs of infection or drainage. Right side scalp flap is soft, and flat. Pupils are equal, round and reactive to light briskly and with accomodation. +Nasogastric tube.  Neck: Left subclavian, C/D/I with dressing.  Respiratory: Clear lung sounds bilaterally, no wheezes, rales or rhonchi  Cardiovascular: Regular rate and rhythm, S1 and S2 presents, no murmurs, rubs or gallops  Gastrointestinal: No lesions, scars or pulsating mass present, normoactive bowel sounds x4, soft, non-tender, non-distended in all 4 quadrants, no palpable mass  Extremities: Warm, well perfused. Pulses 2+ throughout  Neurological:  CNs limited secondary to intubation. Follows commands with RUE/RLE. Opens eyes with stimulation. Minimal LUE withdrawal to noxious stimulus. Triple flexion of LLE. Sensation to LT intact.    TUBES/LINES:  [x] CVC  [] A-line  [] Lumbar Drain  [] Ventriculostomy  [] Other    DIET:  [] NPO  [] Mechanical  [x] Tube feeds    LABS:                        7.9    8.4   )-----------( 219      ( 2018 05:16 )             25.7     06-05    148<H>  |  113<H>  |  12  ----------------------------<  190<H>  3.1<L>   |  25  |  0.48<L>    Ca    8.7      2018 05:13  Phos  2.5     06-05  Mg     2.2     06-05        Urinalysis Basic - ( 2018 09:58 )    Color: Yellow / Appearance: Clear / S.020 / pH: x  Gluc: x / Ketone: NEGATIVE  / Bili: Negative / Urobili: 0.2 E.U./dL   Blood: x / Protein: NEGATIVE mg/dL / Nitrite: NEGATIVE   Leuk Esterase: NEGATIVE / RBC: x / WBC x   Sq Epi: x / Non Sq Epi: x / Bacteria: x          CAPILLARY BLOOD GLUCOSE      POCT Blood Glucose.: 139 mg/dL (2018 11:19)  POCT Blood Glucose.: 159 mg/dL (2018 16:17)      Drug Levels: [] N/A    CSF Analysis: [] N/A      Allergies    No Known Allergies    Intolerances      MEDICATIONS:  Antibiotics:    Neuro:  acetaminophen   Tablet 650 milliGRAM(s) Oral every 6 hours PRN  acetaminophen   Tablet. 650 milliGRAM(s) Oral every 6 hours PRN  dexmedetomidine Infusion 0.2 MICROgram(s)/kG/Hr IV Continuous <Continuous>  fentaNYL    Injectable 25 MICROGram(s) IV Push every 2 hours PRN  levETIRAcetam  Solution 1000 milliGRAM(s) Oral two times a day  ondansetron Injectable 4 milliGRAM(s) IV Push every 6 hours PRN    Anticoagulation:  aspirin  chewable 81 milliGRAM(s) Oral daily  heparin  Injectable 5000 Unit(s) SubCutaneous every 8 hours    OTHER:  atorvastatin 80 milliGRAM(s) Oral at bedtime  bisoprolol   Tablet 2.5 milliGRAM(s) Oral daily  chlorhexidine 0.12% Liquid 15 milliLiter(s) Swish and Spit two times a day  dextrose 40% Gel 15 Gram(s) Oral once PRN  dextrose 50% Injectable 12.5 Gram(s) IV Push once  dextrose 50% Injectable 25 Gram(s) IV Push once  dextrose 50% Injectable 25 Gram(s) IV Push once  docusate sodium Liquid 100 milliGRAM(s) Oral two times a day  glucagon  Injectable 1 milliGRAM(s) IntraMuscular once PRN  hydrALAZINE Injectable 10 milliGRAM(s) IV Push every 4 hours PRN  pantoprazole   Suspension 40 milliGRAM(s) Oral daily  senna 2 Tablet(s) Oral at bedtime    IVF:  dextrose 5%. 1000 milliLiter(s) IV Continuous <Continuous>  multivitamin 1 Tablet(s) Oral daily  potassium chloride   Powder 20 milliEquivalent(s) Oral every 2 hours  sodium chloride 2 Gram(s) Oral every 6 hours  sodium chloride 3%. 500 milliLiter(s) IV Continuous <Continuous>    CULTURES:  -Pending sputum culture    RADIOLOGY & ADDITIONAL TESTS:  CXR: Hiatal hernia       ASSESSMENT:  58y Female w/ HTN, HLD, Anxiety, Depression, h/o Breast Cancer w/ TRAM flap reconstruction admitted for expanding right rectus hematoma complicated by right MCA infarction now s/p hemicraniectomy POD #5.     PLAN:  NEURO:  -ASA if CT Head results are ok  -Continue Precedex for agitation   -Continue helmet when out of bed.  -Continue frequent neuro checks,  -Continue Keppra 1g BID for seizure prophylaxis    CARDIOVASCULAR:  -Goal of -180  -Restart bisoprolol  -Remove A-line  -Daily labs, electrolyte repletion PRN    PULMONARY:  -Daily CXR  -Obtain culture sputum  -Continue CPAP trials, no extubation today    RENAL:  -Repeat Na at 3pm  -Continue 3% NaCl at 15cc/hr  -Voiding    GI:  -Bowel regimen  -Plan for CT abd/pelvis at a later date to evaluate progression of rectus sheath hematoma,  -PPI for gi prophylaxis,  -TFs via NGT, at goal.    ID:  -f/u panculture  -Continue f/u on WBC trend and temperature     ENDO:   -ISS    DVT PROPHYLAXIS:  -SQH, SCDs    DISPOSITION:   -Continue SICU care,  -PT/OT at bedside as tolerated,  -D/w Dr. Johnson, Dr. Vitcor

## 2018-06-05 NOTE — PROGRESS NOTE ADULT - SUBJECTIVE AND OBJECTIVE BOX
S/Overnight events:        Hospital Course:   POD#   POD#1: emergent crani yesterday, transferred back to [facility], sedated overnight, [facility] with hemorrhagic conversion o/w stable. stable exam overnight. Drains remains to suction. no acute events overnight  POD#2: No seizure activity on EEG. Following commands on right side. Continue DREW x2 and Hemovac x1. Low grade fevers.   POD#3: Tolerating CPAP. Self extubated. Hemovac removed.   POD #4: Re-intubated yesterday, precedex resumed, no acute events overnight    Vital Signs Last 24 Hrs  T(C): 38.3 (2018 03:00), Max: 38.3 (2018 03:00)  T(F): 101 (2018 03:00), Max: 101 (2018 03:00)  HR: 64 (2018 05:32) (64 - 108)  BP: 129/68 (2018 02:00) (123/66 - 190/93)  BP(mean): 89 (2018 02:00) (87 - 138)  RR: 14 (2018 05:32) (10 - 22)  SpO2: 100% (2018 05:32) (99% - 100%)    I&O's Detail    2018 07:01  -  2018 07:00  --------------------------------------------------------  IN:    dexmedetomidine Infusion: 70 mL    Enteral Tube Flush: 300 mL    Jevity: 1670 mL    propofol Infusion: 217.8 mL    sodium chloride 3%: 1100 mL    sodium chloride 3%: 60 mL    Solution: 500 mL  Total IN: 3917.8 mL    OUT:    Bulb: 5 mL    Bulb: 50 mL  Total OUT: 55 mL    Total NET: 3862.8 mL      2018 07:01  -  2018 06:07  --------------------------------------------------------  IN:    dexmedetomidine Infusion: 98 mL    Enteral Tube Flush: 260 mL    IV PiggyBack: 100 mL    Jevity: 1400 mL    propofol Infusion: 57.6 mL    sodium chloride 3%: 250 mL    sodium chloride 3%: 240 mL    sodium chloride 3%.: 270 mL  Total IN: 2675.6 mL    OUT:  Total OUT: 0 mL    Total NET: 2675.6 mL        I&O's Summary    2018 07:  -  2018 07:00  --------------------------------------------------------  IN: 3917.8 mL / OUT: 55 mL / NET: 3862.8 mL    2018 07:  -  2018 06:07  --------------------------------------------------------  IN: 2675.6 mL / OUT: 0 mL / NET: 2675.6 mL        PHYSICAL EXAM:  Neurological:    Motor exam:         [] Upper extremity              Bi(c5)  WE(c6)  EE(c7)   FF(c8)                                                R         5/5        5/5        5/5       5/5                                               L          5/5        5/5        5/5       5/5         [] Lower extremeity          HF(l2)   KE(l3)    TA(l4)   EHL(l5)  GS(s1)                                                 R        5/5        5/5        5/5       5/5         5/5                                               L         5/5        5/5       5/5       5/5          5/5                                                        [] warm well perfused; capillary refill <3 seconds     Sensation: [] intact to light touch  [] decreased:       Cardiovascular:  Respiratory:  Gastrointestinal:  Genitourinary:  Extremities:    Incision/Wound:    DEVICE/DRAIN DRESSING:    TUBES/LINES:  [] CVC  [] A-line  [] Lumbar Drain  [] Ventriculostomy  [] Other    DIET:  [] NPO  [] Mechanical  [] Tube feeds    LABS:                        7.9    8.4   )-----------( 219      ( 2018 05:16 )             25.7     06-05    148<H>  |  113<H>  |  12  ----------------------------<  190<H>  3.1<L>   |  25  |  0.48<L>    Ca    8.7      2018 05:13  Phos  2.5     06-05  Mg     2.2     06-05              CAPILLARY BLOOD GLUCOSE      POCT Blood Glucose.: 159 mg/dL (2018 16:17)  POCT Blood Glucose.: 152 mg/dL (2018 11:09)  POCT Blood Glucose.: 186 mg/dL (2018 06:38)      Drug Levels: [] N/A    CSF Analysis: [] N/A      Allergies    No Known Allergies    Intolerances      MEDICATIONS:  Antibiotics:    Neuro:  acetaminophen   Tablet 650 milliGRAM(s) Oral every 6 hours PRN  acetaminophen   Tablet. 650 milliGRAM(s) Oral every 6 hours PRN  dexmedetomidine Infusion 0.2 MICROgram(s)/kG/Hr IV Continuous <Continuous>  fentaNYL    Injectable 25 MICROGram(s) IV Push every 2 hours PRN  levETIRAcetam  Solution 1000 milliGRAM(s) Oral two times a day  ondansetron Injectable 4 milliGRAM(s) IV Push every 6 hours PRN    Anticoagulation:  heparin  Injectable 5000 Unit(s) SubCutaneous every 8 hours    OTHER:  atorvastatin 80 milliGRAM(s) Oral at bedtime  chlorhexidine 0.12% Liquid 15 milliLiter(s) Swish and Spit two times a day  dextrose 40% Gel 15 Gram(s) Oral once PRN  dextrose 50% Injectable 12.5 Gram(s) IV Push once  dextrose 50% Injectable 25 Gram(s) IV Push once  dextrose 50% Injectable 25 Gram(s) IV Push once  docusate sodium Liquid 100 milliGRAM(s) Oral two times a day  glucagon  Injectable 1 milliGRAM(s) IntraMuscular once PRN  hydrALAZINE Injectable 10 milliGRAM(s) IV Push every 4 hours PRN  pantoprazole   Suspension 40 milliGRAM(s) Oral daily  senna 2 Tablet(s) Oral at bedtime    IVF:  dextrose 5%. 1000 milliLiter(s) IV Continuous <Continuous>  multivitamin 1 Tablet(s) Oral daily  sodium chloride 2 Gram(s) Oral every 6 hours  sodium chloride 3%. 500 milliLiter(s) IV Continuous <Continuous>    CULTURES:    RADIOLOGY & ADDITIONAL TESTS:      ASSESSMENT:  58y Female s/p    ABDOMINAL HEMATOMA  Handoff  MEWS Score  Anxiety  Hypertension  Breast CA  Stroke  Stroke (cerebrum)  Abdominal hematoma  Craniectomy  Central venous catheter insertion  Previous  section  History of mastectomy, left  ABDOMINAL PAIN      PLAN:  NEURO:    CARDIOVASCULAR:    PULMONARY:    RENAL:    GI:    HEME:    ID:    ENDO:    DVT PROPHYLAXIS:  [] Venodynes                                [] Heparin/Lovenox    FALL RISK:  [] Low Risk                                    [] Impulsive    DISPOSITION: S/Overnight events:        Hospital Course:   POD#   POD#1: emergent crani yesterday, transferred back to [facility], sedated overnight, [facility] with hemorrhagic conversion o/w stable. stable exam overnight. Drains remains to suction. no acute events overnight  POD#2: No seizure activity on EEG. Following commands on right side. Continue DREW x2 and Hemovac x1. Low grade fevers.   POD#3: Tolerating CPAP. Self extubated. Hemovac removed.   POD #4: Re-intubated yesterday, precedex resumed, no acute events overnight    Vital Signs Last 24 Hrs  T(C): 38.3 (2018 03:00), Max: 38.3 (2018 03:00)  T(F): 101 (2018 03:00), Max: 101 (2018 03:00)  HR: 64 (2018 05:32) (64 - 108)  BP: 129/68 (2018 02:00) (123/66 - 190/93)  BP(mean): 89 (2018 02:00) (87 - 138)  RR: 14 (2018 05:32) (10 - 22)  SpO2: 100% (2018 05:32) (99% - 100%)    I&O's Detail    2018 07:01  -  2018 07:00  --------------------------------------------------------  IN:    dexmedetomidine Infusion: 70 mL    Enteral Tube Flush: 300 mL    Jevity: 1670 mL    propofol Infusion: 217.8 mL    sodium chloride 3%: 1100 mL    sodium chloride 3%: 60 mL    Solution: 500 mL  Total IN: 3917.8 mL    OUT:    Bulb: 5 mL    Bulb: 50 mL  Total OUT: 55 mL    Total NET: 3862.8 mL      2018 07:01  -  2018 06:07  --------------------------------------------------------  IN:    dexmedetomidine Infusion: 98 mL    Enteral Tube Flush: 260 mL    IV PiggyBack: 100 mL    Jevity: 1400 mL    propofol Infusion: 57.6 mL    sodium chloride 3%: 250 mL    sodium chloride 3%: 240 mL    sodium chloride 3%.: 270 mL  Total IN: 2675.6 mL    OUT:  Total OUT: 0 mL    Total NET: 2675.6 mL        I&O's Summary    2018 07:  -  2018 07:00  --------------------------------------------------------  IN: 3917.8 mL / OUT: 55 mL / NET: 3862.8 mL    2018 07:  -  2018 06:07  --------------------------------------------------------  IN: 2675.6 mL / OUT: 0 mL / NET: 2675.6 mL        PHYSICAL EXAM:  Neurological:    Motor exam:         [] Upper extremity              Bi(c5)  WE(c6)  EE(c7)   FF(c8)                                                R         5/5        5/5        5/5       5/5                                               L          5/5        5/5        5/5       5/5         [] Lower extremeity          HF(l2)   KE(l3)    TA(l4)   EHL(l5)  GS(s1)                                                 R        5/5        5/5        5/5       5/5         5/5                                               L         5/5        5/5       5/5       5/5          5/5                                                        [] warm well perfused; capillary refill <3 seconds     Sensation: [] intact to light touch  [] decreased:       Cardiovascular:  Respiratory:  Gastrointestinal:  Genitourinary:  Extremities:    Incision/Wound:    DEVICE/DRAIN DRESSING:    TUBES/LINES:  [] CVC  [] A-line  [] Lumbar Drain  [] Ventriculostomy  [] Other    DIET:  [] NPO  [] Mechanical  [] Tube feeds    LABS:                        7.9    8.4   )-----------( 219      ( 2018 05:16 )             25.7     06-05    148<H>  |  113<H>  |  12  ----------------------------<  190<H>  3.1<L>   |  25  |  0.48<L>    Ca    8.7      2018 05:13  Phos  2.5     06-05  Mg     2.2     06-05              CAPILLARY BLOOD GLUCOSE      POCT Blood Glucose.: 159 mg/dL (2018 16:17)  POCT Blood Glucose.: 152 mg/dL (2018 11:09)  POCT Blood Glucose.: 186 mg/dL (2018 06:38)      Drug Levels: [] N/A    CSF Analysis: [] N/A      Allergies    No Known Allergies    Intolerances      MEDICATIONS:  Antibiotics:    Neuro:  acetaminophen   Tablet 650 milliGRAM(s) Oral every 6 hours PRN  acetaminophen   Tablet. 650 milliGRAM(s) Oral every 6 hours PRN  dexmedetomidine Infusion 0.2 MICROgram(s)/kG/Hr IV Continuous <Continuous>  fentaNYL    Injectable 25 MICROGram(s) IV Push every 2 hours PRN  levETIRAcetam  Solution 1000 milliGRAM(s) Oral two times a day  ondansetron Injectable 4 milliGRAM(s) IV Push every 6 hours PRN    Anticoagulation:  heparin  Injectable 5000 Unit(s) SubCutaneous every 8 hours    OTHER:  atorvastatin 80 milliGRAM(s) Oral at bedtime  chlorhexidine 0.12% Liquid 15 milliLiter(s) Swish and Spit two times a day  dextrose 40% Gel 15 Gram(s) Oral once PRN  dextrose 50% Injectable 12.5 Gram(s) IV Push once  dextrose 50% Injectable 25 Gram(s) IV Push once  dextrose 50% Injectable 25 Gram(s) IV Push once  docusate sodium Liquid 100 milliGRAM(s) Oral two times a day  glucagon  Injectable 1 milliGRAM(s) IntraMuscular once PRN  hydrALAZINE Injectable 10 milliGRAM(s) IV Push every 4 hours PRN  pantoprazole   Suspension 40 milliGRAM(s) Oral daily  senna 2 Tablet(s) Oral at bedtime    IVF:  dextrose 5%. 1000 milliLiter(s) IV Continuous <Continuous>  multivitamin 1 Tablet(s) Oral daily  sodium chloride 2 Gram(s) Oral every 6 hours  sodium chloride 3%. 500 milliLiter(s) IV Continuous <Continuous>    CULTURES:    RADIOLOGY & ADDITIONAL TESTS:      ASSESSMENT:  58y Female s/p    ABDOMINAL HEMATOMA  Handoff  MEWS Score  Anxiety  Hypertension  Breast CA  Stroke  Stroke (cerebrum)  Abdominal hematoma  Craniectomy  Central venous catheter insertion  Previous  section  History of mastectomy, left  ABDOMINAL PAIN      PLAN:  NEURO:  - restart aspirin if cTH ok  - continue   = precedex for agitation   CARDIOVASCULAR:  - SBP <180  PULMONARY:    RENAL:    GI:    HEME:    ID:    ENDO:    DVT PROPHYLAXIS: SQH   [] Venodynes                                [] Heparin/Lovenox    FALL RISK:  [] Low Risk                                    [] Impulsive    DISPOSITION:

## 2018-06-05 NOTE — PROGRESS NOTE ADULT - SUBJECTIVE AND OBJECTIVE BOX
Patient sedated and intubated.    Vital Signs Last 24 Hrs  T(C): 37.7 (05 Jun 2018 09:00), Max: 38.3 (05 Jun 2018 03:00)  T(F): 99.8 (05 Jun 2018 09:00), Max: 101 (05 Jun 2018 03:00)  HR: 68 (05 Jun 2018 09:09) (62 - 108)  BP: 168/89 (05 Jun 2018 09:00) (123/66 - 190/93)  BP(mean): 114 (05 Jun 2018 09:00) (87 - 138)  RR: 17 (05 Jun 2018 09:00) (10 - 22)  SpO2: 99% (05 Jun 2018 09:09) (99% - 100%)    General: NAD  Pulm: Intubated  Abd: soft, ND, unable to elicit tenderness, no right sided masses appreciated  Ext: No edema        LABS:                        7.9    8.4   )-----------( 219      ( 05 Jun 2018 05:16 )             25.7     06-05    148<H>  |  113<H>  |  12  ----------------------------<  190<H>  3.1<L>   |  25  |  0.48<L>    Ca    8.7      05 Jun 2018 05:13  Phos  2.5     06-05  Mg     2.2     06-05

## 2018-06-05 NOTE — PROGRESS NOTE ADULT - ASSESSMENT
58F with   1.  acute cerebrovascular accident involving R MCA / SELINA, malignant infarction, cerebral edema, brain compression s/p decompressive hemicraniectomy (05/31/2018, Dr. Johnson)  2.  Hypertension dyslipidemia   3.  h/o breast CA  4.  CAD  5.  R rectus abdominis intramuscular hematoma  6.  seizures    PLAN:   NEURO: neurochecks q 1h, PRN pain meds tylenol, fentanyl, sedation with Precedex  to RASS of 0   malignant R MCA infarction, s/p DHC:  no ASA for now (will prob start tomorrow or Day 5), hemorrhagic conversion noted  seizure disorder:  continue levetiracetam 1G BID   ICP crises:  s/p C  REHAB:  physical therapy evaluation and management - defer   EARLY MOB:  HOB up, bedrest    PULM:  CPAP 8/5 40%, if on propofol place on AC  CARDIO:  SBP goal 100-180 mm Hg, echo EF 55-60% mod dilated LA, probable AVM, no PFOs, keep central line  ENDO:  Blood sugar goals 140-180 mg/dL, continue insulin sliding scale; continue high-dose statins  GI:  PPI for GI prophylaxis  DIET: jevity to goal of 70  RENAL:  decrease 3%@ 40cc/hr, goal Na > 140-145 recheck BMP this aftenroon;  increase salt tabs 2g q6h  HEM/ONC: s/p transfusion - with adequate response of Hb; repeat CT abd/pelvis no contrast if and when she goes down for CT  VTE Prophylaxis: SCDs only, start SQH if ok with NS, neg dopplers  ID: afebrile, no leukocytosis  Social:  updated yesterday  MISC:  h/o ETOH abuse, continue MVI    I spent 75 minutes of critical care time examining patient, reviewing vitals, labs, medications, imaging and discussing with the team goals of care to prevent life-threatening in this patient who is at high risk for neurological deterioration or death due to:  herniation, seizures, strokes, PE, cardiac arrest.

## 2018-06-05 NOTE — PROGRESS NOTE ADULT - SUBJECTIVE AND OBJECTIVE BOX
NEUROCRITICAL CARE PROGRESS NOTE    VERONIKA CALERO   MRN-[id number]  Summary:  /  HPI:  58F with a hx of left breast cancer s/p left mastectomy with TRAM flap reconstruction (4-5 years prior), recently placed on plavix (about a month ago) by her cardiologist 2/2 calcified arterial disease and is now presenting to the [facility] ED with a painful bulge in her right abdomen.  Pt reports that she's had a cough for several days and yesterday noticed a bulge in her right abdomen that has grown and only become more painful.  She at times has difficulty breathing and some lightheadedness but otherwise denies fevers, chills, chest pain, nausea, vomiting, diarrhea, dysuria.      PMH: HTN, CAD?, HLD, Anxiety, Depression  Meds: bisoprolol 5mg qd, lorazepam 1mg q8?, Plavix 5qd, rosuvastatin, HCTZ, buproprion, baclofen  NKDA  PSH: Left mastectomy with TRAM rotational flap reconstruction  Social: 1/2 bottle of wine most nights, remote cigarette hx, denies IVDA (30 May 2018 13:13)    S/Overnight events:  intubated, on precedex 0.5     EVD:    CSF:    ICPs:      Vital Signs Last 24 Hrs  T(C): 38.3 (2018 03:00), Max: 38.3 (2018 03:00)  T(F): 101 (2018 03:00), Max: 101 (2018 03:00)  HR: 76 (2018 08:00) (62 - 108)  BP: 173/84 (2018 08:00) (123/66 - 190/93)  BP(mean): 113 (2018 08:00) (87 - 138)  RR: 13 (2018 08:00) (10 - 22)  SpO2: 100% (2018 08:00) (99% - 100%)    Mode: CPAP with PS, FiO2: 40, PEEP: 5, PS: 8, MAP: 7.9, PIP: 14    I&O's Detail    2018 07:01  -  2018 07:00  --------------------------------------------------------  IN:    dexmedetomidine Infusion: 118 mL    Enteral Tube Flush: 260 mL    IV PiggyBack: 100 mL    Jevity: 1540 mL    propofol Infusion: 57.6 mL    sodium chloride 3%: 250 mL    sodium chloride 3%: 240 mL    sodium chloride 3%.: 330 mL  Total IN: 2895.6 mL    OUT:  Total OUT: 0 mL    Total NET: 2895.6 mL      2018 07:01  -  2018 09:01  --------------------------------------------------------  IN:    dexmedetomidine Infusion: 10 mL    Jevity: 70 mL    sodium chloride 3%.: 30 mL  Total IN: 110 mL    OUT:  Total OUT: 0 mL    Total NET: 110 mL    LABS:                        7.9    8.4   )-----------( 219      ( 2018 05:16 )             25.7     06-05    148<H>  |  113<H>  |  12  ----------------------------<  190<H>  3.1<L>   |  25  |  0.48<L>    Ca    8.7      2018 05:13  Phos  2.5     06-05  Mg     2.2     06-05    CAPILLARY BLOOD GLUCOSE    POCT Blood Glucose.: 159 mg/dL (2018 16:17)  POCT Blood Glucose.: 152 mg/dL (2018 11:09)    Drug Levels: [] N/A    CSF Analysis: [] N/A    Allergies    No Known Allergies    Intolerances    MEDICATIONS:  Antibiotics:    Neuro:  acetaminophen   Tablet 650 milliGRAM(s) Oral every 6 hours PRN  acetaminophen   Tablet. 650 milliGRAM(s) Oral every 6 hours PRN  dexmedetomidine Infusion 0.2 MICROgram(s)/kG/Hr IV Continuous <Continuous>  fentaNYL Injectable 25 MICROGram(s) IV Push every 2 hours PRN  levETIRAcetam  Solution 1000 milliGRAM(s) Oral two times a day  ondansetron Injectable 4 milliGRAM(s) IV Push every 6 hours PRN    Anticoagulation:  heparin  Injectable 5000 Unit(s) SubCutaneous every 8 hours    OTHER:  atorvastatin 80 milliGRAM(s) Oral at bedtime  chlorhexidine 0.12% Liquid 15 milliLiter(s) Swish and Spit two times a day  dextrose 40% Gel 15 Gram(s) Oral once PRN  dextrose 50% Injectable 12.5 Gram(s) IV Push once  dextrose 50% Injectable 25 Gram(s) IV Push once  dextrose 50% Injectable 25 Gram(s) IV Push once  docusate sodium Liquid 100 milliGRAM(s) Oral two times a day  glucagon  Injectable 1 milliGRAM(s) IntraMuscular once PRN  hydrALAZINE Injectable 10 milliGRAM(s) IV Push every 4 hours PRN  pantoprazole   Suspension 40 milliGRAM(s) Oral daily  senna 2 Tablet(s) Oral at bedtime    IVF:  dextrose 5%. 1000 milliLiter(s) IV Continuous <Continuous>  multivitamin 1 Tablet(s) Oral daily  potassium chloride   Powder 20 milliEquivalent(s) Oral every 2 hours  sodium chloride 2 Gram(s) Oral every 6 hours  sodium chloride 3%. 500 milliLiter(s) IV Continuous <Continuous>    CULTURES:    RADIOLOGY & ADDITIONAL TESTS:    Bacteriology:  CSF studies:  EEG:  Neuroimagin/01 CT head:  no change in small foci of hemorrhage, continued evolution large R MCA infarction small R SELINA territory infarct   CT head: evolving R MCA / SELINA infarction, global mass effect, MLS to L, early L lateral ventricle entrapment, hemorrhagic transformation suspected   CTP:  abnormal perfusion, mismatch volume 12ml   CTA:  acute occlusion R M2   CT head:  acute R MCA infarction, no ICH   CT abd:  large expanding intramuscular hematoma within R rectus abdomin  Other imagin/02 CXR small bilateral effusions    LE Doppler limited exam, NEG    IV FLUIDS: 3%@30cc/hr  DRIPS: precedex  DIET: Jevity 1.2 @ 70  Lines: Cary L SC 3 drains   Drains:  SG 50 70 30  Wounds:    CODE STATUS:  Full Code                       GOALS OF CARE:  aggressive                      DISPOSITION:  ICU

## 2018-06-05 NOTE — PROGRESS NOTE ADULT - ASSESSMENT
58F with a hx of TRAM flap reconstruction on plavix now with a spontaneous right rectus expanding hematoma taken to IR for Rt groin access for dx angio, and rt abd percutaneous thrombin injection of 2 pseudoaneurysms in abd wall c/b herniation of cerebral tonsils, transferred to neurosurgery for decompressive hemicraniectomy 5/31    No signs of increasing hematoma size  No role for surgical intervention at this time  Trend CBC and transfused as needed  No CT abdomen at this time  Team 4c will follow  Discussed with chief resident

## 2018-06-06 LAB
ANION GAP SERPL CALC-SCNC: 10 MMOL/L — SIGNIFICANT CHANGE UP (ref 5–17)
ANION GAP SERPL CALC-SCNC: 12 MMOL/L — SIGNIFICANT CHANGE UP (ref 5–17)
BUN SERPL-MCNC: 11 MG/DL — SIGNIFICANT CHANGE UP (ref 7–23)
BUN SERPL-MCNC: 9 MG/DL — SIGNIFICANT CHANGE UP (ref 7–23)
CALCIUM SERPL-MCNC: 9 MG/DL — SIGNIFICANT CHANGE UP (ref 8.4–10.5)
CALCIUM SERPL-MCNC: 9.4 MG/DL — SIGNIFICANT CHANGE UP (ref 8.4–10.5)
CHLORIDE SERPL-SCNC: 106 MMOL/L — SIGNIFICANT CHANGE UP (ref 96–108)
CHLORIDE SERPL-SCNC: 112 MMOL/L — HIGH (ref 96–108)
CO2 SERPL-SCNC: 25 MMOL/L — SIGNIFICANT CHANGE UP (ref 22–31)
CO2 SERPL-SCNC: 25 MMOL/L — SIGNIFICANT CHANGE UP (ref 22–31)
CREAT SERPL-MCNC: 0.39 MG/DL — LOW (ref 0.5–1.3)
CREAT SERPL-MCNC: 0.43 MG/DL — LOW (ref 0.5–1.3)
GLUCOSE SERPL-MCNC: 116 MG/DL — HIGH (ref 70–99)
GLUCOSE SERPL-MCNC: 145 MG/DL — HIGH (ref 70–99)
GRAM STN FLD: SIGNIFICANT CHANGE UP
HCT VFR BLD CALC: 26.1 % — LOW (ref 34.5–45)
HCT VFR BLD CALC: 27.8 % — LOW (ref 34.5–45)
HGB BLD-MCNC: 8.2 G/DL — LOW (ref 11.5–15.5)
HGB BLD-MCNC: 8.9 G/DL — LOW (ref 11.5–15.5)
MAGNESIUM SERPL-MCNC: 2.2 MG/DL — SIGNIFICANT CHANGE UP (ref 1.6–2.6)
MCHC RBC-ENTMCNC: 28 PG — SIGNIFICANT CHANGE UP (ref 27–34)
MCHC RBC-ENTMCNC: 28.2 PG — SIGNIFICANT CHANGE UP (ref 27–34)
MCHC RBC-ENTMCNC: 31.4 G/DL — LOW (ref 32–36)
MCHC RBC-ENTMCNC: 32 G/DL — SIGNIFICANT CHANGE UP (ref 32–36)
MCV RBC AUTO: 88 FL — SIGNIFICANT CHANGE UP (ref 80–100)
MCV RBC AUTO: 89.1 FL — SIGNIFICANT CHANGE UP (ref 80–100)
PHOSPHATE SERPL-MCNC: 2.7 MG/DL — SIGNIFICANT CHANGE UP (ref 2.5–4.5)
PLATELET # BLD AUTO: 344 K/UL — SIGNIFICANT CHANGE UP (ref 150–400)
PLATELET # BLD AUTO: 403 K/UL — HIGH (ref 150–400)
POTASSIUM SERPL-MCNC: 3.7 MMOL/L — SIGNIFICANT CHANGE UP (ref 3.5–5.3)
POTASSIUM SERPL-MCNC: 3.8 MMOL/L — SIGNIFICANT CHANGE UP (ref 3.5–5.3)
POTASSIUM SERPL-SCNC: 3.7 MMOL/L — SIGNIFICANT CHANGE UP (ref 3.5–5.3)
POTASSIUM SERPL-SCNC: 3.8 MMOL/L — SIGNIFICANT CHANGE UP (ref 3.5–5.3)
RBC # BLD: 2.93 M/UL — LOW (ref 3.8–5.2)
RBC # BLD: 3.16 M/UL — LOW (ref 3.8–5.2)
RBC # FLD: 15.9 % — SIGNIFICANT CHANGE UP (ref 10.3–16.9)
RBC # FLD: 16.3 % — SIGNIFICANT CHANGE UP (ref 10.3–16.9)
SODIUM SERPL-SCNC: 143 MMOL/L — SIGNIFICANT CHANGE UP (ref 135–145)
SODIUM SERPL-SCNC: 147 MMOL/L — HIGH (ref 135–145)
SPECIMEN SOURCE: SIGNIFICANT CHANGE UP
WBC # BLD: 10.3 K/UL — SIGNIFICANT CHANGE UP (ref 3.8–10.5)
WBC # BLD: 13.8 K/UL — HIGH (ref 3.8–10.5)
WBC # FLD AUTO: 10.3 K/UL — SIGNIFICANT CHANGE UP (ref 3.8–10.5)
WBC # FLD AUTO: 13.8 K/UL — HIGH (ref 3.8–10.5)

## 2018-06-06 PROCEDURE — 71045 X-RAY EXAM CHEST 1 VIEW: CPT | Mod: 26

## 2018-06-06 PROCEDURE — 99292 CRITICAL CARE ADDL 30 MIN: CPT | Mod: 24

## 2018-06-06 PROCEDURE — 99291 CRITICAL CARE FIRST HOUR: CPT | Mod: 24

## 2018-06-06 RX ORDER — POLYETHYLENE GLYCOL 3350 17 G/17G
17 POWDER, FOR SOLUTION ORAL AT BEDTIME
Qty: 0 | Refills: 0 | Status: DISCONTINUED | OUTPATIENT
Start: 2018-06-06 | End: 2018-06-12

## 2018-06-06 RX ORDER — POTASSIUM CHLORIDE 20 MEQ
40 PACKET (EA) ORAL ONCE
Qty: 0 | Refills: 0 | Status: COMPLETED | OUTPATIENT
Start: 2018-06-06 | End: 2018-06-06

## 2018-06-06 RX ORDER — HYDRALAZINE HCL 50 MG
10 TABLET ORAL EVERY 4 HOURS
Qty: 0 | Refills: 0 | Status: DISCONTINUED | OUTPATIENT
Start: 2018-06-06 | End: 2018-06-12

## 2018-06-06 RX ORDER — METRONIDAZOLE 500 MG
500 TABLET ORAL
Qty: 0 | Refills: 0 | Status: DISCONTINUED | OUTPATIENT
Start: 2018-06-06 | End: 2018-06-08

## 2018-06-06 RX ORDER — ROBINUL 0.2 MG/ML
0.2 INJECTION INTRAMUSCULAR; INTRAVENOUS DAILY
Qty: 0 | Refills: 0 | Status: DISCONTINUED | OUTPATIENT
Start: 2018-06-06 | End: 2018-06-06

## 2018-06-06 RX ORDER — ACETAMINOPHEN 500 MG
1000 TABLET ORAL ONCE
Qty: 0 | Refills: 0 | Status: COMPLETED | OUTPATIENT
Start: 2018-06-06 | End: 2018-06-06

## 2018-06-06 RX ORDER — ROBINUL 0.2 MG/ML
0.2 INJECTION INTRAMUSCULAR; INTRAVENOUS ONCE
Qty: 0 | Refills: 0 | Status: COMPLETED | OUTPATIENT
Start: 2018-06-06 | End: 2018-06-06

## 2018-06-06 RX ORDER — POTASSIUM CHLORIDE 20 MEQ
40 PACKET (EA) ORAL EVERY 4 HOURS
Qty: 0 | Refills: 0 | Status: COMPLETED | OUTPATIENT
Start: 2018-06-06 | End: 2018-06-06

## 2018-06-06 RX ORDER — LEVETIRACETAM 250 MG/1
1000 TABLET, FILM COATED ORAL ONCE
Qty: 0 | Refills: 0 | Status: COMPLETED | OUTPATIENT
Start: 2018-06-06 | End: 2018-06-06

## 2018-06-06 RX ORDER — SENNA PLUS 8.6 MG/1
10 TABLET ORAL AT BEDTIME
Qty: 0 | Refills: 0 | Status: DISCONTINUED | OUTPATIENT
Start: 2018-06-06 | End: 2018-06-12

## 2018-06-06 RX ADMIN — Medication 400 MILLIGRAM(S): at 15:30

## 2018-06-06 RX ADMIN — HEPARIN SODIUM 5000 UNIT(S): 5000 INJECTION INTRAVENOUS; SUBCUTANEOUS at 22:01

## 2018-06-06 RX ADMIN — Medication 40 MILLIEQUIVALENT(S): at 05:30

## 2018-06-06 RX ADMIN — Medication 1 TABLET(S): at 11:26

## 2018-06-06 RX ADMIN — Medication 1000 MILLIGRAM(S): at 23:00

## 2018-06-06 RX ADMIN — ROBINUL 0.2 MILLIGRAM(S): 0.2 INJECTION INTRAMUSCULAR; INTRAVENOUS at 15:54

## 2018-06-06 RX ADMIN — Medication 100 MILLIGRAM(S): at 18:14

## 2018-06-06 RX ADMIN — LEVETIRACETAM 400 MILLIGRAM(S): 250 TABLET, FILM COATED ORAL at 22:01

## 2018-06-06 RX ADMIN — HEPARIN SODIUM 5000 UNIT(S): 5000 INJECTION INTRAVENOUS; SUBCUTANEOUS at 05:31

## 2018-06-06 RX ADMIN — CHLORHEXIDINE GLUCONATE 15 MILLILITER(S): 213 SOLUTION TOPICAL at 05:32

## 2018-06-06 RX ADMIN — DEXMEDETOMIDINE HYDROCHLORIDE IN 0.9% SODIUM CHLORIDE 3.9 MICROGRAM(S)/KG/HR: 4 INJECTION INTRAVENOUS at 02:00

## 2018-06-06 RX ADMIN — LEVETIRACETAM 1000 MILLIGRAM(S): 250 TABLET, FILM COATED ORAL at 11:26

## 2018-06-06 RX ADMIN — HEPARIN SODIUM 5000 UNIT(S): 5000 INJECTION INTRAVENOUS; SUBCUTANEOUS at 13:50

## 2018-06-06 RX ADMIN — Medication 1000 MILLIGRAM(S): at 16:00

## 2018-06-06 RX ADMIN — Medication 40 MILLIEQUIVALENT(S): at 11:26

## 2018-06-06 RX ADMIN — BISOPROLOL FUMARATE 2.5 MILLIGRAM(S): 10 TABLET, FILM COATED ORAL at 05:30

## 2018-06-06 RX ADMIN — Medication 40 MILLIEQUIVALENT(S): at 02:58

## 2018-06-06 RX ADMIN — Medication 400 MILLIGRAM(S): at 22:29

## 2018-06-06 RX ADMIN — SODIUM CHLORIDE 2 GRAM(S): 9 INJECTION INTRAMUSCULAR; INTRAVENOUS; SUBCUTANEOUS at 05:30

## 2018-06-06 RX ADMIN — Medication 81 MILLIGRAM(S): at 11:26

## 2018-06-06 RX ADMIN — Medication 100 MILLIGRAM(S): at 05:31

## 2018-06-06 RX ADMIN — DEXMEDETOMIDINE HYDROCHLORIDE IN 0.9% SODIUM CHLORIDE 3.9 MICROGRAM(S)/KG/HR: 4 INJECTION INTRAVENOUS at 00:56

## 2018-06-06 NOTE — PROGRESS NOTE ADULT - ASSESSMENT
58F with   1.  acute cerebrovascular accident involving R MCA / SELINA, malignant infarction, cerebral edema, brain compression s/p decompressive hemicraniectomy (05/31/2018, Dr. Johnson)  2.  Hypertension dyslipidemia   3.  h/o breast CA  4.  CAD  5.  R rectus abdominis intramuscular hematoma  6.  seizures    PLAN:   NEURO: neurochecks q 1h, PRN pain meds tylenol, fentanyl, sedation with Precedex  to RASS of 0   malignant R MCA infarction, s/p DHC:  no ASA for now (will prob start tomorrow or Day 5), hemorrhagic conversion noted  seizure disorder:  continue levetiracetam 1G BID   ICP crises:  s/p C  REHAB:  physical therapy evaluation and management - defer   EARLY MOB:  HOB up, bedrest    PULM:  CPAP 8/5 40%, if on propofol place on AC  CARDIO:  SBP goal 100-180 mm Hg, echo EF 55-60% mod dilated LA, probable AVM, no PFOs, keep central line  ENDO:  Blood sugar goals 140-180 mg/dL, continue insulin sliding scale; continue high-dose statins  GI:  PPI for GI prophylaxis  DIET: jevity to goal of 70  RENAL:  decrease 3%@ 40cc/hr, goal Na > 140-145 recheck BMP this aftenroon;  increase salt tabs 2g q6h  HEM/ONC: s/p transfusion - with adequate response of Hb; repeat CT abd/pelvis no contrast if and when she goes down for CT  VTE Prophylaxis: SCDs only, start SQH if ok with NS, neg dopplers  ID: afebrile, no leukocytosis  Social:  updated yesterday  MISC:  h/o ETOH abuse, continue MVI    I spent 75 minutes of critical care time examining patient, reviewing vitals, labs, medications, imaging and discussing with the team goals of care to prevent life-threatening in this patient who is at high risk for neurological deterioration or death due to:  herniation, seizures, strokes, PE, cardiac arrest. 58F with   1.  acute cerebrovascular accident involving R MCA / SELINA, malignant infarction, cerebral edema, brain compression s/p decompressive hemicraniectomy (05/31/2018, Dr. Johnson)  2.  Hypertension dyslipidemia   3.  h/o breast CA  4.  CAD  5.  R rectus abdominis intramuscular hematoma  6.  seizures    PLAN:   NEURO: neurochecks q 1h, PRN pain meds tylenol, hold sedation  malignant R MCA infarction, s/p DHC:  ASA, repeat CT head after ASA,   seizure disorder:  continue levetiracetam 1G BID   ICP crises:  s/p DHC  REHAB:  physical therapy evaluation and management - defer   EARLY MOB:  HOB up, bedrest    PULM:  CPAP 5/5 40%  CARDIO:  SBP goal 100-180 mm Hg, echo EF 55-60% mod dilated LA, probable AVM, no PFOs, remove central line?   ENDO:  Blood sugar goals 140-180 mg/dL, continue insulin sliding scale; continue high-dose statins  GI:  PPI for GI prophylaxis  DIET: jevity to goal of 70  RENAL:  hold 3% goal Na > 140-145 recheck BMP this aftenroon;  increase salt tabs 2g q6h  HEM/ONC: s/p transfusion - with adequate response of Hb; repeat CT abd/pelvis no contrast if and when she goes down for CT  VTE Prophylaxis: SCDs only, start SQH if ok with NS, neg dopplers  ID: afebrile, no leukocytosis  Social:  updated yesterday  MISC:  h/o ETOH abuse, continue MVI    CODE STATUS:  Full Code                      DISPOSITION:  ICU    I spent 75 minutes of critical care time examining patient, reviewing vitals, labs, medications, imaging and discussing with the team goals of care to prevent life-threatening in this patient who is at high risk for neurological deterioration or death due to:  herniation, seizures, strokes, PE, cardiac arrest. 58F with   1.  acute cerebrovascular accident involving R MCA / SELINA, malignant infarction, cerebral edema, brain compression s/p decompressive hemicraniectomy (05/31/2018, Dr. Johnson)  2.  Hypertension dyslipidemia   3.  h/o breast CA  4.  CAD  5.  R rectus abdominis intramuscular hematoma  6.  seizures    PLAN:   NEURO: neurochecks q 1h, PRN pain meds tylenol, hold sedation  malignant R MCA infarction, s/p DHC:  ASA, repeat CT head after ASA,   seizure disorder:  continue levetiracetam 1G BID   ICP crises:  s/p DHC  REHAB:  physical therapy evaluation and management - defer   EARLY MOB:  HOB up, bedrest    PULM:  CPAP 5/5 40%, extubate  CARDIO:  SBP goal 100-180 mm Hg, echo EF 55-60% mod dilated LA, probable AVM, no PFOs, remove central line?   ENDO:  Blood sugar goals 140-180 mg/dL, continue insulin sliding scale; continue high-dose statins  GI:  PPI for GI prophylaxis  DIET: jevity to goal of 70  RENAL:  hold 3% goal Na > 140-145 recheck BMP this aftenroon;  increase salt tabs 2g q6h  HEM/ONC: s/p transfusion - with adequate response of Hb; repeat CT abd/pelvis no contrast if and when she goes down for CT  VTE Prophylaxis: SCDs only, start SQH if ok with NS, neg dopplers  ID: afebrile, no leukocytosis  Social:  updated yesterday  MISC:  h/o ETOH abuse, continue MVI    CODE STATUS:  Full Code                      DISPOSITION:  ICU    I spent 75 minutes of critical care time examining patient, reviewing vitals, labs, medications, imaging and discussing with the team goals of care to prevent life-threatening in this patient who is at high risk for neurological deterioration or death due to:  herniation, seizures, strokes, PE, cardiac arrest.

## 2018-06-06 NOTE — PROGRESS NOTE ADULT - SUBJECTIVE AND OBJECTIVE BOX
S/Overnight events:    No bowel movement overnight.  Hospital Course:   POD#       Vital Signs Last 24 Hrs  T(C): 37 (2018 08:55), Max: 38.1 (2018 14:00)  T(F): 98.6 (2018 08:55), Max: 100.5 (2018 14:00)  HR: 72 (2018 10:00) (56 - 85)  BP: 128/89 (2018 10:00) (128/89 - 179/84)  BP(mean): 106 (2018 10:00) (92 - 125)  RR: 16 (2018 10:00) (9 - 25)  SpO2: 100% (2018 10:00) (99% - 100%)    I&O's Detail    2018 07:01  -  2018 07:00  --------------------------------------------------------  IN:    dexmedetomidine Infusion: 256 mL    Enteral Tube Flush: 150 mL    Jevity: 1500 mL    sodium chloride 3%: 60 mL    sodium chloride 3%.: 315 mL  Total IN: 2281 mL    OUT:    Indwelling Catheter - Urethral: 1 mL  Total OUT: 1 mL    Total NET: 2280 mL        I&O's Summary    2018 07:01  -  2018 07:00  --------------------------------------------------------  IN: 2281 mL / OUT: 1 mL / NET: 2280 mL        PHYSICAL EXAM:  Neurological:    Motor exam:         [] Upper extremity              Bi(c5)  WE(c6)  EE(c7)   FF(c8)                                                R         5/5        5/5        5/5       5/5                                               L          5/5        5/5        5/5       5/5         [] Lower extremeity          HF(l2)   KE(l3)    TA(l4)   EHL(l5)  GS(s1)                                                 R        5/5        5/5        5/5       5/5         5/5                                               L         5/5        5/5       5/5       5/5          5/5                                                        [] warm well perfused; capillary refill <3 seconds     Sensation: [] intact to light touch  [] decreased:       Cardiovascular:  Respiratory:  Gastrointestinal:  Genitourinary:  Extremities:    Incision/Wound:    DEVICE/DRAIN DRESSING:    TUBES/LINES:  [] CVC  [] A-line  [] Lumbar Drain  [] Ventriculostomy  [] Other    DIET:  [] NPO  [] Mechanical  [] Tube feeds    LABS:                        8.2    10.3  )-----------( 344      ( 2018 05:53 )             26.1     06-06    147<H>  |  112<H>  |  11  ----------------------------<  145<H>  3.8   |  25  |  0.43<L>    Ca    9.0      2018 05:52  Phos  2.7     06-06  Mg     2.2     06-06        Urinalysis Basic - ( 2018 09:58 )    Color: Yellow / Appearance: Clear / S.020 / pH: x  Gluc: x / Ketone: NEGATIVE  / Bili: Negative / Urobili: 0.2 E.U./dL   Blood: x / Protein: NEGATIVE mg/dL / Nitrite: NEGATIVE   Leuk Esterase: NEGATIVE / RBC: x / WBC x   Sq Epi: x / Non Sq Epi: x / Bacteria: x          CAPILLARY BLOOD GLUCOSE      POCT Blood Glucose.: 139 mg/dL (2018 11:19)      Drug Levels: [] N/A    CSF Analysis: [] N/A      Allergies    No Known Allergies    Intolerances      MEDICATIONS:  Antibiotics:    Neuro:  acetaminophen   Tablet 650 milliGRAM(s) Oral every 6 hours PRN  acetaminophen   Tablet. 650 milliGRAM(s) Oral every 6 hours PRN  dexmedetomidine Infusion 0.2 MICROgram(s)/kG/Hr IV Continuous <Continuous>  fentaNYL    Injectable 25 MICROGram(s) IV Push every 2 hours PRN  levETIRAcetam  Solution 1000 milliGRAM(s) Oral two times a day  ondansetron Injectable 4 milliGRAM(s) IV Push every 6 hours PRN    Anticoagulation:  aspirin  chewable 81 milliGRAM(s) Oral daily  heparin  Injectable 5000 Unit(s) SubCutaneous every 8 hours    OTHER:  atorvastatin 80 milliGRAM(s) Oral at bedtime  bisoprolol   Tablet 2.5 milliGRAM(s) Oral daily  chlorhexidine 0.12% Liquid 15 milliLiter(s) Swish and Spit two times a day  dextrose 40% Gel 15 Gram(s) Oral once PRN  dextrose 50% Injectable 12.5 Gram(s) IV Push once  dextrose 50% Injectable 25 Gram(s) IV Push once  dextrose 50% Injectable 25 Gram(s) IV Push once  docusate sodium Liquid 100 milliGRAM(s) Oral two times a day  glucagon  Injectable 1 milliGRAM(s) IntraMuscular once PRN  hydrALAZINE Injectable 10 milliGRAM(s) IV Push every 4 hours PRN  pantoprazole   Suspension 40 milliGRAM(s) Oral daily  senna 2 Tablet(s) Oral at bedtime    IVF:  dextrose 5%. 1000 milliLiter(s) IV Continuous <Continuous>  multivitamin 1 Tablet(s) Oral daily  potassium chloride   Powder 40 milliEquivalent(s) Oral once  sodium chloride 2 Gram(s) Oral every 6 hours  sodium chloride 3%. 500 milliLiter(s) IV Continuous <Continuous>    CULTURES:  Culture Results:   No growth at 1 day. ( @ 08:45)  Culture Results:   No growth at 1 day. ( @ 08:45)    RADIOLOGY & ADDITIONAL TESTS:      ASSESSMENT:  58y Female w/ HTN, HLD, Anxiety, Depression, h/o Breast Cancer w/ TRAM flap reconstruction admitted for expanding right rectus hematoma complicated by right MCA infarction now s/p hemicraniectomy POD #6.         PLAN:  NEURO:   -Neuro checks q 1 hr  -Obtain CT head because ASA was started yesterday   -F/u with speech and swallow?    CARDIOVASCULAR:  -If BP goes over 160 treat with Hydralazine     PULMONARY:  -Extubated today   -Continue oral suctioning to prevent aspiration of oral secretions   -Pending sputum culture -    RENAL:  -Stop 3% fluids, then check sodium   -Stop salt tabs    GI:  -Bowel regimen  -NPO  -    HEME:    ID:    ENDO:    DVT PROPHYLAXIS:  [] Venodynes                                [] Heparin/Lovenox    FALL RISK:  [] Low Risk                                    [] Impulsive    DISPOSITION: S/Overnight events:    57 y/o female s/p right hemicraniectomy evaluated at bedside doing well and suctioning herself. No issues overnight, afebrile. Denies any pain upon questioning. No bowel movement overnight. No significant issues reported by nursing.    Hospital Course:   POD#1: emergent crani yesterday, transferred back to [facility], sedated overnight, [facility] with hemorrhagic conversion o/w stable. stable exam overnight. Drains remains to suction. no acute events overnight  POD#2: No seizure activity on EEG. Following commands on right side. Continue DREW x2 and Hemovac x1. Low grade fevers.   POD#3: Tolerating CPAP. Self extubated. Hemovac removed.   POD #4: Re-intubated yesterday, precedex resumed, no acute events overnight  POD#5: Pancultured for fevers. Tolerating CPAP trials. Continues to follow commands on right side. Bedside PT.  POD #6: Extubated without any issues, doing well and suctioning herself. Continues to follow commands on the right side and speaking 3-4 words very slowly. Bedside PT.     Vital Signs Last 24 Hrs  T(C): 37 (2018 08:55), Max: 38.1 (2018 14:00)  T(F): 98.6 (2018 08:55), Max: 100.5 (2018 14:00)  HR: 72 (2018 10:00) (56 - 85)  BP: 128/89 (2018 10:00) (128/89 - 179/84)  BP(mean): 106 (2018 10:00) (92 - 125)  RR: 16 (2018 10:00) (9 - 25)  SpO2: 100% (2018 10:00) (99% - 100%)    I&O's Detail    2018 07:01  -  2018 07:00  --------------------------------------------------------  IN:    dexmedetomidine Infusion: 256 mL    Enteral Tube Flush: 150 mL    Jevity: 1500 mL    sodium chloride 3%: 60 mL    sodium chloride 3%.: 315 mL  Total IN: 2281 mL    OUT:    Indwelling Catheter - Urethral: 1 mL  Total OUT: 1 mL    Total NET: 2280 mL        I&O's Summary    2018 07:01  -  2018 07:00  --------------------------------------------------------  IN: 2281 mL / OUT: 1 mL / NET: 2280 mL        PHYSICAL EXAM:  General: No acute distress. Awake and alert to voice.  HEENT: Mild left facial droop. Midline scalp incision C/D/I with no apparent signs of infection. Right side scalp flap is soft and flat. Pupils are equal, round and reactive to light briskly and with accomodation. +Nasogastric tube.  Neck: Left subclavian, C/D/I with dressing.  Respiratory: Clear lung sounds bilaterally, no wheezes, rales or rhonchi  Cardiovascular: Regular rate and rhythm, S1 and S2 presents, no murmurs, rubs or gallops  Gastrointestinal: No lesions, scars or pulsating mass present, normoactive bowel sounds x4, soft, non-tender and non-distended in all 4 quadrants.  Extremities: Warm, well perfused. Pulses 2+ throughout  Neurological: CNs limited. Follows commands with RUE/RLE. Opens eyes with spontaneously. Minimal LUE withdrawal to noxious stimulus. Triple flexion of LLE.     Incision/Wound:    DEVICE/DRAIN DRESSING:    TUBES/LINES:  [x] CVC  [] A-line  [] Lumbar Drain  [] Ventriculostomy  [] Other    DIET:  [] NPO  [] Mechanical  [] Tube feeds    LABS:                        8.2    10.3  )-----------( 344      ( 2018 05:53 )             26.1     06-06    147<H>  |  112<H>  |  11  ----------------------------<  145<H>  3.8   |  25  |  0.43<L>    Ca    9.0      2018 05:52  Phos  2.7     06-  Mg     2.2     06-06        Urinalysis Basic - ( 2018 09:58 )    Color: Yellow / Appearance: Clear / S.020 / pH: x  Gluc: x / Ketone: NEGATIVE  / Bili: Negative / Urobili: 0.2 E.U./dL   Blood: x / Protein: NEGATIVE mg/dL / Nitrite: NEGATIVE   Leuk Esterase: NEGATIVE / RBC: x / WBC x   Sq Epi: x / Non Sq Epi: x / Bacteria: x          CAPILLARY BLOOD GLUCOSE      POCT Blood Glucose.: 139 mg/dL (2018 11:19)      Drug Levels: [] N/A    CSF Analysis: [] N/A      Allergies    No Known Allergies    Intolerances      MEDICATIONS:  Antibiotics:    Neuro:  acetaminophen   Tablet 650 milliGRAM(s) Oral every 6 hours PRN  acetaminophen   Tablet. 650 milliGRAM(s) Oral every 6 hours PRN  dexmedetomidine Infusion 0.2 MICROgram(s)/kG/Hr IV Continuous <Continuous>  fentaNYL    Injectable 25 MICROGram(s) IV Push every 2 hours PRN  levETIRAcetam  Solution 1000 milliGRAM(s) Oral two times a day  ondansetron Injectable 4 milliGRAM(s) IV Push every 6 hours PRN    Anticoagulation:  aspirin  chewable 81 milliGRAM(s) Oral daily  heparin  Injectable 5000 Unit(s) SubCutaneous every 8 hours    OTHER:  atorvastatin 80 milliGRAM(s) Oral at bedtime  bisoprolol   Tablet 2.5 milliGRAM(s) Oral daily  chlorhexidine 0.12% Liquid 15 milliLiter(s) Swish and Spit two times a day  dextrose 40% Gel 15 Gram(s) Oral once PRN  dextrose 50% Injectable 12.5 Gram(s) IV Push once  dextrose 50% Injectable 25 Gram(s) IV Push once  dextrose 50% Injectable 25 Gram(s) IV Push once  docusate sodium Liquid 100 milliGRAM(s) Oral two times a day  glucagon  Injectable 1 milliGRAM(s) IntraMuscular once PRN  hydrALAZINE Injectable 10 milliGRAM(s) IV Push every 4 hours PRN  pantoprazole   Suspension 40 milliGRAM(s) Oral daily  senna 2 Tablet(s) Oral at bedtime    IVF:  dextrose 5%. 1000 milliLiter(s) IV Continuous <Continuous>  multivitamin 1 Tablet(s) Oral daily  potassium chloride   Powder 40 milliEquivalent(s) Oral once  sodium chloride 2 Gram(s) Oral every 6 hours  sodium chloride 3%. 500 milliLiter(s) IV Continuous <Continuous>    CULTURES:  Culture Results:   No growth at 1 day. ( @ 08:45)  Culture Results:   No growth at 1 day. ( @ 08:45)    RADIOLOGY & ADDITIONAL TESTS:      ASSESSMENT:  58y Female w/ HTN, HLD, Anxiety, Depression, h/o Breast Cancer w/ TRAM flap reconstruction admitted for expanding right rectus hematoma complicated by right MCA infarction now s/p hemicraniectomy POD #6.         PLAN:  NEURO:   -Neuro checks q 1 hr  -Continue Keppra 1g BID for seizure prophylaxis   -Obtain CT head due to ASA being started yesterday   -Continue helmet when out of bed   -F/u with speech and swallow    CARDIOVASCULAR:  -If BP goes over 160 treat with Hydralazine  -Daily labs, electrolyte repletion PRN    PULMONARY:  -Extubated today   -Continue oral suctioning to prevent aspiration of oral secretions   -Pending sputum culture    RENAL:  -Stop 3% fluid, then recheck sodium this afternoon, goal Na >140-145  -Inc salt tablets  -Voiding    GI:  -Continue bowel regimen  -NPO  -Plan for CT abd/pelvis at a later date to evaulate progression   -PPI for GI prophylaxis    ID:  -Continue f/u on WBC trend and temperature     ENDO:  -ISS    DVT PROPHYLAXIS:  -SQH, SCDs    DISPOSITION:   -Continue SICU care  -PT/OT at bedside as tolerated  -D/W Dr. Johnson, Dr. Victor S/Overnight events:    57 y/o female s/p right hemicraniectomy evaluated at bedside doing well and suctioning herself. No issues overnight, afebrile. Denies any pain upon questioning. No bowel movement overnight. No significant issues reported by nursing.    Hospital Course:   POD#1: emergent crani yesterday, transferred back to [facility], sedated overnight, [facility] with hemorrhagic conversion o/w stable. stable exam overnight. Drains remains to suction. no acute events overnight  POD#2: No seizure activity on EEG. Following commands on right side. Continue DREW x2 and Hemovac x1. Low grade fevers.   POD#3: Tolerating CPAP. Self extubated. Hemovac removed.   POD #4: Re-intubated yesterday, precedex resumed, no acute events overnight  POD#5: Pancultured for fevers. Tolerating CPAP trials. Continues to follow commands on right side. Bedside PT.  POD #6: Extubated without any issues, doing well and suctioning herself. Continues to follow commands on the right side and speaking 3-4 words very slowly. Bedside PT.     Vital Signs Last 24 Hrs  T(C): 37 (2018 08:55), Max: 38.1 (2018 14:00)  T(F): 98.6 (2018 08:55), Max: 100.5 (2018 14:00)  HR: 72 (2018 10:00) (56 - 85)  BP: 128/89 (2018 10:00) (128/89 - 179/84)  BP(mean): 106 (2018 10:00) (92 - 125)  RR: 16 (2018 10:00) (9 - 25)  SpO2: 100% (2018 10:00) (99% - 100%)    I&O's Detail    2018 07:01  -  2018 07:00  --------------------------------------------------------  IN:    dexmedetomidine Infusion: 256 mL    Enteral Tube Flush: 150 mL    Jevity: 1500 mL    sodium chloride 3%: 60 mL    sodium chloride 3%.: 315 mL  Total IN: 2281 mL    OUT:    Indwelling Catheter - Urethral: 1 mL  Total OUT: 1 mL    Total NET: 2280 mL        I&O's Summary    2018 07:01  -  2018 07:00  --------------------------------------------------------  IN: 2281 mL / OUT: 1 mL / NET: 2280 mL        PHYSICAL EXAM:  General: No acute distress. Awake and alert to voice.  HEENT: Mild left facial droop. Midline scalp incision C/D/I with no apparent signs of infection. Right side scalp flap is soft and flat. Pupils are equal, round and reactive to light briskly and with accomodation. +Nasogastric tube.  Neck: Left subclavian, C/D/I with dressing.  Respiratory: Clear lung sounds bilaterally, no wheezes, rales or rhonchi  Cardiovascular: Regular rate and rhythm, S1 and S2 presents, no murmurs, rubs or gallops  Gastrointestinal: No lesions, scars or pulsating mass present, normoactive bowel sounds x4, soft, non-tender and non-distended in all 4 quadrants.  : Strong fishy odor  Extremities: Warm, well perfused. Pulses 2+ throughout  Neurological: CNs limited. Follows commands with RUE/RLE. Opens eyes with spontaneously. Minimal LUE withdrawal to noxious stimulus. Triple flexion of LLE.     Incision/Wound:  Midline scalp incision C/D/I    DEVICE/DRAIN DRESSING:    TUBES/LINES:  [x] CVC  [] A-line  [] Lumbar Drain  [] Ventriculostomy  [] Other    DIET:  [X] NPO  [] Mechanical  [] Tube feeds    LABS:                        8.2    10.3  )-----------( 344      ( 2018 05:53 )             26.1     06-06    147<H>  |  112<H>  |  11  ----------------------------<  145<H>  3.8   |  25  |  0.43<L>    Ca    9.0      2018 05:52  Phos  2.7     06-06  Mg     2.2     06-06        Urinalysis Basic - ( 2018 09:58 )    Color: Yellow / Appearance: Clear / S.020 / pH: x  Gluc: x / Ketone: NEGATIVE  / Bili: Negative / Urobili: 0.2 E.U./dL   Blood: x / Protein: NEGATIVE mg/dL / Nitrite: NEGATIVE   Leuk Esterase: NEGATIVE / RBC: x / WBC x   Sq Epi: x / Non Sq Epi: x / Bacteria: x          CAPILLARY BLOOD GLUCOSE      POCT Blood Glucose.: 139 mg/dL (2018 11:19)      Drug Levels: [] N/A    CSF Analysis: [] N/A      Allergies    No Known Allergies    Intolerances      MEDICATIONS:  Antibiotics:    Neuro:  acetaminophen   Tablet 650 milliGRAM(s) Oral every 6 hours PRN  acetaminophen   Tablet. 650 milliGRAM(s) Oral every 6 hours PRN  dexmedetomidine Infusion 0.2 MICROgram(s)/kG/Hr IV Continuous <Continuous>  fentaNYL    Injectable 25 MICROGram(s) IV Push every 2 hours PRN  levETIRAcetam  Solution 1000 milliGRAM(s) Oral two times a day  ondansetron Injectable 4 milliGRAM(s) IV Push every 6 hours PRN    Anticoagulation:  aspirin  chewable 81 milliGRAM(s) Oral daily  heparin  Injectable 5000 Unit(s) SubCutaneous every 8 hours    OTHER:  atorvastatin 80 milliGRAM(s) Oral at bedtime  bisoprolol   Tablet 2.5 milliGRAM(s) Oral daily  chlorhexidine 0.12% Liquid 15 milliLiter(s) Swish and Spit two times a day  dextrose 40% Gel 15 Gram(s) Oral once PRN  dextrose 50% Injectable 12.5 Gram(s) IV Push once  dextrose 50% Injectable 25 Gram(s) IV Push once  dextrose 50% Injectable 25 Gram(s) IV Push once  docusate sodium Liquid 100 milliGRAM(s) Oral two times a day  glucagon  Injectable 1 milliGRAM(s) IntraMuscular once PRN  hydrALAZINE Injectable 10 milliGRAM(s) IV Push every 4 hours PRN  pantoprazole   Suspension 40 milliGRAM(s) Oral daily  senna 2 Tablet(s) Oral at bedtime    IVF:  dextrose 5%. 1000 milliLiter(s) IV Continuous <Continuous>  multivitamin 1 Tablet(s) Oral daily  potassium chloride   Powder 40 milliEquivalent(s) Oral once  sodium chloride 2 Gram(s) Oral every 6 hours  sodium chloride 3%. 500 milliLiter(s) IV Continuous <Continuous>    CULTURES:  Culture Results:   No growth at 1 day. ( @ 08:45)  Culture Results:   No growth at 1 day. ( @ 08:45)    RADIOLOGY & ADDITIONAL TESTS:      ASSESSMENT:  58y Female w/ HTN, HLD, Anxiety, Depression, h/o Breast Cancer w/ TRAM flap reconstruction admitted for expanding right rectus hematoma complicated by right MCA infarction now s/p hemicraniectomy POD #6.         PLAN:  NEURO:   -Neuro checks q 1 hr  -Continue Keppra 1g BID for seizure prophylaxis   -Obtain CT head due to ASA being started yesterday   -Continue helmet when out of bed   -F/u with speech and swallow    CARDIOVASCULAR:  -If BP goes over 160 treat with Hydralazine  -Daily labs, electrolyte repletion PRN    PULMONARY:  -Extubated today   -Continue oral suctioning to prevent aspiration of oral secretions   -Pending sputum culture    RENAL:  -Stop 3% fluid, then recheck sodium this afternoon, goal Na >140-145  -Inc salt tablets  -Voiding    GI:  -Continue bowel regimen  -NPO until f/u with speech and swallow  -Plan for CT abd/pelvis at a later date to evaluate progression   -PPI for GI prophylaxis    :  -Vaginal swab for possible bacterial vaginosis  -Begin Flagyl for possible bacterial vaginosis    ID:  -Continue f/u on WBC trend and temperature     ENDO:  -ISS    DVT PROPHYLAXIS:  -SQH, SCDs    DISPOSITION:   -Continue SICU care  -PT/OT at bedside as tolerated  -D/W Dr. Johnson, Dr. Victor S/Overnight events:    59 y/o female s/p right hemicraniectomy evaluated at bedside doing well and suctioning herself. No issues overnight, afebrile. Denies any pain upon questioning. No bowel movement overnight. No significant issues reported by nursing.    Hospital Course:   POD#1: emergent crani yesterday, transferred back to [facility], sedated overnight, [facility] with hemorrhagic conversion o/w stable. stable exam overnight. Drains remains to suction. no acute events overnight  POD#2: No seizure activity on EEG. Following commands on right side. Continue DREW x2 and Hemovac x1. Low grade fevers.   POD#3: Tolerating CPAP. Self extubated. Hemovac removed.   POD #4: Re-intubated yesterday, precedex resumed, no acute events overnight  POD#5: Pancultured for fevers. Tolerating CPAP trials. Continues to follow commands on right side. Bedside PT.  POD #6: Extubated without any issues, doing well and suctioning herself. Continues to follow commands on the right side and speaking 3-4 words very slowly. Bedside PT.     Vital Signs Last 24 Hrs  T(C): 37 (2018 08:55), Max: 38.1 (2018 14:00)  T(F): 98.6 (2018 08:55), Max: 100.5 (2018 14:00)  HR: 72 (2018 10:00) (56 - 85)  BP: 128/89 (2018 10:00) (128/89 - 179/84)  BP(mean): 106 (2018 10:00) (92 - 125)  RR: 16 (2018 10:00) (9 - 25)  SpO2: 100% (2018 10:00) (99% - 100%)    I&O's Detail    2018 07:01  -  2018 07:00  --------------------------------------------------------  IN:    dexmedetomidine Infusion: 256 mL    Enteral Tube Flush: 150 mL    Jevity: 1500 mL    sodium chloride 3%: 60 mL    sodium chloride 3%.: 315 mL  Total IN: 2281 mL    OUT:    Indwelling Catheter - Urethral: 1 mL  Total OUT: 1 mL    Total NET: 2280 mL        I&O's Summary    2018 07:01  -  2018 07:00  --------------------------------------------------------  IN: 2281 mL / OUT: 1 mL / NET: 2280 mL        PHYSICAL EXAM:  General: No acute distress. Awake and alert to voice.  HEENT: Mild left facial droop. Midline scalp incision C/D/I with no apparent signs of infection. Right side scalp flap is soft and flat. Pupils are equal, round and reactive to light briskly and with accomodation. +Nasogastric tube.  Neck: Left subclavian, C/D/I with dressing.  Respiratory: Clear lung sounds bilaterally, no wheezes, rales or rhonchi  Cardiovascular: Regular rate and rhythm, S1 and S2 presents, no murmurs, rubs or gallops  Gastrointestinal: No lesions, scars or pulsating mass present, normoactive bowel sounds x4, soft, non-tender and non-distended in all 4 quadrants.  : Strong fishy odor  Extremities: Warm, well perfused. Pulses 2+ throughout  Neurological: CNs limited. Follows commands with RUE/RLE. Opens eyes with spontaneously. Minimal LUE withdrawal to noxious stimulus. Triple flexion of LLE.     Incision/Wound:  Midline scalp incision C/D/I    DEVICE/DRAIN DRESSING:    TUBES/LINES:  [x] CVC  [] A-line  [] Lumbar Drain  [] Ventriculostomy  [] Other    DIET:  [X] NPO  [] Mechanical  [] Tube feeds    LABS:                        8.2    10.3  )-----------( 344      ( 2018 05:53 )             26.1     06-06    147<H>  |  112<H>  |  11  ----------------------------<  145<H>  3.8   |  25  |  0.43<L>    Ca    9.0      2018 05:52  Phos  2.7     06-06  Mg     2.2     06-06        Urinalysis Basic - ( 2018 09:58 )    Color: Yellow / Appearance: Clear / S.020 / pH: x  Gluc: x / Ketone: NEGATIVE  / Bili: Negative / Urobili: 0.2 E.U./dL   Blood: x / Protein: NEGATIVE mg/dL / Nitrite: NEGATIVE   Leuk Esterase: NEGATIVE / RBC: x / WBC x   Sq Epi: x / Non Sq Epi: x / Bacteria: x          CAPILLARY BLOOD GLUCOSE      POCT Blood Glucose.: 139 mg/dL (2018 11:19)      Drug Levels: [] N/A    CSF Analysis: [] N/A      Allergies    No Known Allergies    Intolerances      MEDICATIONS:  Antibiotics:    Neuro:  acetaminophen   Tablet 650 milliGRAM(s) Oral every 6 hours PRN  acetaminophen   Tablet. 650 milliGRAM(s) Oral every 6 hours PRN  dexmedetomidine Infusion 0.2 MICROgram(s)/kG/Hr IV Continuous <Continuous>  fentaNYL    Injectable 25 MICROGram(s) IV Push every 2 hours PRN  levETIRAcetam  Solution 1000 milliGRAM(s) Oral two times a day  ondansetron Injectable 4 milliGRAM(s) IV Push every 6 hours PRN    Anticoagulation:  aspirin  chewable 81 milliGRAM(s) Oral daily  heparin  Injectable 5000 Unit(s) SubCutaneous every 8 hours    OTHER:  atorvastatin 80 milliGRAM(s) Oral at bedtime  bisoprolol   Tablet 2.5 milliGRAM(s) Oral daily  chlorhexidine 0.12% Liquid 15 milliLiter(s) Swish and Spit two times a day  dextrose 40% Gel 15 Gram(s) Oral once PRN  dextrose 50% Injectable 12.5 Gram(s) IV Push once  dextrose 50% Injectable 25 Gram(s) IV Push once  dextrose 50% Injectable 25 Gram(s) IV Push once  docusate sodium Liquid 100 milliGRAM(s) Oral two times a day  glucagon  Injectable 1 milliGRAM(s) IntraMuscular once PRN  hydrALAZINE Injectable 10 milliGRAM(s) IV Push every 4 hours PRN  pantoprazole   Suspension 40 milliGRAM(s) Oral daily  senna 2 Tablet(s) Oral at bedtime    IVF:  dextrose 5%. 1000 milliLiter(s) IV Continuous <Continuous>  multivitamin 1 Tablet(s) Oral daily  potassium chloride   Powder 40 milliEquivalent(s) Oral once  sodium chloride 2 Gram(s) Oral every 6 hours  sodium chloride 3%. 500 milliLiter(s) IV Continuous <Continuous>    CULTURES:  Culture Results:   No growth at 1 day. ( @ 08:45)  Culture Results:   No growth at 1 day. ( @ 08:45)    RADIOLOGY & ADDITIONAL TESTS:      ASSESSMENT:  58y Female w/ HTN, HLD, Anxiety, Depression, h/o Breast Cancer w/ TRAM flap reconstruction admitted for expanding right rectus hematoma complicated by right MCA infarction now s/p hemicraniectomy POD #6.         PLAN:  NEURO:   -Neuro checks q 1 hr  -Continue Keppra 1g BID for seizure prophylaxis   -Obtain CT head due to ASA being started yesterday   -Continue helmet when out of bed   -F/u with speech and swallow  -    CARDIOVASCULAR:  -If BP goes over 160 treat with Hydralazine  -Daily labs, electrolyte repletion PRN    PULMONARY:  -Extubated today   -Continue oral suctioning to prevent aspiration of oral secretions   -Pending sputum culture    RENAL:  -Stop 3% fluid, then recheck sodium this afternoon, goal Na >140-145  -Inc salt tablets  -Voiding    GI:  -Continue bowel regimen  -NPO until f/u with speech and swallow  -Plan for CT abd/pelvis at a later date to evaluate progression   -PPI for GI prophylaxis    :  -Vaginal swab for possible bacterial vaginosis  -Begin Flagyl for possible bacterial vaginosis    ID:  -Continue f/u on WBC trend and temperature     ENDO:  -ISS    DVT PROPHYLAXIS:  -SQH, SCDs    DISPOSITION:   -Continue SICU care  -PT/OT at bedside as tolerated  -D/W Dr. Johnson, Dr. Victor S/Overnight events:    57 y/o female s/p right hemicraniectomy evaluated at bedside doing well and suctioning herself. No issues overnight, afebrile. Denies any pain upon questioning. No bowel movement overnight. No significant issues reported by nursing.    Hospital Course:   POD#1: emergent crani yesterday, transferred back to [facility], sedated overnight, [facility] with hemorrhagic conversion o/w stable. stable exam overnight. Drains remains to suction. no acute events overnight  POD#2: No seizure activity on EEG. Following commands on right side. Continue DREW x2 and Hemovac x1. Low grade fevers.   POD#3: Tolerating CPAP. Self extubated. Hemovac removed.   POD #4: Re-intubated yesterday, precedex resumed, no acute events overnight  POD#5: Pancultured for fevers. Tolerating CPAP trials. Continues to follow commands on right side. Bedside PT.  POD #6: Extubated without any issues, doing well and suctioning herself. Continues to follow commands on the right side and speaking 3-4 words very slowly. Bedside PT.     Vital Signs Last 24 Hrs  T(C): 37 (2018 08:55), Max: 38.1 (2018 14:00)  T(F): 98.6 (2018 08:55), Max: 100.5 (2018 14:00)  HR: 72 (2018 10:00) (56 - 85)  BP: 128/89 (2018 10:00) (128/89 - 179/84)  BP(mean): 106 (2018 10:00) (92 - 125)  RR: 16 (2018 10:00) (9 - 25)  SpO2: 100% (2018 10:00) (99% - 100%)    I&O's Detail    2018 07:01  -  2018 07:00  --------------------------------------------------------  IN:    dexmedetomidine Infusion: 256 mL    Enteral Tube Flush: 150 mL    Jevity: 1500 mL    sodium chloride 3%: 60 mL    sodium chloride 3%.: 315 mL  Total IN: 2281 mL    OUT:    Indwelling Catheter - Urethral: 1 mL  Total OUT: 1 mL    Total NET: 2280 mL        I&O's Summary    2018 07:01  -  2018 07:00  --------------------------------------------------------  IN: 2281 mL / OUT: 1 mL / NET: 2280 mL        PHYSICAL EXAM:  General: No acute distress. Awake and alert to voice.  HEENT: Mild left facial droop. Midline scalp incision C/D/I with no apparent signs of infection. Right side scalp flap is soft and flat. Pupils are equal, round and reactive to light briskly and with accomodation. +Nasogastric tube.  Neck: Left subclavian, C/D/I with dressing.  Respiratory: Clear lung sounds bilaterally, no wheezes, rales or rhonchi  Cardiovascular: Regular rate and rhythm, S1 and S2 presents, no murmurs, rubs or gallops  Gastrointestinal: No lesions, scars or pulsating mass present, normoactive bowel sounds x4, soft, non-tender and non-distended in all 4 quadrants.  : Strong fishy odor  Extremities:  Warm, well perfused. Pulses 2+ throughout  Neurological: CNs limited. Follows commands with RUE/RLE. Opens eyes with spontaneously. Minimal LUE withdrawal to noxious stimulus. Triple flexion of LLE.     Incision/Wound:  Midline scalp incision C/D/I    DEVICE/DRAIN DRESSING:    TUBES/LINES:  [x] CVC  [] A-line  [] Lumbar Drain  [] Ventriculostomy  [] Other    DIET:  [X] NPO  [] Mechanical  [] Tube feeds    LABS:                        8.2    10.3  )-----------( 344      ( 2018 05:53 )             26.1     06-06    147<H>  |  112<H>  |  11  ----------------------------<  145<H>  3.8   |  25  |  0.43<L>    Ca    9.0      2018 05:52  Phos  2.7     06-06  Mg     2.2     06-06        Urinalysis Basic - ( 2018 09:58 )    Color: Yellow / Appearance: Clear / S.020 / pH: x  Gluc: x / Ketone: NEGATIVE  / Bili: Negative / Urobili: 0.2 E.U./dL   Blood: x / Protein: NEGATIVE mg/dL / Nitrite: NEGATIVE   Leuk Esterase: NEGATIVE / RBC: x / WBC x   Sq Epi: x / Non Sq Epi: x / Bacteria: x          CAPILLARY BLOOD GLUCOSE      POCT Blood Glucose.: 139 mg/dL (2018 11:19)      Drug Levels: [] N/A    CSF Analysis: [] N/A      Allergies    No Known Allergies    Intolerances      MEDICATIONS:  Antibiotics:    Neuro:  acetaminophen   Tablet 650 milliGRAM(s) Oral every 6 hours PRN  acetaminophen   Tablet. 650 milliGRAM(s) Oral every 6 hours PRN  dexmedetomidine Infusion 0.2 MICROgram(s)/kG/Hr IV Continuous <Continuous>  fentaNYL    Injectable 25 MICROGram(s) IV Push every 2 hours PRN  levETIRAcetam  Solution 1000 milliGRAM(s) Oral two times a day  ondansetron Injectable 4 milliGRAM(s) IV Push every 6 hours PRN    Anticoagulation:  aspirin  chewable 81 milliGRAM(s) Oral daily  heparin  Injectable 5000 Unit(s) SubCutaneous every 8 hours    OTHER:  atorvastatin 80 milliGRAM(s) Oral at bedtime  bisoprolol   Tablet 2.5 milliGRAM(s) Oral daily  chlorhexidine 0.12% Liquid 15 milliLiter(s) Swish and Spit two times a day  dextrose 40% Gel 15 Gram(s) Oral once PRN  dextrose 50% Injectable 12.5 Gram(s) IV Push once  dextrose 50% Injectable 25 Gram(s) IV Push once  dextrose 50% Injectable 25 Gram(s) IV Push once  docusate sodium Liquid 100 milliGRAM(s) Oral two times a day  glucagon  Injectable 1 milliGRAM(s) IntraMuscular once PRN  hydrALAZINE Injectable 10 milliGRAM(s) IV Push every 4 hours PRN  pantoprazole   Suspension 40 milliGRAM(s) Oral daily  senna 2 Tablet(s) Oral at bedtime    IVF:  dextrose 5%. 1000 milliLiter(s) IV Continuous <Continuous>  multivitamin 1 Tablet(s) Oral daily  potassium chloride   Powder 40 milliEquivalent(s) Oral once  sodium chloride 2 Gram(s) Oral every 6 hours  sodium chloride 3%. 500 milliLiter(s) IV Continuous <Continuous>    CULTURES:  Culture Results:   No growth at 1 day. ( @ 08:45)  Culture Results:   No growth at 1 day. ( @ 08:45)    RADIOLOGY & ADDITIONAL TESTS:      ASSESSMENT:  58y Female w/ HTN, HLD, Anxiety, Depression, h/o Breast Cancer w/ TRAM flap reconstruction admitted for expanding right rectus hematoma complicated by right MCA infarction now s/p hemicraniectomy POD #6.         PLAN:  NEURO:   -Neuro checks q 1 hr  -Continue Keppra 1g BID for seizure prophylaxis   -Obtain CT head due to ASA being started yesterday   -Continue helmet when out of bed   -F/u with speech and swallow    CARDIOVASCULAR:  -If BP goes over 160 treat with Hydralazine  -Daily labs, electrolyte repletion PRN    PULMONARY:  -Extubated today   -Continue oral suctioning to prevent aspiration of oral secretions   -Pending sputum culture    RENAL:  -Stop 3% fluid, then recheck sodium this afternoon, goal Na >140-145  -Inc salt tablets  -Voiding    GI:  -Continue bowel regimen  -NPO until f/u with speech and swallow  -Plan for CT abd/pelvis to evaluate progression   -PPI for GI prophylaxis    :  -Vaginal swab for possible bacterial vaginosis  -Begin Flagyl for possible bacterial vaginosis    ID:  -Continue f/u on WBC trend and temperature     ENDO:  -ISS    DVT PROPHYLAXIS:  -SQH, SCDs    DISPOSITION:   -Continue SICU care  -PT/OT at bedside as tolerated  -D/W Dr. Johnson, Dr. Victor S/Overnight events:    57 y/o female s/p right hemicraniectomy evaluated at bedside doing well and suctioning herself. No issues overnight, afebrile. Denies any pain upon questioning. No bowel movement overnight. No significant issues reported by nursing.    Hospital Course:   POD#1: emergent crani yesterday, transferred back to [facility], sedated overnight, rpt CT with hemorrhagic conversion o/w stable. stable exam overnight. Drains remains to suction. no acute events overnight  POD#2: No seizure activity on EEG. Following commands on right side. Continue DREW x2 and Hemovac x1. Low grade fevers.   POD#3: Tolerating CPAP. Self extubated. Hemovac removed.   POD #4: Re-intubated yesterday, precedex resumed, no acute events overnight  POD#5: Pancultured for fevers. Tolerating CPAP trials. Continues to follow commands on right side. Bedside PT.  POD #6: Extubated without any issues, doing well and suctioning herself. Continues to follow commands on the right side and speaking 3-4 words very slowly. Bedside PT. Continued to be on 3% at 15 overnight. CTH done yesterday and was stable     Vital Signs Last 24 Hrs  T(C): 37 (2018 08:55), Max: 38.1 (2018 14:00)  T(F): 98.6 (2018 08:55), Max: 100.5 (2018 14:00)  HR: 72 (2018 10:00) (56 - 85)  BP: 128/89 (2018 10:00) (128/89 - 179/84)  BP(mean): 106 (2018 10:00) (92 - 125)  RR: 16 (2018 10:00) (9 - 25)  SpO2: 100% (2018 10:00) (99% - 100%)    I&O's Detail    2018 07:01  -  2018 07:00  --------------------------------------------------------  IN:    dexmedetomidine Infusion: 256 mL    Enteral Tube Flush: 150 mL    Jevity: 1500 mL    sodium chloride 3%: 60 mL    sodium chloride 3%.: 315 mL  Total IN: 2281 mL    OUT:    Indwelling Catheter - Urethral: 1 mL  Total OUT: 1 mL    Total NET: 2280 mL      I&O's Summary    2018 07:01  -  2018 07:00  --------------------------------------------------------  IN: 2281 mL / OUT: 1 mL / NET: 2280 mL        PHYSICAL EXAM:  General: No acute distress. Awake and alert to voice.  HEENT: Mild left facial droop. Midline scalp incision C/D/I with no apparent signs of infection. Right side scalp flap is soft and flat. Pupils are equal, round and reactive to light briskly and with accomodation. +Nasogastric tube.  Neck: Left subclavian, C/D/I with dressing.  Respiratory: Clear lung sounds bilaterally, no wheezes, rales or rhonchi  Cardiovascular: Regular rate and rhythm, S1 and S2 presents, no murmurs, rubs or gallops  Gastrointestinal: No lesions, scars or pulsating mass present, normoactive bowel sounds x4, soft, non-tender and non-distended in all 4 quadrants.  : Strong fishy odor  Extremities:  Warm, well perfused. Pulses 2+ throughout  Neurological: CNs limited. Follows commands with RUE/RLE. Opens eyes with spontaneously. Minimal LUE withdrawal to noxious stimulus. Triple flexion of LLE.   AAOX3 with choices, hypophonic,   Motor: Minimal LUE withdrawal to noxious stimulus. Triple flexion of LLE.   RUE spontanous, RLE spontaneous     Incision/Wound: Midline scalp incision C/D/I with no apparent signs of infection. Right side scalp flap is soft and flat.   Midline scalp incision C/D/I    DEVICE/DRAIN DRESSING:    TUBES/LINES:  [x] CVC  [] A-line  [] Lumbar Drain  [] Ventriculostomy  [] Other    DIET:  [X] NPO  [] Mechanical  [] Tube feeds    LABS:                        8.2    10.3  )-----------( 344      ( 2018 05:53 )             26.1     06-06    147<H>  |  112<H>  |  11  ----------------------------<  145<H>  3.8   |  25  |  0.43<L>    Ca    9.0      2018 05:52  Phos  2.7     06-06  Mg     2.2     06-06        Urinalysis Basic - ( 2018 09:58 )    Color: Yellow / Appearance: Clear / S.020 / pH: x  Gluc: x / Ketone: NEGATIVE  / Bili: Negative / Urobili: 0.2 E.U./dL   Blood: x / Protein: NEGATIVE mg/dL / Nitrite: NEGATIVE   Leuk Esterase: NEGATIVE / RBC: x / WBC x   Sq Epi: x / Non Sq Epi: x / Bacteria: x          CAPILLARY BLOOD GLUCOSE      POCT Blood Glucose.: 139 mg/dL (2018 11:19)      Drug Levels: [] N/A    CSF Analysis: [] N/A      Allergies    No Known Allergies    Intolerances      MEDICATIONS:  Antibiotics:    Neuro:  acetaminophen   Tablet 650 milliGRAM(s) Oral every 6 hours PRN  acetaminophen   Tablet. 650 milliGRAM(s) Oral every 6 hours PRN  dexmedetomidine Infusion 0.2 MICROgram(s)/kG/Hr IV Continuous <Continuous>  fentaNYL    Injectable 25 MICROGram(s) IV Push every 2 hours PRN  levETIRAcetam  Solution 1000 milliGRAM(s) Oral two times a day  ondansetron Injectable 4 milliGRAM(s) IV Push every 6 hours PRN    Anticoagulation:  aspirin  chewable 81 milliGRAM(s) Oral daily  heparin  Injectable 5000 Unit(s) SubCutaneous every 8 hours    OTHER:  atorvastatin 80 milliGRAM(s) Oral at bedtime  bisoprolol   Tablet 2.5 milliGRAM(s) Oral daily  chlorhexidine 0.12% Liquid 15 milliLiter(s) Swish and Spit two times a day  dextrose 40% Gel 15 Gram(s) Oral once PRN  dextrose 50% Injectable 12.5 Gram(s) IV Push once  dextrose 50% Injectable 25 Gram(s) IV Push once  dextrose 50% Injectable 25 Gram(s) IV Push once  docusate sodium Liquid 100 milliGRAM(s) Oral two times a day  glucagon  Injectable 1 milliGRAM(s) IntraMuscular once PRN  hydrALAZINE Injectable 10 milliGRAM(s) IV Push every 4 hours PRN  pantoprazole   Suspension 40 milliGRAM(s) Oral daily  senna 2 Tablet(s) Oral at bedtime    IVF:  dextrose 5%. 1000 milliLiter(s) IV Continuous <Continuous>  multivitamin 1 Tablet(s) Oral daily  potassium chloride   Powder 40 milliEquivalent(s) Oral once  sodium chloride 2 Gram(s) Oral every 6 hours  sodium chloride 3%. 500 milliLiter(s) IV Continuous <Continuous>    CULTURES:  Culture Results:   No growth at 1 day. ( @ 08:45)  Culture Results:   No growth at 1 day. ( @ 08:45)    RADIOLOGY & ADDITIONAL TESTS:      ASSESSMENT:  58y Female w/ HTN, HLD, Anxiety, Depression, h/o Breast Cancer w/ TRAM flap reconstruction admitted for expanding right rectus hematoma complicated by right MCA infarction now s/p hemicraniectomy POD #6.         PLAN:  NEURO:   -Neuro checks q 1 hr  -Continue Keppra 1g BID for seizure prophylaxis   -Obtain CT head due to ASA being started yesterday   -Continue helmet when out of bed   -F/u with speech and swallow    CARDIOVASCULAR:  -If BP goes over 160 treat with Hydralazine  -Daily labs, electrolyte repletion PRN    PULMONARY:  -Extubated today   -Continue oral suctioning to prevent aspiration of oral secretions, chest PT   - Robinul today     RENAL:  - 3% dc'd ; f/u BMP   - Na+ goal 140-145  - dc salt tab   -Voiding    GI:  -Continue bowel regimen  -NPO until f/u with speech and swallow  -Plan for CT abd/pelvis to evaluate progression   -PPI for GI prophylaxis    :  -Vaginal swab for possible bacterial vaginosis  -Begin Flagyl for possible bacterial vaginosis    ID:  -Continue f/u on WBC trend and temperature   - Pending sputum culture   - f/u bcx     ENDO:  -ISS    DVT PROPHYLAXIS:  -SQH, SCDs    DISPOSITION:   -Continue SICU care    -PT/OT at bedside as tolerated         -D/W Dr. Johnson, Dr. Victor

## 2018-06-06 NOTE — SWALLOW BEDSIDE ASSESSMENT ADULT - ADDITIONAL RECOMMENDATIONS
Patient received sitting up in bed, lethargic appearing.  Close friend at bedside.  Pt. pleasant and cooperative.  Able to follow commands.    Patient also able to verbally participated in this evaluation.  Significant (L) gaze preference evident.  OM exam revealed (L) sided facial droop.  Tongue at midline and able to lateralize.  Vocal quality dysphonic with reduced respiratory support for adequate volume.  Tolerating O2 via NC.  Patient accepted 3x ice chips.  Oral phase grossly adequate for these limited trials.  Cannot R/O pharyngeal swallow delay.  Laryngeal elevation reduced and sluggish per palpation.  Cough after ice chip trials.  Cough response seemed to be ineffective.  Increased WOB also demonstrated.  Educated patient re: results and recs.  Will closely follow.  Maintain NPO, routine oral care.

## 2018-06-06 NOTE — SWALLOW BEDSIDE ASSESSMENT ADULT - SPECIFY REASON(S)
This is a 58 y.o female s/p acute CVA involving (R) MCA/SELINA, malignant infarction, cerebral edema s/p decompressive craniectomy 5/31.

## 2018-06-06 NOTE — PROGRESS NOTE ADULT - SUBJECTIVE AND OBJECTIVE BOX
NEUROCRITICAL CARE PROGRESS NOTE    VERONIKA CALERO   [id number]  Summary:  /  HPI:  58F with a hx of left breast cancer s/p left mastectomy with TRAM flap reconstruction (4-5 years prior), recently placed on plavix (about a month ago) by her cardiologist 2/2 calcified arterial disease and is now presenting to the [facility] ED with a painful bulge in her right abdomen.  Pt reports that she's had a cough for several days and yesterday noticed a bulge in her right abdomen that has grown and only become more painful.  She at times has difficulty breathing and some lightheadedness but otherwise denies fevers, chills, chest pain, nausea, vomiting, diarrhea, dysuria.    PMH: HTN, CAD?, HLD, Anxiety, Depression  Meds: bisoprolol 5mg qd, lorazepam 1mg q8?, Plavix 5qd, rosuvastatin, HCTZ, buproprion, baclofen  NKDA  PSH: Left mastectomy with TRAM rotational flap reconstruction  Social: 1/2 bottle of wine most nights, remote cigarette hx, denies IVDA (30 May 2018 13:13)      S/Overnight events:    POD#     EVD:    CSF :    ICPs:      Vital Signs Last 24 Hrs  T(C): 36.5 (2018 06:40), Max: 38.1 (2018 14:00)  T(F): 97.7 (2018 06:40), Max: 100.5 (2018 14:00)  HR: 62 (2018 06:00) (56 - 85)  BP: 157/76 (2018 06:00) (134/73 - 176/88)  BP(mean): 110 (2018 06:00) (92 - 125)  RR: 11 (2018 06:00) (9 - 25)  SpO2: 100% (2018 06:00) (99% - 100%)    Mode: CPAP with PS, FiO2: 40, PEEP: 5, PS: 8, MAP: 7.8, PIP: 16    I&O's Detail    2018 07:01  -  2018 07:00  --------------------------------------------------------  IN:    dexmedetomidine Infusion: 118 mL    Enteral Tube Flush: 260 mL    IV PiggyBack: 100 mL    Jevity: 1540 mL    propofol Infusion: 57.6 mL    sodium chloride 3%: 330 mL    sodium chloride 3%: 250 mL    sodium chloride 3%: 240 mL  Total IN: 2895.6 mL    OUT:  Total OUT: 0 mL    Total NET: 2895.6 mL      2018 07:01  -  2018 06:58  --------------------------------------------------------  IN:    dexmedetomidine Infusion: 256 mL    Enteral Tube Flush: 150 mL    Jevity: 1500 mL    sodium chloride 3%: 60 mL    sodium chloride 3%.: 315 mL  Total IN: 2281 mL    OUT:    Indwelling Catheter - Urethral: 1 mL  Total OUT: 1 mL    Total NET: 2280 mL          LABS:                        8.2    10.3  )-----------( 344      ( 2018 05:53 )             26.1     06-06    147<H>  |  112<H>  |  11  ----------------------------<  145<H>  3.8   |  25  |  0.43<L>    Ca    9.0      2018 05:52  Phos  2.7     06-06  Mg     2.2     06-06        Urinalysis Basic - ( 2018 09:58 )    Color: Yellow / Appearance: Clear / S.020 / pH: x  Gluc: x / Ketone: NEGATIVE  / Bili: Negative / Urobili: 0.2 E.U./dL   Blood: x / Protein: NEGATIVE mg/dL / Nitrite: NEGATIVE   Leuk Esterase: NEGATIVE / RBC: x / WBC x   Sq Epi: x / Non Sq Epi: x / Bacteria: x          CAPILLARY BLOOD GLUCOSE      POCT Blood Glucose.: 139 mg/dL (2018 11:19)      Drug Levels: [] N/A    CSF Analysis: [] N/A      Allergies    No Known Allergies    Intolerances      MEDICATIONS:  Antibiotics:    Neuro:  acetaminophen   Tablet 650 milliGRAM(s) Oral every 6 hours PRN  acetaminophen   Tablet. 650 milliGRAM(s) Oral every 6 hours PRN  dexmedetomidine Infusion 0.2 MICROgram(s)/kG/Hr IV Continuous <Continuous>  fentaNYL    Injectable 25 MICROGram(s) IV Push every 2 hours PRN  levETIRAcetam  Solution 1000 milliGRAM(s) Oral two times a day  ondansetron Injectable 4 milliGRAM(s) IV Push every 6 hours PRN    Anticoagulation:  aspirin  chewable 81 milliGRAM(s) Oral daily  heparin  Injectable 5000 Unit(s) SubCutaneous every 8 hours    OTHER:  atorvastatin 80 milliGRAM(s) Oral at bedtime  bisoprolol   Tablet 2.5 milliGRAM(s) Oral daily  chlorhexidine 0.12% Liquid 15 milliLiter(s) Swish and Spit two times a day  dextrose 40% Gel 15 Gram(s) Oral once PRN  dextrose 50% Injectable 12.5 Gram(s) IV Push once  dextrose 50% Injectable 25 Gram(s) IV Push once  dextrose 50% Injectable 25 Gram(s) IV Push once  docusate sodium Liquid 100 milliGRAM(s) Oral two times a day  glucagon  Injectable 1 milliGRAM(s) IntraMuscular once PRN  hydrALAZINE Injectable 10 milliGRAM(s) IV Push every 4 hours PRN  pantoprazole   Suspension 40 milliGRAM(s) Oral daily  senna 2 Tablet(s) Oral at bedtime    IVF:  dextrose 5%. 1000 milliLiter(s) IV Continuous <Continuous>  multivitamin 1 Tablet(s) Oral daily  sodium chloride 2 Gram(s) Oral every 6 hours  sodium chloride 3%. 500 milliLiter(s) IV Continuous <Continuous>    CULTURES:  Culture Results:   No growth at 12 hours ( @ 08:45)  Culture Results:   No growth at 12 hours ( @ 08:45)    RADIOLOGY & ADDITIONAL TESTS:            Bacteriology:  CSF studies:  EEG:  Neuroimagin/01 CT head:  no change in small foci of hemorrhage, continued evolution large R MCA infarction small R SELINA territory infarct   CT head: evolving R MCA / SELINA infarction, global mass effect, MLS to L, early L lateral ventricle entrapment, hemorrhagic transformation suspected   CTP:  abnormal perfusion, mismatch volume 12ml   CTA:  acute occlusion R M2   CT head:  acute R MCA infarction, no ICH   CT abd:  large expanding intramuscular hematoma within R rectus abdomin  Other imagin/02 CXR small bilateral effusions    LE Doppler limited exam, NEG    IV FLUIDS: 3%@30cc/hr  DRIPS: precedex  DIET: Jevity 1.2 @ 70  Lines: [place] L SC 3 drains   Drains:  SG 50 70 30  Wounds:    CODE STATUS:  Full Code                       GOALS OF CARE:  aggressive                      DISPOSITION:  ICU NEUROCRITICAL CARE PROGRESS NOTE    VERONIKA CALERO   [id number]  Summary:  /  HPI:  58F with a hx of left breast cancer s/p left mastectomy with TRAM flap reconstruction (4-5 years prior), recently placed on plavix (about a month ago) by her cardiologist 2/2 calcified arterial disease and is now presenting to the [facility] ED with a painful bulge in her right abdomen.  Pt reports that she's had a cough for several days and yesterday noticed a bulge in her right abdomen that has grown and only become more painful.  She at times has difficulty breathing and some lightheadedness but otherwise denies fevers, chills, chest pain, nausea, vomiting, diarrhea, dysuria.    PMH: HTN, CAD?, HLD, Anxiety, Depression  Meds: bisoprolol 5mg qd, lorazepam 1mg q8?, Plavix 5qd, rosuvastatin, HCTZ, buproprion, baclofen  NKDA  PSH: Left mastectomy with TRAM rotational flap reconstruction  Social: 1/2 bottle of wine most nights, remote cigarette hx, denies IVDA (30 May 2018 13:13)      S/Overnight events:  off sedation. TF off.  had a good night.  ready for extubation.     Vital Signs Last 24 Hrs  T(C): 36.5 (2018 06:40), Max: 38.1 (2018 14:00)  T(F): 97.7 (2018 06:40), Max: 100.5 (2018 14:00)  HR: 62 (2018 06:00) (56 - 85)  BP: 157/76 (2018 06:00) (134/73 - 176/88)  BP(mean): 110 (2018 06:00) (92 - 125)  RR: 11 (2018 06:00) (9 - 25)  SpO2: 100% (2018 06:00) (99% - 100%)    Mode: CPAP with PS, FiO2: 40, PEEP: 5, PS: 8, MAP: 7.8, PIP: 16    I&O's Detail    2018 07:01  -  2018 07:00  --------------------------------------------------------  IN:    dexmedetomidine Infusion: 118 mL    Enteral Tube Flush: 260 mL    IV PiggyBack: 100 mL    Jevity: 1540 mL    propofol Infusion: 57.6 mL    sodium chloride 3%: 330 mL    sodium chloride 3%: 250 mL    sodium chloride 3%: 240 mL  Total IN: 2895.6 mL    OUT:  Total OUT: 0 mL    Total NET: 2895.6 mL      2018 07:01  -  2018 06:58  --------------------------------------------------------  IN:    dexmedetomidine Infusion: 256 mL    Enteral Tube Flush: 150 mL    Jevity: 1500 mL    sodium chloride 3%: 60 mL    sodium chloride 3%.: 315 mL  Total IN: 2281 mL    OUT:    Indwelling Catheter - Urethral: 1 mL  Total OUT: 1 mL    Total NET: 2280 mL    LABS:                        8.2    10.3  )-----------( 344      ( 2018 05:53 )             26.1     06-06    147<H>  |  112<H>  |  11  ----------------------------<  145<H>  3.8   |  25  |  0.43<L>    Ca    9.0      2018 05:52  Phos  2.7     06-06  Mg     2.2     06-06    Urinalysis Basic - ( 2018 09:58 )    Color: Yellow / Appearance: Clear / S.020 / pH: x  Gluc: x / Ketone: NEGATIVE  / Bili: Negative / Urobili: 0.2 E.U./dL   Blood: x / Protein: NEGATIVE mg/dL / Nitrite: NEGATIVE   Leuk Esterase: NEGATIVE / RBC: x / WBC x   Sq Epi: x / Non Sq Epi: x / Bacteria: x    CAPILLARY BLOOD GLUCOSE      POCT Blood Glucose.: 139 mg/dL (2018 11:19)      Drug Levels: [] N/A    CSF Analysis: [] N/A      Allergies    No Known Allergies    Intolerances      MEDICATIONS:  Antibiotics:    Neuro:  acetaminophen   Tablet 650 milliGRAM(s) Oral every 6 hours PRN  acetaminophen   Tablet. 650 milliGRAM(s) Oral every 6 hours PRN  dexmedetomidine Infusion 0.2 MICROgram(s)/kG/Hr IV Continuous <Continuous>  fentaNYL    Injectable 25 MICROGram(s) IV Push every 2 hours PRN  levETIRAcetam  Solution 1000 milliGRAM(s) Oral two times a day  ondansetron Injectable 4 milliGRAM(s) IV Push every 6 hours PRN    Anticoagulation:  aspirin  chewable 81 milliGRAM(s) Oral daily  heparin  Injectable 5000 Unit(s) SubCutaneous every 8 hours    OTHER:  atorvastatin 80 milliGRAM(s) Oral at bedtime  bisoprolol   Tablet 2.5 milliGRAM(s) Oral daily  chlorhexidine 0.12% Liquid 15 milliLiter(s) Swish and Spit two times a day  dextrose 40% Gel 15 Gram(s) Oral once PRN  dextrose 50% Injectable 12.5 Gram(s) IV Push once  dextrose 50% Injectable 25 Gram(s) IV Push once  dextrose 50% Injectable 25 Gram(s) IV Push once  docusate sodium Liquid 100 milliGRAM(s) Oral two times a day  glucagon  Injectable 1 milliGRAM(s) IntraMuscular once PRN  hydrALAZINE Injectable 10 milliGRAM(s) IV Push every 4 hours PRN  pantoprazole   Suspension 40 milliGRAM(s) Oral daily  senna 2 Tablet(s) Oral at bedtime    IVF:  dextrose 5%. 1000 milliLiter(s) IV Continuous <Continuous>  multivitamin 1 Tablet(s) Oral daily  sodium chloride 2 Gram(s) Oral every 6 hours  sodium chloride 3%. 500 milliLiter(s) IV Continuous <Continuous>    CULTURES:  Culture Results:   No growth at 12 hours ( @ 08:45)  Culture Results:   No growth at 12 hours ( @ 08:45)    RADIOLOGY & ADDITIONAL TESTS:    Bacteriology:  CSF studies:  EEG:  Neuroimagin/01 CT head:  no change in small foci of hemorrhage, continued evolution large R MCA infarction small R SELINA territory infarct   CT head: evolving R MCA / SELINA infarction, global mass effect, MLS to L, early L lateral ventricle entrapment, hemorrhagic transformation suspected   CTP:  abnormal perfusion, mismatch volume 12ml   CTA:  acute occlusion R M2   CT head:  acute R MCA infarction, no ICH   CT abd:  large expanding intramuscular hematoma within R rectus abdomin  Other imagin/02 CXR small bilateral effusions    LE Doppler limited exam, NEG NEUROCRITICAL CARE PROGRESS NOTE    VERONIKA CALEOR   MRN-[id number]  Summary:  /  HPI:  58F with a hx of left breast cancer s/p left mastectomy with TRAM flap reconstruction (4-5 years prior), recently placed on plavix (about a month ago) by her cardiologist 2/2 calcified arterial disease and is now presenting to the [facility] ED with a painful bulge in her right abdomen.  Pt reports that she's had a cough for several days and yesterday noticed a bulge in her right abdomen that has grown and only become more painful.  She at times has difficulty breathing and some lightheadedness but otherwise denies fevers, chills, chest pain, nausea, vomiting, diarrhea, dysuria.    PMH: HTN, CAD?, HLD, Anxiety, Depression  Meds: bisoprolol 5mg qd, lorazepam 1mg q8?, Plavix 5qd, rosuvastatin, HCTZ, buproprion, baclofen  NKDA  PSH: Left mastectomy with TRAM rotational flap reconstruction  Social: 1/2 bottle of wine most nights, remote cigarette hx, denies IVDA (30 May 2018 13:13)      S/Overnight events:  off sedation. TF off.  had a good night.  ready for extubation.  extubated on rounds.  doing well, suctioning herself.     Vital Signs Last 24 Hrs  T(C): 36.5 (2018 06:40), Max: 38.1 (2018 14:00)  T(F): 97.7 (2018 06:40), Max: 100.5 (2018 14:00)  HR: 62 (2018 06:00) (56 - 85)  BP: 157/76 (2018 06:00) (134/73 - 176/88)  BP(mean): 110 (2018 06:00) (92 - 125)  RR: 11 (2018 06:00) (9 - 25)  SpO2: 100% (2018 06:00) (99% - 100%)    Mode: CPAP with PS, FiO2: 40, PEEP: 5, PS: 8, MAP: 7.8, PIP: 16    I&O's Detail    2018 07:01  -  2018 07:00  --------------------------------------------------------  IN:    dexmedetomidine Infusion: 118 mL    Enteral Tube Flush: 260 mL    IV PiggyBack: 100 mL    Jevity: 1540 mL    propofol Infusion: 57.6 mL    sodium chloride 3%: 330 mL    sodium chloride 3%: 250 mL    sodium chloride 3%: 240 mL  Total IN: 2895.6 mL    OUT:  Total OUT: 0 mL    Total NET: 2895.6 mL      2018 07:01  -  2018 06:58  --------------------------------------------------------  IN:    dexmedetomidine Infusion: 256 mL    Enteral Tube Flush: 150 mL    Jevity: 1500 mL    sodium chloride 3%: 60 mL    sodium chloride 3%.: 315 mL  Total IN: 2281 mL    OUT:    Indwelling Catheter - Urethral: 1 mL  Total OUT: 1 mL    Total NET: 2280 mL    LABS:                        8.2    10.3  )-----------( 344      ( 2018 05:53 )             26.1     06-06    147<H>  |  112<H>  |  11  ----------------------------<  145<H>  3.8   |  25  |  0.43<L>    Ca    9.0      2018 05:52  Phos  2.7     06-06  Mg     2.2     06-06    Urinalysis Basic - ( 2018 09:58 )    Color: Yellow / Appearance: Clear / S.020 / pH: x  Gluc: x / Ketone: NEGATIVE  / Bili: Negative / Urobili: 0.2 E.U./dL   Blood: x / Protein: NEGATIVE mg/dL / Nitrite: NEGATIVE   Leuk Esterase: NEGATIVE / RBC: x / WBC x   Sq Epi: x / Non Sq Epi: x / Bacteria: x    CAPILLARY BLOOD GLUCOSE      POCT Blood Glucose.: 139 mg/dL (2018 11:19)      Drug Levels: [] N/A    CSF Analysis: [] N/A      Allergies    No Known Allergies    Intolerances      MEDICATIONS:  Antibiotics:    Neuro:  acetaminophen   Tablet 650 milliGRAM(s) Oral every 6 hours PRN  acetaminophen   Tablet. 650 milliGRAM(s) Oral every 6 hours PRN  dexmedetomidine Infusion 0.2 MICROgram(s)/kG/Hr IV Continuous <Continuous>  fentaNYL    Injectable 25 MICROGram(s) IV Push every 2 hours PRN  levETIRAcetam  Solution 1000 milliGRAM(s) Oral two times a day  ondansetron Injectable 4 milliGRAM(s) IV Push every 6 hours PRN    Anticoagulation:  aspirin  chewable 81 milliGRAM(s) Oral daily  heparin  Injectable 5000 Unit(s) SubCutaneous every 8 hours    OTHER:  atorvastatin 80 milliGRAM(s) Oral at bedtime  bisoprolol   Tablet 2.5 milliGRAM(s) Oral daily  chlorhexidine 0.12% Liquid 15 milliLiter(s) Swish and Spit two times a day  dextrose 40% Gel 15 Gram(s) Oral once PRN  dextrose 50% Injectable 12.5 Gram(s) IV Push once  dextrose 50% Injectable 25 Gram(s) IV Push once  dextrose 50% Injectable 25 Gram(s) IV Push once  docusate sodium Liquid 100 milliGRAM(s) Oral two times a day  glucagon  Injectable 1 milliGRAM(s) IntraMuscular once PRN  hydrALAZINE Injectable 10 milliGRAM(s) IV Push every 4 hours PRN  pantoprazole   Suspension 40 milliGRAM(s) Oral daily  senna 2 Tablet(s) Oral at bedtime    IVF:  dextrose 5%. 1000 milliLiter(s) IV Continuous <Continuous>  multivitamin 1 Tablet(s) Oral daily  sodium chloride 2 Gram(s) Oral every 6 hours  sodium chloride 3%. 500 milliLiter(s) IV Continuous <Continuous>    CULTURES:  Culture Results:   No growth at 12 hours ( @ 08:45)  Culture Results:   No growth at 12 hours ( @ 08:45)    RADIOLOGY & ADDITIONAL TESTS:    Bacteriology:  CSF studies:  EEG:  Neuroimagin/01 CT head:  no change in small foci of hemorrhage, continued evolution large R MCA infarction small R SELINA territory infarct   CT head: evolving R MCA / SELINA infarction, global mass effect, MLS to L, early L lateral ventricle entrapment, hemorrhagic transformation suspected   CTP:  abnormal perfusion, mismatch volume 12ml   CTA:  acute occlusion R M2   CT head:  acute R MCA infarction, no ICH   CT abd:  large expanding intramuscular hematoma within R rectus abdomin  Other imagin/02 CXR small bilateral effusions    LE Doppler limited exam, NEG

## 2018-06-06 NOTE — SWALLOW BEDSIDE ASSESSMENT ADULT - COMMENTS
Clinical swallow evaluation was completed today despite recency of extubation because patient continually asking RN for water.

## 2018-06-06 NOTE — PROGRESS NOTE ADULT - ATTENDING COMMENTS
Patient seen and examined at bedside.  Extubated, conversing.  C/O headache.  No abdominal complaints.  Abdomen soft NT ND.    If patient is going downstairs for a CT head at any point, can perform a CT abdomen/pelvis as well to evaluate hematoma.

## 2018-06-07 LAB
ANION GAP SERPL CALC-SCNC: 15 MMOL/L — SIGNIFICANT CHANGE UP (ref 5–17)
BUN SERPL-MCNC: 8 MG/DL — SIGNIFICANT CHANGE UP (ref 7–23)
CALCIUM SERPL-MCNC: 9.5 MG/DL — SIGNIFICANT CHANGE UP (ref 8.4–10.5)
CHLORIDE SERPL-SCNC: 99 MMOL/L — SIGNIFICANT CHANGE UP (ref 96–108)
CO2 SERPL-SCNC: 23 MMOL/L — SIGNIFICANT CHANGE UP (ref 22–31)
CREAT SERPL-MCNC: 0.38 MG/DL — LOW (ref 0.5–1.3)
CULTURE RESULTS: SIGNIFICANT CHANGE UP
GLUCOSE SERPL-MCNC: 146 MG/DL — HIGH (ref 70–99)
GRAM STN FLD: SIGNIFICANT CHANGE UP
HCT VFR BLD CALC: 31.4 % — LOW (ref 34.5–45)
HGB BLD-MCNC: 9.9 G/DL — LOW (ref 11.5–15.5)
MAGNESIUM SERPL-MCNC: 2 MG/DL — SIGNIFICANT CHANGE UP (ref 1.6–2.6)
MCHC RBC-ENTMCNC: 27.2 PG — SIGNIFICANT CHANGE UP (ref 27–34)
MCHC RBC-ENTMCNC: 31.5 G/DL — LOW (ref 32–36)
MCV RBC AUTO: 86.3 FL — SIGNIFICANT CHANGE UP (ref 80–100)
PHOSPHATE SERPL-MCNC: 3.5 MG/DL — SIGNIFICANT CHANGE UP (ref 2.5–4.5)
PLATELET # BLD AUTO: 526 K/UL — HIGH (ref 150–400)
POTASSIUM SERPL-MCNC: 3.3 MMOL/L — LOW (ref 3.5–5.3)
POTASSIUM SERPL-SCNC: 3.3 MMOL/L — LOW (ref 3.5–5.3)
RBC # BLD: 3.64 M/UL — LOW (ref 3.8–5.2)
RBC # FLD: 15.9 % — SIGNIFICANT CHANGE UP (ref 10.3–16.9)
SODIUM SERPL-SCNC: 137 MMOL/L — SIGNIFICANT CHANGE UP (ref 135–145)
SPECIMEN SOURCE: SIGNIFICANT CHANGE UP
SPECIMEN SOURCE: SIGNIFICANT CHANGE UP
WBC # BLD: 16.4 K/UL — HIGH (ref 3.8–10.5)
WBC # FLD AUTO: 16.4 K/UL — HIGH (ref 3.8–10.5)

## 2018-06-07 PROCEDURE — 71045 X-RAY EXAM CHEST 1 VIEW: CPT | Mod: 26

## 2018-06-07 PROCEDURE — 99233 SBSQ HOSP IP/OBS HIGH 50: CPT | Mod: 24

## 2018-06-07 PROCEDURE — 70450 CT HEAD/BRAIN W/O DYE: CPT | Mod: 26

## 2018-06-07 RX ORDER — LABETALOL HCL 100 MG
10 TABLET ORAL EVERY 4 HOURS
Qty: 0 | Refills: 0 | Status: DISCONTINUED | OUTPATIENT
Start: 2018-06-07 | End: 2018-06-12

## 2018-06-07 RX ORDER — POTASSIUM CHLORIDE 20 MEQ
40 PACKET (EA) ORAL ONCE
Qty: 0 | Refills: 0 | Status: COMPLETED | OUTPATIENT
Start: 2018-06-07 | End: 2018-06-07

## 2018-06-07 RX ORDER — FENTANYL CITRATE 50 UG/ML
12.5 INJECTION INTRAVENOUS
Qty: 0 | Refills: 0 | Status: DISCONTINUED | OUTPATIENT
Start: 2018-06-07 | End: 2018-06-09

## 2018-06-07 RX ORDER — POTASSIUM CHLORIDE 20 MEQ
20 PACKET (EA) ORAL
Qty: 0 | Refills: 0 | Status: COMPLETED | OUTPATIENT
Start: 2018-06-07 | End: 2018-06-07

## 2018-06-07 RX ORDER — BISOPROLOL FUMARATE 10 MG/1
5 TABLET, FILM COATED ORAL DAILY
Qty: 0 | Refills: 0 | Status: DISCONTINUED | OUTPATIENT
Start: 2018-06-08 | End: 2018-06-21

## 2018-06-07 RX ORDER — ACETAMINOPHEN 500 MG
1000 TABLET ORAL ONCE
Qty: 0 | Refills: 0 | Status: COMPLETED | OUTPATIENT
Start: 2018-06-07 | End: 2018-06-07

## 2018-06-07 RX ORDER — BISOPROLOL FUMARATE 10 MG/1
2.5 TABLET, FILM COATED ORAL ONCE
Qty: 0 | Refills: 0 | Status: COMPLETED | OUTPATIENT
Start: 2018-06-07 | End: 2018-06-07

## 2018-06-07 RX ORDER — FENTANYL CITRATE 50 UG/ML
12.5 INJECTION INTRAVENOUS ONCE
Qty: 0 | Refills: 0 | Status: DISCONTINUED | OUTPATIENT
Start: 2018-06-07 | End: 2018-06-07

## 2018-06-07 RX ADMIN — Medication 100 MILLIEQUIVALENT(S): at 08:40

## 2018-06-07 RX ADMIN — Medication 400 MILLIGRAM(S): at 06:00

## 2018-06-07 RX ADMIN — SENNA PLUS 10 MILLILITER(S): 8.6 TABLET ORAL at 21:14

## 2018-06-07 RX ADMIN — Medication 100 MILLIEQUIVALENT(S): at 09:57

## 2018-06-07 RX ADMIN — FENTANYL CITRATE 12.5 MICROGRAM(S): 50 INJECTION INTRAVENOUS at 05:56

## 2018-06-07 RX ADMIN — Medication 100 MILLIGRAM(S): at 18:02

## 2018-06-07 RX ADMIN — Medication 10 MILLIGRAM(S): at 00:01

## 2018-06-07 RX ADMIN — Medication 100 MILLIGRAM(S): at 06:36

## 2018-06-07 RX ADMIN — BISOPROLOL FUMARATE 2.5 MILLIGRAM(S): 10 TABLET, FILM COATED ORAL at 13:49

## 2018-06-07 RX ADMIN — Medication 10 MILLIGRAM(S): at 21:10

## 2018-06-07 RX ADMIN — HEPARIN SODIUM 5000 UNIT(S): 5000 INJECTION INTRAVENOUS; SUBCUTANEOUS at 21:10

## 2018-06-07 RX ADMIN — LEVETIRACETAM 1000 MILLIGRAM(S): 250 TABLET, FILM COATED ORAL at 21:13

## 2018-06-07 RX ADMIN — Medication 500 MILLIGRAM(S): at 18:02

## 2018-06-07 RX ADMIN — Medication 100 MILLIEQUIVALENT(S): at 11:17

## 2018-06-07 RX ADMIN — Medication 1 TABLET(S): at 11:17

## 2018-06-07 RX ADMIN — Medication 81 MILLIGRAM(S): at 11:18

## 2018-06-07 RX ADMIN — ATORVASTATIN CALCIUM 80 MILLIGRAM(S): 80 TABLET, FILM COATED ORAL at 21:10

## 2018-06-07 RX ADMIN — FENTANYL CITRATE 12.5 MICROGRAM(S): 50 INJECTION INTRAVENOUS at 03:25

## 2018-06-07 RX ADMIN — Medication 40 MILLIEQUIVALENT(S): at 21:26

## 2018-06-07 RX ADMIN — HEPARIN SODIUM 5000 UNIT(S): 5000 INJECTION INTRAVENOUS; SUBCUTANEOUS at 13:49

## 2018-06-07 RX ADMIN — BISOPROLOL FUMARATE 2.5 MILLIGRAM(S): 10 TABLET, FILM COATED ORAL at 06:36

## 2018-06-07 RX ADMIN — FENTANYL CITRATE 12.5 MICROGRAM(S): 50 INJECTION INTRAVENOUS at 06:15

## 2018-06-07 RX ADMIN — LEVETIRACETAM 1000 MILLIGRAM(S): 250 TABLET, FILM COATED ORAL at 09:57

## 2018-06-07 RX ADMIN — Medication 500 MILLIGRAM(S): at 06:36

## 2018-06-07 RX ADMIN — Medication 1000 MILLIGRAM(S): at 06:30

## 2018-06-07 RX ADMIN — HEPARIN SODIUM 5000 UNIT(S): 5000 INJECTION INTRAVENOUS; SUBCUTANEOUS at 06:36

## 2018-06-07 RX ADMIN — FENTANYL CITRATE 12.5 MICROGRAM(S): 50 INJECTION INTRAVENOUS at 03:08

## 2018-06-07 RX ADMIN — Medication 10 MILLIGRAM(S): at 04:02

## 2018-06-07 NOTE — PROGRESS NOTE ADULT - ATTENDING COMMENTS
Patient seen and examined at bedside.  Denied headache or abdominal complaints.  Abdomen soft NT ND.    If patient is going downstairs for a CT head, could consider CT abdomen/pelvis to evaluate hematoma.

## 2018-06-07 NOTE — PROGRESS NOTE ADULT - SUBJECTIVE AND OBJECTIVE BOX
NEUROCRITICAL CARE PROGRESS NOTE    VERONIKA CALERO   MRN-[id number]  Summary:  /  HPI:  58F with a hx of left breast cancer s/p left mastectomy with TRAM flap reconstruction (4-5 years prior), recently placed on plavix (about a month ago) by her cardiologist 2/2 calcified arterial disease and is now presenting to the [facility] ED with a painful bulge in her right abdomen.  Pt reports that she's had a cough for several days and yesterday noticed a bulge in her right abdomen that has grown and only become more painful.  She at times has difficulty breathing and some lightheadedness but otherwise denies fevers, chills, chest pain, nausea, vomiting, diarrhea, dysuria.    PMH: HTN, CAD?, HLD, Anxiety, Depression  Meds: bisoprolol 5mg qd, lorazepam 1mg q8?, Plavix 5qd, rosuvastatin, HCTZ, buproprion, baclofen  NKDA  PSH: Left mastectomy with TRAM rotational flap reconstruction  Social: 1/2 bottle of wine most nights, remote cigarette hx, denies IVDA (30 May 2018 13:13)      S/Overnight events:   sounds better.  looks better.  eyes more wide open.  pulled out NGT twice last night.     Vital Signs Last 24 Hrs  T(C): 37.7 (2018 06:00), Max: 38 (2018 21:00)  T(F): 99.9 (2018 06:00), Max: 100.4 (2018 21:00)  HR: 96 (2018 07:00) (70 - 112)  BP: 150/83 (2018 07:00) (128/89 - 171/87)  BP(mean): 101 (2018 07:00) (93 - 121)  RR: 17 (2018 07:00) (14 - 23)  SpO2: 99% (2018 07:00) (99% - 100%)    Mode: standby,40% AM    I&O's Detail    2018 07:01  -  2018 07:00  --------------------------------------------------------  IN:    Enteral Tube Flush: 300 mL    IV PiggyBack: 300 mL    Jevity: 337 mL  Total IN: 937 mL    OUT:    Voided: 200 mL  Total OUT: 200 mL    Total NET: 737 mL          LABS:                        9.9    16.4  )-----------( 526      ( 2018 05:57 )             31.4     06-07    137  |  99  |  8   ----------------------------<  146<H>  3.3<L>   |  23  |  0.38<L>    Ca    9.5      2018 05:57  Phos  3.5     -  Mg     2.0     -07        Urinalysis Basic - ( 2018 09:58 )    Color: Yellow / Appearance: Clear / S.020 / pH: x  Gluc: x / Ketone: NEGATIVE  / Bili: Negative / Urobili: 0.2 E.U./dL   Blood: x / Protein: NEGATIVE mg/dL / Nitrite: NEGATIVE   Leuk Esterase: NEGATIVE / RBC: x / WBC x   Sq Epi: x / Non Sq Epi: x / Bacteria: x          CAPILLARY BLOOD GLUCOSE          Drug Levels: [] N/A    CSF Analysis: [] N/A      Allergies    No Known Allergies    Intolerances      MEDICATIONS:  Antibiotics:  metroNIDAZOLE    Tablet 500 milliGRAM(s) Oral two times a day    Neuro:  acetaminophen   Tablet 650 milliGRAM(s) Oral every 6 hours PRN  acetaminophen   Tablet. 650 milliGRAM(s) Oral every 6 hours PRN  fentaNYL    Injectable 12.5 MICROGram(s) IV Push every 2 hours PRN  fentaNYL    Injectable 25 MICROGram(s) IV Push every 2 hours PRN  levETIRAcetam  Solution 1000 milliGRAM(s) Oral two times a day  ondansetron Injectable 4 milliGRAM(s) IV Push every 6 hours PRN    Anticoagulation:  aspirin  chewable 81 milliGRAM(s) Oral daily  heparin  Injectable 5000 Unit(s) SubCutaneous every 8 hours    OTHER:  atorvastatin 80 milliGRAM(s) Oral at bedtime  bisoprolol   Tablet 2.5 milliGRAM(s) Oral daily  dextrose 40% Gel 15 Gram(s) Oral once PRN  dextrose 50% Injectable 12.5 Gram(s) IV Push once  dextrose 50% Injectable 25 Gram(s) IV Push once  dextrose 50% Injectable 25 Gram(s) IV Push once  docusate sodium Liquid 100 milliGRAM(s) Oral two times a day  enalaprilat Injectable 1.25 milliGRAM(s) IV Push every 6 hours PRN  glucagon  Injectable 1 milliGRAM(s) IntraMuscular once PRN  hydrALAZINE Injectable 10 milliGRAM(s) IV Push every 4 hours PRN  polyethylene glycol 3350 17 Gram(s) Oral at bedtime  senna Syrup 10 milliLiter(s) Oral at bedtime    IVF:  dextrose 5%. 1000 milliLiter(s) IV Continuous <Continuous>  multivitamin 1 Tablet(s) Oral daily  potassium chloride  20 mEq/100 mL IVPB 20 milliEquivalent(s) IV Intermittent every 2 hours    CULTURES:  Culture Results:   Culture in progress ( @ 14:51)  Culture Results:   No growth at 1 day. ( @ 08:45)    RADIOLOGY & ADDITIONAL TESTS:    Bacteriology:  CSF studies:  EEG:  Neuroimagin/01 CT head:  no change in small foci of hemorrhage, continued evolution large R MCA infarction small R SELINA territory infarct   CT head: evolving R MCA / SELINA infarction, global mass effect, MLS to L, early L lateral ventricle entrapment, hemorrhagic transformation suspected   CTP:  abnormal perfusion, mismatch volume 12ml   CTA:  acute occlusion R M2   CT head:  acute R MCA infarction, no ICH   CT abd:  large expanding intramuscular hematoma within R rectus abdomin  Other imagin/02 CXR small bilateral effusions    LE Doppler limited exam, NEG NEUROCRITICAL CARE PROGRESS NOTE    VERONIKA CALERO   MRN-[id number]  Summary:  /  HPI:  58F with a hx of left breast cancer s/p left mastectomy with TRAM flap reconstruction (4-5 years prior), recently placed on plavix (about a month ago) by her cardiologist 2/2 calcified arterial disease and is now presenting to the [facility] ED with a painful bulge in her right abdomen.  Pt reports that she's had a cough for several days and yesterday noticed a bulge in her right abdomen that has grown and only become more painful.  She at times has difficulty breathing and some lightheadedness but otherwise denies fevers, chills, chest pain, nausea, vomiting, diarrhea, dysuria.    PMH: HTN, CAD?, HLD, Anxiety, Depression  Meds: bisoprolol 5mg qd, lorazepam 1mg q8?, Plavix 5qd, rosuvastatin, HCTZ, buproprion, baclofen  NKDA  PSH: Left mastectomy with TRAM rotational flap reconstruction  Social: 1/2 bottle of wine most nights, remote cigarette hx, denies IVDA (30 May 2018 13:13)      S/Overnight events:   sounds better.  looks better.  eyes more wide open.  pulled out NGT twice last night.     Vital Signs Last 24 Hrs  T(C): 37.7 (2018 06:00), Max: 38 (2018 21:00)  T(F): 99.9 (2018 06:00), Max: 100.4 (2018 21:00)  HR: 96 (2018 07:00) (70 - 112)  BP: 150/83 (2018 07:00) (128/89 - 171/87)  BP(mean): 101 (2018 07:00) (93 - 121)  RR: 17 (2018 07:00) (14 - 23)  SpO2: 99% (2018 07:00) (99% - 100%)    Mode: standby,40% AM    I&O's Detail    2018 07:01  -  2018 07:00  --------------------------------------------------------  IN:    Enteral Tube Flush: 300 mL    IV PiggyBack: 300 mL    Jevity: 337 mL  Total IN: 937 mL    OUT:    Voided: 200 mL  Total OUT: 200 mL    Total NET: 737 mL    LABS:             13-->           9.9    16.4  )-----------( 526      ( 2018 05:57 )             31.4     06-07    137  |  99  |  8   ----------------------------<  146<H>  3.3<L>   |  23  |  0.38<L>    Ca    9.5      2018 05:57  Phos  3.5       Mg     2.0           Urinalysis Basic - ( 2018 09:58 )    Color: Yellow / Appearance: Clear / S.020 / pH: x  Gluc: x / Ketone: NEGATIVE  / Bili: Negative / Urobili: 0.2 E.U./dL   Blood: x / Protein: NEGATIVE mg/dL / Nitrite: NEGATIVE   Leuk Esterase: NEGATIVE / RBC: x / WBC x   Sq Epi: x / Non Sq Epi: x / Bacteria: x      CAPILLARY BLOOD GLUCOSE    Drug Levels: [] N/A    CSF Analysis: [] N/A      Allergies    No Known Allergies    Intolerances    MEDICATIONS:  Antibiotics:  metroNIDAZOLE    Tablet 500 milliGRAM(s) Oral two times a day    Neuro:  acetaminophen   Tablet 650 milliGRAM(s) Oral every 6 hours PRN  acetaminophen   Tablet. 650 milliGRAM(s) Oral every 6 hours PRN  fentaNYL    Injectable 12.5 MICROGram(s) IV Push every 2 hours PRN  fentaNYL    Injectable 25 MICROGram(s) IV Push every 2 hours PRN  levETIRAcetam  Solution 1000 milliGRAM(s) Oral two times a day  ondansetron Injectable 4 milliGRAM(s) IV Push every 6 hours PRN    Anticoagulation:  aspirin  chewable 81 milliGRAM(s) Oral daily  heparin  Injectable 5000 Unit(s) SubCutaneous every 8 hours    OTHER:  atorvastatin 80 milliGRAM(s) Oral at bedtime  bisoprolol   Tablet 2.5 milliGRAM(s) Oral daily  dextrose 40% Gel 15 Gram(s) Oral once PRN  dextrose 50% Injectable 12.5 Gram(s) IV Push once  dextrose 50% Injectable 25 Gram(s) IV Push once  dextrose 50% Injectable 25 Gram(s) IV Push once  docusate sodium Liquid 100 milliGRAM(s) Oral two times a day  enalaprilat Injectable 1.25 milliGRAM(s) IV Push every 6 hours PRN  glucagon  Injectable 1 milliGRAM(s) IntraMuscular once PRN  hydrALAZINE Injectable 10 milliGRAM(s) IV Push every 4 hours PRN  polyethylene glycol 3350 17 Gram(s) Oral at bedtime  senna Syrup 10 milliLiter(s) Oral at bedtime    IVF:  dextrose 5%. 1000 milliLiter(s) IV Continuous <Continuous>  multivitamin 1 Tablet(s) Oral daily  potassium chloride  20 mEq/100 mL IVPB 20 milliEquivalent(s) IV Intermittent every 2 hours    CULTURES:  Culture Results:   Culture in progress ( @ 14:51)  Culture Results:   No growth at 1 day. ( @ 08:45)    RADIOLOGY & ADDITIONAL TESTS:    Bacteriology:  CSF studies:  EEG:  Neuroimagin/01 CT head:  no change in small foci of hemorrhage, continued evolution large R MCA infarction small R SELINA territory infarct   CT head: evolving R MCA / SELINA infarction, global mass effect, MLS to L, early L lateral ventricle entrapment, hemorrhagic transformation suspected   CTP:  abnormal perfusion, mismatch volume 12ml   CTA:  acute occlusion R M2   CT head:  acute R MCA infarction, no ICH   CT abd:  large expanding intramuscular hematoma within R rectus abdomin  Other imagin/02 CXR small bilateral effusions    LE Doppler limited exam, NEG

## 2018-06-07 NOTE — PROGRESS NOTE ADULT - ASSESSMENT
58F with   1.  acute cerebrovascular accident involving R MCA / SELINA, malignant infarction, cerebral edema, brain compression s/p decompressive hemicraniectomy (05/31/2018, Dr. Johnson)  2.  Hypertension dyslipidemia   3.  h/o breast CA  4.  CAD  5.  R rectus abdominis intramuscular hematoma  6.  seizures    PLAN:   NEURO: neurochecks q 2 h, PRN pain meds tylenol, hold sedation  malignant R MCA infarction, s/p DHC:  ASA, repeat CT head after ASA,   seizure disorder:  continue levetiracetam 1G BID   ICP crises:  s/p DHC  REHAB:  physical therapy evaluation and management - defer   EARLY MOB:  HOB up, bedrest; SLP today    PULM:  CPAP 5/5 40%, extubate  CARDIO:  SBP goal < 140 mm Hg, echo EF 55-60% mod dilated LA, probable AVM, no PFOs, remove central line?   ENDO:  Blood sugar goals 140-180 mg/dL, continue insulin sliding scale; continue high-dose statins  GI:  PPI for GI prophylaxis  DIET: jevity to goal of 70  RENAL:  hold 3% goal Na > 140-145 recheck BMP this aftenroon;  increase salt tabs 2g q6h  HEM/ONC: s/p transfusion - with adequate response of Hb; repeat CT abd/pelvis no contrast if and when she goes down for CT  VTE Prophylaxis: SCDs only, start SQH if ok with NS, neg dopplers  ID: afebrile, no leukocytosis  Social:  updated yesterday  MISC:  h/o ETOH abuse, continue MVI    CODE STATUS:  Full Code                      DISPOSITION:  ICU    I spent 45 minutes of critical care time examining patient, reviewing vitals, labs, medications, imaging and discussing with the team goals of care to prevent life-threatening in this patient who is at high risk for neurological deterioration or death due to:  herniation, seizures, strokes, PE, cardiac arrest. 58F with   1.  acute cerebrovascular accident involving R MCA / SELINA, malignant infarction, cerebral edema, brain compression s/p decompressive hemicraniectomy (05/31/2018, Dr. Johnson)  2.  Hypertension dyslipidemia   3.  h/o breast CA  4.  CAD  5.  R rectus abdominis intramuscular hematoma  6.  seizures    PLAN:   NEURO: neurochecks q 4 h, PRN pain meds tylenol, hold sedation  malignant R MCA infarction, s/p DHC:  ASA, CT head stable after starting ASA  seizure disorder:  continue levetiracetam 1G BID   ICP crises:  s/p DHC  REHAB:  physical therapy evaluation and management - defer   EARLY MOB:  HOB up, bedrest; SLP today    PULM:  extubated, self suctioning   CARDIO:  SBP goal < 140 mm Hg, echo EF 55-60% mod dilated LA, probable AVM, no PFOs, remove central line  ENDO:  Blood sugar goals 140-180 mg/dL, stop insulin sliding scale; continue high-dose statins  GI:  PPI for GI prophylaxis  DIET: jevity to goal of 70  RENAL:  3% stopped yesterday, remove central line  HEM/ONC:  s/p transfusion - with adequate response of Hb; repeat CT abd/pelvis no contrast if and when she goes down for CT  VTE Prophylaxis: SCDs only, start SQH if ok with NS, neg dopplers   ID: afebrile, no leukocytosis  Social:  updated yesterday  MISC:  h/o ETOH abuse, continue MVI    CODE STATUS:  Full Code                      DISPOSITION:  ICU through afternoon, SDU in PM?

## 2018-06-07 NOTE — PROGRESS NOTE ADULT - SUBJECTIVE AND OBJECTIVE BOX
S/Overnight events:    59 yo female s/p right hemicraniectomy. Patient seen and examined at bedside, doing well and no longer needed to continue suctioning herself. Overnight patient pulled NGT twice and NGT was correctly placed again with confirmation. Patient afebrile and complained of a headache overnight and as per patient soft bowel movement. Denies any pain upon questioning this morning.  No significant issues reported by nursing.    Hospital Course:   POD#1: emergent crani yesterday, transferred back to [facility], sedated overnight, rpt CTH with hemorrhagic conversion o/w stable. stable exam overnight. Drains remains to suction. no acute events overnight  POD#2: No seizure activity on EEG. Following commands on right side. Continue DREW x2 and Hemovac x1. Low grade fevers.   POD#3: Tolerating CPAP. Self extubated. Hemovac removed.   POD #4: Re-intubated yesterday, precedex resumed, no acute events overnight  POD#5: Pancultured for fevers. Tolerating CPAP trials. Continues to follow commands on right side. Bedside PT.  POD #6: Extubated without any issues, doing well and suctioning herself. Continues to follow commands on the right side and speaking 3-4 words very slowly. Bedside PT. Continued to be on 3% at 15 overnight. CTH done yesterday and was stable   POD #7: Overnight pulled NGT twice, replaced and confirmed. Patient did not need to suction herself overnight. Continues to follow commands on the right side and speaking more words than yesterday. Portable CT head today ASA f/u. Central line will be removed today. Neuro checks and vital signs now checked q 4h. Goal SBP normotensive 100-160.    Vital Signs Last 24 Hrs  T(C): 37.2 (07 Jun 2018 09:15), Max: 38 (06 Jun 2018 21:00)  T(F): 99 (07 Jun 2018 09:15), Max: 100.4 (06 Jun 2018 21:00)  HR: 86 (07 Jun 2018 11:00) (74 - 112)  BP: 148/86 (07 Jun 2018 11:00) (144/74 - 171/87)  BP(mean): 121 (07 Jun 2018 11:00) (93 - 124)  RR: 21 (07 Jun 2018 11:00) (14 - 23)  SpO2: 100% (07 Jun 2018 11:00) (99% - 100%)    I&O's Detail    06 Jun 2018 07:01  -  07 Jun 2018 07:00  --------------------------------------------------------  IN:    Enteral Tube Flush: 300 mL    IV PiggyBack: 300 mL    Jevity: 337 mL  Total IN: 937 mL    OUT:    Voided: 200 mL  Total OUT: 200 mL    Total NET: 737 mL      07 Jun 2018 07:01  -  07 Jun 2018 11:56  --------------------------------------------------------  IN:    Enteral Tube Flush: 90 mL    IV PiggyBack: 300 mL    Jevity: 295 mL  Total IN: 685 mL    OUT:  Total OUT: 0 mL    Total NET: 685 mL        I&O's Summary    06 Jun 2018 07:01  -  07 Jun 2018 07:00  --------------------------------------------------------  IN: 937 mL / OUT: 200 mL / NET: 737 mL    07 Jun 2018 07:01  -  07 Jun 2018 11:56  --------------------------------------------------------  IN: 685 mL / OUT: 0 mL / NET: 685 mL        PHYSICAL EXAM:  General: Sitting up on bed. No acute distress. Awake and alert to voice.  HEENT: Left facial droop while smiling. Right scalp incision C/D/I with no apparent signs of infection. Right side scalp flap is soft and flat. Pupils are equal, round and reactive to light briskly and with accomodation. +Nasogastric tube.  Neck: Left subclavian, C/D/I with dressing.  Respiratory: Clear lung sounds bilaterally, no wheezes, rales or rhonchi  Cardiovascular: Regular rate and rhythm, S1 and S2 presents, no murmurs, rubs or gallops  Gastrointestinal: No lesions, scars or pulsating mass present, normoactive bowel sounds x4, soft, non-tender and non-distended in all 4 quadrants.  Extremities:  Warm, well perfused. Pulses 2+ throughout. Right flank hematoma.  Neurological: A&Ox3 and hypophonic.  RUE/RLE moves spontaneously. Opens eyes with spontaneously. LUE withdrawal to noxious stimulus. Triple flexion of LLE.     Incision/Wound:  Right scalp incision c/d/i    DEVICE/DRAIN DRESSING:    TUBES/LINES:  [X] CVC  [] A-line  [] Lumbar Drain  [] Ventriculostomy  [] Other    DIET:  [] NPO  [] Mechanical  [X] Tube feeds    LABS:                        9.9    16.4  )-----------( 526      ( 07 Jun 2018 05:57 )             31.4     06-07    137  |  99  |  8   ----------------------------<  146<H>  3.3<L>   |  23  |  0.38<L>    Ca    9.5      07 Jun 2018 05:57  Phos  3.5     06-07  Mg     2.0     06-07    CAPILLARY BLOOD GLUCOSE    Drug Levels: [] N/A    CSF Analysis: [] N/A    Allergies    No Known Allergies    Intolerances    MEDICATIONS:  Antibiotics:  metroNIDAZOLE    Tablet 500 milliGRAM(s) Oral two times a day    Neuro:  acetaminophen   Tablet 650 milliGRAM(s) Oral every 6 hours PRN  acetaminophen   Tablet. 650 milliGRAM(s) Oral every 6 hours PRN  fentaNYL    Injectable 12.5 MICROGram(s) IV Push every 2 hours PRN  fentaNYL    Injectable 25 MICROGram(s) IV Push every 2 hours PRN  levETIRAcetam  Solution 1000 milliGRAM(s) Oral two times a day  ondansetron Injectable 4 milliGRAM(s) IV Push every 6 hours PRN    Anticoagulation:  aspirin  chewable 81 milliGRAM(s) Oral daily  heparin  Injectable 5000 Unit(s) SubCutaneous every 8 hours    OTHER:  atorvastatin 80 milliGRAM(s) Oral at bedtime  bisoprolol   Tablet 2.5 milliGRAM(s) Oral once  dextrose 40% Gel 15 Gram(s) Oral once PRN  dextrose 50% Injectable 12.5 Gram(s) IV Push once  dextrose 50% Injectable 25 Gram(s) IV Push once  dextrose 50% Injectable 25 Gram(s) IV Push once  docusate sodium Liquid 100 milliGRAM(s) Oral two times a day  glucagon  Injectable 1 milliGRAM(s) IntraMuscular once PRN  hydrALAZINE Injectable 10 milliGRAM(s) IV Push every 4 hours PRN  labetalol Injectable 10 milliGRAM(s) IV Push every 4 hours PRN  polyethylene glycol 3350 17 Gram(s) Oral at bedtime  senna Syrup 10 milliLiter(s) Oral at bedtime    IVF:  dextrose 5%. 1000 milliLiter(s) IV Continuous <Continuous>  multivitamin 1 Tablet(s) Oral daily    CULTURES:  Culture Results:   Culture in progress (06-05 @ 14:51)  Culture Results:   No growth at 2 days. (06-05 @ 08:45)    RADIOLOGY & ADDITIONAL TESTS:      ASSESSMENT:  58y Female w/ HTN, HLD, Anxiety, Depression, h/o Breast Cancer w/ TRAM flap reconstruction admitted for expanding right rectus hematoma complicated by right MCA infarction now s/p hemicraniectomy POD #7.     PLAN:  NEURO:  - Neuro checks q 4 hr  - Vital signs q 4 hr  - Continue Keppra 1g BID for seizure prophylaxis   - CT head portable f/u ASA  - Continue helmet when out of bed  - Pain med PRN  - f/u speech and swallow    CARDIOVASCULAR:  - Goal SBP- normotensive 100-160, treat SBP if >160   - Discontinue central line  - Obtain duplex today   - Daily labs, electrolyte repletion PRN    PULMONARY:  - Discontinue Robinul, no longer requiring suctioning     RENAL:  - Voiding    GI:  - Continue bowel regimen  - Continue tube feeds until f/u speech and swallow  - Obtain CT abd for rectus hematoma at a later date   - Continue prophylaxis due to ASA    :  -Continue flagyl presumed bacterial vaginosis until June 10th    ID:  - Continue f/u on WBC trend and temperature  - f/u vaginal swab culture     ENDO:    DVT PROPHYLAXIS:  [] Venodynes                                [] Heparin/Lovenox    FALL RISK:  [] Low Risk                                    [] Impulsive    DISPOSITION: S/Overnight events:    57 yo female s/p right hemicraniectomy. Patient seen and examined at bedside, doing well and no longer needed to continue suctioning herself. Overnight patient pulled NGT twice and NGT was correctly placed again with confirmation. Patient afebrile and complained of a headache overnight and as per patient soft bowel movement. Denies any pain upon questioning this morning.  No significant issues reported by nursing.    Hospital Course:   POD#1: emergent crani yesterday, transferred back to [facility], sedated overnight, rpt CTH with hemorrhagic conversion o/w stable. stable exam overnight. Drains remains to suction. no acute events overnight  POD#2: No seizure activity on EEG. Following commands on right side. Continue DREW x2 and Hemovac x1. Low grade fevers.   POD#3: Tolerating CPAP. Self extubated. Hemovac removed.   POD #4: Re-intubated yesterday, precedex resumed, no acute events overnight  POD#5: Pancultured for fevers. Tolerating CPAP trials. Continues to follow commands on right side. Bedside PT.  POD #6: Extubated without any issues, doing well and suctioning herself. Continues to follow commands on the right side and speaking 3-4 words very slowly. Bedside PT. Continued to be on 3% at 15 overnight. CTH done yesterday and was stable   POD #7: Overnight pulled NGT twice, replaced and confirmed. Patient did not need to suction herself overnight. Continues to follow commands on the right side and speaking more words than yesterday. Portable CT head today ASA f/u. Central line will be removed today. Neuro checks and vital signs now checked q 4h. Goal SBP normotensive 100-160.    Vital Signs Last 24 Hrs  T(C): 37.2 (07 Jun 2018 09:15), Max: 38 (06 Jun 2018 21:00)  T(F): 99 (07 Jun 2018 09:15), Max: 100.4 (06 Jun 2018 21:00)  HR: 86 (07 Jun 2018 11:00) (74 - 112)  BP: 148/86 (07 Jun 2018 11:00) (144/74 - 171/87)  BP(mean): 121 (07 Jun 2018 11:00) (93 - 124)  RR: 21 (07 Jun 2018 11:00) (14 - 23)  SpO2: 100% (07 Jun 2018 11:00) (99% - 100%)    I&O's Detail    06 Jun 2018 07:01  -  07 Jun 2018 07:00  --------------------------------------------------------  IN:    Enteral Tube Flush: 300 mL    IV PiggyBack: 300 mL    Jevity: 337 mL  Total IN: 937 mL    OUT:    Voided: 200 mL  Total OUT: 200 mL    Total NET: 737 mL      07 Jun 2018 07:01  -  07 Jun 2018 11:56  --------------------------------------------------------  IN:    Enteral Tube Flush: 90 mL    IV PiggyBack: 300 mL    Jevity: 295 mL  Total IN: 685 mL    OUT:  Total OUT: 0 mL    Total NET: 685 mL        I&O's Summary    06 Jun 2018 07:01  -  07 Jun 2018 07:00  --------------------------------------------------------  IN: 937 mL / OUT: 200 mL / NET: 737 mL    07 Jun 2018 07:01  -  07 Jun 2018 11:56  --------------------------------------------------------  IN: 685 mL / OUT: 0 mL / NET: 685 mL        PHYSICAL EXAM:  General: Sitting up on bed. No acute distress. Awake and alert to voice.  HEENT: Left facial droop while smiling. Right scalp incision C/D/I with no apparent signs of infection. Right side scalp flap is soft and flat. Pupils are equal, round and reactive to light briskly and with accomodation. +Nasogastric tube.  Neck: Left subclavian, C/D/I with dressing.  Respiratory: Clear lung sounds bilaterally, no wheezes, rales or rhonchi  Cardiovascular: Regular rate and rhythm, S1 and S2 presents, no murmurs, rubs or gallops  Gastrointestinal: No lesions, scars or pulsating mass present, normoactive bowel sounds x4, soft, non-tender and non-distended in all 4 quadrants.  Extremities:  Warm, well perfused. Pulses 2+ throughout.   Neurological: A&Ox3 and hypophonic.  RUE/RLE moves spontaneously. Opens eyes with spontaneously. LUE withdrawal to noxious stimulus. Triple flexion of LLE.     Incision/Wound:  Right scalp incision c/d/i    DEVICE/DRAIN DRESSING:    TUBES/LINES:  [X] CVC  [] A-line  [] Lumbar Drain  [] Ventriculostomy  [] Other    DIET:  [] NPO  [] Mechanical  [X] Tube feeds    LABS:                        9.9    16.4  )-----------( 526      ( 07 Jun 2018 05:57 )             31.4     06-07    137  |  99  |  8   ----------------------------<  146<H>  3.3<L>   |  23  |  0.38<L>    Ca    9.5      07 Jun 2018 05:57  Phos  3.5     06-07  Mg     2.0     06-07    CAPILLARY BLOOD GLUCOSE    Drug Levels: [] N/A    CSF Analysis: [] N/A    Allergies    No Known Allergies    Intolerances    MEDICATIONS:  Antibiotics:  metroNIDAZOLE    Tablet 500 milliGRAM(s) Oral two times a day    Neuro:  acetaminophen   Tablet 650 milliGRAM(s) Oral every 6 hours PRN  acetaminophen   Tablet. 650 milliGRAM(s) Oral every 6 hours PRN  fentaNYL    Injectable 12.5 MICROGram(s) IV Push every 2 hours PRN  fentaNYL    Injectable 25 MICROGram(s) IV Push every 2 hours PRN  levETIRAcetam  Solution 1000 milliGRAM(s) Oral two times a day  ondansetron Injectable 4 milliGRAM(s) IV Push every 6 hours PRN    Anticoagulation:  aspirin  chewable 81 milliGRAM(s) Oral daily  heparin  Injectable 5000 Unit(s) SubCutaneous every 8 hours    OTHER:  atorvastatin 80 milliGRAM(s) Oral at bedtime  bisoprolol   Tablet 2.5 milliGRAM(s) Oral once  dextrose 40% Gel 15 Gram(s) Oral once PRN  dextrose 50% Injectable 12.5 Gram(s) IV Push once  dextrose 50% Injectable 25 Gram(s) IV Push once  dextrose 50% Injectable 25 Gram(s) IV Push once  docusate sodium Liquid 100 milliGRAM(s) Oral two times a day  glucagon  Injectable 1 milliGRAM(s) IntraMuscular once PRN  hydrALAZINE Injectable 10 milliGRAM(s) IV Push every 4 hours PRN  labetalol Injectable 10 milliGRAM(s) IV Push every 4 hours PRN  polyethylene glycol 3350 17 Gram(s) Oral at bedtime  senna Syrup 10 milliLiter(s) Oral at bedtime    IVF:  dextrose 5%. 1000 milliLiter(s) IV Continuous <Continuous>  multivitamin 1 Tablet(s) Oral daily    CULTURES:  Culture Results:   Culture in progress (06-05 @ 14:51)  Culture Results:   No growth at 2 days. (06-05 @ 08:45)    RADIOLOGY & ADDITIONAL TESTS:      ASSESSMENT:  58y Female w/ HTN, HLD, Anxiety, Depression, h/o Breast Cancer w/ TRAM flap reconstruction admitted for expanding right rectus hematoma complicated by right MCA infarction now s/p hemicraniectomy POD #7.     PLAN:  NEURO:  - Neuro checks q 4 hr  - Vital signs q 4 hr  - Continue Keppra 1g BID for seizure prophylaxis   - CT head portable f/u ASA  - Continue helmet when out of bed  - Pain med PRN  - f/u speech and swallow    CARDIOVASCULAR:  - Goal SBP- normotensive 100-160, treat SBP if >160   - Discontinue left subcalvian central line  - Obtain duplex today   - Daily labs, electrolyte repletion PRN    PULMONARY:  - Discontinue Robinul, no longer requiring suctioning     RENAL:  - Voiding    GI:  - Continue bowel regimen  - Continue tube feeds until f/u speech and swallow  - Obtain CT abd for rectus hematoma at a later date   - Continue prophylaxis due to ASA    :  -Continue flagyl presumed bacterial vaginosis until June 10th    ID:  - Continue f/u on WBC trend and temperature  - f/u vaginal swab culture       DVT PROPHYLAXIS:  - SQH, SCD's    DISPOSITION:   -PT/OT   - Discussed with Dr. Johnson and Dr. Victor. S/Overnight events:    57 yo female s/p right hemicraniectomy. Patient seen and examined at bedside, doing well and no longer needed to continue suctioning herself. Overnight patient pulled NGT twice and NGT was correctly placed again with confirmation. Patient afebrile and complained of a headache overnight and as per patient soft bowel movement. Denies any pain upon questioning this morning.  No significant issues reported by nursing.    Hospital Course:   POD#1: emergent crani yesterday, transferred back to [facility], sedated overnight, rpt CTH with hemorrhagic conversion o/w stable. stable exam overnight. Drains remains to suction. no acute events overnight  POD#2: No seizure activity on EEG. Following commands on right side. Continue DREW x2 and Hemovac x1. Low grade fevers.   POD#3: Tolerating CPAP. Self extubated. Hemovac removed.   POD #4: Re-intubated yesterday, precedex resumed, no acute events overnight  POD#5: Pancultured for fevers. Tolerating CPAP trials. Continues to follow commands on right side. Bedside PT.  POD #6: Extubated without any issues, doing well and suctioning herself. Continues to follow commands on the right side and speaking 3-4 words very slowly. Bedside PT. Continued to be on 3% at 15 overnight. CTH done yesterday and was stable   POD #7: Overnight pulled NGT twice, replaced and confirmed. Patient did not need to suction herself overnight. Continues to follow commands on the right side and speaking more words than yesterday. Portable CT head today ASA f/u. Central line will be removed today. Neuro checks and vital signs now checked q 4h. Goal SBP normotensive 100-160.    Vital Signs Last 24 Hrs  T(C): 37.2 (07 Jun 2018 09:15), Max: 38 (06 Jun 2018 21:00)  T(F): 99 (07 Jun 2018 09:15), Max: 100.4 (06 Jun 2018 21:00)  HR: 86 (07 Jun 2018 11:00) (74 - 112)  BP: 148/86 (07 Jun 2018 11:00) (144/74 - 171/87)  BP(mean): 121 (07 Jun 2018 11:00) (93 - 124)  RR: 21 (07 Jun 2018 11:00) (14 - 23)  SpO2: 100% (07 Jun 2018 11:00) (99% - 100%)    I&O's Detail    06 Jun 2018 07:01  -  07 Jun 2018 07:00  --------------------------------------------------------  IN:    Enteral Tube Flush: 300 mL    IV PiggyBack: 300 mL    Jevity: 337 mL  Total IN: 937 mL    OUT:    Voided: 200 mL  Total OUT: 200 mL    Total NET: 737 mL      07 Jun 2018 07:01  -  07 Jun 2018 11:56  --------------------------------------------------------  IN:    Enteral Tube Flush: 90 mL    IV PiggyBack: 300 mL    Jevity: 295 mL  Total IN: 685 mL    OUT:  Total OUT: 0 mL    Total NET: 685 mL        I&O's Summary    06 Jun 2018 07:01  -  07 Jun 2018 07:00  --------------------------------------------------------  IN: 937 mL / OUT: 200 mL / NET: 737 mL    07 Jun 2018 07:01  -  07 Jun 2018 11:56  --------------------------------------------------------  IN: 685 mL / OUT: 0 mL / NET: 685 mL        PHYSICAL EXAM:  General: Sitting up on bed. No acute distress. AAOx3  HEENT:  Right scalp incision C/D/I. Right side scalp flap is soft and flat. PERRL. EOMI. +Nasogastric tube.  Neck: Left subclavian, C/D/I with dressing.  Respiratory: Clear lung sounds bilaterally, no wheezes, rales or rhonchi  Cardiovascular: Regular rate and rhythm, S1 and S2 presents, no murmurs, rubs or gallops  Gastrointestinal: No lesions, scars or pulsating mass present, normoactive bowel sounds x4, soft, non-tender and non-distended in all 4 quadrants.  Extremities:  Warm, well perfused. Pulses 2+ throughout.   Neurological: Left facial asymmetry. Left hemiparesis with withdrawal to LUE to noxious, triple flexion of LLE. Moving right side with good strength and following commands. Clear speech but hypophonic.      TUBES/LINES:  [X] CVC  [] A-line  [] Lumbar Drain  [] Ventriculostomy  [] Other    DIET:  [] NPO  [] Mechanical  [X] Tube feeds    LABS:                        9.9    16.4  )-----------( 526      ( 07 Jun 2018 05:57 )             31.4     06-07    137  |  99  |  8   ----------------------------<  146<H>  3.3<L>   |  23  |  0.38<L>    Ca    9.5      07 Jun 2018 05:57  Phos  3.5     06-07  Mg     2.0     06-07    CAPILLARY BLOOD GLUCOSE    Drug Levels: [] N/A    CSF Analysis: [] N/A    Allergies    No Known Allergies    Intolerances    MEDICATIONS:  Antibiotics:  metroNIDAZOLE    Tablet 500 milliGRAM(s) Oral two times a day    Neuro:  acetaminophen   Tablet 650 milliGRAM(s) Oral every 6 hours PRN  acetaminophen   Tablet. 650 milliGRAM(s) Oral every 6 hours PRN  fentaNYL    Injectable 12.5 MICROGram(s) IV Push every 2 hours PRN  fentaNYL    Injectable 25 MICROGram(s) IV Push every 2 hours PRN  levETIRAcetam  Solution 1000 milliGRAM(s) Oral two times a day  ondansetron Injectable 4 milliGRAM(s) IV Push every 6 hours PRN    Anticoagulation:  aspirin  chewable 81 milliGRAM(s) Oral daily  heparin  Injectable 5000 Unit(s) SubCutaneous every 8 hours    OTHER:  atorvastatin 80 milliGRAM(s) Oral at bedtime  bisoprolol   Tablet 2.5 milliGRAM(s) Oral once  dextrose 40% Gel 15 Gram(s) Oral once PRN  dextrose 50% Injectable 12.5 Gram(s) IV Push once  dextrose 50% Injectable 25 Gram(s) IV Push once  dextrose 50% Injectable 25 Gram(s) IV Push once  docusate sodium Liquid 100 milliGRAM(s) Oral two times a day  glucagon  Injectable 1 milliGRAM(s) IntraMuscular once PRN  hydrALAZINE Injectable 10 milliGRAM(s) IV Push every 4 hours PRN  labetalol Injectable 10 milliGRAM(s) IV Push every 4 hours PRN  polyethylene glycol 3350 17 Gram(s) Oral at bedtime  senna Syrup 10 milliLiter(s) Oral at bedtime    IVF:  dextrose 5%. 1000 milliLiter(s) IV Continuous <Continuous>  multivitamin 1 Tablet(s) Oral daily    CULTURES:  Culture Results:   Culture in progress (06-05 @ 14:51)  Culture Results:   No growth at 2 days. (06-05 @ 08:45)    RADIOLOGY & ADDITIONAL TESTS:      ASSESSMENT:  58y Female w/ HTN, HLD, Anxiety, Depression, h/o Breast Cancer w/ TRAM flap reconstruction admitted for expanding right rectus hematoma complicated by right MCA infarction now s/p hemicraniectomy POD #7.     PLAN:  NEURO:  - Neuro checks q 4 hr  - Continue Keppra 1g BID for seizure prophylaxis   - CT head portable f/u ASA, reviewed  - Continue helmet when out of bed, called vendor to refit due to tightness when on.  - Pain med PRN    CARDIOVASCULAR:  - Goal SBP- normotensive 100-140, treat SBP if >160   - Discontinue left subcalvian central line today  - Daily labs, electrolyte repletion PRN    PULMONARY:  - Discontinue Robinul, no longer requiring suctioning   - Tolerating extubation    RENAL:  - Voiding    GI:  - Continue bowel regimen  - Continue tube feeds until f/u speech and swallow  - Possible CT Abd/Pelvis at a later date    :  -Continue flagyl presumed bacterial vaginosis until June 10th    ID:  - Continue f/u on WBC trend and temperature  - f/u vaginal swab culture       DVT PROPHYLAXIS:  - SQH, SCD's  -LE dopplers repeat pending    DISPOSITION:   -PT/OT, Helmet when OOB  -Discussed with Dr. Johnson and Dr. Victro.

## 2018-06-07 NOTE — PROGRESS NOTE ADULT - SUBJECTIVE AND OBJECTIVE BOX
SUBJECTIVE: Pt seen and examined at bedside. No overnight events.   Denies abdominal pain, nausea or emesis. More awake, participating    Vital Signs Last 24 Hrs  T(C): 37 (07 Jun 2018 14:52), Max: 38 (06 Jun 2018 21:00)  T(F): 98.6 (07 Jun 2018 14:52), Max: 100.4 (06 Jun 2018 21:00)  HR: 84 (07 Jun 2018 12:03) (76 - 112)  BP: 155/96 (07 Jun 2018 12:03) (144/74 - 171/87)  BP(mean): 123 (07 Jun 2018 12:03) (93 - 124)  RR: 21 (07 Jun 2018 12:03) (14 - 23)  SpO2: 100% (07 Jun 2018 12:03) (99% - 100%)    PHYSICAL EXAM    Gen: NAD, awake, following commands, answering questions  CV: normocardic, hypertensive  Pulm: extubated, on NC  Abd: Soft, non-distended, non-tender throughout, no rebound or guarding, right flank and hip ecchymosis - not explanding  Ext: WWP    LABS:                        9.9    16.4  )-----------( 526      ( 07 Jun 2018 05:57 )             31.4     06-07    137  |  99  |  8   ----------------------------<  146<H>  3.3<L>   |  23  |  0.38<L>    Ca    9.5      07 Jun 2018 05:57  Phos  3.5     06-07  Mg     2.0     06-07        ASSESSMENT AND PLAN  58F with a hx of TRAM flap reconstruction on plavix now with a spontaneous right rectus expanding hematoma taken to IR for Rt groin access for dx angio, and rt abd percutaneous thrombin injection of 2 pseudoaneurysms in abd wall c/b herniation of cerebral tonsils, transferred to neurosurgery for decompressive hemicraniectomy 5/31    Abdominal exam benign, stable bruises over right hip and flank  Hgb stable, continue checking daily  Would recommend CT abdomen/pelvis non con to assess for hematoma and new flank bruises  No need for surgical intervention at this time  Team 4c will follow  Discussed with chief resident and Dr. Dozier

## 2018-06-08 LAB
ANION GAP SERPL CALC-SCNC: 13 MMOL/L — SIGNIFICANT CHANGE UP (ref 5–17)
APPEARANCE UR: ABNORMAL
BILIRUB UR-MCNC: NEGATIVE — SIGNIFICANT CHANGE UP
BUN SERPL-MCNC: 12 MG/DL — SIGNIFICANT CHANGE UP (ref 7–23)
CALCIUM SERPL-MCNC: 9.3 MG/DL — SIGNIFICANT CHANGE UP (ref 8.4–10.5)
CHLORIDE SERPL-SCNC: 101 MMOL/L — SIGNIFICANT CHANGE UP (ref 96–108)
CO2 SERPL-SCNC: 24 MMOL/L — SIGNIFICANT CHANGE UP (ref 22–31)
COLOR SPEC: YELLOW — SIGNIFICANT CHANGE UP
CREAT SERPL-MCNC: 0.46 MG/DL — LOW (ref 0.5–1.3)
DIFF PNL FLD: NEGATIVE — SIGNIFICANT CHANGE UP
GLUCOSE SERPL-MCNC: 124 MG/DL — HIGH (ref 70–99)
GLUCOSE UR QL: NEGATIVE — SIGNIFICANT CHANGE UP
HCT VFR BLD CALC: 31 % — LOW (ref 34.5–45)
HGB BLD-MCNC: 10.1 G/DL — LOW (ref 11.5–15.5)
KETONES UR-MCNC: NEGATIVE — SIGNIFICANT CHANGE UP
LEUKOCYTE ESTERASE UR-ACNC: NEGATIVE — SIGNIFICANT CHANGE UP
MAGNESIUM SERPL-MCNC: 2.3 MG/DL — SIGNIFICANT CHANGE UP (ref 1.6–2.6)
MCHC RBC-ENTMCNC: 28.3 PG — SIGNIFICANT CHANGE UP (ref 27–34)
MCHC RBC-ENTMCNC: 32.6 G/DL — SIGNIFICANT CHANGE UP (ref 32–36)
MCV RBC AUTO: 86.8 FL — SIGNIFICANT CHANGE UP (ref 80–100)
NITRITE UR-MCNC: POSITIVE
PH UR: 7 — SIGNIFICANT CHANGE UP (ref 5–8)
PHOSPHATE SERPL-MCNC: 3 MG/DL — SIGNIFICANT CHANGE UP (ref 2.5–4.5)
PLATELET # BLD AUTO: 521 K/UL — HIGH (ref 150–400)
POTASSIUM SERPL-MCNC: 3.9 MMOL/L — SIGNIFICANT CHANGE UP (ref 3.5–5.3)
POTASSIUM SERPL-SCNC: 3.9 MMOL/L — SIGNIFICANT CHANGE UP (ref 3.5–5.3)
PROT UR-MCNC: ABNORMAL MG/DL
RBC # BLD: 3.57 M/UL — LOW (ref 3.8–5.2)
RBC # FLD: 16 % — SIGNIFICANT CHANGE UP (ref 10.3–16.9)
SODIUM SERPL-SCNC: 138 MMOL/L — SIGNIFICANT CHANGE UP (ref 135–145)
SP GR SPEC: 1.01 — SIGNIFICANT CHANGE UP (ref 1–1.03)
UROBILINOGEN FLD QL: 2 E.U./DL
WBC # BLD: 13.4 K/UL — HIGH (ref 3.8–10.5)
WBC # FLD AUTO: 13.4 K/UL — HIGH (ref 3.8–10.5)

## 2018-06-08 PROCEDURE — 99233 SBSQ HOSP IP/OBS HIGH 50: CPT | Mod: 24

## 2018-06-08 PROCEDURE — 99232 SBSQ HOSP IP/OBS MODERATE 35: CPT

## 2018-06-08 PROCEDURE — 71045 X-RAY EXAM CHEST 1 VIEW: CPT | Mod: 26

## 2018-06-08 RX ORDER — ROBINUL 0.2 MG/ML
0.2 INJECTION INTRAMUSCULAR; INTRAVENOUS ONCE
Qty: 0 | Refills: 0 | Status: COMPLETED | OUTPATIENT
Start: 2018-06-08 | End: 2018-06-08

## 2018-06-08 RX ORDER — SODIUM CHLORIDE 9 MG/ML
1000 INJECTION INTRAMUSCULAR; INTRAVENOUS; SUBCUTANEOUS
Qty: 0 | Refills: 0 | Status: DISCONTINUED | OUTPATIENT
Start: 2018-06-08 | End: 2018-06-09

## 2018-06-08 RX ORDER — LEVETIRACETAM 250 MG/1
1000 TABLET, FILM COATED ORAL EVERY 12 HOURS
Qty: 0 | Refills: 0 | Status: DISCONTINUED | OUTPATIENT
Start: 2018-06-08 | End: 2018-06-16

## 2018-06-08 RX ORDER — ASPIRIN/CALCIUM CARB/MAGNESIUM 324 MG
300 TABLET ORAL DAILY
Qty: 0 | Refills: 0 | Status: DISCONTINUED | OUTPATIENT
Start: 2018-06-08 | End: 2018-06-10

## 2018-06-08 RX ORDER — METRONIDAZOLE 500 MG
500 TABLET ORAL EVERY 12 HOURS
Qty: 0 | Refills: 0 | Status: COMPLETED | OUTPATIENT
Start: 2018-06-08 | End: 2018-06-14

## 2018-06-08 RX ADMIN — HEPARIN SODIUM 5000 UNIT(S): 5000 INJECTION INTRAVENOUS; SUBCUTANEOUS at 13:46

## 2018-06-08 RX ADMIN — HEPARIN SODIUM 5000 UNIT(S): 5000 INJECTION INTRAVENOUS; SUBCUTANEOUS at 21:44

## 2018-06-08 RX ADMIN — LEVETIRACETAM 400 MILLIGRAM(S): 250 TABLET, FILM COATED ORAL at 12:21

## 2018-06-08 RX ADMIN — FENTANYL CITRATE 25 MICROGRAM(S): 50 INJECTION INTRAVENOUS at 09:00

## 2018-06-08 RX ADMIN — Medication 100 MILLIGRAM(S): at 17:00

## 2018-06-08 RX ADMIN — ROBINUL 0.2 MILLIGRAM(S): 0.2 INJECTION INTRAMUSCULAR; INTRAVENOUS at 12:11

## 2018-06-08 RX ADMIN — FENTANYL CITRATE 25 MICROGRAM(S): 50 INJECTION INTRAVENOUS at 18:55

## 2018-06-08 RX ADMIN — FENTANYL CITRATE 25 MICROGRAM(S): 50 INJECTION INTRAVENOUS at 18:30

## 2018-06-08 RX ADMIN — Medication 300 MILLIGRAM(S): at 15:47

## 2018-06-08 RX ADMIN — BISOPROLOL FUMARATE 5 MILLIGRAM(S): 10 TABLET, FILM COATED ORAL at 06:26

## 2018-06-08 RX ADMIN — Medication 500 MILLIGRAM(S): at 06:26

## 2018-06-08 RX ADMIN — HEPARIN SODIUM 5000 UNIT(S): 5000 INJECTION INTRAVENOUS; SUBCUTANEOUS at 06:26

## 2018-06-08 RX ADMIN — FENTANYL CITRATE 25 MICROGRAM(S): 50 INJECTION INTRAVENOUS at 08:43

## 2018-06-08 NOTE — PROGRESS NOTE ADULT - SUBJECTIVE AND OBJECTIVE BOX
S/Overnight events:  Talking less this morning but continues to follow commands. Tolerating PO diet.      Hospital Course:   POD#1: emergent crani yesterday, transferred back to [facility], sedated overnight, rpt CTH with hemorrhagic conversion o/w stable. stable exam overnight. Drains remains to suction. no acute events overnight  POD#2: No seizure activity on EEG. Following commands on right side. Continue DREW x2 and Hemovac x1. Low grade fevers.   POD#3: Tolerating CPAP. Self extubated. Hemovac removed.   POD #4: Re-intubated yesterday, precedex resumed, no acute events overnight  POD#5: Pancultured for fevers. Tolerating CPAP trials. Continues to follow commands on right side. Bedside PT.  POD #6: Extubated without any issues, doing well and suctioning herself. Continues to follow commands on the right side and speaking 3-4 words very slowly. Bedside PT. Continued to be on 3% at 15 overnight. CTH done yesterday and was stable   POD #7: Overnight pulled NGT twice, replaced and confirmed. Patient did not need to suction herself overnight. Continues to follow commands on the right side and speaking more words than yesterday. Portable CT head today ASA f/u. Central line will be removed today. Neuro checks and vital signs now checked q 4h. Goal SBP normotensive 100-160.  POD#8: Passed S&S eval, tolerating PO diet.    Vital Signs Last 24 Hrs  T(C): 37.4 (08 Jun 2018 05:45), Max: 37.6 (07 Jun 2018 21:44)  T(F): 99.3 (08 Jun 2018 05:45), Max: 99.6 (07 Jun 2018 21:44)  HR: 76 (08 Jun 2018 05:00) (76 - 96)  BP: 148/79 (08 Jun 2018 05:00) (144/87 - 176/99)  BP(mean): 104 (08 Jun 2018 05:00) (101 - 129)  RR: 16 (08 Jun 2018 05:00) (16 - 27)  SpO2: 96% (08 Jun 2018 05:00) (96% - 100%)    I&O's Detail    06 Jun 2018 07:01  -  07 Jun 2018 07:00  --------------------------------------------------------  IN:    Enteral Tube Flush: 300 mL    IV PiggyBack: 300 mL    Jevity: 337 mL  Total IN: 937 mL    OUT:    Voided: 200 mL  Total OUT: 200 mL    Total NET: 737 mL      07 Jun 2018 07:01  -  08 Jun 2018 06:00  --------------------------------------------------------  IN:    Enteral Tube Flush: 220 mL    IV PiggyBack: 300 mL    Jevity: 413 mL  Total IN: 933 mL    OUT:  Total OUT: 0 mL    Total NET: 933 mL        I&O's Summary    06 Jun 2018 07:01  -  07 Jun 2018 07:00  --------------------------------------------------------  IN: 937 mL / OUT: 200 mL / NET: 737 mL    07 Jun 2018 07:01  -  08 Jun 2018 06:00  --------------------------------------------------------  IN: 933 mL / OUT: 0 mL / NET: 933 mL        PHYSICAL EXAM:  General: NAD. AAOx3  HEENT:  Right scalp incision C/D/I. Right side scalp flap is soft and flat. PERRL. EOMI. +Nasogastric tube.  Neck: Left subclavian dressing C/D/I  Respiratory: CTA b/l  Cardiovascular: S1,S2. NSR.  Gastrointestinal: Soft, NT/ND. +BS.  Extremities: Pulses 2+ throughout  Neurological: Left facial asymmetry. Left hemiparesis with withdrawal to LUE to noxious, triple flexion of LLE. Moving right side with good strength and following commands. Clear speech but hypophonic.      TUBES/LINES:  [] CVC  [] A-line  [] Lumbar Drain  [] Ventriculostomy  [] Other    DIET:  [] NPO  [] Mechanical  [] Tube feeds    LABS:                        10.1   13.4  )-----------( 521      ( 08 Jun 2018 04:21 )             31.0     06-08    138  |  101  |  12  ----------------------------<  124<H>  3.9   |  24  |  0.46<L>    Ca    9.3      08 Jun 2018 04:21  Phos  3.0     06-08  Mg     2.3     06-08              CAPILLARY BLOOD GLUCOSE          Drug Levels: [] N/A    CSF Analysis: [] N/A      Allergies    No Known Allergies    Intolerances      MEDICATIONS:  Antibiotics:  metroNIDAZOLE    Tablet 500 milliGRAM(s) Oral two times a day    Neuro:  acetaminophen   Tablet 650 milliGRAM(s) Oral every 6 hours PRN  acetaminophen   Tablet. 650 milliGRAM(s) Oral every 6 hours PRN  fentaNYL    Injectable 12.5 MICROGram(s) IV Push every 2 hours PRN  fentaNYL    Injectable 25 MICROGram(s) IV Push every 2 hours PRN  levETIRAcetam  Solution 1000 milliGRAM(s) Oral two times a day  ondansetron Injectable 4 milliGRAM(s) IV Push every 6 hours PRN    Anticoagulation:  aspirin  chewable 81 milliGRAM(s) Oral daily  heparin  Injectable 5000 Unit(s) SubCutaneous every 8 hours    OTHER:  atorvastatin 80 milliGRAM(s) Oral at bedtime  bisoprolol   Tablet 5 milliGRAM(s) Oral daily  dextrose 40% Gel 15 Gram(s) Oral once PRN  dextrose 50% Injectable 12.5 Gram(s) IV Push once  dextrose 50% Injectable 25 Gram(s) IV Push once  dextrose 50% Injectable 25 Gram(s) IV Push once  docusate sodium Liquid 100 milliGRAM(s) Oral two times a day  glucagon  Injectable 1 milliGRAM(s) IntraMuscular once PRN  hydrALAZINE Injectable 10 milliGRAM(s) IV Push every 4 hours PRN  labetalol Injectable 10 milliGRAM(s) IV Push every 4 hours PRN  polyethylene glycol 3350 17 Gram(s) Oral at bedtime  senna Syrup 10 milliLiter(s) Oral at bedtime    IVF:  dextrose 5%. 1000 milliLiter(s) IV Continuous <Continuous>  multivitamin 1 Tablet(s) Oral daily    CULTURES:  Culture Results:   Normal Respiratory Gisele present (06-05 @ 14:51)  Culture Results:   No growth at 2 days. (06-05 @ 08:45)    RADIOLOGY & ADDITIONAL TESTS:      ASSESSMENT: 58y Female w/ HTN, HLD, Anxiety, Depression, h/o Breast Cancer w/ TRAM flap reconstruction admitted for expanding right rectus hematoma complicated by right MCA infarction now s/p hemicraniectomy POD #8.     PLAN:  NEURO:  - Neuro checks q 4 hr  - Continue Keppra 1g BID for seizure prophylaxis   - CT head portable f/u ASA, reviewed  - Continue helmet when out of bed, called vendor to refit due to tightness when on.  - Pain med PRN    CARDIOVASCULAR:  - Goal SBP- normotensive 100-140, treat SBP if >160   - Discontinue left subcalvian central line today  - Daily labs, electrolyte repletion PRN    PULMONARY:  - Discontinue Robinul, no longer requiring suctioning   - Tolerating extubation    RENAL:  - Voiding    GI:  - Continue bowel regimen  - Continue tube feeds until f/u speech and swallow  - Possible CT Abd/Pelvis at a later date    :  -Continue flagyl presumed bacterial vaginosis until June 10th    ID:  - Continue f/u on WBC trend and temperature  - f/u vaginal swab culture     DVT PROPHYLAXIS:  - SQH, SCD's  -LE dopplers repeat pending    DISPOSITION:   -PT/OT, Helmet when OOB  -Discussed with Dr. Johnson and Dr. Victor. S/Overnight events:  Talking less this morning but continues to follow commands. Tolerating PO diet but pocketing medications in her mouth.      Hospital Course:   POD#1: emergent crani yesterday, transferred back to [facility], sedated overnight, rpt CT with hemorrhagic conversion o/w stable. stable exam overnight. Drains remains to suction. no acute events overnight  POD#2: No seizure activity on EEG. Following commands on right side. Continue DREW x2 and Hemovac x1. Low grade fevers.   POD#3: Tolerating CPAP. Self extubated. Hemovac removed.   POD #4: Re-intubated yesterday, precedex resumed, no acute events overnight  POD#5: Pancultured for fevers. Tolerating CPAP trials. Continues to follow commands on right side. Bedside PT.  POD #6: Extubated without any issues, doing well and suctioning herself. Continues to follow commands on the right side and speaking 3-4 words very slowly. Bedside PT. Continued to be on 3% at 15 overnight. CTH done yesterday and was stable   POD #7: Overnight pulled NGT twice, replaced and confirmed. Patient did not need to suction herself overnight. Continues to follow commands on the right side and speaking more words than yesterday. Portable CT head today ASA f/u. Central line will be removed today. Neuro checks and vital signs now checked q 4h. Goal SBP normotensive 100-160.  POD#8: Passed S&S eval, tolerating PO diet.    Vital Signs Last 24 Hrs  T(C): 37.4 (08 Jun 2018 05:45), Max: 37.6 (07 Jun 2018 21:44)  T(F): 99.3 (08 Jun 2018 05:45), Max: 99.6 (07 Jun 2018 21:44)  HR: 76 (08 Jun 2018 05:00) (76 - 96)  BP: 148/79 (08 Jun 2018 05:00) (144/87 - 176/99)  BP(mean): 104 (08 Jun 2018 05:00) (101 - 129)  RR: 16 (08 Jun 2018 05:00) (16 - 27)  SpO2: 96% (08 Jun 2018 05:00) (96% - 100%)    I&O's Detail    06 Jun 2018 07:01  -  07 Jun 2018 07:00  --------------------------------------------------------  IN:    Enteral Tube Flush: 300 mL    IV PiggyBack: 300 mL    Jevity: 337 mL  Total IN: 937 mL    OUT:    Voided: 200 mL  Total OUT: 200 mL    Total NET: 737 mL      07 Jun 2018 07:01  -  08 Jun 2018 06:00  --------------------------------------------------------  IN:    Enteral Tube Flush: 220 mL    IV PiggyBack: 300 mL    Jevity: 413 mL  Total IN: 933 mL    OUT:  Total OUT: 0 mL    Total NET: 933 mL        I&O's Summary    06 Jun 2018 07:01  -  07 Jun 2018 07:00  --------------------------------------------------------  IN: 937 mL / OUT: 200 mL / NET: 737 mL    07 Jun 2018 07:01  -  08 Jun 2018 06:00  --------------------------------------------------------  IN: 933 mL / OUT: 0 mL / NET: 933 mL        PHYSICAL EXAM:  General: NAD. AAOx3  HEENT:  Right scalp incision C/D/I. Right side scalp flap is soft and flat. PERRL. EOMI. +Nasogastric tube.  Neck: Left subclavian dressing C/D/I  Respiratory: CTA b/l  Cardiovascular: S1,S2. NSR.  Gastrointestinal: Soft, NT/ND. +BS.  Extremities: Pulses 2+ throughout  Neurological: Left facial asymmetry. Left hemiparesis with withdrawal to LUE to noxious, triple flexion of LLE. Moving right side with good strength and following commands. Clear speech but hypophonic.      TUBES/LINES:  [] CVC  [] A-line  [] Lumbar Drain  [] Ventriculostomy  [] Other    DIET:  [] NPO  [] Mechanical  [] Tube feeds    LABS:                        10.1   13.4  )-----------( 521      ( 08 Jun 2018 04:21 )             31.0     06-08    138  |  101  |  12  ----------------------------<  124<H>  3.9   |  24  |  0.46<L>    Ca    9.3      08 Jun 2018 04:21  Phos  3.0     06-08  Mg     2.3     06-08              CAPILLARY BLOOD GLUCOSE          Drug Levels: [] N/A    CSF Analysis: [] N/A      Allergies    No Known Allergies    Intolerances      MEDICATIONS:  Antibiotics:  metroNIDAZOLE    Tablet 500 milliGRAM(s) Oral two times a day    Neuro:  acetaminophen   Tablet 650 milliGRAM(s) Oral every 6 hours PRN  acetaminophen   Tablet. 650 milliGRAM(s) Oral every 6 hours PRN  fentaNYL    Injectable 12.5 MICROGram(s) IV Push every 2 hours PRN  fentaNYL    Injectable 25 MICROGram(s) IV Push every 2 hours PRN  levETIRAcetam  Solution 1000 milliGRAM(s) Oral two times a day  ondansetron Injectable 4 milliGRAM(s) IV Push every 6 hours PRN    Anticoagulation:  aspirin  chewable 81 milliGRAM(s) Oral daily  heparin  Injectable 5000 Unit(s) SubCutaneous every 8 hours    OTHER:  atorvastatin 80 milliGRAM(s) Oral at bedtime  bisoprolol   Tablet 5 milliGRAM(s) Oral daily  dextrose 40% Gel 15 Gram(s) Oral once PRN  dextrose 50% Injectable 12.5 Gram(s) IV Push once  dextrose 50% Injectable 25 Gram(s) IV Push once  dextrose 50% Injectable 25 Gram(s) IV Push once  docusate sodium Liquid 100 milliGRAM(s) Oral two times a day  glucagon  Injectable 1 milliGRAM(s) IntraMuscular once PRN  hydrALAZINE Injectable 10 milliGRAM(s) IV Push every 4 hours PRN  labetalol Injectable 10 milliGRAM(s) IV Push every 4 hours PRN  polyethylene glycol 3350 17 Gram(s) Oral at bedtime  senna Syrup 10 milliLiter(s) Oral at bedtime    IVF:  dextrose 5%. 1000 milliLiter(s) IV Continuous <Continuous>  multivitamin 1 Tablet(s) Oral daily    CULTURES:  Culture Results:   Normal Respiratory Gisele present (06-05 @ 14:51)  Culture Results:   No growth at 2 days. (06-05 @ 08:45)    RADIOLOGY & ADDITIONAL TESTS:      ASSESSMENT: 58y Female w/ HTN, HLD, Anxiety, Depression, h/o Breast Cancer w/ TRAM flap reconstruction admitted for expanding right rectus hematoma complicated by right MCA infarction now s/p hemicraniectomy POD #8.     PLAN:  NEURO:  - Neuro checks q 4 hr  - Continue Keppra 1g BID for seizure prophylaxis   - CT head portable reviewed.  - Continue helmet when out of bed, called vendor to refit due to tightness when on.  - Pain med PRN  - 4 hour EEG to r/o seizure activity for fluctuations of neurological status    CARDIOVASCULAR:  - Goal SBP- normotensive 100-140, treat SBP if >160   - Continue Bisoprolol  - Daily labs, electrolyte repletion PRN    PULMONARY:  - Tolerating extubation  - Robinul 0.2mg x1    RENAL:  - Voiding    GI:  - Continue bowel regimen  - PO diet as tolerated, passed S&S, NGT DCed.  - Possible CT Abd/Pelvis at a later date    :  -Continue flagyl presumed bacterial vaginosis until June 10th    ID:  - f/u vaginal swab culture - multiple organisms growing, on Flagyl x7 days total    DVT PROPHYLAXIS:  -SQH, SCD's  -LE dopplers repeat pending    DISPOSITION:   -PT/OT, Helmet when OOB  -Possible transfer to stroke service,  -Discussed with Dr. Johnson and Dr. Victor. S/Overnight events:  Talking less this morning but continues to follow commands. Tolerating PO diet but pocketing medications in her mouth.      Hospital Course:   POD#1: emergent crani yesterday, transferred back to [facility], sedated overnight, rpt CT with hemorrhagic conversion o/w stable. stable exam overnight. Drains remains to suction. no acute events overnight  POD#2: No seizure activity on EEG. Following commands on right side. Continue DREW x2 and Hemovac x1. Low grade fevers.   POD#3: Tolerating CPAP. Self extubated. Hemovac removed.   POD #4: Re-intubated yesterday, precedex resumed, no acute events overnight  POD#5: Pancultured for fevers. Tolerating CPAP trials. Continues to follow commands on right side. Bedside PT.  POD #6: Extubated without any issues, doing well and suctioning herself. Continues to follow commands on the right side and speaking 3-4 words very slowly. Bedside PT. Continued to be on 3% at 15 overnight. CTH done yesterday and was stable   POD #7: Overnight pulled NGT twice, replaced and confirmed. Patient did not need to suction herself overnight. Continues to follow commands on the right side and speaking more words than yesterday. Portable CT head today ASA f/u. Central line will be removed today. Neuro checks and vital signs now checked q 4h. Goal SBP normotensive 100-160. Passed S&S eval  POD#8: Failed re-eval of S&S, NGT replaced. EEG to r/o seizure.    Vital Signs Last 24 Hrs  T(C): 37.4 (08 Jun 2018 05:45), Max: 37.6 (07 Jun 2018 21:44)  T(F): 99.3 (08 Jun 2018 05:45), Max: 99.6 (07 Jun 2018 21:44)  HR: 76 (08 Jun 2018 05:00) (76 - 96)  BP: 148/79 (08 Jun 2018 05:00) (144/87 - 176/99)  BP(mean): 104 (08 Jun 2018 05:00) (101 - 129)  RR: 16 (08 Jun 2018 05:00) (16 - 27)  SpO2: 96% (08 Jun 2018 05:00) (96% - 100%)    I&O's Detail    06 Jun 2018 07:01 - 07 Jun 2018 07:00  --------------------------------------------------------  IN:    Enteral Tube Flush: 300 mL    IV PiggyBack: 300 mL    Jevity: 337 mL  Total IN: 937 mL    OUT:    Voided: 200 mL  Total OUT: 200 mL    Total NET: 737 mL      07 Jun 2018 07:01  -  08 Jun 2018 06:00  --------------------------------------------------------  IN:    Enteral Tube Flush: 220 mL    IV PiggyBack: 300 mL    Jevity: 413 mL  Total IN: 933 mL    OUT:  Total OUT: 0 mL    Total NET: 933 mL        I&O's Summary    06 Jun 2018 07:01  -  07 Jun 2018 07:00  --------------------------------------------------------  IN: 937 mL / OUT: 200 mL / NET: 737 mL    07 Jun 2018 07:01  -  08 Jun 2018 06:00  --------------------------------------------------------  IN: 933 mL / OUT: 0 mL / NET: 933 mL        PHYSICAL EXAM:  General: NAD. AAOx3  HEENT:  Right scalp incision C/D/I. Right side scalp flap is soft and flat. PERRL. EOMI.   Neck: Nontender  Respiratory: CTA b/l  Cardiovascular: S1,S2. NSR.  Gastrointestinal: Soft, NT/ND. +BS.  Extremities: Pulses 2+ throughout  Neurological: Left facial asymmetry. Left hemiparesis with withdrawal to LUE to noxious. LLE 2/5 proximal hip flexion on command. Moving right side with good strength and following commands. Clear speech but hypophonic.  following commands.    TUBES/LINES:  [] CVC  [] A-line  [] Lumbar Drain  [] Ventriculostomy  [] Other    DIET:  [] NPO  [] Mechanical  [] Tube feeds    LABS:                        10.1   13.4  )-----------( 521      ( 08 Jun 2018 04:21 )             31.0     06-08    138  |  101  |  12  ----------------------------<  124<H>  3.9   |  24  |  0.46<L>    Ca    9.3      08 Jun 2018 04:21  Phos  3.0     06-08  Mg     2.3     06-08              CAPILLARY BLOOD GLUCOSE          Drug Levels: [] N/A    CSF Analysis: [] N/A      Allergies    No Known Allergies    Intolerances      MEDICATIONS:  Antibiotics:  metroNIDAZOLE    Tablet 500 milliGRAM(s) Oral two times a day    Neuro:  acetaminophen   Tablet 650 milliGRAM(s) Oral every 6 hours PRN  acetaminophen   Tablet. 650 milliGRAM(s) Oral every 6 hours PRN  fentaNYL    Injectable 12.5 MICROGram(s) IV Push every 2 hours PRN  fentaNYL    Injectable 25 MICROGram(s) IV Push every 2 hours PRN  levETIRAcetam  Solution 1000 milliGRAM(s) Oral two times a day  ondansetron Injectable 4 milliGRAM(s) IV Push every 6 hours PRN    Anticoagulation:  aspirin  chewable 81 milliGRAM(s) Oral daily  heparin  Injectable 5000 Unit(s) SubCutaneous every 8 hours    OTHER:  atorvastatin 80 milliGRAM(s) Oral at bedtime  bisoprolol   Tablet 5 milliGRAM(s) Oral daily  dextrose 40% Gel 15 Gram(s) Oral once PRN  dextrose 50% Injectable 12.5 Gram(s) IV Push once  dextrose 50% Injectable 25 Gram(s) IV Push once  dextrose 50% Injectable 25 Gram(s) IV Push once  docusate sodium Liquid 100 milliGRAM(s) Oral two times a day  glucagon  Injectable 1 milliGRAM(s) IntraMuscular once PRN  hydrALAZINE Injectable 10 milliGRAM(s) IV Push every 4 hours PRN  labetalol Injectable 10 milliGRAM(s) IV Push every 4 hours PRN  polyethylene glycol 3350 17 Gram(s) Oral at bedtime  senna Syrup 10 milliLiter(s) Oral at bedtime    IVF:  dextrose 5%. 1000 milliLiter(s) IV Continuous <Continuous>  multivitamin 1 Tablet(s) Oral daily    CULTURES:  Culture Results:   Normal Respiratory Gisele present (06-05 @ 14:51)  Culture Results:   No growth at 2 days. (06-05 @ 08:45)    RADIOLOGY & ADDITIONAL TESTS: < from: CT Head No Cont (06.07.18 @ 11:35) >  Right frontal parietal temporal craniectomy. Subacute   infarction in the right MCA/ICA and PCA distribution with gyriform   hyperdensity that may represent spared cortex versus hemorrhage,   unchanged.      ASSESSMENT: 58y Female w/ HTN, HLD, Anxiety, Depression, h/o Breast Cancer w/ TRAM flap reconstruction admitted for expanding right rectus hematoma complicated by right MCA infarction now s/p hemicraniectomy POD #8.     PLAN:  NEURO:  - Neuro checks q 4 hr  - Continue Keppra 1g BID for seizure prophylaxis   - CT head portable reviewed, stable.  - Continue helmet when out of bed, called vendor to refit due to tightness when on.  - Pain med PRN  - 4 hour EEG to r/o seizure activity for fluctuations of neurological status    CARDIOVASCULAR:  - Goal SBP- normotensive 100-140, treat SBP if >160   - Continue Bisoprolol  - Daily labs, electrolyte repletion PRN    PULMONARY:  - Robinul 0.2mg x1 today for copious secretions,  -IS as tolerated    RENAL:  - Voiding, incontinent.    GI:  - Continue bowel regimen  - Failed S&S today, plan to replace NGT  - Possible CT Abd/Pelvis at a later date    :  -Continue Flagyl presumed bacterial vaginosis until June 10th    ID:  - f/u vaginal swab culture - multiple organisms growing, on Flagyl x7 days total    DVT PROPHYLAXIS:  -SQH, SCD's  -LE dopplers repeat pending    DISPOSITION:   -PT/OT, Helmet when OOB  -Continue SICU care  -Discussed with Dr. Johnson and Dr. Victor.

## 2018-06-08 NOTE — OCCUPATIONAL THERAPY INITIAL EVALUATION ADULT - IMPAIRMENTS CONTRIBUTING IMPAIRED BED MOBILITY, REHAB EVAL
abnormal muscle tone/decreased ROM/decreased strength/impaired balance/impaired motor control/impaired postural control/decreased sensation/impaired sensory feedback

## 2018-06-08 NOTE — PROVIDER CONTACT NOTE (CHANGE IN STATUS NOTIFICATION) - SITUATION
Patient was non-verbal upon examination. Other neuro exam unchanged. Following commands, no drift on rt extremities and withdrawal from pain on left, B/L pupils brisk reactive and equal.

## 2018-06-08 NOTE — PROVIDER CONTACT NOTE (CHANGE IN STATUS NOTIFICATION) - ACTION/TREATMENT ORDERED:
no intervention at this time per PA. will wait to see if patient spikes fever. vs otherwise stable. pt lethargic, otherwise AAOx3 with no further neuro decline. will continue to monitor.
To be monitored. No interventions stated at this moment.

## 2018-06-08 NOTE — OCCUPATIONAL THERAPY INITIAL EVALUATION ADULT - MD ORDER
58F presenting to the [facility] ED with a painful bulge in her right abdomen. CT A&P done showed a large expanding intramuscular hematoma within the right rectus abdominis, with evidence of active contrast extravasation internally. Patient went for IR and was found to have 2 pseudo-aneurysms of the artery supplying the R rectus abd muscle and had ultrasound guided thrombin injection of them.

## 2018-06-08 NOTE — PROGRESS NOTE ADULT - SUBJECTIVE AND OBJECTIVE BOX
SUBJECTIVE: Pt seen and examined at bedside. No overnight events  In the morning patient does not respond to commands, does not follow.  Abdominal exam unchanged    Vital Signs Last 24 Hrs  T(C): 37.4 (08 Jun 2018 05:45), Max: 37.6 (07 Jun 2018 21:44)  T(F): 99.3 (08 Jun 2018 05:45), Max: 99.6 (07 Jun 2018 21:44)  HR: 78 (08 Jun 2018 07:00) (76 - 90)  BP: 131/71 (08 Jun 2018 07:00) (131/71 - 176/99)  BP(mean): 107 (08 Jun 2018 07:00) (103 - 129)  RR: 20 (08 Jun 2018 07:00) (16 - 27)  SpO2: 95% (08 Jun 2018 07:00) (95% - 100%)    PHYSICAL EXAM    Gen: NAD, awake, not following commands, moving spontaneously but non verbal  CV: normocardic, intermittwently hypertensive  Pulm: on NC  Abd: Soft, non-distended, non-tender throughout, no rebound or guarding, right flank and hip ecchymosis unchanged  Ext: WWP        LABS:                        10.1   13.4  )-----------( 521      ( 08 Jun 2018 04:21 )             31.0     06-08    138  |  101  |  12  ----------------------------<  124<H>  3.9   |  24  |  0.46<L>    Ca    9.3      08 Jun 2018 04:21  Phos  3.0     06-08  Mg     2.3     06-08        ASSESSMENT AND PLAN  58F with a hx of TRAM flap reconstruction on plavix now with a spontaneous right rectus expanding hematoma taken to IR for Rt groin access for dx angio, and rt abd percutaneous thrombin injection of 2 pseudoaneurysms in abd wall c/b herniation of cerebral tonsils, transferred to neurosurgery for decompressive hemicraniectomy 5/31    Abdominal exam benign, stable bruises over right hip and flank  Hgb stable, continue checking daily  Would recommend CT abdomen/pelvis non con to assess for hematoma and new flank bruises  No need for surgical intervention at this time  Team 4c will follow  Discussed with chief resident and Dr. Dozier

## 2018-06-08 NOTE — OCCUPATIONAL THERAPY INITIAL EVALUATION ADULT - BALANCE DISTURBANCE, IDENTIFIED IMPAIRMENT CONTRIBUTE, REHAB EVAL
abnormal muscle tone/decreased ROM/impaired coordination/impaired motor control/impaired postural control/decreased sensation/impaired sensory feedback/decreased strength

## 2018-06-08 NOTE — PROGRESS NOTE ADULT - ASSESSMENT
58F with   1.  acute cerebrovascular accident involving R MCA / SELINA, malignant infarction, cerebral edema, brain compression s/p decompressive hemicraniectomy (05/31/2018, Dr. Johnson)  2.  Hypertension dyslipidemia   3.  h/o breast CA  4.  CAD  5.  R rectus abdominis intramuscular hematoma  6.  seizures    PLAN:   NEURO: neurochecks q 4 h, PRN pain meds tylenol, hold sedation  malignant R MCA infarction, s/p DHC:  ASA, CT head stable after starting ASA  seizure disorder:  continue levetiracetam 1G BID   ICP crises:  s/p DHC  REHAB:  physical therapy evaluation and management - defer   EARLY MOB:  HOB up, bedrest; SLP today    PULM:  extubated, self suctioning   CARDIO:  SBP goal < 140 mm Hg, echo EF 55-60% mod dilated LA, probable AVM, no PFOs, remove central line  ENDO:  Blood sugar goals 140-180 mg/dL, stop insulin sliding scale; continue high-dose statins  GI:  PPI for GI prophylaxis  DIET: jevity to goal of 70  RENAL:  3% stopped yesterday, remove central line  HEM/ONC:  s/p transfusion - with adequate response of Hb; repeat CT abd/pelvis no contrast if and when she goes down for CT  VTE Prophylaxis: SCDs only, start SQH if ok with NS, neg dopplers   ID: afebrile, no leukocytosis  Social:  updated yesterday  MISC:  h/o ETOH abuse, continue MVI    CODE STATUS:  Full Code                      DISPOSITION:  ICU through afternoon, SDU in PM? 58F with   1.  acute cerebrovascular accident involving R MCA / SELINA, malignant infarction, cerebral edema, brain compression s/p decompressive hemicraniectomy (05/31/2018, Dr. Johnson)  2.  Hypertension dyslipidemia   3.  h/o breast CA  4.  CAD  5.  R rectus abdominis intramuscular hematoma  6.  seizures    PLAN:   NEURO: neurochecks q 4 h, PRN pain meds tylenol, hold sedation  malignant R MCA infarction, s/p DHC:  ASA  seizure disorder:  continue levetiracetam 1G BID; EEG today 4 hr  REHAB:  physical therapy evaluation and management - defer   EARLY MOB:  HOB up, bedrest; SLP again    PULM:  self-suctions, eating  CARDIO:  SBP goal < 140 mm Hg, echo EF 55-60% mod dilated LA, probable AVM, no PFOs  ENDO:  Blood sugar goals 140-180 mg/dL, stop insulin sliding scale; continue high-dose statins  GI:  PPI for GI prophylaxis  DIET: off TF  RENAL:  IVF  HEM/ONC:  stable  VTE Prophylaxis: SCDs, SQH neg dopplers   ID: afebrile, no leukocytosis  Social:  updated  MISC:  h/o ETOH abuse, continue MVI    CODE STATUS:  Full Code                      DISPOSITION:  SDU 58F with   1.  acute cerebrovascular accident involving R MCA / SELINA, malignant infarction, cerebral edema, brain compression s/p decompressive hemicraniectomy (05/31/2018, Dr. Johnson)  2.  Hypertension dyslipidemia   3.  h/o breast CA  4.  CAD  5.  R rectus abdominis intramuscular hematoma  6.  seizures    PLAN:   NEURO: neurochecks q 4 h, PRN pain meds tylenol, hold sedation  malignant R MCA infarction, s/p DHC:  ASA  seizure disorder:  continue levetiracetam 1G BID; EEG today 4 hr  REHAB:  physical therapy evaluation and management - defer   EARLY MOB; SLP again    PULM:  self-suctions, eating  CARDIO:  SBP goal < 140 mm Hg, echo EF 55-60% mod dilated LA, probable AVM, no PFOs  ENDO:  Blood sugar goals 140-180 mg/dL, stop insulin sliding scale; continue high-dose statins  GI:  PPI for GI prophylaxis  DIET: off TF  RENAL:  IVF  HEM/ONC:  stable  VTE Prophylaxis: SCDs, SQH neg dopplers   ID: febrile, no leukocytosis; CXR and LE dopplers  Social:  updated  MISC:  h/o ETOH abuse, continue MVI    CODE STATUS:  Full Code                      DISPOSITION:  SDU

## 2018-06-08 NOTE — OCCUPATIONAL THERAPY INITIAL EVALUATION ADULT - PERTINENT HX OF CURRENT PROBLEM, REHAB EVAL
At 8:04pm stroke code called for persistent AMS and left sided weakness. Right MCA infarction now s/p hemicraniectomy; S/P emergent crani yesterday, transferred back to [facility], sedated overnight, [facility] with hemorrhagic conversion 6/4.

## 2018-06-08 NOTE — PROVIDER CONTACT NOTE (CHANGE IN STATUS NOTIFICATION) - ASSESSMENT
pt's rectal temp 100.7
Patient was non-verbal upon examination at this time. Other neuro exam unchanged. Following commands, no drift on rt extremities and withdrawal from pain on left, B/L pupils brisk reactive and equal.

## 2018-06-08 NOTE — OCCUPATIONAL THERAPY INITIAL EVALUATION ADULT - PLANNED THERAPY INTERVENTIONS, OT EVAL
ROM/fine motor coordination training/parent/caregiver training.../strengthening/transfer training/IADL retraining/balance training/cognitive, visual perceptual/motor coordination training/neuromuscular re-education/ADL retraining/bed mobility training

## 2018-06-08 NOTE — OCCUPATIONAL THERAPY INITIAL EVALUATION ADULT - SITTING BALANCE: STATIC
dangling at EOB 15 minutes with mod to maxAX1 for trunk control, +leaning to left can partially correct with VC/poor balance

## 2018-06-08 NOTE — PROGRESS NOTE ADULT - SUBJECTIVE AND OBJECTIVE BOX
Neurology Stroke Follow Up     Interval History:  Patient seen and examined.  As per Dr. Victor, patient more lethargic today than yesterday but much improved from week ago.  Patient c/o being hot. Ice packs placed under her arms.  Afebrile earlier in day.  100.7F at 1PM.  sbp in 140-150's    Medications:  MEDICATIONS  (STANDING):  aspirin Suppository 300 milliGRAM(s) Rectal daily  atorvastatin 80 milliGRAM(s) Oral at bedtime  bisoprolol   Tablet 5 milliGRAM(s) Oral daily  dextrose 5%. 1000 milliLiter(s) (50 mL/Hr) IV Continuous <Continuous>  dextrose 50% Injectable 12.5 Gram(s) IV Push once  dextrose 50% Injectable 25 Gram(s) IV Push once  dextrose 50% Injectable 25 Gram(s) IV Push once  docusate sodium Liquid 100 milliGRAM(s) Oral two times a day  heparin  Injectable 5000 Unit(s) SubCutaneous every 8 hours  levETIRAcetam  IVPB 1000 milliGRAM(s) IV Intermittent every 12 hours  metroNIDAZOLE  IVPB 500 milliGRAM(s) IV Intermittent every 12 hours  multivitamin 1 Tablet(s) Oral daily  polyethylene glycol 3350 17 Gram(s) Oral at bedtime  senna Syrup 10 milliLiter(s) Oral at bedtime    MEDICATIONS  (PRN):  acetaminophen   Tablet 650 milliGRAM(s) Oral every 6 hours PRN For Temp greater than 38 C (100.4 F)  acetaminophen   Tablet. 650 milliGRAM(s) Oral every 6 hours PRN Mild Pain (1 - 3)  dextrose 40% Gel 15 Gram(s) Oral once PRN Blood Glucose LESS THAN 70 milliGRAM(s)/deciliter  fentaNYL    Injectable 12.5 MICROGram(s) IV Push every 2 hours PRN Moderate Pain (4 - 6)  fentaNYL    Injectable 25 MICROGram(s) IV Push every 2 hours PRN Severe Pain (7 - 10)  glucagon  Injectable 1 milliGRAM(s) IntraMuscular once PRN Glucose LESS THAN 70 milligrams/deciliter  hydrALAZINE Injectable 10 milliGRAM(s) IV Push every 4 hours PRN SBP >160  labetalol Injectable 10 milliGRAM(s) IV Push every 4 hours PRN SBP>160  ondansetron Injectable 4 milliGRAM(s) IV Push every 6 hours PRN Nausea    Allergies  No Known Allergies    Exam:  Vital Signs Last 24 Hrs  T(C): 37.4 (08 Jun 2018 14:33), Max: 38.2 (08 Jun 2018 13:00)  T(F): 99.3 (08 Jun 2018 14:33), Max: 100.7 (08 Jun 2018 13:00)  HR: 80 (08 Jun 2018 15:00) (68 - 90)  BP: 145/85 (08 Jun 2018 15:00) (128/78 - 176/99)  BP(mean): 104 (08 Jun 2018 15:00) (99 - 129)  RR: 21 (08 Jun 2018 15:00) (12 - 27)  SpO2: 96% (08 Jun 2018 15:00) (95% - 100%)    Gen: Lying in bed, calm, cooperative, in NAD  Neuro:  Mental status: Awake, eyes slightly closed but patient able open her eyes fully.  Able name simple words, repeat phrases, follows simple commands   Cranial nerves: right gaze preference with ability to move almost all the way to the left, pupils equally round and reactive to light, left facial droop, tongue midline    Motor: moves right side spontaneously, left hemiplegia     Sensation: decreased response to pain on left   Coordination: unable to assess   Reflexes: absent Babinski bilaterally  Gait: deferred    Labs:                        10.1   13.4  )-----------( 521      ( 08 Jun 2018 04:21 )             31.0     06-08    138  |  101  |  12  ----------------------------<  124<H>  3.9   |  24  |  0.46<L>    Ca    9.3      08 Jun 2018 04:21  Phos  3.0     06-08  Mg     2.3     06-08    Hemoglobin A1C, Whole Blood: 5.3 % (06-01 @ 05:20)  Lipid Profile (05.30.18 @ 19:17)    Total Cholesterol/HDL Ratio Measurement: 3.4 RATIO    Cholesterol, Serum: 175 mg/dL    Triglycerides, Serum: 84 mg/dL    HDL Cholesterol, Serum: 52 mg/dL    Direct LDL: 106    Radiology:  CT Head No Cont (06.07.18 @ 11:35) >  Right frontal parietal temporal craniectomy. Subacute infarction in the right MCA/ICA and PCA distribution with gyriform   hyperdensity that may represent spared cortex versus hemorrhage, unchanged.    Assessment and plan:  58 year-old female with large right MCA CVA s/p craniotomy.  She's improved neurologically.    Discussed with Dr. Victor - patient to remain on Neurosurgery today with daily assessment regarding her responses.  EEG in place.  Will consider transfer to stroke service once patient stable.    Secondary stroke prevention -   a) Continue Aspirin 300mg [place]  b) Continue Atorvastatin 80mg for statin.    For rehab once workup complete.

## 2018-06-08 NOTE — OCCUPATIONAL THERAPY INITIAL EVALUATION ADULT - RANGE OF MOTION EXAMINATION, UPPER EXTREMITY
left upper extremity no AROM noted/Left UE Passive ROM was WFL  (within functional limits)/Right UE Passive ROM was WFL  (within functional limits)/Right UE Active ROM was WFL (within functional limits)

## 2018-06-08 NOTE — OCCUPATIONAL THERAPY INITIAL EVALUATION ADULT - VISUAL ASSESSMENT: TRACKING
patient can track in all planes right > left hemisphere 2/2 left inattention and signs of VF cut/left to right/right to left/up gaze/down gaze

## 2018-06-08 NOTE — PROGRESS NOTE ADULT - SUBJECTIVE AND OBJECTIVE BOX
NEUROCRITICAL CARE PROGRESS NOTE    VERONIKA CALERO   MRN-[id number]  Summary:  /  HPI:  58F with a hx of left breast cancer s/p left mastectomy with TRAM flap reconstruction (4-5 years prior), recently placed on plavix (about a month ago) by her cardiologist 2/2 calcified arterial disease and is now presenting to the [facility] ED with a painful bulge in her right abdomen.  Pt reports that she's had a cough for several days and yesterday noticed a bulge in her right abdomen that has grown and only become more painful.  She at times has difficulty breathing and some lightheadedness but otherwise denies fevers, chills, chest pain, nausea, vomiting, diarrhea, dysuria.    PMH: HTN, CAD?, HLD, Anxiety, Depression  Meds: bisoprolol 5mg qd, lorazepam 1mg q8?, Plavix 5qd, rosuvastatin, HCTZ, buproprion, baclofen  NKDA  PSH: Left mastectomy with TRAM rotational flap reconstruction  Social: 1/2 bottle of wine most nights, remote cigarette hx, denies IVDA (30 May 2018 13:13)      S/Overnight events:    POD#     EVD:    CSF :    ICPs:      Vital Signs Last 24 Hrs  T(C): 37.4 (2018 05:45), Max: 37.6 (2018 21:44)  T(F): 99.3 (2018 05:45), Max: 99.6 (2018 21:44)  HR: 78 (2018 07:00) (76 - 90)  BP: 131/71 (2018 07:00) (131/71 - 176/99)  BP(mean): 107 (2018 07:00) (102 - 129)  RR: 20 (2018 07:00) (16 - 27)  SpO2: 95% (2018 07:00) (95% - 100%)      I&O's Detail    2018 07:01  -  2018 07:00  --------------------------------------------------------  IN:    Enteral Tube Flush: 220 mL    IV PiggyBack: 300 mL    Jevity: 413 mL  Total IN: 933 mL    OUT:  Total OUT: 0 mL    Total NET: 933 mL      LABS:                        10.1   13.4  )-----------( 521      ( 2018 04:21 )             31.0     06-08    138  |  101  |  12  ----------------------------<  124<H>  3.9   |  24  |  0.46<L>    Ca    9.3      2018 04:21  Phos  3.0     06-08  Mg     2.3     06-08      CAPILLARY BLOOD GLUCOSE          Drug Levels: [] N/A    CSF Analysis: [] N/A      Allergies    No Known Allergies    Intolerances      MEDICATIONS:  Antibiotics:  metroNIDAZOLE    Tablet 500 milliGRAM(s) Oral two times a day    Neuro:  acetaminophen   Tablet 650 milliGRAM(s) Oral every 6 hours PRN  acetaminophen   Tablet. 650 milliGRAM(s) Oral every 6 hours PRN  fentaNYL    Injectable 12.5 MICROGram(s) IV Push every 2 hours PRN  fentaNYL    Injectable 25 MICROGram(s) IV Push every 2 hours PRN  levETIRAcetam  Solution 1000 milliGRAM(s) Oral two times a day  ondansetron Injectable 4 milliGRAM(s) IV Push every 6 hours PRN    Anticoagulation:  aspirin  chewable 81 milliGRAM(s) Oral daily  heparin  Injectable 5000 Unit(s) SubCutaneous every 8 hours    OTHER:  atorvastatin 80 milliGRAM(s) Oral at bedtime  bisoprolol   Tablet 5 milliGRAM(s) Oral daily  dextrose 40% Gel 15 Gram(s) Oral once PRN  dextrose 50% Injectable 12.5 Gram(s) IV Push once  dextrose 50% Injectable 25 Gram(s) IV Push once  dextrose 50% Injectable 25 Gram(s) IV Push once  docusate sodium Liquid 100 milliGRAM(s) Oral two times a day  glucagon  Injectable 1 milliGRAM(s) IntraMuscular once PRN  hydrALAZINE Injectable 10 milliGRAM(s) IV Push every 4 hours PRN  labetalol Injectable 10 milliGRAM(s) IV Push every 4 hours PRN  polyethylene glycol 3350 17 Gram(s) Oral at bedtime  senna Syrup 10 milliLiter(s) Oral at bedtime    IVF:  dextrose 5%. 1000 milliLiter(s) IV Continuous <Continuous>  multivitamin 1 Tablet(s) Oral daily    CULTURES:  Culture Results:   Normal Respiratory Gisele present ( @ 14:51)  Culture Results:   No growth at 2 days. ( @ 08:45)    RADIOLOGY & ADDITIONAL TESTS:  	  Bacteriology:  CSF studies:  EEG:  Neuroimagin/01 CT head:  no change in small foci of hemorrhage, continued evolution large R MCA infarction small R SELINA territory infarct   CT head: evolving R MCA / SELINA infarction, global mass effect, MLS to L, early L lateral ventricle entrapment, hemorrhagic transformation suspected   CTP:  abnormal perfusion, mismatch volume 12ml   CTA:  acute occlusion R M2   CT head:  acute R MCA infarction, no ICH   CT abd:  large expanding intramuscular hematoma within R rectus abdomin  Other imagin/02 CXR small bilateral effusions    LE Doppler limited exam, NEG NEUROCRITICAL CARE PROGRESS NOTE    VERONIKA CALERO   MRN-[id number]  Summary:  /  HPI:  58F with a hx of left breast cancer s/p left mastectomy with TRAM flap reconstruction (4-5 years prior), recently placed on plavix (about a month ago) by her cardiologist 2/2 calcified arterial disease and is now presenting to the [facility] ED with a painful bulge in her right abdomen.  Pt reports that she's had a cough for several days and yesterday noticed a bulge in her right abdomen that has grown and only become more painful.  She at times has difficulty breathing and some lightheadedness but otherwise denies fevers, chills, chest pain, nausea, vomiting, diarrhea, dysuria.    PMH: HTN, CAD?, HLD, Anxiety, Depression  Meds: bisoprolol 5mg qd, lorazepam 1mg q8?, Plavix 5qd, rosuvastatin, HCTZ, buproprion, baclofen  NKDA  PSH: Left mastectomy with TRAM rotational flap reconstruction  Social: 1/2 bottle of wine most nights, remote cigarette hx, denies IVDA (30 May 2018 13:13)    S/Overnight events:  awake, talking, WBC lower, scratching her crani site.  restrained now.      Vital Signs Last 24 Hrs  T(C): 37.4 (2018 05:45), Max: 37.6 (2018 21:44)  T(F): 99.3 (2018 05:45), Max: 99.6 (2018 21:44)  HR: 78 (2018 07:00) (76 - 90)  BP: 131/71 (2018 07:00) (131/71 - 176/99)  BP(mean): 107 (2018 07:00) (102 - 129)  RR: 20 (2018 07:00) (16 - 27)  SpO2: 95% (2018 07:00) (95% - 100%)      I&O's Detail    2018 07:01  -  2018 07:00  --------------------------------------------------------  IN:    Enteral Tube Flush: 220 mL    IV PiggyBack: 300 mL    Jevity: 413 mL  Total IN: 933 mL    OUT:  Total OUT: 0 mL    Total NET: 933 mL      LABS:                        10.1   13.4  )-----------( 521      ( 2018 04:21 )             31.0     06-08    138  |  101  |  12  ----------------------------<  124<H>  3.9   |  24  |  0.46<L>    Ca    9.3      2018 04:21  Phos  3.0     06-08  Mg     2.3     06-08      CAPILLARY BLOOD GLUCOSE    Drug Levels: [] N/A    CSF Analysis: [] N/A      Allergies    No Known Allergies    Intolerances      MEDICATIONS:  Antibiotics:  metroNIDAZOLE    Tablet 500 milliGRAM(s) Oral two times a day    Neuro:  acetaminophen   Tablet 650 milliGRAM(s) Oral every 6 hours PRN  acetaminophen   Tablet. 650 milliGRAM(s) Oral every 6 hours PRN  fentaNYL    Injectable 12.5 MICROGram(s) IV Push every 2 hours PRN  fentaNYL    Injectable 25 MICROGram(s) IV Push every 2 hours PRN  levETIRAcetam  Solution 1000 milliGRAM(s) Oral two times a day  ondansetron Injectable 4 milliGRAM(s) IV Push every 6 hours PRN    Anticoagulation:  aspirin  chewable 81 milliGRAM(s) Oral daily  heparin  Injectable 5000 Unit(s) SubCutaneous every 8 hours    OTHER:  atorvastatin 80 milliGRAM(s) Oral at bedtime  bisoprolol   Tablet 5 milliGRAM(s) Oral daily  dextrose 40% Gel 15 Gram(s) Oral once PRN  dextrose 50% Injectable 12.5 Gram(s) IV Push once  dextrose 50% Injectable 25 Gram(s) IV Push once  dextrose 50% Injectable 25 Gram(s) IV Push once  docusate sodium Liquid 100 milliGRAM(s) Oral two times a day  glucagon  Injectable 1 milliGRAM(s) IntraMuscular once PRN  hydrALAZINE Injectable 10 milliGRAM(s) IV Push every 4 hours PRN  labetalol Injectable 10 milliGRAM(s) IV Push every 4 hours PRN  polyethylene glycol 3350 17 Gram(s) Oral at bedtime  senna Syrup 10 milliLiter(s) Oral at bedtime    IVF:  dextrose 5%. 1000 milliLiter(s) IV Continuous <Continuous>  multivitamin 1 Tablet(s) Oral daily    CULTURES:  Culture Results:   Normal Respiratory Gisele present ( @ 14:51)  Culture Results:   No growth at 2 days. ( @ 08:45)    RADIOLOGY & ADDITIONAL TESTS:  	  Bacteriology:  CSF studies:  EEG:  Neuroimagin/01 CT head:  no change in small foci of hemorrhage, continued evolution large R MCA infarction small R SELINA territory infarct   CT head: evolving R MCA / SELINA infarction, global mass effect, MLS to L, early L lateral ventricle entrapment, hemorrhagic transformation suspected   CTP:  abnormal perfusion, mismatch volume 12ml   CTA:  acute occlusion R M2   CT head:  acute R MCA infarction, no ICH   CT abd:  large expanding intramuscular hematoma within R rectus abdomin  Other imagin/02 CXR small bilateral effusions    LE Doppler limited exam, NEG    Neuro exam:   drowsy  more secretions, pocketing food on L   L hemiplegic,  increase L hemiplegia

## 2018-06-08 NOTE — OCCUPATIONAL THERAPY INITIAL EVALUATION ADULT - GENERAL OBSERVATIONS, REHAB EVAL
Patient cleared for Occupational Therapy by Dr. Victor and RN Jia, patient received supine in semi-asif position in non-acute distress. +tele, +IV heplock, + right cranial incision C/D/I, + bilateral sequential compression devices. Patient denies pain, no complaint. *Helmet worn throughout session when seated at EOB.

## 2018-06-08 NOTE — OCCUPATIONAL THERAPY INITIAL EVALUATION ADULT - MANUAL MUSCLE TESTING RESULTS, REHAB EVAL
right facial droop; right upper extremity >4/5 throughout; left upper extremity 0/5 throughout except shoulder shrug 1/5.

## 2018-06-09 LAB
ANION GAP SERPL CALC-SCNC: 13 MMOL/L — SIGNIFICANT CHANGE UP (ref 5–17)
BUN SERPL-MCNC: 12 MG/DL — SIGNIFICANT CHANGE UP (ref 7–23)
CALCIUM SERPL-MCNC: 9.2 MG/DL — SIGNIFICANT CHANGE UP (ref 8.4–10.5)
CHLORIDE SERPL-SCNC: 103 MMOL/L — SIGNIFICANT CHANGE UP (ref 96–108)
CO2 SERPL-SCNC: 23 MMOL/L — SIGNIFICANT CHANGE UP (ref 22–31)
CREAT SERPL-MCNC: 0.39 MG/DL — LOW (ref 0.5–1.3)
CULTURE RESULTS: SIGNIFICANT CHANGE UP
GLUCOSE SERPL-MCNC: 121 MG/DL — HIGH (ref 70–99)
HCT VFR BLD CALC: 31.5 % — LOW (ref 34.5–45)
HGB BLD-MCNC: 10.1 G/DL — LOW (ref 11.5–15.5)
MAGNESIUM SERPL-MCNC: 2.2 MG/DL — SIGNIFICANT CHANGE UP (ref 1.6–2.6)
MCHC RBC-ENTMCNC: 28.2 PG — SIGNIFICANT CHANGE UP (ref 27–34)
MCHC RBC-ENTMCNC: 32.1 G/DL — SIGNIFICANT CHANGE UP (ref 32–36)
MCV RBC AUTO: 88 FL — SIGNIFICANT CHANGE UP (ref 80–100)
PHOSPHATE SERPL-MCNC: 2.8 MG/DL — SIGNIFICANT CHANGE UP (ref 2.5–4.5)
PLATELET # BLD AUTO: 605 K/UL — HIGH (ref 150–400)
POTASSIUM SERPL-MCNC: 3.4 MMOL/L — LOW (ref 3.5–5.3)
POTASSIUM SERPL-SCNC: 3.4 MMOL/L — LOW (ref 3.5–5.3)
RBC # BLD: 3.58 M/UL — LOW (ref 3.8–5.2)
RBC # FLD: 16.5 % — SIGNIFICANT CHANGE UP (ref 10.3–16.9)
SODIUM SERPL-SCNC: 139 MMOL/L — SIGNIFICANT CHANGE UP (ref 135–145)
SPECIMEN SOURCE: SIGNIFICANT CHANGE UP
WBC # BLD: 14.4 K/UL — HIGH (ref 3.8–10.5)
WBC # FLD AUTO: 14.4 K/UL — HIGH (ref 3.8–10.5)

## 2018-06-09 PROCEDURE — 99232 SBSQ HOSP IP/OBS MODERATE 35: CPT

## 2018-06-09 PROCEDURE — 99233 SBSQ HOSP IP/OBS HIGH 50: CPT | Mod: 24

## 2018-06-09 PROCEDURE — 95951: CPT | Mod: 26,52

## 2018-06-09 RX ORDER — POTASSIUM CHLORIDE 20 MEQ
10 PACKET (EA) ORAL
Qty: 0 | Refills: 0 | Status: COMPLETED | OUTPATIENT
Start: 2018-06-09 | End: 2018-06-09

## 2018-06-09 RX ORDER — CEFTRIAXONE 500 MG/1
INJECTION, POWDER, FOR SOLUTION INTRAMUSCULAR; INTRAVENOUS
Qty: 0 | Refills: 0 | Status: DISCONTINUED | OUTPATIENT
Start: 2018-06-09 | End: 2018-06-09

## 2018-06-09 RX ORDER — CEFTRIAXONE 500 MG/1
1000 INJECTION, POWDER, FOR SOLUTION INTRAMUSCULAR; INTRAVENOUS EVERY 24 HOURS
Qty: 0 | Refills: 0 | Status: DISCONTINUED | OUTPATIENT
Start: 2018-06-10 | End: 2018-06-12

## 2018-06-09 RX ORDER — SODIUM CHLORIDE 9 MG/ML
1000 INJECTION INTRAMUSCULAR; INTRAVENOUS; SUBCUTANEOUS
Qty: 0 | Refills: 0 | Status: DISCONTINUED | OUTPATIENT
Start: 2018-06-09 | End: 2018-06-11

## 2018-06-09 RX ORDER — SODIUM CHLORIDE 9 MG/ML
1000 INJECTION INTRAMUSCULAR; INTRAVENOUS; SUBCUTANEOUS
Qty: 0 | Refills: 0 | Status: DISCONTINUED | OUTPATIENT
Start: 2018-06-09 | End: 2018-06-09

## 2018-06-09 RX ORDER — CEFTRIAXONE 500 MG/1
1 INJECTION, POWDER, FOR SOLUTION INTRAMUSCULAR; INTRAVENOUS ONCE
Qty: 0 | Refills: 0 | Status: COMPLETED | OUTPATIENT
Start: 2018-06-09 | End: 2018-06-09

## 2018-06-09 RX ADMIN — Medication 1 TABLET(S): at 11:51

## 2018-06-09 RX ADMIN — LEVETIRACETAM 400 MILLIGRAM(S): 250 TABLET, FILM COATED ORAL at 00:14

## 2018-06-09 RX ADMIN — Medication 100 MILLIEQUIVALENT(S): at 09:28

## 2018-06-09 RX ADMIN — Medication 100 MILLIEQUIVALENT(S): at 11:45

## 2018-06-09 RX ADMIN — HEPARIN SODIUM 5000 UNIT(S): 5000 INJECTION INTRAVENOUS; SUBCUTANEOUS at 13:34

## 2018-06-09 RX ADMIN — Medication 100 MILLIGRAM(S): at 06:54

## 2018-06-09 RX ADMIN — LEVETIRACETAM 400 MILLIGRAM(S): 250 TABLET, FILM COATED ORAL at 23:11

## 2018-06-09 RX ADMIN — FENTANYL CITRATE 12.5 MICROGRAM(S): 50 INJECTION INTRAVENOUS at 09:47

## 2018-06-09 RX ADMIN — LEVETIRACETAM 400 MILLIGRAM(S): 250 TABLET, FILM COATED ORAL at 11:52

## 2018-06-09 RX ADMIN — Medication 100 MILLIEQUIVALENT(S): at 07:58

## 2018-06-09 RX ADMIN — HEPARIN SODIUM 5000 UNIT(S): 5000 INJECTION INTRAVENOUS; SUBCUTANEOUS at 23:11

## 2018-06-09 RX ADMIN — HEPARIN SODIUM 5000 UNIT(S): 5000 INJECTION INTRAVENOUS; SUBCUTANEOUS at 06:54

## 2018-06-09 RX ADMIN — CEFTRIAXONE 100 GRAM(S): 500 INJECTION, POWDER, FOR SOLUTION INTRAMUSCULAR; INTRAVENOUS at 11:51

## 2018-06-09 RX ADMIN — Medication 100 MILLIGRAM(S): at 18:32

## 2018-06-09 RX ADMIN — Medication 300 MILLIGRAM(S): at 11:51

## 2018-06-09 NOTE — PROGRESS NOTE ADULT - SUBJECTIVE AND OBJECTIVE BOX
Neurology Stroke Follow Up     Interval History:  Patient seen and examined.  No acute complaints.  Patient stated that she felt comfortable  Afebrile now but 100.9 at 4PM and 100.2F overnight.    sbp in 150's    MEDICATIONS  (STANDING):  aspirin Suppository 300 milliGRAM(s) Rectal daily  atorvastatin 80 milliGRAM(s) Oral at bedtime  bisoprolol   Tablet 5 milliGRAM(s) Oral daily  dextrose 5%. 1000 milliLiter(s) (50 mL/Hr) IV Continuous <Continuous>  dextrose 50% Injectable 12.5 Gram(s) IV Push once  dextrose 50% Injectable 25 Gram(s) IV Push once  dextrose 50% Injectable 25 Gram(s) IV Push once  docusate sodium Liquid 100 milliGRAM(s) Oral two times a day  heparin  Injectable 5000 Unit(s) SubCutaneous every 8 hours  levETIRAcetam  IVPB 1000 milliGRAM(s) IV Intermittent every 12 hours  metroNIDAZOLE  IVPB 500 milliGRAM(s) IV Intermittent every 12 hours  multivitamin 1 Tablet(s) Oral daily  polyethylene glycol 3350 17 Gram(s) Oral at bedtime  potassium chloride  10 mEq/100 mL IVPB 10 milliEquivalent(s) IV Intermittent every 2 hours  senna Syrup 10 milliLiter(s) Oral at bedtime  sodium chloride 0.9%. 1000 milliLiter(s) (50 mL/Hr) IV Continuous <Continuous>    MEDICATIONS  (PRN):  acetaminophen   Tablet 650 milliGRAM(s) Oral every 6 hours PRN For Temp greater than 38 C (100.4 F)  acetaminophen   Tablet. 650 milliGRAM(s) Oral every 6 hours PRN Mild Pain (1 - 3)  dextrose 40% Gel 15 Gram(s) Oral once PRN Blood Glucose LESS THAN 70 milliGRAM(s)/deciliter  fentaNYL    Injectable 12.5 MICROGram(s) IV Push every 2 hours PRN Moderate Pain (4 - 6)  fentaNYL    Injectable 25 MICROGram(s) IV Push every 2 hours PRN Severe Pain (7 - 10)  glucagon  Injectable 1 milliGRAM(s) IntraMuscular once PRN Glucose LESS THAN 70 milligrams/deciliter  hydrALAZINE Injectable 10 milliGRAM(s) IV Push every 4 hours PRN SBP >160  labetalol Injectable 10 milliGRAM(s) IV Push every 4 hours PRN SBP>160  ondansetron Injectable 4 milliGRAM(s) IV Push every 6 hours PRN Nausea      Allergies  No Known Allergies    Exam:  ICU Vital Signs Last 24 Hrs  T(C): 37.2 (09 Jun 2018 07:04), Max: 38.3 (08 Jun 2018 16:00)  T(F): 99 (09 Jun 2018 07:04), Max: 100.9 (08 Jun 2018 16:00)  HR: 86 (09 Jun 2018 09:00) (76 - 90)  BP: 156/97 (09 Jun 2018 09:00) (128/78 - 160/92)  BP(mean): 127 (09 Jun 2018 09:00) (100 - 127)  RR: 27 (09 Jun 2018 09:00) (16 - 27)  SpO2: 97% (09 Jun 2018 09:00) (95% - 99%)      Gen: Lying in bed,  NAD  Neuro:  Mental status: Awake, eyes oriented to name, hospital, able name simple words, repeat phrases, follows simple commands   Cranial nerves: right gaze preference with ability to move almost all the way to the left, pupils equally round and reactive to light, left facial droop, tongue midline    Motor: Right UE at least 4/5, Right LE at least 3/5, Left UE 0/5, Left LE barely 3/5 - able lift slightly off bed     Sensation: decreased response to pain on left   Coordination: finger-nose intact on right  Reflexes: absent Babinski bilaterally  Gait: deferred    Labs:                        10.1   14.4  )-----------( 605      ( 09 Jun 2018 06:45 )             31.5   06-09    139  |  103  |  12  ----------------------------<  121<H>  3.4<L>   |  23  |  0.39<L>    Ca    9.2      09 Jun 2018 06:45  Phos  2.8     06-09  Mg     2.2     06-09    Hemoglobin A1C, Whole Blood: 5.3 % (06-01 @ 05:20)  Lipid Profile (05.30.18 @ 19:17)    Total Cholesterol/HDL Ratio Measurement: 3.4 RATIO    Cholesterol, Serum: 175 mg/dL    Triglycerides, Serum: 84 mg/dL    HDL Cholesterol, Serum: 52 mg/dL    Direct LDL: 106    Radiology:  CT Head No Cont (06.07.18 @ 11:35) >  Right frontal parietal temporal craniectomy. Subacute infarction in the right MCA/ICA and PCA distribution with gyriform   hyperdensity that may represent spared cortex versus hemorrhage, unchanged.    Assessment and plan:  58 year-old female with large right MCA CVA s/p craniotomy.  She's improved neurologically.  Still had some fever overnight.  Plan as per Neurosurgery.      1) Secondary stroke prevention -   a) Continue Aspirin 300mg [place]  b) Continue Atorvastatin 80mg for statin.    2) Seizure  - f/u EEG  - Continue Keppra 1,000mg BID    For rehab once workup complete.

## 2018-06-09 NOTE — PROGRESS NOTE ADULT - ASSESSMENT
58F with   1.  acute cerebrovascular accident involving R MCA / SELINA, malignant infarction, cerebral edema, brain compression s/p decompressive hemicraniectomy (05/31/2018, Dr. Johnson)  2.  Hypertension dyslipidemia   3.  h/o breast CA  4.  CAD  5.  R rectus abdominis intramuscular hematoma  6.  seizures    PLAN:   NEURO: neurochecks q 4 h, PRN pain meds tylenol, hold sedation  malignant R MCA infarction, s/p DHC:  ASA  seizure disorder:  continue levetiracetam 1G BID; EEG today 4 hr  REHAB:  physical therapy evaluation and management - defer   EARLY MOB; SLP again    PULM:  self-suctions, eating  CARDIO:  SBP goal < 140 mm Hg, echo EF 55-60% mod dilated LA, probable AVM, no PFOs  ENDO:  Blood sugar goals 140-180 mg/dL, stop insulin sliding scale; continue high-dose statins  GI:  PPI for GI prophylaxis  DIET: off TF  RENAL:  IVF  HEM/ONC:  stable  VTE Prophylaxis: SCDs, SQH neg dopplers   ID: febrile, no leukocytosis; CXR and LE dopplers  Social:  updated  MISC:  h/o ETOH abuse, continue MVI    CODE STATUS:  Full Code                      DISPOSITION:  SDU 58F with   1.  acute cerebrovascular accident involving R MCA / SELINA, malignant infarction, cerebral edema, brain compression s/p decompressive hemicraniectomy (05/31/2018, Dr. Johnson)  2.  Hypertension dyslipidemia   3.  h/o breast CA  4.  CAD  5.  R rectus abdominis intramuscular hematoma  6.  seizures    PLAN:   NEURO: neurochecks q 4 h, PRN pain meds tylenol, hold sedation, stop fent  malignant R MCA infarction, s/p DHC:  ASA  seizure disorder:  continue levetiracetam 1G BID; stop EEG  REHAB:  physical therapy evaluation and management - defer   EARLY MOB; dysgphagia screen    PULM:  self-suctions, eating  CARDIO:  SBP goal < 140 mm Hg, echo EF 55-60% mod dilated LA, probable AVM, no PFOs  ENDO:  Blood sugar goals 140-180 mg/dL, stop insulin sliding scale; continue high-dose statins  GI:  PPI for GI prophylaxis  DIET: off TF  RENAL:  IVF  HEM/ONC:  stable  VTE Prophylaxis: SCDs, SQH f/u dopplers   ID: febrile, leukocytosis; start on ceftriaxone, f/u on cultures then may de-escalate.    Social:  updated  MISC:  h/o ETOH abuse, continue MVI    CODE STATUS:  Full Code                      DISPOSITION:  SDU

## 2018-06-09 NOTE — PROGRESS NOTE ADULT - SUBJECTIVE AND OBJECTIVE BOX
S/Overnight events:        Hospital Course:   POD#   POD#1: emergent crani yesterday, transferred back to [facility], sedated overnight, rpt CTH with hemorrhagic conversion o/w stable. stable exam overnight. Drains remains to suction. no acute events overnight  POD#2: No seizure activity on EEG. Following commands on right side. Continue DREW x2 and Hemovac x1. Low grade fevers.   POD#3: Tolerating CPAP. Self extubated. Hemovac removed.   POD #4: Re-intubated yesterday, precedex resumed, no acute events overnight  POD#5: Pancultured for fevers. Tolerating CPAP trials. Continues to follow commands on right side. Bedside PT.  POD #6: Extubated without any issues, doing well and suctioning herself. Continues to follow commands on the right side and speaking 3-4 words very slowly. Bedside PT. Continued to be on 3% at 15 overnight. CTH done yesterday and was stable   POD #7: Overnight pulled NGT twice, replaced and confirmed. Patient did not need to suction herself overnight. Continues to follow commands on the right side and speaking more words than yesterday. Portable CT head today ASA f/u. Central line will be removed today. Neuro checks and vital signs now checked q 4h. Goal SBP normotensive 100-160. Passed S&S eval  POD#8: Failed re-eval of S&S, NGT replaced. EEG to r/o seizure.    Vital Signs Last 24 Hrs  T(C): 37.2 (2018 02:00), Max: 38.3 (2018 16:00)  T(F): 98.9 (2018 02:00), Max: 100.9 (2018 16:00)  HR: 86 (2018 05:00) (68 - 90)  BP: 151/85 (2018 05:00) (128/78 - 160/92)  BP(mean): 119 (2018 05:00) (99 - 123)  RR: 20 (2018 05:00) (12 - 27)  SpO2: 98% (2018 05:00) (95% - 99%)    I&O's Detail    2018 07:01  -  2018 07:00  --------------------------------------------------------  IN:    Enteral Tube Flush: 220 mL    IV PiggyBack: 300 mL    Jevity: 413 mL  Total IN: 933 mL    OUT:  Total OUT: 0 mL    Total NET: 933 mL      2018 07:  -  2018 06:21  --------------------------------------------------------  IN:    IV PiggyBack: 300 mL    sodium chloride 0.9%.: 400 mL  Total IN: 700 mL    OUT:    Intermittent Catheterization - Urethral: 450 mL  Total OUT: 450 mL    Total NET: 250 mL        I&O's Summary    2018 07:  -  2018 07:00  --------------------------------------------------------  IN: 933 mL / OUT: 0 mL / NET: 933 mL    2018 07:  -  2018 06:21  --------------------------------------------------------  IN: 700 mL / OUT: 450 mL / NET: 250 mL        PHYSICAL EXAM:  AAOX3.   Cranial Nerves: II-XII intact; Left facial droop, left hemiparesis, withrdrala to LUE   Motor: 5/5 power in b/l UE and LE  Sensation: intact to touch in all extremities  Pronator Drift: ***  Dysmetria: ***    Incision/Wound: R scalp incision, flap     DEVICE/DRAIN DRESSING:    TUBES/LINES:  [] CVC  [] A-line  [] Lumbar Drain  [] Ventriculostomy  [] Other    DIET:  [] NPO  [] Mechanical  [] Tube feeds    LABS:                        10.1   13.4  )-----------( 521      ( 2018 04:21 )             31.0     06-08    138  |  101  |  12  ----------------------------<  124<H>  3.9   |  24  |  0.46<L>    Ca    9.3      2018 04:21  Phos  3.0     06-08  Mg     2.3     06-08        Urinalysis Basic - ( 2018 18:59 )    Color: Yellow / Appearance: Cloudy / S.015 / pH: x  Gluc: x / Ketone: NEGATIVE  / Bili: Negative / Urobili: 2.0 E.U./dL   Blood: x / Protein: Trace mg/dL / Nitrite: POSITIVE   Leuk Esterase: NEGATIVE / RBC: < 5 /HPF / WBC 5-10 /HPF   Sq Epi: x / Non Sq Epi: 0-5 /HPF / Bacteria: Many /HPF          CAPILLARY BLOOD GLUCOSE          Drug Levels: [] N/A    CSF Analysis: [] N/A      Allergies    No Known Allergies    Intolerances      MEDICATIONS:  Antibiotics:  metroNIDAZOLE  IVPB 500 milliGRAM(s) IV Intermittent every 12 hours    Neuro:  acetaminophen   Tablet 650 milliGRAM(s) Oral every 6 hours PRN  acetaminophen   Tablet. 650 milliGRAM(s) Oral every 6 hours PRN  aspirin Suppository 300 milliGRAM(s) Rectal daily  fentaNYL    Injectable 12.5 MICROGram(s) IV Push every 2 hours PRN  fentaNYL    Injectable 25 MICROGram(s) IV Push every 2 hours PRN  levETIRAcetam  IVPB 1000 milliGRAM(s) IV Intermittent every 12 hours  ondansetron Injectable 4 milliGRAM(s) IV Push every 6 hours PRN    Anticoagulation:  heparin  Injectable 5000 Unit(s) SubCutaneous every 8 hours    OTHER:  atorvastatin 80 milliGRAM(s) Oral at bedtime  bisoprolol   Tablet 5 milliGRAM(s) Oral daily  dextrose 40% Gel 15 Gram(s) Oral once PRN  dextrose 50% Injectable 12.5 Gram(s) IV Push once  dextrose 50% Injectable 25 Gram(s) IV Push once  dextrose 50% Injectable 25 Gram(s) IV Push once  docusate sodium Liquid 100 milliGRAM(s) Oral two times a day  glucagon  Injectable 1 milliGRAM(s) IntraMuscular once PRN  hydrALAZINE Injectable 10 milliGRAM(s) IV Push every 4 hours PRN  labetalol Injectable 10 milliGRAM(s) IV Push every 4 hours PRN  polyethylene glycol 3350 17 Gram(s) Oral at bedtime  senna Syrup 10 milliLiter(s) Oral at bedtime    IVF:  dextrose 5%. 1000 milliLiter(s) IV Continuous <Continuous>  multivitamin 1 Tablet(s) Oral daily  sodium chloride 0.9%. 1000 milliLiter(s) IV Continuous <Continuous>    CULTURES:  Culture Results:   Normal vaginal prisca to date ( @ 16:37)  Culture Results:   Normal Respiratory Prisca present ( @ 14:51)    RADIOLOGY & ADDITIONAL TESTS:      ASSESSMENT:  58y Female w/ HTN, HLD, Anxiety, Depression, h/o Breast Cancer w/ TRAM flap reconstruction admitted for expanding right rectus hematoma complicated by right MCA infarction now s/p hemicraniectomy POD #9    ABDOMINAL HEMATOMA  Handoff  MEWS Score  Anxiety  Hypertension  Breast CA  Stroke  Stroke (cerebrum)  Abdominal hematoma  Craniectomy  Central venous catheter insertion  Previous  section  History of mastectomy, left  ABDOMINAL PAIN      PLAN:  NEURO:  - Neuro checks q 4 hr- Continue Keppra 1g BID for seizure prophylaxis   CARDIOVASCULAR:    PULMONARY:    RENAL:    GI:    HEME:    ID:    ENDO:    DVT PROPHYLAXIS:  [] Venodynes                                [] Heparin/Lovenox    FALL RISK:  [] Low Risk                                    [] Impulsive    DISPOSITION: S/Overnight events:        Hospital Course:   POD#   POD#1: emergent crani yesterday, transferred back to [facility], sedated overnight, rpt CTH with hemorrhagic conversion o/w stable. stable exam overnight. Drains remains to suction. no acute events overnight  POD#2: No seizure activity on EEG. Following commands on right side. Continue DREW x2 and Hemovac x1. Low grade fevers.   POD#3: Tolerating CPAP. Self extubated. Hemovac removed.   POD #4: Re-intubated yesterday, precedex resumed, no acute events overnight  POD#5: Pancultured for fevers. Tolerating CPAP trials. Continues to follow commands on right side. Bedside PT.  POD #6: Extubated without any issues, doing well and suctioning herself. Continues to follow commands on the right side and speaking 3-4 words very slowly. Bedside PT. Continued to be on 3% at 15 overnight. CTH done yesterday and was stable   POD #7: Overnight pulled NGT twice, replaced and confirmed. Patient did not need to suction herself overnight. Continues to follow commands on the right side and speaking more words than yesterday. Portable CT head today ASA f/u. Central line will be removed today. Neuro checks and vital signs now checked q 4h. Goal SBP normotensive 100-160. Passed S&S eval  POD#8: Failed re-eval of S&S, NGT replaced. EEG to r/o seizure.  Low grade temps    Vital Signs Last 24 Hrs  T(C): 37.2 (2018 02:00), Max: 38.3 (2018 16:00)  T(F): 98.9 (2018 02:00), Max: 100.9 (2018 16:00)  HR: 86 (2018 05:00) (68 - 90)  BP: 151/85 (2018 05:00) (128/78 - 160/92)  BP(mean): 119 (2018 05:00) (99 - 123)  RR: 20 (2018 05:00) (12 - 27)  SpO2: 98% (2018 05:00) (95% - 99%)    I&O's Detail    2018 07:01  -  2018 07:00  --------------------------------------------------------  IN:    Enteral Tube Flush: 220 mL    IV PiggyBack: 300 mL    Jevity: 413 mL  Total IN: 933 mL    OUT:  Total OUT: 0 mL    Total NET: 933 mL      2018 07:  -  2018 06:21  --------------------------------------------------------  IN:    IV PiggyBack: 300 mL    sodium chloride 0.9%.: 400 mL  Total IN: 700 mL    OUT:    Intermittent Catheterization - Urethral: 450 mL  Total OUT: 450 mL    Total NET: 250 mL        I&O's Summary    2018 07:  -  2018 07:00  --------------------------------------------------------  IN: 933 mL / OUT: 0 mL / NET: 933 mL    2018 07:  -  2018 06:21  --------------------------------------------------------  IN: 700 mL / OUT: 450 mL / NET: 250 mL        PHYSICAL EXAM:  AAOX3.   Cranial Nerves: II-XII intact; Left facial droop, left hemiparesis, withrdrala to LUE   Motor: 5/5 power in b/l UE and LE  Sensation: intact to touch in all extremities  Pronator Drift: ***  Dysmetria: ***    Incision/Wound: R scalp incision, flap     DEVICE/DRAIN DRESSING:    TUBES/LINES:  [] CVC  [] A-line  [] Lumbar Drain  [] Ventriculostomy  [] Other    DIET:  [] NPO  [] Mechanical  [] Tube feeds    LABS:                        10.1   13.4  )-----------( 521      ( 2018 04:21 )             31.0     06-08    138  |  101  |  12  ----------------------------<  124<H>  3.9   |  24  |  0.46<L>    Ca    9.3      2018 04:21  Phos  3.0     06-08  Mg     2.3     06-08        Urinalysis Basic - ( 2018 18:59 )    Color: Yellow / Appearance: Cloudy / S.015 / pH: x  Gluc: x / Ketone: NEGATIVE  / Bili: Negative / Urobili: 2.0 E.U./dL   Blood: x / Protein: Trace mg/dL / Nitrite: POSITIVE   Leuk Esterase: NEGATIVE / RBC: < 5 /HPF / WBC 5-10 /HPF   Sq Epi: x / Non Sq Epi: 0-5 /HPF / Bacteria: Many /HPF          CAPILLARY BLOOD GLUCOSE          Drug Levels: [] N/A    CSF Analysis: [] N/A      Allergies    No Known Allergies    Intolerances      MEDICATIONS:  Antibiotics:  metroNIDAZOLE  IVPB 500 milliGRAM(s) IV Intermittent every 12 hours    Neuro:  acetaminophen   Tablet 650 milliGRAM(s) Oral every 6 hours PRN  acetaminophen   Tablet. 650 milliGRAM(s) Oral every 6 hours PRN  aspirin Suppository 300 milliGRAM(s) Rectal daily  fentaNYL    Injectable 12.5 MICROGram(s) IV Push every 2 hours PRN  fentaNYL    Injectable 25 MICROGram(s) IV Push every 2 hours PRN  levETIRAcetam  IVPB 1000 milliGRAM(s) IV Intermittent every 12 hours  ondansetron Injectable 4 milliGRAM(s) IV Push every 6 hours PRN    Anticoagulation:  heparin  Injectable 5000 Unit(s) SubCutaneous every 8 hours    OTHER:  atorvastatin 80 milliGRAM(s) Oral at bedtime  bisoprolol   Tablet 5 milliGRAM(s) Oral daily  dextrose 40% Gel 15 Gram(s) Oral once PRN  dextrose 50% Injectable 12.5 Gram(s) IV Push once  dextrose 50% Injectable 25 Gram(s) IV Push once  dextrose 50% Injectable 25 Gram(s) IV Push once  docusate sodium Liquid 100 milliGRAM(s) Oral two times a day  glucagon  Injectable 1 milliGRAM(s) IntraMuscular once PRN  hydrALAZINE Injectable 10 milliGRAM(s) IV Push every 4 hours PRN  labetalol Injectable 10 milliGRAM(s) IV Push every 4 hours PRN  polyethylene glycol 3350 17 Gram(s) Oral at bedtime  senna Syrup 10 milliLiter(s) Oral at bedtime    IVF:  dextrose 5%. 1000 milliLiter(s) IV Continuous <Continuous>  multivitamin 1 Tablet(s) Oral daily  sodium chloride 0.9%. 1000 milliLiter(s) IV Continuous <Continuous>    CULTURES:  Culture Results:   Normal vaginal prisca to date ( @ 16:37)  Culture Results:   Normal Respiratory Prisca present ( @ 14:51)    RADIOLOGY & ADDITIONAL TESTS:      ASSESSMENT:  58y Female w/ HTN, HLD, Anxiety, Depression, h/o Breast Cancer w/ TRAM flap reconstruction admitted for expanding right rectus hematoma complicated by right MCA infarction now s/p hemicraniectomy POD #9    ABDOMINAL HEMATOMA  Handoff  MEWS Score  Anxiety  Hypertension  Breast CA  Stroke  Stroke (cerebrum)  Abdominal hematoma  Craniectomy  Central venous catheter insertion  Previous  section  History of mastectomy, left  ABDOMINAL PAIN      PLAN:  NEURO:  - Neuro checks q 4 hr- Continue Keppra 1g BID for seizure prophylaxis   CARDIOVASCULAR:    PULMONARY:    RENAL:    GI:    HEME:    ID:    ENDO:    DVT PROPHYLAXIS:  [] Venodynes                                [] Heparin/Lovenox    FALL RISK:  [] Low Risk                                    [] Impulsive    DISPOSITION: S/Overnight events:  Seen/evaluated at bedside.  Better today, more awake.  No acute events overnight      Hospital Course:   POD#   POD#1: emergent crani yesterday, transferred back to [facility], sedated overnight, rpt CTH with hemorrhagic conversion o/w stable. stable exam overnight. Drains remains to suction. no acute events overnight  POD#2: No seizure activity on EEG. Following commands on right side. Continue DREW x2 and Hemovac x1. Low grade fevers.   POD#3: Tolerating CPAP. Self extubated. Hemovac removed.   POD #4: Re-intubated yesterday, precedex resumed, no acute events overnight  POD#5: Pancultured for fevers. Tolerating CPAP trials. Continues to follow commands on right side. Bedside PT.  POD #6: Extubated without any issues, doing well and suctioning herself. Continues to follow commands on the right side and speaking 3-4 words very slowly. Bedside PT. Continued to be on 3% at 15 overnight. CTH done yesterday and was stable   POD #7: Overnight pulled NGT twice, replaced and confirmed. Patient did not need to suction herself overnight. Continues to follow commands on the right side and speaking more words than yesterday. Portable CT head today ASA f/u. Central line will be removed today. Neuro checks and vital signs now checked q 4h. Goal SBP normotensive 100-160. Passed S&S eval  POD#8: Failed re-eval of S&S, NGT replaced. EEG to r/o seizure.  Low grade temps, cultures sent     Vital Signs Last 24 Hrs  T(C): 37.2 (2018 02:00), Max: 38.3 (2018 16:00)  T(F): 98.9 (2018 02:00), Max: 100.9 (2018 16:00)  HR: 86 (2018 05:00) (68 - 90)  BP: 151/85 (2018 05:00) (128/78 - 160/92)  BP(mean): 119 (2018 05:00) (99 - 123)  RR: 20 (2018 05:00) (12 - 27)  SpO2: 98% (2018 05:00) (95% - 99%)    I&O's Detail    2018 07: 07:00  --------------------------------------------------------  IN:    Enteral Tube Flush: 220 mL    IV PiggyBack: 300 mL    Jevity: 413 mL  Total IN: 933 mL    OUT:  Total OUT: 0 mL    Total NET: 933 mL      2018 07: 06:21  --------------------------------------------------------  IN:    IV PiggyBack: 300 mL    sodium chloride 0.9%.: 400 mL  Total IN: 700 mL    OUT:    Intermittent Catheterization - Urethral: 450 mL  Total OUT: 450 mL    Total NET: 250 mL        I&O's Summary    2018 07: 07:00  --------------------------------------------------------  IN: 933 mL / OUT: 0 mL / NET: 933 mL    2018 07:  -  2018 06:21  --------------------------------------------------------  IN: 700 mL / OUT: 450 mL / NET: 250 mL        PHYSICAL EXAM:  Neuro: A/Ox2-3, follows commands, speech clear, naming objects appropriately  Moves right side 5/5 strength  Withdraws left side briskly  PERRL, EOMI  Incision/Wound: R scalp incision, flap full but soft    TUBES/LINES:  [] CVC  [] A-line  [] Lumbar Drain  [] Ventriculostomy  [] Other    DIET:  [] NPO  [x] Mechanical  [] Tube feeds    LABS:                        10.1   13.4  )-----------( 521      ( 2018 04:21 )             31.0     06-08    138  |  101  |  12  ----------------------------<  124<H>  3.9   |  24  |  0.46<L>    Ca    9.3      2018 04:21  Phos  3.0     06-08  Mg     2.3     06-08        Urinalysis Basic - ( 2018 18:59 )    Color: Yellow / Appearance: Cloudy / S.015 / pH: x  Gluc: x / Ketone: NEGATIVE  / Bili: Negative / Urobili: 2.0 E.U./dL   Blood: x / Protein: Trace mg/dL / Nitrite: POSITIVE   Leuk Esterase: NEGATIVE / RBC: < 5 /HPF / WBC 5-10 /HPF   Sq Epi: x / Non Sq Epi: 0-5 /HPF / Bacteria: Many /HPF          CAPILLARY BLOOD GLUCOSE          Drug Levels: [] N/A    CSF Analysis: [] N/A      Allergies    No Known Allergies    Intolerances      MEDICATIONS:  Antibiotics:  metroNIDAZOLE  IVPB 500 milliGRAM(s) IV Intermittent every 12 hours    Neuro:  acetaminophen   Tablet 650 milliGRAM(s) Oral every 6 hours PRN  acetaminophen   Tablet. 650 milliGRAM(s) Oral every 6 hours PRN  aspirin Suppository 300 milliGRAM(s) Rectal daily  fentaNYL    Injectable 12.5 MICROGram(s) IV Push every 2 hours PRN  fentaNYL    Injectable 25 MICROGram(s) IV Push every 2 hours PRN  levETIRAcetam  IVPB 1000 milliGRAM(s) IV Intermittent every 12 hours  ondansetron Injectable 4 milliGRAM(s) IV Push every 6 hours PRN    Anticoagulation:  heparin  Injectable 5000 Unit(s) SubCutaneous every 8 hours    OTHER:  atorvastatin 80 milliGRAM(s) Oral at bedtime  bisoprolol   Tablet 5 milliGRAM(s) Oral daily  dextrose 40% Gel 15 Gram(s) Oral once PRN  dextrose 50% Injectable 12.5 Gram(s) IV Push once  dextrose 50% Injectable 25 Gram(s) IV Push once  dextrose 50% Injectable 25 Gram(s) IV Push once  docusate sodium Liquid 100 milliGRAM(s) Oral two times a day  glucagon  Injectable 1 milliGRAM(s) IntraMuscular once PRN  hydrALAZINE Injectable 10 milliGRAM(s) IV Push every 4 hours PRN  labetalol Injectable 10 milliGRAM(s) IV Push every 4 hours PRN  polyethylene glycol 3350 17 Gram(s) Oral at bedtime  senna Syrup 10 milliLiter(s) Oral at bedtime    IVF:  dextrose 5%. 1000 milliLiter(s) IV Continuous <Continuous>  multivitamin 1 Tablet(s) Oral daily  sodium chloride 0.9%. 1000 milliLiter(s) IV Continuous <Continuous>    CULTURES:  Culture Results:   Normal vaginal prisca to date ( @ 16:37)  Culture Results:   Normal Respiratory Prisca present ( @ 14:51)      ASSESSMENT:  58y Female w/ HTN, HLD, Anxiety, Depression, h/o Breast Cancer w/ TRAM flap reconstruction admitted for expanding right rectus hematoma complicated by right MCA infarction now s/p hemicraniectomy POD #9    ABDOMINAL HEMATOMA  Handoff  MEWS Score  Anxiety  Hypertension  Breast CA  Stroke  Stroke (cerebrum)  Abdominal hematoma  Craniectomy  Central venous catheter insertion  Previous  section  History of mastectomy, left  ABDOMINAL PAIN      PLAN:  NEURO:  - Neuro checks q 4 hr- Continue Keppra 1g BID for seizure prophylaxis   -f/u EEG report  -Continue ASA  CARDIOVASCULAR: SBP goal<140, titrate meds appropriate    PULMONARY: on room air, no issues    RENAL: IVL.  Sodium and creatinine stable.  Hypokalemia repleted    GI: Repeat bedside swallow eval.  Restart PO diet if passes    HEME: H/H stable.  Thrombocytosis, possible acute phase reactant.    ID: On flagyl for BV.  F/U cultures from yesterday.  F/U dopplers today    ENDO: Continue ISS    DVT PROPHYLAXIS:  [x] Venodynes                                [x] Heparin/Lovenox    FALL RISK:  [x] Low Risk                                    [] Impulsive    DISPOSITION: D/W Jose Johnson and Valente

## 2018-06-09 NOTE — PROGRESS NOTE ADULT - SUBJECTIVE AND OBJECTIVE BOX
NEUROCRITICAL CARE PROGRESS NOTE    VERONIKA CALERO   MRN-[id number]  Summary:  /  HPI:  58F with a hx of left breast cancer s/p left mastectomy with TRAM flap reconstruction (4-5 years prior), recently placed on plavix (about a month ago) by her cardiologist 2/2 calcified arterial disease and is now presenting to the [facility] ED with a painful bulge in her right abdomen.  Pt reports that she's had a cough for several days and yesterday noticed a bulge in her right abdomen that has grown and only become more painful.  She at times has difficulty breathing and some lightheadedness but otherwise denies fevers, chills, chest pain, nausea, vomiting, diarrhea, dysuria.    PMH: HTN, CAD?, HLD, Anxiety, Depression  Meds: bisoprolol 5mg qd, lorazepam 1mg q8?, Plavix 5qd, rosuvastatin, HCTZ, buproprion, baclofen  NKDA  PSH: Left mastectomy with TRAM rotational flap reconstruction  Social: 1/2 bottle of wine most nights, remote cigarette hx, denies IVDA (30 May 2018 13:13)    S/Overnight events:  awake, talking, WBC lower, scratching her crani site.  restrained now.      Vital Signs Last 24 Hrs  T(C): 37.4 (2018 05:45), Max: 37.6 (2018 21:44)  T(F): 99.3 (2018 05:45), Max: 99.6 (2018 21:44)  HR: 78 (2018 07:00) (76 - 90)  BP: 131/71 (2018 07:00) (131/71 - 176/99)  BP(mean): 107 (2018 07:00) (102 - 129)  RR: 20 (2018 07:00) (16 - 27)  SpO2: 95% (2018 07:00) (95% - 100%)      I&O's Detail    2018 07:01  -  2018 07:00  --------------------------------------------------------  IN:    Enteral Tube Flush: 220 mL    IV PiggyBack: 300 mL    Jevity: 413 mL  Total IN: 933 mL    OUT:  Total OUT: 0 mL    Total NET: 933 mL      LABS:                        10.1   13.4  )-----------( 521      ( 2018 04:21 )             31.0     06-08    138  |  101  |  12  ----------------------------<  124<H>  3.9   |  24  |  0.46<L>    Ca    9.3      2018 04:21  Phos  3.0     06-08  Mg     2.3     06-08      CAPILLARY BLOOD GLUCOSE    Drug Levels: [] N/A    CSF Analysis: [] N/A      Allergies    No Known Allergies    Intolerances      MEDICATIONS:  Antibiotics:  metroNIDAZOLE    Tablet 500 milliGRAM(s) Oral two times a day    Neuro:  acetaminophen   Tablet 650 milliGRAM(s) Oral every 6 hours PRN  acetaminophen   Tablet. 650 milliGRAM(s) Oral every 6 hours PRN  fentaNYL    Injectable 12.5 MICROGram(s) IV Push every 2 hours PRN  fentaNYL    Injectable 25 MICROGram(s) IV Push every 2 hours PRN  levETIRAcetam  Solution 1000 milliGRAM(s) Oral two times a day  ondansetron Injectable 4 milliGRAM(s) IV Push every 6 hours PRN    Anticoagulation:  aspirin  chewable 81 milliGRAM(s) Oral daily  heparin  Injectable 5000 Unit(s) SubCutaneous every 8 hours    OTHER:  atorvastatin 80 milliGRAM(s) Oral at bedtime  bisoprolol   Tablet 5 milliGRAM(s) Oral daily  dextrose 40% Gel 15 Gram(s) Oral once PRN  dextrose 50% Injectable 12.5 Gram(s) IV Push once  dextrose 50% Injectable 25 Gram(s) IV Push once  dextrose 50% Injectable 25 Gram(s) IV Push once  docusate sodium Liquid 100 milliGRAM(s) Oral two times a day  glucagon  Injectable 1 milliGRAM(s) IntraMuscular once PRN  hydrALAZINE Injectable 10 milliGRAM(s) IV Push every 4 hours PRN  labetalol Injectable 10 milliGRAM(s) IV Push every 4 hours PRN  polyethylene glycol 3350 17 Gram(s) Oral at bedtime  senna Syrup 10 milliLiter(s) Oral at bedtime    IVF:  dextrose 5%. 1000 milliLiter(s) IV Continuous <Continuous>  multivitamin 1 Tablet(s) Oral daily    CULTURES:  Culture Results:   Normal Respiratory Gisele present ( @ 14:51)  Culture Results:   No growth at 2 days. ( @ 08:45)    RADIOLOGY & ADDITIONAL TESTS:  	  Bacteriology:  CSF studies:  EEG:  Neuroimagin/01 CT head:  no change in small foci of hemorrhage, continued evolution large R MCA infarction small R SELINA territory infarct   CT head: evolving R MCA / SELINA infarction, global mass effect, MLS to L, early L lateral ventricle entrapment, hemorrhagic transformation suspected   CTP:  abnormal perfusion, mismatch volume 12ml   CTA:  acute occlusion R M2   CT head:  acute R MCA infarction, no ICH   CT abd:  large expanding intramuscular hematoma within R rectus abdomin  Other imagin/02 CXR small bilateral effusions    LE Doppler limited exam, NEG    Neuro exam:   drowsy  more secretions, pocketing food on L   L hemiplegic,  increase L hemiplegia NEUROCRITICAL CARE PROGRESS NOTE    VERONIKA CALERO   MRN-[id number]  Summary:  /  HPI:  58F with a hx of left breast cancer s/p left mastectomy with TRAM flap reconstruction (4-5 years prior), recently placed on plavix (about a month ago) by her cardiologist 2/2 calcified arterial disease and is now presenting to the [facility] ED with a painful bulge in her right abdomen.  Pt reports that she's had a cough for several days and yesterday noticed a bulge in her right abdomen that has grown and only become more painful.  She at times has difficulty breathing and some lightheadedness but otherwise denies fevers, chills, chest pain, nausea, vomiting, diarrhea, dysuria.    PMH: HTN, CAD?, HLD, Anxiety, Depression  Meds: bisoprolol 5mg qd, lorazepam 1mg q8?, Plavix 5qd, rosuvastatin, HCTZ, buproprion, baclofen  NKDA  PSH: Left mastectomy with TRAM rotational flap reconstruction  Social: 1/2 bottle of wine most nights, remote cigarette hx, denies IVDA (30 May 2018 13:13)      S/Overnight events:    POD#     EVD:    CSF :    ICPs:      Vital Signs Last 24 Hrs  T(C): 37.2 (2018 07:04), Max: 38.3 (2018 16:00)  T(F): 99 (2018 07:04), Max: 100.9 (2018 16:00)  HR: 86 (2018 09:00) (76 - 90)  BP: 156/97 (2018 09:00) (128/78 - 160/92)  BP(mean): 127 (2018 09:00) (100 - 127)  RR: 27 (2018 09:00) (16 - 27)  SpO2: 97% (2018 09:00) (95% - 99%)        I&O's Detail    2018 07:01  -  2018 07:00  --------------------------------------------------------  IN:    IV PiggyBack: 400 mL    sodium chloride 0.9%.: 450 mL  Total IN: 850 mL    OUT:    Intermittent Catheterization - Urethral: 450 mL  Total OUT: 450 mL    Total NET: 400 mL      2018 07:01  -  2018 09:32  --------------------------------------------------------  IN:    IV PiggyBack: 100 mL  Total IN: 100 mL    OUT:  Total OUT: 0 mL    Total NET: 100 mL          LABS:                        10.1   14.4  )-----------( 605      ( 2018 06:45 )             31.5         139  |  103  |  12  ----------------------------<  121<H>  3.4<L>   |  23  |  0.39<L>    Ca    9.2      2018 06:45  Phos  2.8       Mg     2.2             Urinalysis Basic - ( 2018 18:59 )    Color: Yellow / Appearance: Cloudy / S.015 / pH: x  Gluc: x / Ketone: NEGATIVE  / Bili: Negative / Urobili: 2.0 E.U./dL   Blood: x / Protein: Trace mg/dL / Nitrite: POSITIVE   Leuk Esterase: NEGATIVE / RBC: < 5 /HPF / WBC 5-10 /HPF   Sq Epi: x / Non Sq Epi: 0-5 /HPF / Bacteria: Many /HPF          CAPILLARY BLOOD GLUCOSE          Drug Levels: [] N/A    CSF Analysis: [] N/A      Allergies    No Known Allergies    Intolerances      MEDICATIONS:  Antibiotics:  metroNIDAZOLE  IVPB 500 milliGRAM(s) IV Intermittent every 12 hours    Neuro:  acetaminophen   Tablet 650 milliGRAM(s) Oral every 6 hours PRN  acetaminophen   Tablet. 650 milliGRAM(s) Oral every 6 hours PRN  aspirin Suppository 300 milliGRAM(s) Rectal daily  fentaNYL    Injectable 12.5 MICROGram(s) IV Push every 2 hours PRN  fentaNYL    Injectable 25 MICROGram(s) IV Push every 2 hours PRN  levETIRAcetam  IVPB 1000 milliGRAM(s) IV Intermittent every 12 hours  ondansetron Injectable 4 milliGRAM(s) IV Push every 6 hours PRN    Anticoagulation:  heparin  Injectable 5000 Unit(s) SubCutaneous every 8 hours    OTHER:  atorvastatin 80 milliGRAM(s) Oral at bedtime  bisoprolol   Tablet 5 milliGRAM(s) Oral daily  dextrose 40% Gel 15 Gram(s) Oral once PRN  dextrose 50% Injectable 12.5 Gram(s) IV Push once  dextrose 50% Injectable 25 Gram(s) IV Push once  dextrose 50% Injectable 25 Gram(s) IV Push once  docusate sodium Liquid 100 milliGRAM(s) Oral two times a day  glucagon  Injectable 1 milliGRAM(s) IntraMuscular once PRN  hydrALAZINE Injectable 10 milliGRAM(s) IV Push every 4 hours PRN  labetalol Injectable 10 milliGRAM(s) IV Push every 4 hours PRN  polyethylene glycol 3350 17 Gram(s) Oral at bedtime  senna Syrup 10 milliLiter(s) Oral at bedtime    IVF:  dextrose 5%. 1000 milliLiter(s) IV Continuous <Continuous>  multivitamin 1 Tablet(s) Oral daily  potassium chloride  10 mEq/100 mL IVPB 10 milliEquivalent(s) IV Intermittent every 2 hours  sodium chloride 0.9%. 1000 milliLiter(s) IV Continuous <Continuous>    CULTURES:  Culture Results:   Normal vaginal prisca to date ( @ 16:37)  Culture Results:   Normal Respiratory Prisca present ( @ 14:51)    RADIOLOGY & ADDITIONAL TESTS:    	  Bacteriology:  CSF studies:  EEG:  Neuroimagin/01 CT head:  no change in small foci of hemorrhage, continued evolution large R MCA infarction small R SELINA territory infarct   CT head: evolving R MCA / SELINA infarction, global mass effect, MLS to L, early L lateral ventricle entrapment, hemorrhagic transformation suspected   CTP:  abnormal perfusion, mismatch volume 12ml   CTA:  acute occlusion R M2   CT head:  acute R MCA infarction, no ICH   CT abd:  large expanding intramuscular hematoma within R rectus abdomin  Other imagin/02 CXR small bilateral effusions    LE Doppler limited exam, NEG    Neuro exam:   drowsy  more secretions, pocketing food on L   L hemiplegic,  increase L hemiplegia NEUROCRITICAL CARE PROGRESS NOTE    VERONIKA CALERO   MRN-[id number]  Summary:  /  HPI:  58F with a hx of left breast cancer s/p left mastectomy with TRAM flap reconstruction (4-5 years prior), recently placed on plavix (about a month ago) by her cardiologist 2/2 calcified arterial disease and is now presenting to the [facility] ED with a painful bulge in her right abdomen.  Pt reports that she's had a cough for several days and yesterday noticed a bulge in her right abdomen that has grown and only become more painful.  She at times has difficulty breathing and some lightheadedness but otherwise denies fevers, chills, chest pain, nausea, vomiting, diarrhea, dysuria.    PMH: HTN, CAD?, HLD, Anxiety, Depression  Meds: bisoprolol 5mg qd, lorazepam 1mg q8?, Plavix 5qd, rosuvastatin, HCTZ, buproprion, baclofen  NKDA  PSH: Left mastectomy with TRAM rotational flap reconstruction  Social: 1/2 bottle of wine most nights, remote cigarette hx, denies IVDA (30 May 2018 13:13)      S/Overnight events:  on EEG.  no seizures.  rec'd     Vital Signs Last 24 Hrs  T(C): 37.2 (2018 07:04), Max: 38.3 (2018 16:00)  T(F): 99 (2018 07:04), Max: 100.9 (2018 16:00)  HR: 86 (2018 09:00) (76 - 90)  BP: 156/97 (2018 09:00) (128/78 - 160/92)  BP(mean): 127 (2018 09:00) (100 - 127)  RR: 27 (2018 09:00) (16 - 27)  SpO2: 97% (2018 09:00) (95% - 99%)        I&O's Detail    2018 07:01  -  2018 07:00  --------------------------------------------------------  IN:    IV PiggyBack: 400 mL    sodium chloride 0.9%.: 450 mL  Total IN: 850 mL    OUT:    Intermittent Catheterization - Urethral: 450 mL  Total OUT: 450 mL    Total NET: 400 mL      2018 07:01  -  2018 09:32  --------------------------------------------------------  IN:    IV PiggyBack: 100 mL  Total IN: 100 mL    OUT:  Total OUT: 0 mL    Total NET: 100 mL          LABS:                        10.1   14.4  )-----------( 605      ( 2018 06:45 )             31.5         139  |  103  |  12  ----------------------------<  121<H>  3.4<L>   |  23  |  0.39<L>    Ca    9.2      2018 06:45  Phos  2.8       Mg     2.2             Urinalysis Basic - ( 2018 18:59 )    Color: Yellow / Appearance: Cloudy / S.015 / pH: x  Gluc: x / Ketone: NEGATIVE  / Bili: Negative / Urobili: 2.0 E.U./dL   Blood: x / Protein: Trace mg/dL / Nitrite: POSITIVE   Leuk Esterase: NEGATIVE / RBC: < 5 /HPF / WBC 5-10 /HPF   Sq Epi: x / Non Sq Epi: 0-5 /HPF / Bacteria: Many /HPF          CAPILLARY BLOOD GLUCOSE          Drug Levels: [] N/A    CSF Analysis: [] N/A      Allergies    No Known Allergies    Intolerances      MEDICATIONS:  Antibiotics:  metroNIDAZOLE  IVPB 500 milliGRAM(s) IV Intermittent every 12 hours    Neuro:  acetaminophen   Tablet 650 milliGRAM(s) Oral every 6 hours PRN  acetaminophen   Tablet. 650 milliGRAM(s) Oral every 6 hours PRN  aspirin Suppository 300 milliGRAM(s) Rectal daily  fentaNYL    Injectable 12.5 MICROGram(s) IV Push every 2 hours PRN  fentaNYL    Injectable 25 MICROGram(s) IV Push every 2 hours PRN  levETIRAcetam  IVPB 1000 milliGRAM(s) IV Intermittent every 12 hours  ondansetron Injectable 4 milliGRAM(s) IV Push every 6 hours PRN    Anticoagulation:  heparin  Injectable 5000 Unit(s) SubCutaneous every 8 hours    OTHER:  atorvastatin 80 milliGRAM(s) Oral at bedtime  bisoprolol   Tablet 5 milliGRAM(s) Oral daily  dextrose 40% Gel 15 Gram(s) Oral once PRN  dextrose 50% Injectable 12.5 Gram(s) IV Push once  dextrose 50% Injectable 25 Gram(s) IV Push once  dextrose 50% Injectable 25 Gram(s) IV Push once  docusate sodium Liquid 100 milliGRAM(s) Oral two times a day  glucagon  Injectable 1 milliGRAM(s) IntraMuscular once PRN  hydrALAZINE Injectable 10 milliGRAM(s) IV Push every 4 hours PRN  labetalol Injectable 10 milliGRAM(s) IV Push every 4 hours PRN  polyethylene glycol 3350 17 Gram(s) Oral at bedtime  senna Syrup 10 milliLiter(s) Oral at bedtime    IVF:  dextrose 5%. 1000 milliLiter(s) IV Continuous <Continuous>  multivitamin 1 Tablet(s) Oral daily  potassium chloride  10 mEq/100 mL IVPB 10 milliEquivalent(s) IV Intermittent every 2 hours  sodium chloride 0.9%. 1000 milliLiter(s) IV Continuous <Continuous>    CULTURES:  Culture Results:   Normal vaginal prisca to date ( @ 16:37)  Culture Results:   Normal Respiratory Prisca present ( @ 14:51)    RADIOLOGY & ADDITIONAL TESTS:    	  Bacteriology:  CSF studies:  EEG:  Neuroimagin/01 CT head:  no change in small foci of hemorrhage, continued evolution large R MCA infarction small R SELINA territory infarct   CT head: evolving R MCA / SELINA infarction, global mass effect, MLS to L, early L lateral ventricle entrapment, hemorrhagic transformation suspected   CTP:  abnormal perfusion, mismatch volume 12ml   CTA:  acute occlusion R M2   CT head:  acute R MCA infarction, no ICH   CT abd:  large expanding intramuscular hematoma within R rectus abdomin  Other imagin/02 CXR small bilateral effusions    LE Doppler limited exam, NEG    Neuro exam:   drowsy after fentanyl,   more secretions, coughing.  CTAB  L hemiplegic,  increase L hemiplegia

## 2018-06-10 LAB
ANION GAP SERPL CALC-SCNC: 18 MMOL/L — HIGH (ref 5–17)
BUN SERPL-MCNC: 9 MG/DL — SIGNIFICANT CHANGE UP (ref 7–23)
CALCIUM SERPL-MCNC: 9.1 MG/DL — SIGNIFICANT CHANGE UP (ref 8.4–10.5)
CHLORIDE SERPL-SCNC: 102 MMOL/L — SIGNIFICANT CHANGE UP (ref 96–108)
CO2 SERPL-SCNC: 19 MMOL/L — LOW (ref 22–31)
CREAT SERPL-MCNC: 0.39 MG/DL — LOW (ref 0.5–1.3)
CULTURE RESULTS: SIGNIFICANT CHANGE UP
CULTURE RESULTS: SIGNIFICANT CHANGE UP
GLUCOSE SERPL-MCNC: 104 MG/DL — HIGH (ref 70–99)
HCT VFR BLD CALC: 35 % — SIGNIFICANT CHANGE UP (ref 34.5–45)
HGB BLD-MCNC: 11.3 G/DL — LOW (ref 11.5–15.5)
MAGNESIUM SERPL-MCNC: 2.2 MG/DL — SIGNIFICANT CHANGE UP (ref 1.6–2.6)
MCHC RBC-ENTMCNC: 28.5 PG — SIGNIFICANT CHANGE UP (ref 27–34)
MCHC RBC-ENTMCNC: 32.3 G/DL — SIGNIFICANT CHANGE UP (ref 32–36)
MCV RBC AUTO: 88.4 FL — SIGNIFICANT CHANGE UP (ref 80–100)
PHOSPHATE SERPL-MCNC: 2.6 MG/DL — SIGNIFICANT CHANGE UP (ref 2.5–4.5)
PLATELET # BLD AUTO: 503 K/UL — HIGH (ref 150–400)
POTASSIUM SERPL-MCNC: 3.5 MMOL/L — SIGNIFICANT CHANGE UP (ref 3.5–5.3)
POTASSIUM SERPL-SCNC: 3.5 MMOL/L — SIGNIFICANT CHANGE UP (ref 3.5–5.3)
RBC # BLD: 3.96 M/UL — SIGNIFICANT CHANGE UP (ref 3.8–5.2)
RBC # FLD: 16.4 % — SIGNIFICANT CHANGE UP (ref 10.3–16.9)
SODIUM SERPL-SCNC: 139 MMOL/L — SIGNIFICANT CHANGE UP (ref 135–145)
SPECIMEN SOURCE: SIGNIFICANT CHANGE UP
SPECIMEN SOURCE: SIGNIFICANT CHANGE UP
WBC # BLD: 14.2 K/UL — HIGH (ref 3.8–10.5)
WBC # FLD AUTO: 14.2 K/UL — HIGH (ref 3.8–10.5)

## 2018-06-10 PROCEDURE — 93970 EXTREMITY STUDY: CPT | Mod: 26

## 2018-06-10 PROCEDURE — 99233 SBSQ HOSP IP/OBS HIGH 50: CPT | Mod: 24

## 2018-06-10 RX ORDER — POTASSIUM CHLORIDE 20 MEQ
10 PACKET (EA) ORAL ONCE
Qty: 0 | Refills: 0 | Status: COMPLETED | OUTPATIENT
Start: 2018-06-10 | End: 2018-06-10

## 2018-06-10 RX ORDER — OXYCODONE AND ACETAMINOPHEN 5; 325 MG/1; MG/1
1 TABLET ORAL EVERY 6 HOURS
Qty: 0 | Refills: 0 | Status: DISCONTINUED | OUTPATIENT
Start: 2018-06-10 | End: 2018-06-17

## 2018-06-10 RX ORDER — ASPIRIN/CALCIUM CARB/MAGNESIUM 324 MG
81 TABLET ORAL DAILY
Qty: 0 | Refills: 0 | Status: DISCONTINUED | OUTPATIENT
Start: 2018-06-11 | End: 2018-06-21

## 2018-06-10 RX ADMIN — LEVETIRACETAM 400 MILLIGRAM(S): 250 TABLET, FILM COATED ORAL at 13:19

## 2018-06-10 RX ADMIN — ATORVASTATIN CALCIUM 80 MILLIGRAM(S): 80 TABLET, FILM COATED ORAL at 21:23

## 2018-06-10 RX ADMIN — OXYCODONE AND ACETAMINOPHEN 1 TABLET(S): 5; 325 TABLET ORAL at 18:17

## 2018-06-10 RX ADMIN — CEFTRIAXONE 1000 MILLIGRAM(S): 500 INJECTION, POWDER, FOR SOLUTION INTRAMUSCULAR; INTRAVENOUS at 13:01

## 2018-06-10 RX ADMIN — Medication 300 MILLIGRAM(S): at 13:02

## 2018-06-10 RX ADMIN — HEPARIN SODIUM 5000 UNIT(S): 5000 INJECTION INTRAVENOUS; SUBCUTANEOUS at 15:24

## 2018-06-10 RX ADMIN — HEPARIN SODIUM 5000 UNIT(S): 5000 INJECTION INTRAVENOUS; SUBCUTANEOUS at 06:13

## 2018-06-10 RX ADMIN — LEVETIRACETAM 400 MILLIGRAM(S): 250 TABLET, FILM COATED ORAL at 23:59

## 2018-06-10 RX ADMIN — SENNA PLUS 10 MILLILITER(S): 8.6 TABLET ORAL at 21:25

## 2018-06-10 RX ADMIN — POLYETHYLENE GLYCOL 3350 17 GRAM(S): 17 POWDER, FOR SOLUTION ORAL at 21:24

## 2018-06-10 RX ADMIN — Medication 1 TABLET(S): at 13:02

## 2018-06-10 RX ADMIN — Medication 100 MILLIGRAM(S): at 17:36

## 2018-06-10 RX ADMIN — HEPARIN SODIUM 5000 UNIT(S): 5000 INJECTION INTRAVENOUS; SUBCUTANEOUS at 22:00

## 2018-06-10 RX ADMIN — Medication 100 MILLIGRAM(S): at 06:13

## 2018-06-10 RX ADMIN — Medication 100 MILLIEQUIVALENT(S): at 10:22

## 2018-06-10 RX ADMIN — OXYCODONE AND ACETAMINOPHEN 1 TABLET(S): 5; 325 TABLET ORAL at 20:51

## 2018-06-10 NOTE — PROGRESS NOTE ADULT - SUBJECTIVE AND OBJECTIVE BOX
S/Overnight events:  Afebrile overnight. Continues ceftriaxone for UTI and fagyl for BV. Neuro stable, improving. Flap full, but soft.    Hospital Course:   POD#1: emergent crani yesterday, transferred back to [facility], sedated overnight, rpt CT with hemorrhagic conversion o/w stable. stable exam overnight. Drains remains to suction. no acute events overnight  POD#2: No seizure activity on EEG. Following commands on right side. Continue DREW x2 and Hemovac x1. Low grade fevers.   POD#3: Tolerating CPAP. Self extubated. Hemovac removed.   POD #4: Re-intubated yesterday, precedex resumed, no acute events overnight  POD#5: Pancultured for fevers. Tolerating CPAP trials. Continues to follow commands on right side. Bedside PT.  POD #6: Extubated without any issues, doing well and suctioning herself. Continues to follow commands on the right side and speaking 3-4 words very slowly. Bedside PT. Continued to be on 3% at 15 overnight. CTH done yesterday and was stable   POD #7: Overnight pulled NGT twice, replaced and confirmed. Patient did not need to suction herself overnight. Continues to follow commands on the right side and speaking more words than yesterday. Portable CT head today ASA f/u. Central line will be removed today. Neuro checks and vital signs now checked q 4h. Goal SBP normotensive 100-160. Passed S&S eval  POD#8: Failed re-eval of S&S, NGT replaced. EEG to r/o seizure.  POD#9: Better today, more awake.  No acute events overnight  POD#10: Afebrile overnight. Started ceftriaxone for UTI yesterday and continues flagyl for BV. Neuro stable, improving. Flap full, but soft.    Vital Signs Last 24 Hrs  T(C): 36.6 (10 Germain 2018 06:40), Max: 37.7 (2018 09:55)  T(F): 97.8 (10 Germain 2018 06:40), Max: 99.9 (2018 09:55)  HR: 84 (10 Germain 2018 07:00) (74 - 94)  BP: 160/78 (10 Germain 2018 07:00) (124/78 - 169/77)  BP(mean): 115 (10 Germain 2018 07:00) (90 - 127)  RR: 22 (10 Germain 2018 07:00) (13 - 29)  SpO2: 97% (10 Germain 2018 07:00) (94% - 98%)    I&O's Detail    2018 07:01  -  10 Germain 2018 07:00  --------------------------------------------------------  IN:    IV PiggyBack: 600 mL    sodium chloride 0.9%: 500 mL    sodium chloride 0.9%.: 350 mL  Total IN: 1450 mL    OUT:    Intermittent Catheterization - Urethral: 2 mL  Total OUT: 2 mL    Total NET: 1448 mL        I&O's Summary    2018 07:01  -  10 Germain 2018 07:00  --------------------------------------------------------  IN: 1450 mL / OUT: 2 mL / NET: 1448 mL    PHYSICAL EXAM:  Neurological: AAOx3, FC, speech coherent  CNII-XII: EOM intact, PERRL, left facial   Motor: Moving RUE/RLE spontaneously and purposefully with good strength 4/5, LLE 3/5, trace withdrawal LUE  Scalp incision site C/D/I, flap full and soft        TUBES/LINES:  [] CVC  [] A-line  [] Lumbar Drain  [] Ventriculostomy  [] Other    DIET:  [] NPO  [] Mechanical  [x] Tube feeds    LABS:                        11.3   14.2  )-----------( 503      ( 10 Germain 2018 07:40 )             35.0     06-10    139  |  102  |  9   ----------------------------<  104<H>  3.5   |  19<L>  |  0.39<L>    Ca    9.1      10 Germain 2018 07:40  Phos  2.6     06-10  Mg     2.2     06-10        Urinalysis Basic - ( 2018 18:59 )    Color: Yellow / Appearance: Cloudy / S.015 / pH: x  Gluc: x / Ketone: NEGATIVE  / Bili: Negative / Urobili: 2.0 E.U./dL   Blood: x / Protein: Trace mg/dL / Nitrite: POSITIVE   Leuk Esterase: NEGATIVE / RBC: < 5 /HPF / WBC 5-10 /HPF   Sq Epi: x / Non Sq Epi: 0-5 /HPF / Bacteria: Many /HPF          CAPILLARY BLOOD GLUCOSE      POCT Blood Glucose.: 103 mg/dL (2018 10:27)      Drug Levels: [] N/A    CSF Analysis: [] N/A      Allergies    No Known Allergies    Intolerances      MEDICATIONS:  Antibiotics:  cefTRIAXone Injectable. 1000 milliGRAM(s) IV Push every 24 hours  metroNIDAZOLE  IVPB 500 milliGRAM(s) IV Intermittent every 12 hours    Neuro:  acetaminophen   Tablet 650 milliGRAM(s) Oral every 6 hours PRN  acetaminophen   Tablet. 650 milliGRAM(s) Oral every 6 hours PRN  aspirin Suppository 300 milliGRAM(s) Rectal daily  levETIRAcetam  IVPB 1000 milliGRAM(s) IV Intermittent every 12 hours  ondansetron Injectable 4 milliGRAM(s) IV Push every 6 hours PRN    Anticoagulation:  heparin  Injectable 5000 Unit(s) SubCutaneous every 8 hours    OTHER:  atorvastatin 80 milliGRAM(s) Oral at bedtime  bisoprolol   Tablet 5 milliGRAM(s) Oral daily  dextrose 40% Gel 15 Gram(s) Oral once PRN  dextrose 50% Injectable 12.5 Gram(s) IV Push once  dextrose 50% Injectable 25 Gram(s) IV Push once  dextrose 50% Injectable 25 Gram(s) IV Push once  docusate sodium Liquid 100 milliGRAM(s) Oral two times a day  glucagon  Injectable 1 milliGRAM(s) IntraMuscular once PRN  hydrALAZINE Injectable 10 milliGRAM(s) IV Push every 4 hours PRN  labetalol Injectable 10 milliGRAM(s) IV Push every 4 hours PRN  polyethylene glycol 3350 17 Gram(s) Oral at bedtime  senna Syrup 10 milliLiter(s) Oral at bedtime    IVF:  dextrose 5%. 1000 milliLiter(s) IV Continuous <Continuous>  multivitamin 1 Tablet(s) Oral daily  sodium chloride 0.9%. 1000 milliLiter(s) IV Continuous <Continuous>    CULTURES:  Culture Results:   Normal vaginal gisele ( @ 16:37)  Culture Results:   Normal Respiratory Gisele present ( @ 14:51)    RADIOLOGY & ADDITIONAL TESTS:      ASSESSMENT:  58y Female w/ HTN, HLD, Anxiety, Depression, h/o Breast Cancer w/ TRAM flap reconstruction admitted for expanding right rectus hematoma complicated by right MCA infarction now s/p hemicraniectomy POD #10    ABDOMINAL HEMATOMA  Handoff  MEWS Score  Anxiety  Hypertension  Breast CA  Stroke  Stroke (cerebrum)  Abdominal hematoma  Craniectomy  Central venous catheter insertion  Previous  section  History of mastectomy, left  ABDOMINAL PAIN    PLAN:  NEURO:  -neuro checks  -pain control tylenol prn  -continue aspirin  -stroke core measures: continue lipitor  -continue keppra 1g BID    CARDIOVASCULAR:  -SBP <140    PULMONARY:  -room air    RENAL:  -IVF hydration while NPO  -replete K+    GI:  -advance diet    HEME:  -H/H stable    ID:  -afebrile  -continue ceftriaxone +UTI  -continue flagyl suspected BV    ENDO:  -no issues    DVT PROPHYLAXIS:  [x] Venodynes                                [x] Heparin/Lovenox    FALL RISK:  [] Low Risk                                    [] Impulsive    DISPOSITION: S/Overnight events:  Afebrile overnight. Continues ceftriaxone for UTI and fagyl for BV. Neuro stable, improving. Flap full, but soft.    Hospital Course:   POD#1: emergent crani yesterday, transferred back to [facility], sedated overnight, rpt CT with hemorrhagic conversion o/w stable. stable exam overnight. Drains remains to suction. no acute events overnight  POD#2: No seizure activity on EEG. Following commands on right side. Continue DREW x2 and Hemovac x1. Low grade fevers.   POD#3: Tolerating CPAP. Self extubated. Hemovac removed.   POD #4: Re-intubated yesterday, precedex resumed, no acute events overnight  POD#5: Pancultured for fevers. Tolerating CPAP trials. Continues to follow commands on right side. Bedside PT.  POD #6: Extubated without any issues, doing well and suctioning herself. Continues to follow commands on the right side and speaking 3-4 words very slowly. Bedside PT. Continued to be on 3% at 15 overnight. CTH done yesterday and was stable   POD #7: Overnight pulled NGT twice, replaced and confirmed. Patient did not need to suction herself overnight. Continues to follow commands on the right side and speaking more words than yesterday. Portable CT head today ASA f/u. Central line will be removed today. Neuro checks and vital signs now checked q 4h. Goal SBP normotensive 100-160. Passed S&S eval  POD#8: Failed re-eval of S&S, NGT replaced. EEG to r/o seizure.  POD#9: Better today, more awake.  No acute events overnight  POD#10: Afebrile overnight. Started ceftriaxone for UTI yesterday and continues flagyl for BV. Neuro stable, improving. Flap full, but soft.    Vital Signs Last 24 Hrs  T(C): 36.6 (10 Germain 2018 06:40), Max: 37.7 (2018 09:55)  T(F): 97.8 (10 Germain 2018 06:40), Max: 99.9 (2018 09:55)  HR: 84 (10 Germain 2018 07:00) (74 - 94)  BP: 160/78 (10 Germain 2018 07:00) (124/78 - 169/77)  BP(mean): 115 (10 Germain 2018 07:00) (90 - 127)  RR: 22 (10 Germain 2018 07:00) (13 - 29)  SpO2: 97% (10 Germain 2018 07:00) (94% - 98%)    I&O's Detail    2018 07:01  -  10 Germain 2018 07:00  --------------------------------------------------------  IN:    IV PiggyBack: 600 mL    sodium chloride 0.9%: 500 mL    sodium chloride 0.9%.: 350 mL  Total IN: 1450 mL    OUT:    Intermittent Catheterization - Urethral: 2 mL  Total OUT: 2 mL    Total NET: 1448 mL        I&O's Summary    2018 07:01  -  10 Germain 2018 07:00  --------------------------------------------------------  IN: 1450 mL / OUT: 2 mL / NET: 1448 mL    PHYSICAL EXAM:  Neurological: AAOx3, FC, speech coherent  CNII-XII: EOM intact, PERRL, left facial   Motor: Moving RUE/RLE spontaneously and purposefully with good strength 4/5, LLE 3/5, trace withdrawal LUE  Scalp incision site C/D/I, flap full and soft        TUBES/LINES:  [] CVC  [] A-line  [] Lumbar Drain  [] Ventriculostomy  [] Other    DIET:  [] NPO  [] Mechanical  [x] Tube feeds    LABS:                        11.3   14.2  )-----------( 503      ( 10 Germain 2018 07:40 )             35.0     06-10    139  |  102  |  9   ----------------------------<  104<H>  3.5   |  19<L>  |  0.39<L>    Ca    9.1      10 Germain 2018 07:40  Phos  2.6     06-10  Mg     2.2     06-10        Urinalysis Basic - ( 2018 18:59 )    Color: Yellow / Appearance: Cloudy / S.015 / pH: x  Gluc: x / Ketone: NEGATIVE  / Bili: Negative / Urobili: 2.0 E.U./dL   Blood: x / Protein: Trace mg/dL / Nitrite: POSITIVE   Leuk Esterase: NEGATIVE / RBC: < 5 /HPF / WBC 5-10 /HPF   Sq Epi: x / Non Sq Epi: 0-5 /HPF / Bacteria: Many /HPF          CAPILLARY BLOOD GLUCOSE      POCT Blood Glucose.: 103 mg/dL (2018 10:27)      Drug Levels: [] N/A    CSF Analysis: [] N/A      Allergies    No Known Allergies    Intolerances      MEDICATIONS:  Antibiotics:  cefTRIAXone Injectable. 1000 milliGRAM(s) IV Push every 24 hours  metroNIDAZOLE  IVPB 500 milliGRAM(s) IV Intermittent every 12 hours    Neuro:  acetaminophen   Tablet 650 milliGRAM(s) Oral every 6 hours PRN  acetaminophen   Tablet. 650 milliGRAM(s) Oral every 6 hours PRN  aspirin Suppository 300 milliGRAM(s) Rectal daily  levETIRAcetam  IVPB 1000 milliGRAM(s) IV Intermittent every 12 hours  ondansetron Injectable 4 milliGRAM(s) IV Push every 6 hours PRN    Anticoagulation:  heparin  Injectable 5000 Unit(s) SubCutaneous every 8 hours    OTHER:  atorvastatin 80 milliGRAM(s) Oral at bedtime  bisoprolol   Tablet 5 milliGRAM(s) Oral daily  dextrose 40% Gel 15 Gram(s) Oral once PRN  dextrose 50% Injectable 12.5 Gram(s) IV Push once  dextrose 50% Injectable 25 Gram(s) IV Push once  dextrose 50% Injectable 25 Gram(s) IV Push once  docusate sodium Liquid 100 milliGRAM(s) Oral two times a day  glucagon  Injectable 1 milliGRAM(s) IntraMuscular once PRN  hydrALAZINE Injectable 10 milliGRAM(s) IV Push every 4 hours PRN  labetalol Injectable 10 milliGRAM(s) IV Push every 4 hours PRN  polyethylene glycol 3350 17 Gram(s) Oral at bedtime  senna Syrup 10 milliLiter(s) Oral at bedtime    IVF:  dextrose 5%. 1000 milliLiter(s) IV Continuous <Continuous>  multivitamin 1 Tablet(s) Oral daily  sodium chloride 0.9%. 1000 milliLiter(s) IV Continuous <Continuous>    CULTURES:  Culture Results:   Normal vaginal gisele ( @ 16:37)  Culture Results:   Normal Respiratory Gisele present ( @ 14:51)    RADIOLOGY & ADDITIONAL TESTS:      ASSESSMENT:  58y Female w/ HTN, HLD, Anxiety, Depression, h/o Breast Cancer w/ TRAM flap reconstruction admitted for expanding right rectus hematoma complicated by right MCA infarction now s/p hemicraniectomy POD #10    ABDOMINAL HEMATOMA  Handoff  MEWS Score  Anxiety  Hypertension  Breast CA  Stroke  Stroke (cerebrum)  Abdominal hematoma  Craniectomy  Central venous catheter insertion  Previous  section  History of mastectomy, left  ABDOMINAL PAIN    PLAN:  NEURO:  -neuro checks  -pain control tylenol prn  -continue aspirin  -stroke core measures: continue lipitor  -continue keppra 1g BID    CARDIOVASCULAR:  -SBP <140    PULMONARY:  -room air    RENAL:  -IVF hydration while NPO  -replete K+    GI:  -advance diet  -f/u S/S tomorrow    HEME:  -H/H stable    ID:  -afebrile  -continue ceftriaxone +UTI  -continue flagyl suspected BV    ENDO:  -no issues    DVT PROPHYLAXIS:  [x] Venodynes                                [x] Heparin/Lovenox    DISPOSITION: Pending    -D/w Dr. Johnson and Dr. Victor

## 2018-06-10 NOTE — PROGRESS NOTE ADULT - ASSESSMENT
58F with   1.  acute cerebrovascular accident involving R MCA / SELINA, malignant infarction, cerebral edema, brain compression s/p decompressive hemicraniectomy (05/31/2018, Dr. Johnson)  2.  Hypertension dyslipidemia   3.  h/o breast CA  4.  CAD  5.  R rectus abdominis intramuscular hematoma  6.  seizures    PLAN:   NEURO: neurochecks q 4 h, PRN pain meds tylenol, hold sedation, stop fent  malignant R MCA infarction, s/p DHC:  ASA  seizure disorder:  continue levetiracetam 1G BID; stop EEG  REHAB:  physical therapy evaluation and management - defer   EARLY MOB; dysgphagia screen    PULM:  self-suctions, eating  CARDIO:  SBP goal < 140 mm Hg, echo EF 55-60% mod dilated LA, probable AVM, no PFOs  ENDO:  Blood sugar goals 140-180 mg/dL, stop insulin sliding scale; continue high-dose statins  GI:  PPI for GI prophylaxis  DIET: off TF  RENAL:  IVF  HEM/ONC:  stable  VTE Prophylaxis: SCDs, SQH f/u dopplers   ID: febrile, leukocytosis; start on ceftriaxone, f/u on cultures then may de-escalate.    Social:  updated  MISC:  h/o ETOH abuse, continue MVI    CODE STATUS:  Full Code                      DISPOSITION:  SDU 58F with   1.  acute cerebrovascular accident involving R MCA / SELINA, malignant infarction, cerebral edema, brain compression s/p decompressive hemicraniectomy (05/31/2018, Dr. Johnson)  2.  Hypertension dyslipidemia   3.  h/o breast CA  4.  CAD  5.  R rectus abdominis intramuscular hematoma  6.  seizures    PLAN:   NEURO: neurochecks q 4 h, PRN pain meds tylenol, hold sedation, stop fent  malignant R MCA infarction, s/p DHC:  ASA  seizure disorder:  continue levetiracetam 1G BID  REHAB:  physical therapy evaluation and management - defer   EARLY MOB; dysgphagia screen    PULM:  self-suctions, eating  CARDIO:  SBP goal < 140 mm Hg  ENDO:  Blood sugar goals 140-180 mg/dL  GI:  PPI for GI prophylaxis  DIET: off TF  RENAL:  IVF  HEM/ONC:  stable  VTE Prophylaxis: SCDs, SQH f/u dopplers   ID: febrile, leukocytosis; start on ceftriaxone, f/u on cultures/sensitivities then may de-escalate  Social:  updated  MISC:  h/o ETOH abuse, continue MVI    CODE STATUS:  Full Code                      DISPOSITION:  SDU/stroke unit 58F with   1.  acute cerebrovascular accident involving R MCA / SELINA, malignant infarction, cerebral edema, brain compression s/p decompressive hemicraniectomy (05/31/2018, Dr. Johnson)  2.  Hypertension dyslipidemia   3.  h/o breast CA  4.  CAD  5.  R rectus abdominis intramuscular hematoma  6.  seizures  7.  UTI    PLAN:   NEURO: neurochecks q 4 h, PRN pain meds tylenol, hold sedation, stop fent  malignant R MCA infarction, s/p DHC:  ASA  seizure disorder:  continue levetiracetam 1G BID  REHAB:  physical therapy evaluation and management - defer   EARLY MOB; dysgphagia screen    PULM:  self-suctions, eating  CARDIO:  SBP goal < 140 mm Hg  ENDO:  Blood sugar goals 140-180 mg/dL  GI:  PPI for GI prophylaxis  DIET: off TF, pt refusing NGT, dysphagia trial today, if fails SLP tomorrow  RENAL:  IVF  HEM/ONC:  stable  VTE Prophylaxis: SCDs, SQH f/u dopplers   ID: febrile, leukocytosis; start on ceftriaxone, f/u on cultures/sensitivities then may de-escalate  CT abd/pelvis next time she goes down to radiology for abdomen hematoma evaluation (no rush)  Social:  updated  MISC:  h/o ETOH abuse, continue MVI    CODE STATUS:  Full Code                      DISPOSITION:  SDU/stroke unit tomorrow

## 2018-06-10 NOTE — PROGRESS NOTE ADULT - SUBJECTIVE AND OBJECTIVE BOX
NEUROCRITICAL CARE PROGRESS NOTE    VERONIKA CALERO   MRN-[id number]  Summary:  /  HPI:  58F with a hx of left breast cancer s/p left mastectomy with TRAM flap reconstruction (4-5 years prior), recently placed on plavix (about a month ago) by her cardiologist 2/2 calcified arterial disease and is now presenting to the [facility] ED with a painful bulge in her right abdomen.  Pt reports that she's had a cough for several days and yesterday noticed a bulge in her right abdomen that has grown and only become more painful.  She at times has difficulty breathing and some lightheadedness but otherwise denies fevers, chills, chest pain, nausea, vomiting, diarrhea, dysuria.    PMH: HTN, CAD?, HLD, Anxiety, Depression  Meds: bisoprolol 5mg qd, lorazepam 1mg q8?, Plavix 5qd, rosuvastatin, HCTZ, buproprion, baclofen  NKDA  PSH: Left mastectomy with TRAM rotational flap reconstruction  Social: 1/2 bottle of wine most nights, remote cigarette hx, denies IVDA (30 May 2018 13:13)      S/Overnight events:    POD#     EVD:    CSF :    ICPs:      Vital Signs Last 24 Hrs  T(C): 36.6 (10 Germain 2018 06:40), Max: 37.7 (2018 09:55)  T(F): 97.8 (10 Germain 2018 06:40), Max: 99.9 (2018 09:55)  HR: 84 (10 Germain 2018 07:00) (74 - 94)  BP: 160/78 (10 Germain 2018 07:00) (124/78 - 169/77)  BP(mean): 115 (10 Germain 2018 07:00) (90 - 127)  RR: 22 (10 Germain 2018 07:00) (13 - 29)  SpO2: 97% (10 Germain 2018 07:00) (94% - 98%)        I&O's Detail    2018 07:01  -  10 Germain 2018 07:00  --------------------------------------------------------  IN:    IV PiggyBack: 600 mL    sodium chloride 0.9%: 500 mL    sodium chloride 0.9%.: 350 mL  Total IN: 1450 mL    OUT:    Intermittent Catheterization - Urethral: 2 mL  Total OUT: 2 mL    Total NET: 1448 mL          LABS:                        11.3   14.2  )-----------( 503      ( 10 Germain 2018 07:40 )             35.0     06-10    139  |  102  |  9   ----------------------------<  104<H>  3.5   |  19<L>  |  0.39<L>    Ca    9.1      10 Germain 2018 07:40  Phos  2.6     06-10  Mg     2.2     06-10        Urinalysis Basic - ( 2018 18:59 )    Color: Yellow / Appearance: Cloudy / S.015 / pH: x  Gluc: x / Ketone: NEGATIVE  / Bili: Negative / Urobili: 2.0 E.U./dL   Blood: x / Protein: Trace mg/dL / Nitrite: POSITIVE   Leuk Esterase: NEGATIVE / RBC: < 5 /HPF / WBC 5-10 /HPF   Sq Epi: x / Non Sq Epi: 0-5 /HPF / Bacteria: Many /HPF      Culture - Urine (18 @ 08:47)    Specimen Source: .Urine Catheterized    Culture Results:   >100,000 CFU/ml Gram Negative Rods  Identification and susceptibility to follow.        CAPILLARY BLOOD GLUCOSE      POCT Blood Glucose.: 103 mg/dL (2018 10:27)      Drug Levels: [] N/A    CSF Analysis: [] N/A      Allergies    No Known Allergies    Intolerances      MEDICATIONS:  Antibiotics:  cefTRIAXone Injectable. 1000 milliGRAM(s) IV Push every 24 hours  metroNIDAZOLE  IVPB 500 milliGRAM(s) IV Intermittent every 12 hours    Neuro:  acetaminophen   Tablet 650 milliGRAM(s) Oral every 6 hours PRN  acetaminophen   Tablet. 650 milliGRAM(s) Oral every 6 hours PRN  aspirin Suppository 300 milliGRAM(s) Rectal daily  levETIRAcetam  IVPB 1000 milliGRAM(s) IV Intermittent every 12 hours  ondansetron Injectable 4 milliGRAM(s) IV Push every 6 hours PRN    Anticoagulation:  heparin  Injectable 5000 Unit(s) SubCutaneous every 8 hours    OTHER:  atorvastatin 80 milliGRAM(s) Oral at bedtime  bisoprolol   Tablet 5 milliGRAM(s) Oral daily  dextrose 40% Gel 15 Gram(s) Oral once PRN  dextrose 50% Injectable 12.5 Gram(s) IV Push once  dextrose 50% Injectable 25 Gram(s) IV Push once  dextrose 50% Injectable 25 Gram(s) IV Push once  docusate sodium Liquid 100 milliGRAM(s) Oral two times a day  glucagon  Injectable 1 milliGRAM(s) IntraMuscular once PRN  hydrALAZINE Injectable 10 milliGRAM(s) IV Push every 4 hours PRN  labetalol Injectable 10 milliGRAM(s) IV Push every 4 hours PRN  polyethylene glycol 3350 17 Gram(s) Oral at bedtime  senna Syrup 10 milliLiter(s) Oral at bedtime    IVF:  dextrose 5%. 1000 milliLiter(s) IV Continuous <Continuous>  multivitamin 1 Tablet(s) Oral daily  sodium chloride 0.9%. 1000 milliLiter(s) IV Continuous <Continuous>    CULTURES:  Culture Results:   >100,000 CFU/ml Gram Negative Rods  Identification and susceptibility to follow. ( @ 08:47)  Culture Results:   Normal vaginal prisca ( @ 16:37)    RADIOLOGY & ADDITIONAL TESTS:  	  Bacteriology:  CSF studies:  EEG:  Neuroimagin/01 CT head:  no change in small foci of hemorrhage, continued evolution large R MCA infarction small R SELINA territory infarct   CT head: evolving R MCA / SELINA infarction, global mass effect, MLS to L, early L lateral ventricle entrapment, hemorrhagic transformation suspected   CTP:  abnormal perfusion, mismatch volume 12ml   CTA:  acute occlusion R M2   CT head:  acute R MCA infarction, no ICH   CT abd:  large expanding intramuscular hematoma within R rectus abdomin  Other imagin/02 CXR small bilateral effusions    LE Doppler limited exam, NEG    Neuro exam:   drowsy after fentanyl,   more secretions, coughing.  CTAB  L hemiplegic,  increase L hemiplegia NEUROCRITICAL CARE PROGRESS NOTE    VERONIKA CALERO   MRN-[id number]  Summary:  /  HPI:  58F with a hx of left breast cancer s/p left mastectomy with TRAM flap reconstruction (4-5 years prior), recently placed on plavix (about a month ago) by her cardiologist 2/2 calcified arterial disease and is now presenting to the [facility] ED with a painful bulge in her right abdomen.  Pt reports that she's had a cough for several days and yesterday noticed a bulge in her right abdomen that has grown and only become more painful.  She at times has difficulty breathing and some lightheadedness but otherwise denies fevers, chills, chest pain, nausea, vomiting, diarrhea, dysuria.    PMH: HTN, CAD?, HLD, Anxiety, Depression  Meds: bisoprolol 5mg qd, lorazepam 1mg q8?, Plavix 5qd, rosuvastatin, HCTZ, buproprion, baclofen  NKDA  PSH: Left mastectomy with TRAM rotational flap reconstruction  Social: 1/2 bottle of wine most nights, remote cigarette hx, denies IVDA (30 May 2018 13:13)    S/Overnight events:  afebrile, gram neg rods in urine. awake earlier     Vital Signs Last 24 Hrs  T(C): 36.6 (10 Germain 2018 06:40), Max: 37.7 (2018 09:55)  T(F): 97.8 (10 Germain 2018 06:40), Max: 99.9 (2018 09:55)  HR: 84 (10 Germain 2018 07:00) (74 - 94)  BP: 160/78 (10 Germain 2018 07:00) (124/78 - 169/77)  BP(mean): 115 (10 Germain 2018 07:00) (90 - 127)  RR: 22 (10 Germain 2018 07:00) (13 - 29)  SpO2: 97% (10 Germain 2018 07:00) (94% - 98%)      I&O's Detail    2018 07:01  -  10 Germain 2018 07:00  --------------------------------------------------------  IN:    IV PiggyBack: 600 mL    sodium chloride 0.9%: 500 mL    sodium chloride 0.9%.: 350 mL  Total IN: 1450 mL    OUT:    Intermittent Catheterization - Urethral: 2 mL  Total OUT: 2 mL    Total NET: 1448 mL    LABS:                        11.3   14.2  )-----------( 503      ( 10 Germain 2018 07:40 )             35.0     06-10    139  |  102  |  9   ----------------------------<  104<H>  3.5   |  19<L>  |  0.39<L>    Ca    9.1      10 Germain 2018 07:40  Phos  2.6     06-10  Mg     2.2     06-10        Urinalysis Basic - ( 2018 18:59 )    Color: Yellow / Appearance: Cloudy / S.015 / pH: x  Gluc: x / Ketone: NEGATIVE  / Bili: Negative / Urobili: 2.0 E.U./dL   Blood: x / Protein: Trace mg/dL / Nitrite: POSITIVE   Leuk Esterase: NEGATIVE / RBC: < 5 /HPF / WBC 5-10 /HPF   Sq Epi: x / Non Sq Epi: 0-5 /HPF / Bacteria: Many /HPF      Culture - Urine (18 @ 08:47)    Specimen Source: .Urine Catheterized    Culture Results:   >100,000 CFU/ml Gram Negative Rods  Identification and susceptibility to follow.    CAPILLARY BLOOD GLUCOSE      POCT Blood Glucose.: 103 mg/dL (2018 10:27)      Drug Levels: [] N/A    CSF Analysis: [] N/A      Allergies    No Known Allergies    Intolerances      MEDICATIONS:  Antibiotics:  cefTRIAXone Injectable. 1000 milliGRAM(s) IV Push every 24 hours  metroNIDAZOLE  IVPB 500 milliGRAM(s) IV Intermittent every 12 hours    Neuro:  acetaminophen   Tablet 650 milliGRAM(s) Oral every 6 hours PRN  acetaminophen   Tablet. 650 milliGRAM(s) Oral every 6 hours PRN  aspirin Suppository 300 milliGRAM(s) Rectal daily  levETIRAcetam  IVPB 1000 milliGRAM(s) IV Intermittent every 12 hours  ondansetron Injectable 4 milliGRAM(s) IV Push every 6 hours PRN    Anticoagulation:  heparin  Injectable 5000 Unit(s) SubCutaneous every 8 hours    OTHER:  atorvastatin 80 milliGRAM(s) Oral at bedtime  bisoprolol   Tablet 5 milliGRAM(s) Oral daily  dextrose 40% Gel 15 Gram(s) Oral once PRN  dextrose 50% Injectable 12.5 Gram(s) IV Push once  dextrose 50% Injectable 25 Gram(s) IV Push once  dextrose 50% Injectable 25 Gram(s) IV Push once  docusate sodium Liquid 100 milliGRAM(s) Oral two times a day  glucagon  Injectable 1 milliGRAM(s) IntraMuscular once PRN  hydrALAZINE Injectable 10 milliGRAM(s) IV Push every 4 hours PRN  labetalol Injectable 10 milliGRAM(s) IV Push every 4 hours PRN  polyethylene glycol 3350 17 Gram(s) Oral at bedtime  senna Syrup 10 milliLiter(s) Oral at bedtime    IVF:  dextrose 5%. 1000 milliLiter(s) IV Continuous <Continuous>  multivitamin 1 Tablet(s) Oral daily  sodium chloride 0.9%. 1000 milliLiter(s) IV Continuous <Continuous>    CULTURES:  Culture Results:   >100,000 CFU/ml Gram Negative Rods  Identification and susceptibility to follow. ( @ 08:47)  Culture Results:   Normal vaginal prisca ( @ 16:37)    RADIOLOGY & ADDITIONAL TESTS:  	  Bacteriology:  CSF studies:  EEG:  Neuroimagin/01 CT head:  no change in small foci of hemorrhage, continued evolution large R MCA infarction small R SELINA territory infarct   CT head: evolving R MCA / SELINA infarction, global mass effect, MLS to L, early L lateral ventricle entrapment, hemorrhagic transformation suspected   CTP:  abnormal perfusion, mismatch volume 12ml   CTA:  acute occlusion R M2   CT head:  acute R MCA infarction, no ICH   CT abd:  large expanding intramuscular hematoma within R rectus abdomin  Other imagin/02 CXR small bilateral effusions    LE Doppler limited exam, NEG    Neuro exam:   sleeping but arousable  coughing.  CTAB  L hemiplegic,  increase LLE antigravity  R 5/ NEUROCRITICAL CARE PROGRESS NOTE    VERONIKA CALERO   MRN-[id number]  Summary:  /  HPI:  58F with a hx of left breast cancer s/p left mastectomy with TRAM flap reconstruction (4-5 years prior), recently placed on plavix (about a month ago) by her cardiologist 2/2 calcified arterial disease and is now presenting to the [facility] ED with a painful bulge in her right abdomen.  Pt reports that she's had a cough for several days and yesterday noticed a bulge in her right abdomen that has grown and only become more painful.  She at times has difficulty breathing and some lightheadedness but otherwise denies fevers, chills, chest pain, nausea, vomiting, diarrhea, dysuria.    PMH: HTN, CAD?, HLD, Anxiety, Depression  Meds: bisoprolol 5mg qd, lorazepam 1mg q8?, Plavix 5qd, rosuvastatin, HCTZ, buproprion, baclofen  NKDA  PSH: Left mastectomy with TRAM rotational flap reconstruction  Social: 1/2 bottle of wine most nights, remote cigarette hx, denies IVDA (30 May 2018 13:13)    S/Overnight events:  afebrile, gram neg rods in urine. awake earlier     Vital Signs Last 24 Hrs  T(C): 36.6 (10 Germain 2018 06:40), Max: 37.7 (2018 09:55)  T(F): 97.8 (10 Germain 2018 06:40), Max: 99.9 (2018 09:55)  HR: 84 (10 Germain 2018 07:00) (74 - 94)  BP: 160/78 (10 Germain 2018 07:00) (124/78 - 169/77)  BP(mean): 115 (10 Germain 2018 07:00) (90 - 127)  RR: 22 (10 Germain 2018 07:00) (13 - 29)  SpO2: 97% (10 Germain 2018 07:00) (94% - 98%)      I&O's Detail    2018 07:01  -  10 Germain 2018 07:00  --------------------------------------------------------  IN:    IV PiggyBack: 600 mL    sodium chloride 0.9%: 500 mL    sodium chloride 0.9%.: 350 mL  Total IN: 1450 mL    OUT:    Intermittent Catheterization - Urethral: 2 mL  Total OUT: 2 mL    Total NET: 1448 mL    LABS:                        11.3   14.2  )-----------( 503      ( 10 Germain 2018 07:40 )             35.0     06-10    139  |  102  |  9   ----------------------------<  104<H>  3.5   |  19<L>  |  0.39<L>    Ca    9.1      10 Germain 2018 07:40  Phos  2.6     06-10  Mg     2.2     06-10        Urinalysis Basic - ( 2018 18:59 )    Color: Yellow / Appearance: Cloudy / S.015 / pH: x  Gluc: x / Ketone: NEGATIVE  / Bili: Negative / Urobili: 2.0 E.U./dL   Blood: x / Protein: Trace mg/dL / Nitrite: POSITIVE   Leuk Esterase: NEGATIVE / RBC: < 5 /HPF / WBC 5-10 /HPF   Sq Epi: x / Non Sq Epi: 0-5 /HPF / Bacteria: Many /HPF      Culture - Urine (18 @ 08:47)    Specimen Source: .Urine Catheterized    Culture Results:   >100,000 CFU/ml Gram Negative Rods  Identification and susceptibility to follow.    CAPILLARY BLOOD GLUCOS  POCT Blood Glucose.: 103 mg/dL (2018 10:27)        MEDICATIONS:  Antibiotics:  cefTRIAXone Injectable. 1000 milliGRAM(s) IV Push every 24 hours  metroNIDAZOLE  IVPB 500 milliGRAM(s) IV Intermittent every 12 hours    Neuro:  acetaminophen   Tablet 650 milliGRAM(s) Oral every 6 hours PRN  acetaminophen   Tablet. 650 milliGRAM(s) Oral every 6 hours PRN  aspirin Suppository 300 milliGRAM(s) Rectal daily  levETIRAcetam  IVPB 1000 milliGRAM(s) IV Intermittent every 12 hours  ondansetron Injectable 4 milliGRAM(s) IV Push every 6 hours PRN    Anticoagulation:  heparin  Injectable 5000 Unit(s) SubCutaneous every 8 hours    OTHER:  atorvastatin 80 milliGRAM(s) Oral at bedtime  bisoprolol   Tablet 5 milliGRAM(s) Oral daily  docusate sodium Liquid 100 milliGRAM(s) Oral two times a day  glucagon  Injectable 1 milliGRAM(s) IntraMuscular once PRN  hydrALAZINE Injectable 10 milliGRAM(s) IV Push every 4 hours PRN  labetalol Injectable 10 milliGRAM(s) IV Push every 4 hours PRN  polyethylene glycol 3350 17 Gram(s) Oral at bedtime  senna Syrup 10 milliLiter(s) Oral at bedtime    IVF:  dextrose 5%. 1000 milliLiter(s) IV Continuous <Continuous>  multivitamin 1 Tablet(s) Oral daily  sodium chloride 0.9%. 1000 milliLiter(s) IV Continuous <Continuous>    CULTURES:  Culture Results:   >100,000 CFU/ml Gram Negative Rods  Identification and susceptibility to follow. ( @ 08:47)  Culture Results:   Normal vaginal prisca ( @ 16:37)      Neuro exam:   sleeping but arousable  coughing.  CTAB  L hemiplegic,  increase LLE antigravity  R 5/5

## 2018-06-11 DIAGNOSIS — N76.0 ACUTE VAGINITIS: ICD-10-CM

## 2018-06-11 DIAGNOSIS — N39.0 URINARY TRACT INFECTION, SITE NOT SPECIFIED: ICD-10-CM

## 2018-06-11 DIAGNOSIS — I25.10 ATHEROSCLEROTIC HEART DISEASE OF NATIVE CORONARY ARTERY WITHOUT ANGINA PECTORIS: ICD-10-CM

## 2018-06-11 DIAGNOSIS — I63.9 CEREBRAL INFARCTION, UNSPECIFIED: ICD-10-CM

## 2018-06-11 DIAGNOSIS — R63.8 OTHER SYMPTOMS AND SIGNS CONCERNING FOOD AND FLUID INTAKE: ICD-10-CM

## 2018-06-11 DIAGNOSIS — Z29.9 ENCOUNTER FOR PROPHYLACTIC MEASURES, UNSPECIFIED: ICD-10-CM

## 2018-06-11 LAB
-  AMPICILLIN/SULBACTAM: SIGNIFICANT CHANGE UP
-  AMPICILLIN: SIGNIFICANT CHANGE UP
-  CEFAZOLIN: SIGNIFICANT CHANGE UP
-  CEFTRIAXONE: SIGNIFICANT CHANGE UP
-  CIPROFLOXACIN: SIGNIFICANT CHANGE UP
-  GENTAMICIN: SIGNIFICANT CHANGE UP
-  NITROFURANTOIN: SIGNIFICANT CHANGE UP
-  PIPERACILLIN/TAZOBACTAM: SIGNIFICANT CHANGE UP
-  TOBRAMYCIN: SIGNIFICANT CHANGE UP
-  TRIMETHOPRIM/SULFAMETHOXAZOLE: SIGNIFICANT CHANGE UP
ANION GAP SERPL CALC-SCNC: 11 MMOL/L — SIGNIFICANT CHANGE UP (ref 5–17)
ANION GAP SERPL CALC-SCNC: 13 MMOL/L — SIGNIFICANT CHANGE UP (ref 5–17)
BUN SERPL-MCNC: 7 MG/DL — SIGNIFICANT CHANGE UP (ref 7–23)
BUN SERPL-MCNC: 8 MG/DL — SIGNIFICANT CHANGE UP (ref 7–23)
CALCIUM SERPL-MCNC: 9.1 MG/DL — SIGNIFICANT CHANGE UP (ref 8.4–10.5)
CALCIUM SERPL-MCNC: 9.3 MG/DL — SIGNIFICANT CHANGE UP (ref 8.4–10.5)
CHLORIDE SERPL-SCNC: 104 MMOL/L — SIGNIFICANT CHANGE UP (ref 96–108)
CHLORIDE SERPL-SCNC: 105 MMOL/L — SIGNIFICANT CHANGE UP (ref 96–108)
CO2 SERPL-SCNC: 23 MMOL/L — SIGNIFICANT CHANGE UP (ref 22–31)
CO2 SERPL-SCNC: 24 MMOL/L — SIGNIFICANT CHANGE UP (ref 22–31)
CREAT SERPL-MCNC: 0.43 MG/DL — LOW (ref 0.5–1.3)
CREAT SERPL-MCNC: 0.44 MG/DL — LOW (ref 0.5–1.3)
CULTURE RESULTS: SIGNIFICANT CHANGE UP
GLUCOSE SERPL-MCNC: 120 MG/DL — HIGH (ref 70–99)
GLUCOSE SERPL-MCNC: 96 MG/DL — SIGNIFICANT CHANGE UP (ref 70–99)
HCT VFR BLD CALC: 31.2 % — LOW (ref 34.5–45)
HGB BLD-MCNC: 10.4 G/DL — LOW (ref 11.5–15.5)
MAGNESIUM SERPL-MCNC: 2.1 MG/DL — SIGNIFICANT CHANGE UP (ref 1.6–2.6)
MCHC RBC-ENTMCNC: 29.1 PG — SIGNIFICANT CHANGE UP (ref 27–34)
MCHC RBC-ENTMCNC: 33.3 G/DL — SIGNIFICANT CHANGE UP (ref 32–36)
MCV RBC AUTO: 87.4 FL — SIGNIFICANT CHANGE UP (ref 80–100)
METHOD TYPE: SIGNIFICANT CHANGE UP
ORGANISM # SPEC MICROSCOPIC CNT: SIGNIFICANT CHANGE UP
ORGANISM # SPEC MICROSCOPIC CNT: SIGNIFICANT CHANGE UP
PHOSPHATE SERPL-MCNC: 2.8 MG/DL — SIGNIFICANT CHANGE UP (ref 2.5–4.5)
PLATELET # BLD AUTO: 635 K/UL — HIGH (ref 150–400)
POTASSIUM SERPL-MCNC: 2.8 MMOL/L — CRITICAL LOW (ref 3.5–5.3)
POTASSIUM SERPL-MCNC: 4.4 MMOL/L — SIGNIFICANT CHANGE UP (ref 3.5–5.3)
POTASSIUM SERPL-SCNC: 2.8 MMOL/L — CRITICAL LOW (ref 3.5–5.3)
POTASSIUM SERPL-SCNC: 4.4 MMOL/L — SIGNIFICANT CHANGE UP (ref 3.5–5.3)
RBC # BLD: 3.57 M/UL — LOW (ref 3.8–5.2)
RBC # FLD: 16.1 % — SIGNIFICANT CHANGE UP (ref 10.3–16.9)
SODIUM SERPL-SCNC: 140 MMOL/L — SIGNIFICANT CHANGE UP (ref 135–145)
SODIUM SERPL-SCNC: 140 MMOL/L — SIGNIFICANT CHANGE UP (ref 135–145)
SPECIMEN SOURCE: SIGNIFICANT CHANGE UP
WBC # BLD: 13.6 K/UL — HIGH (ref 3.8–10.5)
WBC # FLD AUTO: 13.6 K/UL — HIGH (ref 3.8–10.5)

## 2018-06-11 PROCEDURE — 99231 SBSQ HOSP IP/OBS SF/LOW 25: CPT | Mod: GC

## 2018-06-11 PROCEDURE — 99233 SBSQ HOSP IP/OBS HIGH 50: CPT

## 2018-06-11 PROCEDURE — 99233 SBSQ HOSP IP/OBS HIGH 50: CPT | Mod: 24

## 2018-06-11 RX ORDER — POTASSIUM CHLORIDE 20 MEQ
20 PACKET (EA) ORAL
Qty: 0 | Refills: 0 | Status: COMPLETED | OUTPATIENT
Start: 2018-06-11 | End: 2018-06-11

## 2018-06-11 RX ORDER — SODIUM CHLORIDE 9 MG/ML
1000 INJECTION INTRAMUSCULAR; INTRAVENOUS; SUBCUTANEOUS
Qty: 0 | Refills: 0 | Status: DISCONTINUED | OUTPATIENT
Start: 2018-06-11 | End: 2018-06-13

## 2018-06-11 RX ADMIN — Medication 1 TABLET(S): at 12:37

## 2018-06-11 RX ADMIN — HEPARIN SODIUM 5000 UNIT(S): 5000 INJECTION INTRAVENOUS; SUBCUTANEOUS at 13:40

## 2018-06-11 RX ADMIN — Medication 100 MILLIGRAM(S): at 05:26

## 2018-06-11 RX ADMIN — HEPARIN SODIUM 5000 UNIT(S): 5000 INJECTION INTRAVENOUS; SUBCUTANEOUS at 06:00

## 2018-06-11 RX ADMIN — Medication 100 MILLIGRAM(S): at 18:06

## 2018-06-11 RX ADMIN — HEPARIN SODIUM 5000 UNIT(S): 5000 INJECTION INTRAVENOUS; SUBCUTANEOUS at 22:00

## 2018-06-11 RX ADMIN — ATORVASTATIN CALCIUM 80 MILLIGRAM(S): 80 TABLET, FILM COATED ORAL at 22:00

## 2018-06-11 RX ADMIN — LEVETIRACETAM 400 MILLIGRAM(S): 250 TABLET, FILM COATED ORAL at 12:37

## 2018-06-11 RX ADMIN — OXYCODONE AND ACETAMINOPHEN 1 TABLET(S): 5; 325 TABLET ORAL at 06:15

## 2018-06-11 RX ADMIN — Medication 100 MILLIGRAM(S): at 06:00

## 2018-06-11 RX ADMIN — BISOPROLOL FUMARATE 5 MILLIGRAM(S): 10 TABLET, FILM COATED ORAL at 05:25

## 2018-06-11 RX ADMIN — SODIUM CHLORIDE 75 MILLILITER(S): 9 INJECTION INTRAMUSCULAR; INTRAVENOUS; SUBCUTANEOUS at 17:01

## 2018-06-11 RX ADMIN — Medication 50 MILLIEQUIVALENT(S): at 06:12

## 2018-06-11 RX ADMIN — Medication 50 MILLIEQUIVALENT(S): at 10:30

## 2018-06-11 RX ADMIN — CEFTRIAXONE 1000 MILLIGRAM(S): 500 INJECTION, POWDER, FOR SOLUTION INTRAMUSCULAR; INTRAVENOUS at 12:35

## 2018-06-11 RX ADMIN — Medication 50 MILLIEQUIVALENT(S): at 08:53

## 2018-06-11 NOTE — SWALLOW FEES ASSESSMENT ADULT - RECOMMENDED CONSISTENCY
1. Mechanical soft solids and nectar-thick liquids. 2. Allow ice-chips and spoon sips of thin liquid. 3. Downgrade to pureed solids if Pt has difficulty completing meals d/t oral stage deficits.

## 2018-06-11 NOTE — PROGRESS NOTE ADULT - SUBJECTIVE AND OBJECTIVE BOX
Hospital course: 58F with h/o breast cancer, s/p TRAM flap reconstruction, recently started on plavix presented with painful bulge in R abdomen.  Imaging showed large expanding intramuscular hematoma R rects abdominis.  Admitted to surgery 05/30, diagnostic angio, R abd percutaneous thrombin injection of 2 pseudoaneurysms.  Post-procedure, noted to be hypotensive, low respiratory rate, altered mental status.  Naloxone given with improved respiratory status, but AMS persisted and noted L-sided weakness.  Stroke code called.  Post-op, noted  L UE hemiplegia / L LE weakness.  Stroke code called.  CT showed large R MCA small SELINA infarcts, with mass effect.  Mannitol 50g IV given, 23.4% x1 given.  Intubated for airway protection.  Seizure noted during intubation, given 4 ativan, started on propofol drip.  Brought to OR for decompressive hemicraniectomy.     POD#1: emergent crani yesterday, transferred back to [facility], sedated overnight, rpt CTH with hemorrhagic conversion o/w stable. stable exam overnight. Drains remains to suction. no acute events overnight  POD#2: No seizure activity on EEG. Following commands on right side. Continue DREW x2 and Hemovac x1. Low grade fevers.   POD#3: Tolerating CPAP. Self extubated. Hemovac removed.   POD#3: Self extubated. Hemovac removed. POD #4: Re-intubated yesterday, precedex resumed, no acute events overnight  POD#5: Pancultured for fevers. Tolerating CPAP trials. Continues to follow commands on right side. Bedside PT.  POD #6: Extubated without any issues, doing well and suctioning herself. Continues to follow commands on the right side and speaking 3-4 words very slowly. Bedside PT. Continued to be on 3% at 15 overnight. CTH done yesterday and was stable   POD #7: Overnight pulled NGT twice, replaced and confirmed. Patient did not need to suction herself overnight. Continues to follow commands on the right side and speaking more words than yesterday. Portable CT head today ASA f/u. Central line will be removed today. Neuro checks and vital signs now checked q 4h. Goal SBP normotensive 100-160. Passed S&S eval  POD#8: Failed re-eval of S&S, NGT replaced. EEG to r/o seizure.  POD#9: Better today, more awake.  No acute events overnight  POD#10: Afebrile overnight. Started ceftriaxone for UTI yesterday and continues flagyl for BV. Neuro stable, improving. Flap full, but soft.  POD #11: afebrile overnight. Neuro stable, flap soft.     Also being treated for UTI w/ Ceftriaxone, bacterial vaginosis w/ Metronidazole and is on Keppra for seizure ppx.        SUBJECTIVE: Patient seen and examined at bedside.    OBJECTIVE:    VITAL SIGNS:  ICU Vital Signs Last 24 Hrs  T(C): 36.9 (11 Jun 2018 14:10), Max: 37.8 (10 Germain 2018 17:50)  T(F): 98.4 (11 Jun 2018 14:10), Max: 100.1 (10 Germain 2018 17:50)  HR: 72 (11 Jun 2018 14:31) (62 - 86)  BP: 140/83 (11 Jun 2018 14:31) (128/80 - 160/77)  BP(mean): 103 (11 Jun 2018 14:31) (91 - 119)  ABP: --  ABP(mean): --  RR: 16 (11 Jun 2018 14:31) (12 - 47)  SpO2: 97% (11 Jun 2018 14:31) (95% - 98%)        06-10 @ 07:01  -  06-11 @ 07:00  --------------------------------------------------------  IN: 1550 mL / OUT: 3 mL / NET: 1547 mL      CAPILLARY BLOOD GLUCOSE          PHYSICAL EXAM:    General: NAD  HEENT: NC/AT; PERRL, clear conjunctiva  Neck: supple  Respiratory: CTA b/l  Cardiovascular: +S1/S2; RRR  Abdomen: soft, NT/ND; +BS x4  Extremities: WWP, 2+ peripheral pulses b/l; no LE edema  Skin: normal color and turgor; no rash  Neurological:     MEDICATIONS:  MEDICATIONS  (STANDING):  aspirin  chewable 81 milliGRAM(s) Oral daily  atorvastatin 80 milliGRAM(s) Oral at bedtime  bisoprolol   Tablet 5 milliGRAM(s) Oral daily  cefTRIAXone Injectable. 1000 milliGRAM(s) IV Push every 24 hours  dextrose 5%. 1000 milliLiter(s) (50 mL/Hr) IV Continuous <Continuous>  dextrose 50% Injectable 12.5 Gram(s) IV Push once  dextrose 50% Injectable 25 Gram(s) IV Push once  dextrose 50% Injectable 25 Gram(s) IV Push once  docusate sodium Liquid 100 milliGRAM(s) Oral two times a day  heparin  Injectable 5000 Unit(s) SubCutaneous every 8 hours  levETIRAcetam  IVPB 1000 milliGRAM(s) IV Intermittent every 12 hours  metroNIDAZOLE  IVPB 500 milliGRAM(s) IV Intermittent every 12 hours  multivitamin 1 Tablet(s) Oral daily  polyethylene glycol 3350 17 Gram(s) Oral at bedtime  senna Syrup 10 milliLiter(s) Oral at bedtime  sodium chloride 0.9%. 1000 milliLiter(s) (75 mL/Hr) IV Continuous <Continuous>    MEDICATIONS  (PRN):  acetaminophen   Tablet 650 milliGRAM(s) Oral every 6 hours PRN For Temp greater than 38 C (100.4 F)  acetaminophen   Tablet. 650 milliGRAM(s) Oral every 6 hours PRN Mild Pain (1 - 3)  dextrose 40% Gel 15 Gram(s) Oral once PRN Blood Glucose LESS THAN 70 milliGRAM(s)/deciliter  glucagon  Injectable 1 milliGRAM(s) IntraMuscular once PRN Glucose LESS THAN 70 milligrams/deciliter  hydrALAZINE Injectable 10 milliGRAM(s) IV Push every 4 hours PRN SBP >160  labetalol Injectable 10 milliGRAM(s) IV Push every 4 hours PRN SBP>160  ondansetron Injectable 4 milliGRAM(s) IV Push every 6 hours PRN Nausea  oxyCODONE    5 mG/acetaminophen 325 mG 1 Tablet(s) Oral every 6 hours PRN Moderate Pain (4 - 6)      ALLERGIES:  Allergies    No Known Allergies    Intolerances        LABS:                        10.4   13.6  )-----------( 635      ( 11 Jun 2018 04:28 )             31.2     06-11    140  |  105  |  7   ----------------------------<  96  4.4   |  24  |  0.44<L>    Ca    9.3      11 Jun 2018 11:40  Phos  2.8     06-11  Mg     2.1     06-11            RADIOLOGY & ADDITIONAL TESTS: Reviewed. Hospital course: 58F with h/o breast cancer, s/p TRAM flap reconstruction, recently started on plavix presented with painful bulge in R abdomen.  Imaging showed large expanding intramuscular hematoma R rects abdominis.  Admitted to surgery 05/30, diagnostic angio, R abd percutaneous thrombin injection of 2 pseudoaneurysms.  Post-procedure, noted to be hypotensive, low respiratory rate, altered mental status.  Naloxone given with improved respiratory status, but AMS persisted and noted L-sided weakness.  Stroke code called.  Post-op, noted  L UE hemiplegia / L LE weakness.  Stroke code called.  CT showed large R MCA small SELINA infarcts, with mass effect.  Mannitol 50g IV given, 23.4% x1 given.  Intubated for airway protection.  Seizure noted during intubation, given 4 ativan, started on propofol drip.  Brought to OR for decompressive hemicraniectomy.     POD#1: emergent crani yesterday, transferred back to [facility], sedated overnight, rpt CTH with hemorrhagic conversion o/w stable. stable exam overnight. Drains remains to suction. no acute events overnight  POD#2: No seizure activity on EEG. Following commands on right side. Continue DREW x2 and Hemovac x1. Low grade fevers.   POD#3: Tolerating CPAP. Self extubated. Hemovac removed.   POD#3: Self extubated. Hemovac removed. POD #4: Re-intubated yesterday, precedex resumed, no acute events overnight  POD#5: Pancultured for fevers. Tolerating CPAP trials. Continues to follow commands on right side. Bedside PT.  POD #6: Extubated without any issues, doing well and suctioning herself. Continues to follow commands on the right side and speaking 3-4 words very slowly. Bedside PT. Continued to be on 3% at 15 overnight. CTH done yesterday and was stable   POD #7: Overnight pulled NGT twice, replaced and confirmed. Patient did not need to suction herself overnight. Continues to follow commands on the right side and speaking more words than yesterday. Portable CT head today ASA f/u. Central line will be removed today. Neuro checks and vital signs now checked q 4h. Goal SBP normotensive 100-160. Passed S&S eval  POD#8: Failed re-eval of S&S, NGT replaced. EEG to r/o seizure.  POD#9: Better today, more awake.  No acute events overnight  POD#10: Afebrile overnight. Started ceftriaxone for UTI yesterday and continues flagyl for BV. Neuro stable, improving. Flap full, but soft.  POD #11: afebrile overnight. Neuro stable, flap soft.     Also being treated for UTI w/ Ceftriaxone, bacterial vaginosis w/ Metronidazole and is on Keppra for seizure ppx.        SUBJECTIVE: Patient seen and examined at bedside.    OBJECTIVE:    VITAL SIGNS:  ICU Vital Signs Last 24 Hrs  T(C): 36.9 (11 Jun 2018 14:10), Max: 37.8 (10 Germain 2018 17:50)  T(F): 98.4 (11 Jun 2018 14:10), Max: 100.1 (10 Germain 2018 17:50)  HR: 72 (11 Jun 2018 14:31) (62 - 86)  BP: 140/83 (11 Jun 2018 14:31) (128/80 - 160/77)  BP(mean): 103 (11 Jun 2018 14:31) (91 - 119)  ABP: --  ABP(mean): --  RR: 16 (11 Jun 2018 14:31) (12 - 47)  SpO2: 97% (11 Jun 2018 14:31) (95% - 98%)        06-10 @ 07:01  -  06-11 @ 07:00  --------------------------------------------------------  IN: 1550 mL / OUT: 3 mL / NET: 1547 mL      CAPILLARY BLOOD GLUCOSE          PHYSICAL EXAM:    General: NAD  HEENT: NC/AT; PERRL, clear conjunctiva  Neck: supple  Respiratory: CTA b/l  Cardiovascular: +S1/S2; RRR  Abdomen: soft, NT/ND; +BS x4  Extremities: WWP, 2+ peripheral pulses b/l; no LE edema  Skin: normal color and turgor; no rash  Neurological: Complete left neglect. Eyes rarely go past midline. Left facial droop. 5/5 strength and sensation (light touch) on right. 0/5 sensation on left (light touch) 1/5 strength in LUE, 4/5 strength in LLE. Gait not assessed.    MEDICATIONS:  MEDICATIONS  (STANDING):  aspirin  chewable 81 milliGRAM(s) Oral daily  atorvastatin 80 milliGRAM(s) Oral at bedtime  bisoprolol   Tablet 5 milliGRAM(s) Oral daily  cefTRIAXone Injectable. 1000 milliGRAM(s) IV Push every 24 hours  dextrose 5%. 1000 milliLiter(s) (50 mL/Hr) IV Continuous <Continuous>  dextrose 50% Injectable 12.5 Gram(s) IV Push once  dextrose 50% Injectable 25 Gram(s) IV Push once  dextrose 50% Injectable 25 Gram(s) IV Push once  docusate sodium Liquid 100 milliGRAM(s) Oral two times a day  heparin  Injectable 5000 Unit(s) SubCutaneous every 8 hours  levETIRAcetam  IVPB 1000 milliGRAM(s) IV Intermittent every 12 hours  metroNIDAZOLE  IVPB 500 milliGRAM(s) IV Intermittent every 12 hours  multivitamin 1 Tablet(s) Oral daily  polyethylene glycol 3350 17 Gram(s) Oral at bedtime  senna Syrup 10 milliLiter(s) Oral at bedtime  sodium chloride 0.9%. 1000 milliLiter(s) (75 mL/Hr) IV Continuous <Continuous>    MEDICATIONS  (PRN):  acetaminophen   Tablet 650 milliGRAM(s) Oral every 6 hours PRN For Temp greater than 38 C (100.4 F)  acetaminophen   Tablet. 650 milliGRAM(s) Oral every 6 hours PRN Mild Pain (1 - 3)  dextrose 40% Gel 15 Gram(s) Oral once PRN Blood Glucose LESS THAN 70 milliGRAM(s)/deciliter  glucagon  Injectable 1 milliGRAM(s) IntraMuscular once PRN Glucose LESS THAN 70 milligrams/deciliter  hydrALAZINE Injectable 10 milliGRAM(s) IV Push every 4 hours PRN SBP >160  labetalol Injectable 10 milliGRAM(s) IV Push every 4 hours PRN SBP>160  ondansetron Injectable 4 milliGRAM(s) IV Push every 6 hours PRN Nausea  oxyCODONE    5 mG/acetaminophen 325 mG 1 Tablet(s) Oral every 6 hours PRN Moderate Pain (4 - 6)      ALLERGIES:  Allergies    No Known Allergies    Intolerances        LABS:                        10.4   13.6  )-----------( 635      ( 11 Jun 2018 04:28 )             31.2     06-11    140  |  105  |  7   ----------------------------<  96  4.4   |  24  |  0.44<L>    Ca    9.3      11 Jun 2018 11:40  Phos  2.8     06-11  Mg     2.1     06-11            RADIOLOGY & ADDITIONAL TESTS: Reviewed.

## 2018-06-11 NOTE — PROGRESS NOTE ADULT - ATTENDING COMMENTS
Patient seen and examined earlier in the day to confirm that she's doing well.  She was awake with ability to answer questions and follow commands.  She had some trouble with swallowing so discussed her case with Lacey Cheng who was to perform a confirmatory test.  Afebrile on antibiotics.  Patient stable for transfer to stroke unit for further observation and treatment.    1) Secondary stroke prevention -   a) Continue Aspirin 300mg LA for antiplatelet - switch to PO once cleared.  b) Continue Atorvastatin 80mg for statin.    2) Seizure - Continue Keppra 1,000mg BID  3) DVT prophylaxis - Heparin SQ TID and SCDs in place.  4) For rehab once medically and neurologically cleared.

## 2018-06-11 NOTE — SWALLOW BEDSIDE ASSESSMENT ADULT - PHARYNGEAL PHASE
Delayed cough post oral intake/Voice remained clear after sips of NTL and there was no cough or throat clear. Approx 15 seconds after trial of puree, Pt began coughing, indicating penetration and/or aspiration.

## 2018-06-11 NOTE — PROGRESS NOTE ADULT - SUBJECTIVE AND OBJECTIVE BOX
SUBJECTIVE: Pt seen and examined at bedside. No overnight events.   Patient does not respond to questions, awake.   Hemodynamically stable.     Vital Signs Last 24 Hrs  T(C): 36.9 (11 Jun 2018 09:00), Max: 37.8 (10 Germain 2018 17:50)  T(F): 98.4 (11 Jun 2018 09:00), Max: 100.1 (10 Germain 2018 17:50)  HR: 65 (11 Jun 2018 07:00) (65 - 88)  BP: 154/92 (11 Jun 2018 07:00) (139/69 - 167/77)  BP(mean): 111 (11 Jun 2018 07:00) (91 - 119)  RR: 15 (11 Jun 2018 07:00) (12 - 22)  SpO2: 95% (11 Jun 2018 07:00) (95% - 98%)    PHYSICAL EXAM  Gen: NAD, awake, following commands, answering questions  CV: normocardic, hypertensive  Pulm: extubated, on NC  Abd: Soft, non-distended, patient mildly grimacing with R abdomen palpation, no rebound or guarding, right flank and hip ecchymosis smaller  Ext: WWP        LABS:                        10.4   13.6  )-----------( 635      ( 11 Jun 2018 04:28 )             31.2     06-11    140  |  104  |  8   ----------------------------<  120<H>  2.8<LL>   |  23  |  0.43<L>    Ca    9.1      11 Jun 2018 04:28  Phos  2.8     06-11  Mg     2.1     06-11        ASSESSMENT AND PLAN  58F with a hx of TRAM flap reconstruction on plavix now with a spontaneous right rectus expanding hematoma taken to IR for Rt groin access for dx angio, and rt abd percutaneous thrombin injection of 2 pseudoaneurysms in abd wall c/b herniation of cerebral tonsils, transferred to neurosurgery for decompressive hemicraniectomy 5/31    Abdominal exam benign, bruises over right hip and flank improving  Hgb stable  No need for surgical intervention at this time  Team 4c will sign off, please re-consult as needed  Discussed with chief resident and Dr. Dozier SUBJECTIVE: Pt seen and examined at bedside. No overnight events.   Patient does not respond to questions, awake.   Hemodynamically stable.     Vital Signs Last 24 Hrs  T(C): 36.9 (11 Jun 2018 09:00), Max: 37.8 (10 Germain 2018 17:50)  T(F): 98.4 (11 Jun 2018 09:00), Max: 100.1 (10 Germain 2018 17:50)  HR: 65 (11 Jun 2018 07:00) (65 - 88)  BP: 154/92 (11 Jun 2018 07:00) (139/69 - 167/77)  BP(mean): 111 (11 Jun 2018 07:00) (91 - 119)  RR: 15 (11 Jun 2018 07:00) (12 - 22)  SpO2: 95% (11 Jun 2018 07:00) (95% - 98%)    PHYSICAL EXAM  Gen: NAD, awake, following commands, answering questions  CV: normocardic, hypertensive  Pulm: extubated, on NC  Abd: Soft, non-distended, patient mildly grimacing with R abdomen palpation, no rebound or guarding, right flank and hip ecchymosis smaller  Ext: WWP        LABS:                        10.4   13.6  )-----------( 635      ( 11 Jun 2018 04:28 )             31.2     06-11    140  |  104  |  8   ----------------------------<  120<H>  2.8<LL>   |  23  |  0.43<L>    Ca    9.1      11 Jun 2018 04:28  Phos  2.8     06-11  Mg     2.1     06-11        ASSESSMENT AND PLAN  58F with a hx of TRAM flap reconstruction on plavix now with a spontaneous right rectus expanding hematoma taken to IR for Rt groin access for dx angio, and rt abd percutaneous thrombin injection of 2 pseudoaneurysms in abd wall c/b herniation of cerebral tonsils, transferred to neurosurgery for decompressive hemicraniectomy 5/31    Abdominal exam benign, bruises over right hip and flank improving  Hgb stable  No need for surgical intervention at this time  Please re-consult as needed  Discussed with chief resident and Dr. Dozier

## 2018-06-11 NOTE — SWALLOW BEDSIDE ASSESSMENT ADULT - SLP PERTINENT HISTORY OF CURRENT PROBLEM
Pt. extubated this morning.
Well-known to this svc, started on oral diet of mech soft solids / NTL on 6/7. Pt became more lethargic on 6/8 & had increasing signs of aspiration with PO intake, was made  NPO.

## 2018-06-11 NOTE — SWALLOW FEES ASSESSMENT ADULT - COMMENTS
Risks, benefits, alternatives of procedure were explained to the patient and she provided verbal consent to participate in assessment. Dementia Pt is at mental baseline Patient is at mental baseline.

## 2018-06-11 NOTE — PROGRESS NOTE ADULT - ASSESSMENT
58F with   1.  acute cerebrovascular accident involving R MCA / SELINA, malignant infarction, cerebral edema, brain compression s/p decompressive hemicraniectomy (05/31/2018, Dr. Johnson)  2.  Hypertension dyslipidemia   3.  h/o breast CA  4.  CAD  5.  R rectus abdominis intramuscular hematoma  6.  seizures    PLAN:   NEURO: neurochecks q1h, PRN pain meds tylenol, fentanyl, sedation with precedex  to RASS of 0  malignant R MCA infarction, s/p DHC:  no ASA for now (will prob start tomorrow or Day 5), hemorrhagic conversion noted  seizure disorder:  continue levetiracetam 1G BID  ICP crises:  s/p [facility]  REHAB:  physical therapy evaluation and management - defer   EARLY MOB:  HOB up, bedrest    PULM:  CPAP 8/5 40%  CARDIO:  SBP goal 100-180 mm Hg, echo EF 55-60% mod dilated LA, probable AVM, no PFOs, keep central line  ENDO:  Blood sugar goals 140-180 mg/dL, continue insulin sliding scale; continue high-dose statins  GI:  PPI for GI prophylaxis  DIET: jevity to goal of 70  RENAL:  increase 3%@50cc/hr, recheck BMP this aftenroon; Na goal 145-150, increase salt tabs 2g q6h  HEM/ONC: s/p transfusion - with adequate response of Hb  VTE Prophylaxis: SCDs only, start SQH if ok with NS, neg dopplers  ID: afebrile, no leukocytosis  Social:  updated yesterday  MISC:  h/o ETOH abuse, continue MVI thiamine folate    ATTENDING ATTESTATION:  I was physically present for the key portions of the evaluation and management (E/M) service provided.  I agree with the above history, physical and plan, which I have reviewed and edited where appropriate.    Patient at high risk for neurological deterioration or death due to:  ICU delirium, aspiration PNA, DVT / PE.  Critical care time, excluding procedures: 45 minutes spent on total encounter, more than 50% of the visit was spent counseling and/or coordinating care by the attending physician.     Plan discussed with RN, house staff. 58F with   1.  acute cerebrovascular accident involving R MCA / SELINA, malignant infarction, cerebral edema, brain compression s/p decompressive hemicraniectomy (05/31/2018, Dr. Johnson)  2.  Hypertension dyslipidemia   3.  h/o breast CA  4.  CAD  5.  R rectus abdominis intramuscular hematoma  6.  seizures    PLAN:   NEURO: neurochecks q4h, PRN pain meds tylenol, percocet  malignant R MCA infarction, s/p DHC:  ASA MT 300mg  seizure disorder:  continue levetiracetam 1G BID  REHAB:  physical therapy evaluation and management - defer   EARLY MOB:  HOB up, bed in chair    PULM:  high risk for aspiration, incentive spiroemtry, pulmo toilette  CARDIO:  SBP goal 100-150 mm Hg, echo EF 55-60% mod dilated LA, bisoprolol home med  ENDO:  Blood sugar goals 140-180 mg/dL, off insulin sliding scale; continue high-dose statins  GI:  docusate senna  DIET: NPO  RENAL:  incrase fluids to 75cc/hr, replete K  HEM/ONC: Hb stable  VTE Prophylaxis: SCDs,  start SQH   ID: afebrile, no leukocytosis; bacterial vaginosis, UTI, continue ceftriaxone (until 06/13)) / MNZ (until 06/14)  Social:  updated yesterday  MISC:  h/o ETOH abuse, continue MVI thiamine folate    ATTENDING ATTESTATION:  I was physically present for the key portions of the evaluation and management (E/M) service provided.  I agree with the above history, physical and plan, which I have reviewed and edited where appropriate.    Patient at high risk for neurological deterioration or death due to:  ICU delirium, aspiration PNA, DVT / PE.  Critical care time, excluding procedures: 45 minutes spent on total encounter, more than 50% of the visit was spent counseling and/or coordinating care by the attending physician.     Plan discussed with RN, house staff. 58F with   1.  acute cerebrovascular accident involving R MCA / SELINA, malignant infarction, cerebral edema, brain compression s/p decompressive hemicraniectomy (05/31/2018, Dr. Johnson)  2.  Hypertension dyslipidemia   3.  h/o breast CA  4.  CAD  5.  R rectus abdominis intramuscular hematoma  6.  seizures    PLAN:   NEURO: neurochecks q4h, PRN pain meds tylenol, percocet  malignant R MCA infarction, s/p DHC:  ASA DE 300mg  seizure disorder:  continue levetiracetam 1G BID  REHAB:  physical therapy evaluation and management - defer   EARLY MOB:  HOB up, bed in chair    PULM:  high risk for aspiration, incentive spiroemtry, pulmo toilette  CARDIO:  SBP goal 100-150 mm Hg, echo EF 55-60% mod dilated LA, bisoprolol home med  ENDO:  Blood sugar goals 140-180 mg/dL, off insulin sliding scale; continue high-dose statins  GI:  docusate senna  DIET: NPO  RENAL:  incrase fluids to 75cc/hr, replete K  HEM/ONC: Hb stable  VTE Prophylaxis: SCDs,  start SQH   ID: afebrile, no leukocytosis; bacterial vaginosis, UTI, continue ceftriaxone (until 06/13)) / MNZ (until 06/14)  Social:  updated yesterday  MISC:  h/o ETOH abuse, continue MVI thiamine folate    ATTENDING ATTESTATION:  I was physically present for the key portions of the evaluation and management (E/M) service provided.  I agree with the above history, physical and plan which I have reviewed and edited where appropriate.     Patient not at high risk for neurologic deterioration / death.  Time spent on this noncritically ill patient: 45 minutes spent on total encounter, more than 50% of the visit was spent counseling and/or coordinating care by the attending physician.    Plan discussed with RN, house staff.

## 2018-06-11 NOTE — CHART NOTE - NSCHARTNOTEFT_GEN_A_CORE
Admitting Diagnosis:   Patient is a 58y old  Female who presents with a chief complaint of Expanding hematoma (2018 03:05)      PAST MEDICAL & SURGICAL HISTORY:  Anxiety  Hypertension  Breast CA  Previous  section  History of mastectomy, left: with TRAM flap recon      Current Nutrition Order:  NPO      PO Intake: Good (%) [   ]  Fair (50-75%) [   ] Poor (<25%) [   ]- N/a NPO    GI Issues: Unable to assess 2/2 lethargy, RN reports no complaints from patient     Pain: Unable to assess 2/2 lethargy, though grimacing as if she were in pain     Skin Integrity: Surgical wounds    Labs:       140  |  105  |  7   ----------------------------<  96  4.4   |  24  |  0.44<L>    Ca    9.3      2018 11:40  Phos  2.8     06-  Mg     2.1     06-11      CAPILLARY BLOOD GLUCOSE          Medications:  MEDICATIONS  (STANDING):  aspirin  chewable 81 milliGRAM(s) Oral daily  atorvastatin 80 milliGRAM(s) Oral at bedtime  bisoprolol   Tablet 5 milliGRAM(s) Oral daily  cefTRIAXone Injectable. 1000 milliGRAM(s) IV Push every 24 hours  dextrose 5%. 1000 milliLiter(s) (50 mL/Hr) IV Continuous <Continuous>  dextrose 50% Injectable 12.5 Gram(s) IV Push once  dextrose 50% Injectable 25 Gram(s) IV Push once  dextrose 50% Injectable 25 Gram(s) IV Push once  docusate sodium Liquid 100 milliGRAM(s) Oral two times a day  heparin  Injectable 5000 Unit(s) SubCutaneous every 8 hours  levETIRAcetam  IVPB 1000 milliGRAM(s) IV Intermittent every 12 hours  metroNIDAZOLE  IVPB 500 milliGRAM(s) IV Intermittent every 12 hours  multivitamin 1 Tablet(s) Oral daily  polyethylene glycol 3350 17 Gram(s) Oral at bedtime  senna Syrup 10 milliLiter(s) Oral at bedtime  sodium chloride 0.9%. 1000 milliLiter(s) (75 mL/Hr) IV Continuous <Continuous>    MEDICATIONS  (PRN):  acetaminophen   Tablet 650 milliGRAM(s) Oral every 6 hours PRN For Temp greater than 38 C (100.4 F)  acetaminophen   Tablet. 650 milliGRAM(s) Oral every 6 hours PRN Mild Pain (1 - 3)  dextrose 40% Gel 15 Gram(s) Oral once PRN Blood Glucose LESS THAN 70 milliGRAM(s)/deciliter  glucagon  Injectable 1 milliGRAM(s) IntraMuscular once PRN Glucose LESS THAN 70 milligrams/deciliter  hydrALAZINE Injectable 10 milliGRAM(s) IV Push every 4 hours PRN SBP >160  labetalol Injectable 10 milliGRAM(s) IV Push every 4 hours PRN SBP>160  ondansetron Injectable 4 milliGRAM(s) IV Push every 6 hours PRN Nausea  oxyCODONE    5 mG/acetaminophen 325 mG 1 Tablet(s) Oral every 6 hours PRN Moderate Pain (4 - 6)      Weight: 78kg   Daily     Daily     Weight Change: No new weights recorded since admit     Estimated energy needs: Ideal body weight (61.2kg) used for calculations as pt >120% of IBW. Needs adjusted for s/p CVA, wound healing  Calories: 25-30kcal/kg = 0746-4984 kcal/day  Protein: 1.2-1.4 g/kg = 86-98g protein/day  Fluids; 30-35mL/kg = 0099-8347 mL/day    Subjective: 57 yo/female with PMHx L. breast cancer s/p mastectomy w/flap reconstruction, HTN, admitted with abdominal pain/bulge. Found to have R. rectus expanding hematoma. Course c/b large R. MCA stroke. S/p decompressive craniotomy on . Pt extubated on  though has been lethargic since. Pt was on diet order over the weekend but was too lethargic to eat at some meals; c/f high aspiration risk. Seen by SLP on  who recommended NPO w/FEES study; RN aware. Unable to obtain information from patient at this time as she was very sleepy. RN reports no complaints. Na/Mg/Phos WNL, K 2.8 (L), BG 103mg/dL.    Previous Nutrition Diagnosis:   Increased nutrient needs RT increased demand for protein-calorie intake AEB s/p CVA, wound healing     Active [  X ]  Resolved [   ]    If resolved, new PES:     Goal: Pt will meet % of EER per day via tolerated route     Recommendations:  1. F/U results of FEES study as soon as possible  2. If PO intake not feasible, recommend re-starting EN (Jev 1.2 Sreedhar @ 59mL/hr x 24hrs plus 1 ProStat)  3. Monitor lytes and replete prn.   4. Trend weights   5. Ensure pain control    Education:   Not appropriate for education at this time     Risk Level: High [ X  ] Moderate [   ] Low [   ].

## 2018-06-11 NOTE — PROGRESS NOTE ADULT - ASSESSMENT
58y Female w/ HTN, HLD, Anxiety, Depression, h/o Breast Cancer w/ TRAM flap reconstruction admitted for expanding right rectus hematoma complicated by right MCA infarction now s/p hemicraniectomy POD #11. Also found to have UTI and Bacterial vaginosos.    PLAN:  NEURO:  CARDIOVASCULAR:  - Continue Asp 81 mg     PULMONARY:  - satting well in RA   - continue suctioning   - chest pt prn   - sputum culture negative   RENAL:  - K+ of 2..8, repeat BMP at 10 am     - on Ceftriaxone for UTI, S&S pending will deescalate once avilable   GI:  - NPO for now   - S&S recs- FEES high aspiration risk   - abdominal CT at some point per gen surg request   HEME:  - H&H stable   ID:  - CT abd eventually if down at CT, non-urgent   - ceftx for + UCx  end date 6/13  - Flagyl for BV  end date 6/14   ENDO:  - BS stable   - goal 140-180 58y Female w/ HTN, HLD, Anxiety, Depression, h/o Breast Cancer w/ TRAM flap reconstruction admitted for expanding right rectus hematoma complicated by right MCA infarction now s/p hemicraniectomy POD #11. Also found to have UTI and Bacterial vaginosos.

## 2018-06-11 NOTE — SWALLOW BEDSIDE ASSESSMENT ADULT - COMMENTS
Received awake but very sleepy. Per RN, was given percocet earlier this a.m. Pt follows simple commands. Voice is weak at baseline, can be louder with cue to take a deep breath first.

## 2018-06-11 NOTE — SWALLOW FEES ASSESSMENT ADULT - DIAGNOSTIC IMPRESSIONS
Mild oropharyngeal dysphagia characterized by bolus holding and delayed A-P transport, greater for solids than thins, in the oral stage & by delayed swallow trigger in the pharyngeal stage with penetration of thin liquid x1. Patient is at high risk of aspiration of thin liquids due to spillage deep into the pyriform sinuses before the swallow and demonstrates overall better bolus control with nectar-thick liquids.

## 2018-06-11 NOTE — PROGRESS NOTE ADULT - SUBJECTIVE AND OBJECTIVE BOX
=================================  NEUROCRITICAL CARE ATTENDING NOTE  =================================    VERONIKA CALERO   [id number]  Summary:  58F with h/o breast cancer, s/p TRAM flap reconstruction, recently started on plavix presented with painful bulge in R abdomen.  Imaging showed large expanding intramuscular hematoma R rects abdominis.  Admitted to surgery , diagnostic angio, R abd percutaneous thrombin injection of 2 pseudoaneurysms.  Post-procedure, noted to be hypotensive, low respiratory rate, altered mental status.  Naloxone given with improved respiratory status, but AMS persisted and noted L-sided weakness.  Stroke code called.  Post-op, noted  L UE hemiplegia / L LE weakness.  Stroke code called.  CT showed large R MCA small SELINA infarcts, with mass effect.  Mannitol 50g IV given, 23.4% x1 given.  Intubated for airway protection.  Seizure noted during intubation, given 4 ativan, started on propofol drip.  Brought to OR for decompressive hemicraniectomy.  Overnight Events: transfused 1 unit pRBC yesterday , no significant events overnight    Past Medical History: Anxiety Hypertension Breast CA dyslipidemia CAD Hypertension   Allergies:  No Known Allergies  Home Meds: bisoprolol 5mg qd, lorazepam 1mg q8?, Plavix 5qd, rosuvastatin, HCTZ, buproprion, baclofen  Social: 1/2 bottle of wine most nights, remote cigarette hx, denies IVDA     PHYSICAL EXAMINATION  T(C): 37 ( @ 05:32), Max: 37.8 (06-10 @ 17:50) HR: 65 ( @ 07:00) (65 - 88) BP: 154/92 ( @ 07:00) (137/78 - 167/77) RR: 15 ( @ 07:00) (12 - 22) SpO2: 95% ( @ 07:00) (95% - 98%)  NEUROLOGIC EXAMINATION:  Patient does not open eyes, follows commands on R, no verbal output, no gaze preference, pupils 3mm reactive and equal, moves R UE/LE spontaneously , withdraws to noxious on L LE 0/5 L UE  GENERAL:   intubated CPAP 8/ 40%  EENT: anicteric, flap full, soft  CARDIOVASC:  (+) S1 S2, normal rate and regular rhythm  PULMONARY:  clear to auscultation bilaterally  ABDOMEN:  soft, nontender, with normoactive bowel sounds  EXTREMITIES:  no edema  SKIN:  no rash    LABS:                       10.4   13.6  )-----------( 635      ( 2018 04:28 )             31.2     140  |  104  |  8   ----------------------------<  120<H>  2.8<LL>   |  23  |  0.43<L>    Ca    9.1      2018 04:28  Phos  2.8       Mg     2.1     06-11    06-10 @ 07:01  -   @ 07:00  IN: 1550 mL / OUT: 3 mL / NET: 1547 mL    HbA1C = 5.3 ()  LDL = 106   /  HDL = 52 ()  /  TG = 84 ()  TSH = 0.614 ()    Bacteriology:  CSF studies:  EEG:  Neuroimagin/07 CT head:  E FTRP crani, subacute infarction R MCA/ICA and PCA territories spared cortex vs hemorrhage - gyriform hyperdensity)   CT head:  no change in small foci of hemorrhage, continued evolution large R MCA infarction small R SELINA territory infarct   CT head: evolving R MCA / SELINA infarction, global mass effect, MLS to L, early L lateral ventricle entrapment, hemorrhagic transformation suspected   CTP:  abnormal perfusion, mismatch volume 12ml   CTA:  acute occlusion R M2   CT head:  acute R MCA infarction, no ICH   CT abd:  large expanding intramuscular hematoma within R rectus abdomin  Other imagin/10 LE Doppler: NEG   CXR: clear   CXR small bilateral effusions    LE Doppler limited exam, NEG    MEDICATIONS: atorvastatin 80mg HS peridex docusate fentanyl PRN folic acid mod ISS levetiracetam 1G BID MVI pantoprazole 40 daily senna HS salt 2g q8h thiamine  MEDICATIONS: ASA 81 mg daily atorvastatin 80mg HS bisoprolol 5mg daily ceftriaxone 1g q24h SQH q8h levetiracetam 1g IV q12h  q12h MVI daily percocet miralax HS senna HS     IV FLUIDS: 3%@30cc/hr  DRIPS:   DIET: Jevity 1.2 @ 70  Lines: Matador L SC 3 drains   Drains:  SG 50 70 30  Wounds:    CODE STATUS:  Full Code                       GOALS OF CARE:  aggressive                      DISPOSITION:  ICU / Stepdown =================================  NEUROCRITICAL CARE ATTENDING NOTE  =================================    VERONIKA CALERO   [id number]  Summary:  58F with h/o breast cancer, s/p TRAM flap reconstruction, recently started on plavix presented with painful bulge in R abdomen.  Imaging showed large expanding intramuscular hematoma R rects abdominis.  Admitted to surgery , diagnostic angio, R abd percutaneous thrombin injection of 2 pseudoaneurysms.  Post-procedure, noted to be hypotensive, low respiratory rate, altered mental status.  Naloxone given with improved respiratory status, but AMS persisted and noted L-sided weakness.  Stroke code called.  Post-op, noted  L UE hemiplegia / L LE weakness.  Stroke code called.  CT showed large R MCA small SELINA infarcts, with mass effect.  Mannitol 50g IV given, 23.4% x1 given.  Intubated for airway protection.  Seizure noted during intubation, given 4 ativan, started on propofol drip.  Brought to OR for decompressive hemicraniectomy.    Overnight Events: extubated last week, stable, started on ASA, on diet, but pocketing, feeding held; no significant events overnight; started MNZ for bacterial vaginosis    Past Medical History: Anxiety Hypertension Breast CA dyslipidemia CAD Hypertension   Allergies:  No Known Allergies  Home Meds: bisoprolol 5mg qd, lorazepam 1mg q8?, Plavix 5qd, rosuvastatin, HCTZ, buproprion, baclofen  Social: 1/2 bottle of wine most nights, remote cigarette hx, denies IVDA     PHYSICAL EXAMINATION  T(C): 37 ( @ 05:32), Max: 37.8 (06-10 @ 17:50) HR: 65 ( @ 07:00) (65 - 88) BP: 154/92 ( @ 07:00) (137/78 - 167/77) RR: 15 ( @ 07:00) (12 - 22) SpO2: 95% ( @ 07:00) (95% - 98%)  NEUROLOGIC EXAMINATION:  Patient is awake, alert, fully oriented, moves RUE/LE spontaneously, 1/5 L UE, L LE 4/5  does not open eyes, follows commands on R, no verbal output, no gaze preference, pupils 3mm reactive and equal, moves R UE/LE spontaneously , withdraws to noxious on L LE 0/5 L UE  GENERAL:   not intubated  EENT: anicteric, flap soft  CARDIOVASC:  (+) S1 S2, normal rate and regular rhythm  PULMONARY:  clear to auscultation bilaterally  ABDOMEN:  soft, nontender, with normoactive bowel sounds  EXTREMITIES:  no edema  SKIN:  no rash    LABS:          (14.2)    10.4 (11.3)  13.6  )-----------( 635  (503)    ( 2018 04:28 )             31.2     140  |  104  |  8   ----------------------------<  120<H>  2.8<LL>   |  23  |  0.43<L>    Ca    9.1      2018 04:28  Phos  2.8       Mg     2.1     06-11    06-10 @ 07:01  -   @ 07:00  IN: 1550 mL / OUT: 3 mL / NET: 1547 mL    HbA1C = 5.3 ()  LDL = 106   /  HDL = 52 ()  /  TG = 84 ()  TSH = 0.614 ()    Bacteriology: UA Nitrite (+) (06/10)  RECENT CULTURES:   .Urine Catheterized XXXX XXXX  >100,000 CFU/ml Gram Negative Rods Identification and susceptibility to follow.   .Other Vaginal XXXX  Numerous Gram Positive Rods Rare Gram Positive Cocci in Pairs and Chains Rare Gram Negative Rods No White blood cells  Normal vaginal prisca   .Sputum Sputum XXXX  No epithelial cells Numerous White blood cells Few Gram positive cocci in pairs Rare Gram Negative Rods  Normal Respiratory Prisca present   .Blood Blood XXXX XXXX  No growth at 5 days.    CSF studies:  EEG:  Neuroimagin/07 CT head:  E FTRP crani, subacute infarction R MCA/ICA and PCA territories spared cortex vs hemorrhage - gyriform hyperdensity)   CT head:  no change in small foci of hemorrhage, continued evolution large R MCA infarction small R SELINA territory infarct   CT head: evolving R MCA / SELINA infarction, global mass effect, MLS to L, early L lateral ventricle entrapment, hemorrhagic transformation suspected   CTP:  abnormal perfusion, mismatch volume 12ml   CTA:  acute occlusion R M2   CT head:  acute R MCA infarction, no ICH   CT abd:  large expanding intramuscular hematoma within R rectus abdomin  Other imagin/10 LE Doppler: NEG   CXR: clear   CXR small bilateral effusions    LE Doppler limited exam, NEG    MEDICATIONS: ASA 81 mg daily atorvastatin 80mg HS bisoprolol 5mg daily ceftriaxone 1g q24h SQH q8h levetiracetam 1g IV q12h  q12h MVI daily percocet miralax HS senna HS     IV FLUIDS: NS@50cc/hr  DRIPS:   DIET: pureed  Lines:   Drains:    Wounds:    CODE STATUS:  Full Code                       GOALS OF CARE:  aggressive                      DISPOSITION:  ICU / Stepdown =================================  NEUROCRITICAL CARE ATTENDING NOTE  =================================    VERONIKA CALERO   [id number]  Summary:  58F with h/o breast cancer, s/p TRAM flap reconstruction, recently started on plavix presented with painful bulge in R abdomen.  Imaging showed large expanding intramuscular hematoma R rects abdominis.  Admitted to surgery , diagnostic angio, R abd percutaneous thrombin injection of 2 pseudoaneurysms.  Post-procedure, noted to be hypotensive, low respiratory rate, altered mental status.  Naloxone given with improved respiratory status, but AMS persisted and noted L-sided weakness.  Stroke code called.  Post-op, noted  L UE hemiplegia / L LE weakness.  Stroke code called.  CT showed large R MCA small SELINA infarcts, with mass effect.  Mannitol 50g IV given, 23.4% x1 given.  Intubated for airway protection.  Seizure noted during intubation, given 4 ativan, started on propofol drip.  Brought to OR for decompressive hemicraniectomy.    Overnight Events: extubated last week, stable, started on ASA, on diet, but pocketing, feeding held; no significant events overnight; started MNZ for bacterial vaginosis  REVIEW OF SYSTEMS:  No headaches, no nausea or vomiting; 14 -point review of systems otherwise unremarkable.    Past Medical History: Anxiety Hypertension Breast CA dyslipidemia CAD Hypertension   Allergies:  No Known Allergies  Home Meds: bisoprolol 5mg qd, lorazepam 1mg q8?, Plavix 5qd, rosuvastatin, HCTZ, buproprion, baclofen  Social: 1/2 bottle of wine most nights, remote cigarette hx, denies IVDA     PHYSICAL EXAMINATION  T(C): 37 ( @ 05:32), Max: 37.8 (06-10 @ 17:50) HR: 65 ( @ 07:00) (65 - 88) BP: 154/92 ( @ 07:00) (137/78 - 167/77) RR: 15 ( @ 07:00) (12 - 22) SpO2: 95% ( @ 07:00) (95% - 98%)  NEUROLOGIC EXAMINATION:  Patient is awake, alert, fully oriented, moves RUE/LE spontaneously, 1/5 L UE, L LE 4/5  does not open eyes, follows commands on R, no verbal output, no gaze preference, pupils 3mm reactive and equal, moves R UE/LE spontaneously , withdraws to noxious on L LE 0/5 L UE  GENERAL:   not intubated  EENT: anicteric, flap soft  CARDIOVASC:  (+) S1 S2, normal rate and regular rhythm  PULMONARY:  clear to auscultation bilaterally  ABDOMEN:  soft, nontender, with normoactive bowel sounds  EXTREMITIES:  no edema  SKIN:  no rash    LABS:          (14.2)    10.4 (11.3)  13.6  )-----------( 635  (503)    ( 2018 04:28 )             31.2     140  |  104  |  8   ----------------------------<  120<H>  2.8<LL>   |  23  |  0.43<L>    Ca    9.1      2018 04:28  Phos  2.8     -  Mg     2.1     06-11    06-10 @ 07:01  -   @ 07:00  IN: 1550 mL / OUT: 3 mL / NET: 1547 mL    HbA1C = 5.3 ()  LDL = 106   /  HDL = 52 ()  /  TG = 84 ()  TSH = 0.614 ()    Bacteriology: UA Nitrite (+) (06/10)  RECENT CULTURES:   .Urine Catheterized XXXX XXXX  >100,000 CFU/ml Gram Negative Rods Identification and susceptibility to follow.   .Other Vaginal XXXX  Numerous Gram Positive Rods Rare Gram Positive Cocci in Pairs and Chains Rare Gram Negative Rods No White blood cells  Normal vaginal prisca   .Sputum Sputum XXXX  No epithelial cells Numerous White blood cells Few Gram positive cocci in pairs Rare Gram Negative Rods  Normal Respiratory Prisca present   .Blood Blood XXXX XXXX  No growth at 5 days.    CSF studies:  EEG:  Neuroimagin/07 CT head:  E FTRP crani, subacute infarction R MCA/ICA and PCA territories spared cortex vs hemorrhage - gyriform hyperdensity)   CT head:  no change in small foci of hemorrhage, continued evolution large R MCA infarction small R SELINA territory infarct   CT head: evolving R MCA / SELINA infarction, global mass effect, MLS to L, early L lateral ventricle entrapment, hemorrhagic transformation suspected   CTP:  abnormal perfusion, mismatch volume 12ml   CTA:  acute occlusion R M2   CT head:  acute R MCA infarction, no ICH   CT abd:  large expanding intramuscular hematoma within R rectus abdomin  Other imagin/10 LE Doppler: NEG   CXR: clear   CXR small bilateral effusions    LE Doppler limited exam, NEG    MEDICATIONS: ASA 81 mg daily atorvastatin 80mg HS bisoprolol 5mg daily ceftriaxone 1g q24h SQH q8h levetiracetam 1g IV q12h  q12h MVI daily percocet miralax HS senna HS     IV FLUIDS: NS@50cc/hr  DRIPS:   DIET: pureed  Lines:   Drains:    Wounds:    CODE STATUS:  Full Code                       GOALS OF CARE:  aggressive                      DISPOSITION:  ICU / Stepdown

## 2018-06-11 NOTE — PROGRESS NOTE ADULT - SUBJECTIVE AND OBJECTIVE BOX
TRANSFER NOTE       · Subjective and Objective: 	  S/Overnight events:  No acute events overnight.   Hospital Course:   POD#1: emergent crani yesterday, transferred back to [facility], sedated overnight, rpt CTH with hemorrhagic conversion o/w stable. stable exam overnight. Drains remains to suction. no acute events overnight  POD#2: No seizure activity on EEG. Following commands on right side. Continue DREW x2 and Hemovac x1. Low grade fevers.   POD#3: Tolerating CPAP. Self extubated. Hemovac removed.   POD #4: Re-intubated yesterday, precedex resumed, no acute events overnight  POD#5: Pancultured for fevers. Tolerating CPAP trials. Continues to follow commands on right side. Bedside PT.  POD #6: Extubated without any issues, doing well and suctioning herself. Continues to follow commands on the right side and speaking 3-4 words very slowly. Bedside PT. Continued to be on 3% at 15 overnight. CTH done yesterday and was stable   POD #7: Overnight pulled NGT twice, replaced and confirmed. Patient did not need to suction herself overnight. Continues to follow commands on the right side and speaking more words than yesterday. Portable CT head today ASA f/u. Central line will be removed today. Neuro checks and vital signs now checked q 4h. Goal SBP normotensive 100-160. Passed S&S eval  POD#8: Failed re-eval of S&S, NGT replaced. EEG to r/o seizure.  POD#9: Better today, more awake.  No acute events overnight  POD#10: Afebrile overnight. Started ceftriaxone for UTI yesterday and continues flagyl for BV. Neuro stable, improving. Flap full, but soft.  POD #11: afebrile overnight. Neuro stable, flap soft.     Vital Signs Last 24 Hrs  T(C): 37 (2018 05:32), Max: 37.8 (10 Germain 2018 17:50)  T(F): 98.6 (2018 05:32), Max: 100.1 (10 Germain 2018 17:50)  HR: 80 (2018 03:00) (76 - 88)  BP: 158/74 (2018 03:00) (137/78 - 167/77)  BP(mean): 99 (2018 03:00) (91 - 119)  RR: 22 (2018 03:00) (12 - 22)  SpO2: 97% (2018 03:00) (95% - 98%)    I&O's Detail    2018 07:01  -  10 Germain 2018 07:00  --------------------------------------------------------  IN:    IV PiggyBack: 600 mL    sodium chloride 0.9%: 500 mL    sodium chloride 0.9%.: 350 mL  Total IN: 1450 mL    OUT:    Intermittent Catheterization - Urethral: 2 mL  Total OUT: 2 mL    Total NET: 1448 mL      10 Germain 2018 07:  -  2018 06:09  --------------------------------------------------------  IN:    IV PiggyBack: 300 mL    sodium chloride 0.9%.: 1050 mL  Total IN: 1350 mL    OUT:    Voided: 3 mL  Total OUT: 3 mL    Total NET: 1347 mL        I&O's Summary    2018 07:01  -  10 Germain 2018 07:00  --------------------------------------------------------  IN: 1450 mL / OUT: 2 mL / NET: 1448 mL    10 Germain 2018 07:  -  2018 06:09  --------------------------------------------------------  IN: 1350 mL / OUT: 3 mL / NET: 1347 mL        PHYSICAL EXAM:  AAOx3, NAD, FC, Opens eyes spontaneously, PERRL;   Verbal functions intact .  L facial droop,   LLE 4/5, LUE plegic, RLE 5/5, RUE 5/5; LUE trace withdrawal   Incision/Wound: c/d/i, staples in place.     DEVICE/DRAIN DRESSING:    TUBES/LINES:  [] CVC  [] A-line  [] Lumbar Drain  [] Ventriculostomy  [] Other    DIET:  [] NPO  [x] Mechanical  [] Tube feeds    LABS:                        10.4   13.6  )-----------( 635      ( 2018 04:28 )             31.2     06-11    140  |  104  |  8   ----------------------------<  120<H>  2.8<LL>   |  23  |  0.43<L>    Ca    9.1      2018 04:28  Phos  2.8     06-11  Mg     2.1     06-11    CAPILLARY BLOOD GLUCOSE    Drug Levels: [] N/A    CSF Analysis: [] N/A      Allergie    No Known Allergies    Intolerances      MEDICATIONS:  Antibiotics:  cefTRIAXone Injectable. 1000 milliGRAM(s) IV Push every 24 hours  metroNIDAZOLE  IVPB 500 milliGRAM(s) IV Intermittent every 12 hours    Neuro:  acetaminophen   Tablet 650 milliGRAM(s) Oral every 6 hours PRN  acetaminophen   Tablet. 650 milliGRAM(s) Oral every 6 hours PRN  levETIRAcetam  IVPB 1000 milliGRAM(s) IV Intermittent every 12 hours  ondansetron Injectable 4 milliGRAM(s) IV Push every 6 hours PRN  oxyCODONE    5 mG/acetaminophen 325 mG 1 Tablet(s) Oral every 6 hours PRN    Anticoagulation:  aspirin  chewable 81 milliGRAM(s) Oral daily  heparin  Injectable 5000 Unit(s) SubCutaneous every 8 hours    OTHER:  atorvastatin 80 milliGRAM(s) Oral at bedtime  bisoprolol   Tablet 5 milliGRAM(s) Oral daily  dextrose 40% Gel 15 Gram(s) Oral once PRN  dextrose 50% Injectable 12.5 Gram(s) IV Push once  dextrose 50% Injectable 25 Gram(s) IV Push once  dextrose 50% Injectable 25 Gram(s) IV Push once  docusate sodium Liquid 100 milliGRAM(s) Oral two times a day  glucagon  Injectable 1 milliGRAM(s) IntraMuscular once PRN  hydrALAZINE Injectable 10 milliGRAM(s) IV Push every 4 hours PRN  labetalol Injectable 10 milliGRAM(s) IV Push every 4 hours PRN  polyethylene glycol 3350 17 Gram(s) Oral at bedtime  senna Syrup 10 milliLiter(s) Oral at bedtime    IVF:  dextrose 5%. 1000 milliLiter(s) IV Continuous <Continuous>  multivitamin 1 Tablet(s) Oral daily  potassium chloride  20 mEq/100 mL IVPB 20 milliEquivalent(s) IV Intermittent every 1 hour  sodium chloride 0.9%. 1000 milliLiter(s) IV Continuous <Continuous>    CULTURES:  Culture Results:   >100,000 CFU/ml Gram Negative Rods  Identification and susceptibility to follow. ( @ 08:47)  Culture Results:   Normal vaginal prisca ( @ 16:37)    RADIOLOGY & ADDITIONAL TESTS:      ASSESSMENT:  58y Female w/ HTN, HLD, Anxiety, Depression, h/o Breast Cancer w/ TRAM flap reconstruction admitted for expanding right rectus hematoma complicated by right MCA infarction now s/p hemicraniectomy POD #11    ABDOMINAL HEMATOMA  Handoff  MEWS Score  Anxiety  Hypertension  Breast CA  Stroke  Stroke (cerebrum)  Abdominal hematoma  Craniectomy  Central venous catheter insertion  Previous  section  History of mastectomy, left  ABDOMINAL PAIN      PLAN:  NEURO:  - neurocheck q 4  - vitals q 4   - pain control with Tylenol   - Transfer to [facility] today   - dc staples before patient is discharged.   - continue Keppra 1 g BID, hx of seizure  CARDIOVASCULAR:  - Continue Asp 81 mg   - SBP < 140   PULMONARY:  - satting well in RA   - continue suctioning   - chest pt prn   - sputum culture negative   RENAL:  - K+ of 2..8, repeat BMP at 10 am   - replete prn   - IVF @ 75  - incontinent   - on Ceftriaxone for UTI, S&S pending will deescalate once avilable   GI:  - NPO for now   - S&S recs- FEES high aspiration risk   - abdominal CT at some point per gen surg request   HEME:  - H&H stable   ID:  - CT abd eventually if down at CT, non-urgent   - ceftx for + UCx  end date   - Flagyl for BV  end date    ENDO:  - BS stable   - goal 140-180   DVT PROPHYLAXIS: Doppler negative. SQH   [] Venodynes                                [x] Heparin    FALL RISK:  [] Low Risk                                    [] Impulsive    DISPOSITION: d/w Dr. Johnson.

## 2018-06-11 NOTE — SWALLOW BEDSIDE ASSESSMENT ADULT - SWALLOW EVAL: DIAGNOSIS
Suspect at least a mild pharyngeal dysphagia AEB +delayed cough with PO trials of puree and NTL. Pt would benefit from FEES to determine safety of oral intake.
Suspected pharyngeal dysphagia compounded by respiratory deconditioning

## 2018-06-11 NOTE — SWALLOW BEDSIDE ASSESSMENT ADULT - NS SPL SWALLOW CLINIC TRIAL FT
Hyolaryngeal elevation palpated during pharyngeal swallow trigger. Suspect delay vs mistimed airway protection.

## 2018-06-11 NOTE — SWALLOW FEES ASSESSMENT ADULT - SLP PERTINENT HISTORY OF CURRENT PROBLEM
Seen for clinical swallow eval & f/u on 6/7 and 6/8. Had started oral diet of m. soft/NTL on 6/7 then made NPO on 6/8 due to +signs of aspiration with PO intake.

## 2018-06-11 NOTE — PROGRESS NOTE ADULT - SUBJECTIVE AND OBJECTIVE BOX
S/Overnight events:      Hospital Course:   POD#   POD#1: emergent crani yesterday, transferred back to [facility], sedated overnight, rpt CTH with hemorrhagic conversion o/w stable. stable exam overnight. Drains remains to suction. no acute events overnight  POD#2: No seizure activity on EEG. Following commands on right side. Continue DREW x2 and Hemovac x1. Low grade fevers.   POD#3: Tolerating CPAP. Self extubated. Hemovac removed.   POD #4: Re-intubated yesterday, precedex resumed, no acute events overnight  POD#5: Pancultured for fevers. Tolerating CPAP trials. Continues to follow commands on right side. Bedside PT.  POD #6: Extubated without any issues, doing well and suctioning herself. Continues to follow commands on the right side and speaking 3-4 words very slowly. Bedside PT. Continued to be on 3% at 15 overnight. CTH done yesterday and was stable   POD #7: Overnight pulled NGT twice, replaced and confirmed. Patient did not need to suction herself overnight. Continues to follow commands on the right side and speaking more words than yesterday. Portable CT head today ASA f/u. Central line will be removed today. Neuro checks and vital signs now checked q 4h. Goal SBP normotensive 100-160. Passed S&S eval  POD#8: Failed re-eval of S&S, NGT replaced. EEG to r/o seizure.  POD#9: Better today, more awake.  No acute events overnight  POD#10: Afebrile overnight. Started ceftriaxone for UTI yesterday and continues flagyl for BV. Neuro stable, improving. Flap full, but soft.    Vital Signs Last 24 Hrs  T(C): 37 (2018 05:32), Max: 37.8 (10 Germain 2018 17:50)  T(F): 98.6 (2018 05:32), Max: 100.1 (10 Germain 2018 17:50)  HR: 80 (2018 03:00) (76 - 88)  BP: 158/74 (2018 03:00) (137/78 - 167/77)  BP(mean): 99 (2018 03:00) (91 - 119)  RR: 22 (2018 03:00) (12 - 22)  SpO2: 97% (2018 03:00) (95% - 98%)    I&O's Detail    2018 07:01  -  10 Germain 2018 07:00  --------------------------------------------------------  IN:    IV PiggyBack: 600 mL    sodium chloride 0.9%: 500 mL    sodium chloride 0.9%.: 350 mL  Total IN: 1450 mL    OUT:    Intermittent Catheterization - Urethral: 2 mL  Total OUT: 2 mL    Total NET: 1448 mL      10 Germain 2018 07:  -  2018 06:09  --------------------------------------------------------  IN:    IV PiggyBack: 300 mL    sodium chloride 0.9%.: 1050 mL  Total IN: 1350 mL    OUT:    Voided: 3 mL  Total OUT: 3 mL    Total NET: 1347 mL        I&O's Summary    2018 07:01  -  10 Germain 2018 07:00  --------------------------------------------------------  IN: 1450 mL / OUT: 2 mL / NET: 1448 mL    10 Germain 2018 07:  -  2018 06:09  --------------------------------------------------------  IN: 1350 mL / OUT: 3 mL / NET: 1347 mL        PHYSICAL EXAM:  L facial  LLE 3/5, trace withdrawal LUE  Incision/Wound:    DEVICE/DRAIN DRESSING:    TUBES/LINES:  [] CVC  [] A-line  [] Lumbar Drain  [] Ventriculostomy  [] Other    DIET:  [] NPO  [] Mechanical  [] Tube feeds    LABS:                        10.4   13.6  )-----------( 635      ( 2018 04:28 )             31.2     06-11    140  |  104  |  8   ----------------------------<  120<H>  2.8<LL>   |  23  |  0.43<L>    Ca    9.1      2018 04:28  Phos  2.8     06-11  Mg     2.1     06-11              CAPILLARY BLOOD GLUCOSE      Drug Levels: [] N/A    CSF Analysis: [] N/A      Allergies    No Known Allergies    Intolerances      MEDICATIONS:  Antibiotics:  cefTRIAXone Injectable. 1000 milliGRAM(s) IV Push every 24 hours  metroNIDAZOLE  IVPB 500 milliGRAM(s) IV Intermittent every 12 hours    Neuro:  acetaminophen   Tablet 650 milliGRAM(s) Oral every 6 hours PRN  acetaminophen   Tablet. 650 milliGRAM(s) Oral every 6 hours PRN  levETIRAcetam  IVPB 1000 milliGRAM(s) IV Intermittent every 12 hours  ondansetron Injectable 4 milliGRAM(s) IV Push every 6 hours PRN  oxyCODONE    5 mG/acetaminophen 325 mG 1 Tablet(s) Oral every 6 hours PRN    Anticoagulation:  aspirin  chewable 81 milliGRAM(s) Oral daily  heparin  Injectable 5000 Unit(s) SubCutaneous every 8 hours    OTHER:  atorvastatin 80 milliGRAM(s) Oral at bedtime  bisoprolol   Tablet 5 milliGRAM(s) Oral daily  dextrose 40% Gel 15 Gram(s) Oral once PRN  dextrose 50% Injectable 12.5 Gram(s) IV Push once  dextrose 50% Injectable 25 Gram(s) IV Push once  dextrose 50% Injectable 25 Gram(s) IV Push once  docusate sodium Liquid 100 milliGRAM(s) Oral two times a day  glucagon  Injectable 1 milliGRAM(s) IntraMuscular once PRN  hydrALAZINE Injectable 10 milliGRAM(s) IV Push every 4 hours PRN  labetalol Injectable 10 milliGRAM(s) IV Push every 4 hours PRN  polyethylene glycol 3350 17 Gram(s) Oral at bedtime  senna Syrup 10 milliLiter(s) Oral at bedtime    IVF:  dextrose 5%. 1000 milliLiter(s) IV Continuous <Continuous>  multivitamin 1 Tablet(s) Oral daily  potassium chloride  20 mEq/100 mL IVPB 20 milliEquivalent(s) IV Intermittent every 1 hour  sodium chloride 0.9%. 1000 milliLiter(s) IV Continuous <Continuous>    CULTURES:  Culture Results:   >100,000 CFU/ml Gram Negative Rods  Identification and susceptibility to follow. ( @ 08:47)  Culture Results:   Normal vaginal prisca ( @ 16:37)    RADIOLOGY & ADDITIONAL TESTS:      ASSESSMENT:  58y Female w/ HTN, HLD, Anxiety, Depression, h/o Breast Cancer w/ TRAM flap reconstruction admitted for expanding right rectus hematoma complicated by right MCA infarction now s/p hemicraniectomy POD #11    ABDOMINAL HEMATOMA  Handoff  MEWS Score  Anxiety  Hypertension  Breast CA  Stroke  Stroke (cerebrum)  Abdominal hematoma  Craniectomy  Central venous catheter insertion  Previous  section  History of mastectomy, left  ABDOMINAL PAIN      PLAN:  NEURO:    CARDIOVASCULAR:    PULMONARY:    RENAL:    GI:    HEME:    ID:    ENDO:    DVT PROPHYLAXIS:  [] Venodynes                                [] Heparin/Lovenox    FALL RISK:  [] Low Risk                                    [] Impulsive    DISPOSITION: S/Overnight events:  No acute events overnight.   Hospital Course:   POD#1: emergent crani yesterday, transferred back to [facility], sedated overnight, rpt CT with hemorrhagic conversion o/w stable. stable exam overnight. Drains remains to suction. no acute events overnight  POD#2: No seizure activity on EEG. Following commands on right side. Continue DREW x2 and Hemovac x1. Low grade fevers.   POD#3: Tolerating CPAP. Self extubated. Hemovac removed.   POD #4: Re-intubated yesterday, precedex resumed, no acute events overnight  POD#5: Pancultured for fevers. Tolerating CPAP trials. Continues to follow commands on right side. Bedside PT.  POD #6: Extubated without any issues, doing well and suctioning herself. Continues to follow commands on the right side and speaking 3-4 words very slowly. Bedside PT. Continued to be on 3% at 15 overnight. CTH done yesterday and was stable   POD #7: Overnight pulled NGT twice, replaced and confirmed. Patient did not need to suction herself overnight. Continues to follow commands on the right side and speaking more words than yesterday. Portable CT head today ASA f/u. Central line will be removed today. Neuro checks and vital signs now checked q 4h. Goal SBP normotensive 100-160. Passed S&S eval  POD#8: Failed re-eval of S&S, NGT replaced. EEG to r/o seizure.  POD#9: Better today, more awake.  No acute events overnight  POD#10: Afebrile overnight. Started ceftriaxone for UTI yesterday and continues flagyl for BV. Neuro stable, improving. Flap full, but soft.  POD #11: afebrile overnight. Neuro stable, flap soft.     Vital Signs Last 24 Hrs  T(C): 37 (2018 05:32), Max: 37.8 (10 Germain 2018 17:50)  T(F): 98.6 (2018 05:32), Max: 100.1 (10 Germain 2018 17:50)  HR: 80 (2018 03:00) (76 - 88)  BP: 158/74 (2018 03:00) (137/78 - 167/77)  BP(mean): 99 (2018 03:00) (91 - 119)  RR: 22 (2018 03:00) (12 - 22)  SpO2: 97% (2018 03:00) (95% - 98%)    I&O's Detail    2018 07:01  -  10 Germain 2018 07:00  --------------------------------------------------------  IN:    IV PiggyBack: 600 mL    sodium chloride 0.9%: 500 mL    sodium chloride 0.9%.: 350 mL  Total IN: 1450 mL    OUT:    Intermittent Catheterization - Urethral: 2 mL  Total OUT: 2 mL    Total NET: 1448 mL      10 Germain 2018 07:  -  2018 06:09  --------------------------------------------------------  IN:    IV PiggyBack: 300 mL    sodium chloride 0.9%.: 1050 mL  Total IN: 1350 mL    OUT:    Voided: 3 mL  Total OUT: 3 mL    Total NET: 1347 mL        I&O's Summary    2018 07:01  -  10 Germain 2018 07:00  --------------------------------------------------------  IN: 1450 mL / OUT: 2 mL / NET: 1448 mL    10 Germain 2018 07:  -  2018 06:09  --------------------------------------------------------  IN: 1350 mL / OUT: 3 mL / NET: 1347 mL        PHYSICAL EXAM:  AAOx3, NAD, FC, Opens eyes spontaneously, PERRL;   Verbal functions intact .  L facial droop,   LLE 4/5, LUE plegic, RLE 5/5, RUE 5/5   Incision/Wound: c/d/i, staples in place.     DEVICE/DRAIN DRESSING:    TUBES/LINES:  [] CVC  [] A-line  [] Lumbar Drain  [] Ventriculostomy  [] Other    DIET:  [] NPO  [x] Mechanical  [] Tube feeds    LABS:                        10.4   13.6  )-----------( 635      ( 2018 04:28 )             31.2     06-11    140  |  104  |  8   ----------------------------<  120<H>  2.8<LL>   |  23  |  0.43<L>    Ca    9.1      2018 04:28  Phos  2.8     06-11  Mg     2.1     06-11    CAPILLARY BLOOD GLUCOSE    Drug Levels: [] N/A    CSF Analysis: [] N/A      Allergie    No Known Allergies    Intolerances      MEDICATIONS:  Antibiotics:  cefTRIAXone Injectable. 1000 milliGRAM(s) IV Push every 24 hours  metroNIDAZOLE  IVPB 500 milliGRAM(s) IV Intermittent every 12 hours    Neuro:  acetaminophen   Tablet 650 milliGRAM(s) Oral every 6 hours PRN  acetaminophen   Tablet. 650 milliGRAM(s) Oral every 6 hours PRN  levETIRAcetam  IVPB 1000 milliGRAM(s) IV Intermittent every 12 hours  ondansetron Injectable 4 milliGRAM(s) IV Push every 6 hours PRN  oxyCODONE    5 mG/acetaminophen 325 mG 1 Tablet(s) Oral every 6 hours PRN    Anticoagulation:  aspirin  chewable 81 milliGRAM(s) Oral daily  heparin  Injectable 5000 Unit(s) SubCutaneous every 8 hours    OTHER:  atorvastatin 80 milliGRAM(s) Oral at bedtime  bisoprolol   Tablet 5 milliGRAM(s) Oral daily  dextrose 40% Gel 15 Gram(s) Oral once PRN  dextrose 50% Injectable 12.5 Gram(s) IV Push once  dextrose 50% Injectable 25 Gram(s) IV Push once  dextrose 50% Injectable 25 Gram(s) IV Push once  docusate sodium Liquid 100 milliGRAM(s) Oral two times a day  glucagon  Injectable 1 milliGRAM(s) IntraMuscular once PRN  hydrALAZINE Injectable 10 milliGRAM(s) IV Push every 4 hours PRN  labetalol Injectable 10 milliGRAM(s) IV Push every 4 hours PRN  polyethylene glycol 3350 17 Gram(s) Oral at bedtime  senna Syrup 10 milliLiter(s) Oral at bedtime    IVF:  dextrose 5%. 1000 milliLiter(s) IV Continuous <Continuous>  multivitamin 1 Tablet(s) Oral daily  potassium chloride  20 mEq/100 mL IVPB 20 milliEquivalent(s) IV Intermittent every 1 hour  sodium chloride 0.9%. 1000 milliLiter(s) IV Continuous <Continuous>    CULTURES:  Culture Results:   >100,000 CFU/ml Gram Negative Rods  Identification and susceptibility to follow. ( @ 08:47)  Culture Results:   Normal vaginal prisca ( @ 16:37)    RADIOLOGY & ADDITIONAL TESTS:      ASSESSMENT:  58y Female w/ HTN, HLD, Anxiety, Depression, h/o Breast Cancer w/ TRAM flap reconstruction admitted for expanding right rectus hematoma complicated by right MCA infarction now s/p hemicraniectomy POD #11    ABDOMINAL HEMATOMA  Handoff  MEWS Score  Anxiety  Hypertension  Breast CA  Stroke  Stroke (cerebrum)  Abdominal hematoma  Craniectomy  Central venous catheter insertion  Previous  section  History of mastectomy, left  ABDOMINAL PAIN      PLAN:  NEURO:  - neurocheck q 4  - vitals q 4   - pain control with Tylenol   - Transfer to [facility] today   CARDIOVASCULAR:  - Continue Asp 81 mg   - SBP < 140   PULMONARY:  - satting well in RA   - continue suctioning   - chest pt prn   - sputum culture negative   RENAL:  - K+ of 2..8, repeat BMP at 10 am   - replete prn   - IVL   - incontinent   - on Ceftriaxone for UTI, S&S pending will deescalate once avilable   GI:  - continue soft diet   - calorie count   HEME:  - H&H stable   ID:  - CT abd eventually if down at CT, non-urgent   ENDO:  - BS stable   - goal 140-180   DVT PROPHYLAXIS: Doppler negative. SQH   [] Venodynes                                [x] Heparin    FALL RISK:  [] Low Risk                                    [] Impulsive    DISPOSITION: d/w Dr. Johnson. S/Overnight events:  No acute events overnight.   Hospital Course:   POD#1: emergent crani yesterday, transferred back to [facility], sedated overnight, rpt CT with hemorrhagic conversion o/w stable. stable exam overnight. Drains remains to suction. no acute events overnight  POD#2: No seizure activity on EEG. Following commands on right side. Continue DREW x2 and Hemovac x1. Low grade fevers.   POD#3: Tolerating CPAP. Self extubated. Hemovac removed.   POD #4: Re-intubated yesterday, precedex resumed, no acute events overnight  POD#5: Pancultured for fevers. Tolerating CPAP trials. Continues to follow commands on right side. Bedside PT.  POD #6: Extubated without any issues, doing well and suctioning herself. Continues to follow commands on the right side and speaking 3-4 words very slowly. Bedside PT. Continued to be on 3% at 15 overnight. CTH done yesterday and was stable   POD #7: Overnight pulled NGT twice, replaced and confirmed. Patient did not need to suction herself overnight. Continues to follow commands on the right side and speaking more words than yesterday. Portable CT head today ASA f/u. Central line will be removed today. Neuro checks and vital signs now checked q 4h. Goal SBP normotensive 100-160. Passed S&S eval  POD#8: Failed re-eval of S&S, NGT replaced. EEG to r/o seizure.  POD#9: Better today, more awake.  No acute events overnight  POD#10: Afebrile overnight. Started ceftriaxone for UTI yesterday and continues flagyl for BV. Neuro stable, improving. Flap full, but soft.  POD #11: afebrile overnight. Neuro stable, flap soft.     Vital Signs Last 24 Hrs  T(C): 37 (2018 05:32), Max: 37.8 (10 Germain 2018 17:50)  T(F): 98.6 (2018 05:32), Max: 100.1 (10 Germain 2018 17:50)  HR: 80 (2018 03:00) (76 - 88)  BP: 158/74 (2018 03:00) (137/78 - 167/77)  BP(mean): 99 (2018 03:00) (91 - 119)  RR: 22 (2018 03:00) (12 - 22)  SpO2: 97% (2018 03:00) (95% - 98%)    I&O's Detail    2018 07:01  -  10 Germain 2018 07:00  --------------------------------------------------------  IN:    IV PiggyBack: 600 mL    sodium chloride 0.9%: 500 mL    sodium chloride 0.9%.: 350 mL  Total IN: 1450 mL    OUT:    Intermittent Catheterization - Urethral: 2 mL  Total OUT: 2 mL    Total NET: 1448 mL      10 Germain 2018 07:  -  2018 06:09  --------------------------------------------------------  IN:    IV PiggyBack: 300 mL    sodium chloride 0.9%.: 1050 mL  Total IN: 1350 mL    OUT:    Voided: 3 mL  Total OUT: 3 mL    Total NET: 1347 mL        I&O's Summary    2018 07:01  -  10 Germain 2018 07:00  --------------------------------------------------------  IN: 1450 mL / OUT: 2 mL / NET: 1448 mL    10 Germain 2018 07:  -  2018 06:09  --------------------------------------------------------  IN: 1350 mL / OUT: 3 mL / NET: 1347 mL        PHYSICAL EXAM:  AAOx3, NAD, FC, Opens eyes spontaneously, PERRL;   Verbal functions intact .  L facial droop,   LLE 4/5, LUE plegic, RLE 5/5, RUE 5/5; LUE trace withdrawal   Incision/Wound: c/d/i, staples in place.     DEVICE/DRAIN DRESSING:    TUBES/LINES:  [] CVC  [] A-line  [] Lumbar Drain  [] Ventriculostomy  [] Other    DIET:  [] NPO  [x] Mechanical  [] Tube feeds    LABS:                        10.4   13.6  )-----------( 635      ( 2018 04:28 )             31.2     06-11    140  |  104  |  8   ----------------------------<  120<H>  2.8<LL>   |  23  |  0.43<L>    Ca    9.1      2018 04:28  Phos  2.8     06-11  Mg     2.1     06-11    CAPILLARY BLOOD GLUCOSE    Drug Levels: [] N/A    CSF Analysis: [] N/A      Allergie    No Known Allergies    Intolerances      MEDICATIONS:  Antibiotics:  cefTRIAXone Injectable. 1000 milliGRAM(s) IV Push every 24 hours  metroNIDAZOLE  IVPB 500 milliGRAM(s) IV Intermittent every 12 hours    Neuro:  acetaminophen   Tablet 650 milliGRAM(s) Oral every 6 hours PRN  acetaminophen   Tablet. 650 milliGRAM(s) Oral every 6 hours PRN  levETIRAcetam  IVPB 1000 milliGRAM(s) IV Intermittent every 12 hours  ondansetron Injectable 4 milliGRAM(s) IV Push every 6 hours PRN  oxyCODONE    5 mG/acetaminophen 325 mG 1 Tablet(s) Oral every 6 hours PRN    Anticoagulation:  aspirin  chewable 81 milliGRAM(s) Oral daily  heparin  Injectable 5000 Unit(s) SubCutaneous every 8 hours    OTHER:  atorvastatin 80 milliGRAM(s) Oral at bedtime  bisoprolol   Tablet 5 milliGRAM(s) Oral daily  dextrose 40% Gel 15 Gram(s) Oral once PRN  dextrose 50% Injectable 12.5 Gram(s) IV Push once  dextrose 50% Injectable 25 Gram(s) IV Push once  dextrose 50% Injectable 25 Gram(s) IV Push once  docusate sodium Liquid 100 milliGRAM(s) Oral two times a day  glucagon  Injectable 1 milliGRAM(s) IntraMuscular once PRN  hydrALAZINE Injectable 10 milliGRAM(s) IV Push every 4 hours PRN  labetalol Injectable 10 milliGRAM(s) IV Push every 4 hours PRN  polyethylene glycol 3350 17 Gram(s) Oral at bedtime  senna Syrup 10 milliLiter(s) Oral at bedtime    IVF:  dextrose 5%. 1000 milliLiter(s) IV Continuous <Continuous>  multivitamin 1 Tablet(s) Oral daily  potassium chloride  20 mEq/100 mL IVPB 20 milliEquivalent(s) IV Intermittent every 1 hour  sodium chloride 0.9%. 1000 milliLiter(s) IV Continuous <Continuous>    CULTURES:  Culture Results:   >100,000 CFU/ml Gram Negative Rods  Identification and susceptibility to follow. ( @ 08:47)  Culture Results:   Normal vaginal prisca ( @ 16:37)    RADIOLOGY & ADDITIONAL TESTS:      ASSESSMENT:  58y Female w/ HTN, HLD, Anxiety, Depression, h/o Breast Cancer w/ TRAM flap reconstruction admitted for expanding right rectus hematoma complicated by right MCA infarction now s/p hemicraniectomy POD #11    ABDOMINAL HEMATOMA  Handoff  MEWS Score  Anxiety  Hypertension  Breast CA  Stroke  Stroke (cerebrum)  Abdominal hematoma  Craniectomy  Central venous catheter insertion  Previous  section  History of mastectomy, left  ABDOMINAL PAIN      PLAN:  NEURO:  - neurocheck q 4  - vitals q 4   - pain control with Tylenol   - Transfer to [facility] today   CARDIOVASCULAR:  - Continue Asp 81 mg   - SBP < 140   PULMONARY:  - satting well in RA   - continue suctioning   - chest pt prn   - sputum culture negative   RENAL:  - K+ of 2..8, repeat BMP at 10 am   - replete prn   - IVL   - incontinent   - on Ceftriaxone for UTI, S&S pending will deescalate once avilable   GI:  - continue soft diet   - calorie count   HEME:  - H&H stable   ID:  - CT abd eventually if down at CT, non-urgent   ENDO:  - BS stable   - goal 140-180   DVT PROPHYLAXIS: Doppler negative. SQH   [] Venodynes                                [x] Heparin    FALL RISK:  [] Low Risk                                    [] Impulsive    DISPOSITION: d/w Dr. Johnson. S/Overnight events:  No acute events overnight.   Hospital Course:   POD#1: emergent crani yesterday, transferred back to [facility], sedated overnight, rpt CT with hemorrhagic conversion o/w stable. stable exam overnight. Drains remains to suction. no acute events overnight  POD#2: No seizure activity on EEG. Following commands on right side. Continue DREW x2 and Hemovac x1. Low grade fevers.   POD#3: Tolerating CPAP. Self extubated. Hemovac removed.   POD #4: Re-intubated yesterday, precedex resumed, no acute events overnight  POD#5: Pancultured for fevers. Tolerating CPAP trials. Continues to follow commands on right side. Bedside PT.  POD #6: Extubated without any issues, doing well and suctioning herself. Continues to follow commands on the right side and speaking 3-4 words very slowly. Bedside PT. Continued to be on 3% at 15 overnight. CTH done yesterday and was stable   POD #7: Overnight pulled NGT twice, replaced and confirmed. Patient did not need to suction herself overnight. Continues to follow commands on the right side and speaking more words than yesterday. Portable CT head today ASA f/u. Central line will be removed today. Neuro checks and vital signs now checked q 4h. Goal SBP normotensive 100-160. Passed S&S eval  POD#8: Failed re-eval of S&S, NGT replaced. EEG to r/o seizure.  POD#9: Better today, more awake.  No acute events overnight  POD#10: Afebrile overnight. Started ceftriaxone for UTI yesterday and continues flagyl for BV. Neuro stable, improving. Flap full, but soft.  POD #11: afebrile overnight. Neuro stable, flap soft.     Vital Signs Last 24 Hrs  T(C): 37 (2018 05:32), Max: 37.8 (10 Germain 2018 17:50)  T(F): 98.6 (2018 05:32), Max: 100.1 (10 Germain 2018 17:50)  HR: 80 (2018 03:00) (76 - 88)  BP: 158/74 (2018 03:00) (137/78 - 167/77)  BP(mean): 99 (2018 03:00) (91 - 119)  RR: 22 (2018 03:00) (12 - 22)  SpO2: 97% (2018 03:00) (95% - 98%)    I&O's Detail    2018 07:01  -  10 Germain 2018 07:00  --------------------------------------------------------  IN:    IV PiggyBack: 600 mL    sodium chloride 0.9%: 500 mL    sodium chloride 0.9%.: 350 mL  Total IN: 1450 mL    OUT:    Intermittent Catheterization - Urethral: 2 mL  Total OUT: 2 mL    Total NET: 1448 mL      10 Germain 2018 07:  -  2018 06:09  --------------------------------------------------------  IN:    IV PiggyBack: 300 mL    sodium chloride 0.9%.: 1050 mL  Total IN: 1350 mL    OUT:    Voided: 3 mL  Total OUT: 3 mL    Total NET: 1347 mL        I&O's Summary    2018 07:01  -  10 Germain 2018 07:00  --------------------------------------------------------  IN: 1450 mL / OUT: 2 mL / NET: 1448 mL    10 Germain 2018 07:  -  2018 06:09  --------------------------------------------------------  IN: 1350 mL / OUT: 3 mL / NET: 1347 mL        PHYSICAL EXAM:  AAOx3, NAD, FC, Opens eyes spontaneously, PERRL;   Verbal functions intact .  L facial droop,   LLE 4/5, LUE plegic, RLE 5/5, RUE 5/5; LUE trace withdrawal   Incision/Wound: c/d/i, staples in place.     DEVICE/DRAIN DRESSING:    TUBES/LINES:  [] CVC  [] A-line  [] Lumbar Drain  [] Ventriculostomy  [] Other    DIET:  [] NPO  [x] Mechanical  [] Tube feeds    LABS:                        10.4   13.6  )-----------( 635      ( 2018 04:28 )             31.2     06-11    140  |  104  |  8   ----------------------------<  120<H>  2.8<LL>   |  23  |  0.43<L>    Ca    9.1      2018 04:28  Phos  2.8     06-11  Mg     2.1     06-11    CAPILLARY BLOOD GLUCOSE    Drug Levels: [] N/A    CSF Analysis: [] N/A      Allergie    No Known Allergies    Intolerances      MEDICATIONS:  Antibiotics:  cefTRIAXone Injectable. 1000 milliGRAM(s) IV Push every 24 hours  metroNIDAZOLE  IVPB 500 milliGRAM(s) IV Intermittent every 12 hours    Neuro:  acetaminophen   Tablet 650 milliGRAM(s) Oral every 6 hours PRN  acetaminophen   Tablet. 650 milliGRAM(s) Oral every 6 hours PRN  levETIRAcetam  IVPB 1000 milliGRAM(s) IV Intermittent every 12 hours  ondansetron Injectable 4 milliGRAM(s) IV Push every 6 hours PRN  oxyCODONE    5 mG/acetaminophen 325 mG 1 Tablet(s) Oral every 6 hours PRN    Anticoagulation:  aspirin  chewable 81 milliGRAM(s) Oral daily  heparin  Injectable 5000 Unit(s) SubCutaneous every 8 hours    OTHER:  atorvastatin 80 milliGRAM(s) Oral at bedtime  bisoprolol   Tablet 5 milliGRAM(s) Oral daily  dextrose 40% Gel 15 Gram(s) Oral once PRN  dextrose 50% Injectable 12.5 Gram(s) IV Push once  dextrose 50% Injectable 25 Gram(s) IV Push once  dextrose 50% Injectable 25 Gram(s) IV Push once  docusate sodium Liquid 100 milliGRAM(s) Oral two times a day  glucagon  Injectable 1 milliGRAM(s) IntraMuscular once PRN  hydrALAZINE Injectable 10 milliGRAM(s) IV Push every 4 hours PRN  labetalol Injectable 10 milliGRAM(s) IV Push every 4 hours PRN  polyethylene glycol 3350 17 Gram(s) Oral at bedtime  senna Syrup 10 milliLiter(s) Oral at bedtime    IVF:  dextrose 5%. 1000 milliLiter(s) IV Continuous <Continuous>  multivitamin 1 Tablet(s) Oral daily  potassium chloride  20 mEq/100 mL IVPB 20 milliEquivalent(s) IV Intermittent every 1 hour  sodium chloride 0.9%. 1000 milliLiter(s) IV Continuous <Continuous>    CULTURES:  Culture Results:   >100,000 CFU/ml Gram Negative Rods  Identification and susceptibility to follow. ( @ 08:47)  Culture Results:   Normal vaginal prisca ( @ 16:37)    RADIOLOGY & ADDITIONAL TESTS:      ASSESSMENT:  58y Female w/ HTN, HLD, Anxiety, Depression, h/o Breast Cancer w/ TRAM flap reconstruction admitted for expanding right rectus hematoma complicated by right MCA infarction now s/p hemicraniectomy POD #11    ABDOMINAL HEMATOMA  Handoff  MEWS Score  Anxiety  Hypertension  Breast CA  Stroke  Stroke (cerebrum)  Abdominal hematoma  Craniectomy  Central venous catheter insertion  Previous  section  History of mastectomy, left  ABDOMINAL PAIN      PLAN:  NEURO:  - neurocheck q 4  - vitals q 4   - pain control with Tylenol   - Transfer to [facility] service today   CARDIOVASCULAR:  - Continue Asp 81 mg   - SBP < 140   PULMONARY:  - satting well in RA   - continue suctioning   - chest pt prn   - sputum culture negative   RENAL:  - K+ of 2..8, repeat BMP at 10 am   - replete prn   - IVL   - incontinent   - on Ceftriaxone for UTI, S&S pending will deescalate once avilable   GI:  - continue soft diet   - calorie count   - S&S recs- FEES high aspiration risk   HEME:  - H&H stable   ID:  - CT abd eventually if down at CT, non-urgent   ENDO:  - BS stable   - goal 140-180   DVT PROPHYLAXIS: Doppler negative. SQH   [] Venodynes                                [x] Heparin    FALL RISK:  [] Low Risk                                    [] Impulsive    DISPOSITION: d/w Dr. Johnson. S/Overnight events:  No acute events overnight.   Hospital Course:   POD#1: emergent crani yesterday, transferred back to [facility], sedated overnight, rpt CT with hemorrhagic conversion o/w stable. stable exam overnight. Drains remains to suction. no acute events overnight  POD#2: No seizure activity on EEG. Following commands on right side. Continue DREW x2 and Hemovac x1. Low grade fevers.   POD#3: Tolerating CPAP. Self extubated. Hemovac removed.   POD #4: Re-intubated yesterday, precedex resumed, no acute events overnight  POD#5: Pancultured for fevers. Tolerating CPAP trials. Continues to follow commands on right side. Bedside PT.  POD #6: Extubated without any issues, doing well and suctioning herself. Continues to follow commands on the right side and speaking 3-4 words very slowly. Bedside PT. Continued to be on 3% at 15 overnight. CTH done yesterday and was stable   POD #7: Overnight pulled NGT twice, replaced and confirmed. Patient did not need to suction herself overnight. Continues to follow commands on the right side and speaking more words than yesterday. Portable CT head today ASA f/u. Central line will be removed today. Neuro checks and vital signs now checked q 4h. Goal SBP normotensive 100-160. Passed S&S eval  POD#8: Failed re-eval of S&S, NGT replaced. EEG to r/o seizure.  POD#9: Better today, more awake.  No acute events overnight  POD#10: Afebrile overnight. Started ceftriaxone for UTI yesterday and continues flagyl for BV. Neuro stable, improving. Flap full, but soft.  POD #11: afebrile overnight. Neuro stable, flap soft.     Vital Signs Last 24 Hrs  T(C): 37 (2018 05:32), Max: 37.8 (10 Germain 2018 17:50)  T(F): 98.6 (2018 05:32), Max: 100.1 (10 Germain 2018 17:50)  HR: 80 (2018 03:00) (76 - 88)  BP: 158/74 (2018 03:00) (137/78 - 167/77)  BP(mean): 99 (2018 03:00) (91 - 119)  RR: 22 (2018 03:00) (12 - 22)  SpO2: 97% (2018 03:00) (95% - 98%)    I&O's Detail    2018 07:01  -  10 Germain 2018 07:00  --------------------------------------------------------  IN:    IV PiggyBack: 600 mL    sodium chloride 0.9%: 500 mL    sodium chloride 0.9%.: 350 mL  Total IN: 1450 mL    OUT:    Intermittent Catheterization - Urethral: 2 mL  Total OUT: 2 mL    Total NET: 1448 mL      10 Germain 2018 07:  -  2018 06:09  --------------------------------------------------------  IN:    IV PiggyBack: 300 mL    sodium chloride 0.9%.: 1050 mL  Total IN: 1350 mL    OUT:    Voided: 3 mL  Total OUT: 3 mL    Total NET: 1347 mL        I&O's Summary    2018 07:01  -  10 Germain 2018 07:00  --------------------------------------------------------  IN: 1450 mL / OUT: 2 mL / NET: 1448 mL    10 Germain 2018 07:  -  2018 06:09  --------------------------------------------------------  IN: 1350 mL / OUT: 3 mL / NET: 1347 mL        PHYSICAL EXAM:  AAOx3, NAD, FC, Opens eyes spontaneously, PERRL;   Verbal functions intact .  L facial droop,   LLE 4/5, LUE plegic, RLE 5/5, RUE 5/5; LUE trace withdrawal   Incision/Wound: c/d/i, staples in place.     DEVICE/DRAIN DRESSING:    TUBES/LINES:  [] CVC  [] A-line  [] Lumbar Drain  [] Ventriculostomy  [] Other    DIET:  [] NPO  [x] Mechanical  [] Tube feeds    LABS:                        10.4   13.6  )-----------( 635      ( 2018 04:28 )             31.2     06-11    140  |  104  |  8   ----------------------------<  120<H>  2.8<LL>   |  23  |  0.43<L>    Ca    9.1      2018 04:28  Phos  2.8     06-11  Mg     2.1     06-11    CAPILLARY BLOOD GLUCOSE    Drug Levels: [] N/A    CSF Analysis: [] N/A      Allergie    No Known Allergies    Intolerances      MEDICATIONS:  Antibiotics:  cefTRIAXone Injectable. 1000 milliGRAM(s) IV Push every 24 hours  metroNIDAZOLE  IVPB 500 milliGRAM(s) IV Intermittent every 12 hours    Neuro:  acetaminophen   Tablet 650 milliGRAM(s) Oral every 6 hours PRN  acetaminophen   Tablet. 650 milliGRAM(s) Oral every 6 hours PRN  levETIRAcetam  IVPB 1000 milliGRAM(s) IV Intermittent every 12 hours  ondansetron Injectable 4 milliGRAM(s) IV Push every 6 hours PRN  oxyCODONE    5 mG/acetaminophen 325 mG 1 Tablet(s) Oral every 6 hours PRN    Anticoagulation:  aspirin  chewable 81 milliGRAM(s) Oral daily  heparin  Injectable 5000 Unit(s) SubCutaneous every 8 hours    OTHER:  atorvastatin 80 milliGRAM(s) Oral at bedtime  bisoprolol   Tablet 5 milliGRAM(s) Oral daily  dextrose 40% Gel 15 Gram(s) Oral once PRN  dextrose 50% Injectable 12.5 Gram(s) IV Push once  dextrose 50% Injectable 25 Gram(s) IV Push once  dextrose 50% Injectable 25 Gram(s) IV Push once  docusate sodium Liquid 100 milliGRAM(s) Oral two times a day  glucagon  Injectable 1 milliGRAM(s) IntraMuscular once PRN  hydrALAZINE Injectable 10 milliGRAM(s) IV Push every 4 hours PRN  labetalol Injectable 10 milliGRAM(s) IV Push every 4 hours PRN  polyethylene glycol 3350 17 Gram(s) Oral at bedtime  senna Syrup 10 milliLiter(s) Oral at bedtime    IVF:  dextrose 5%. 1000 milliLiter(s) IV Continuous <Continuous>  multivitamin 1 Tablet(s) Oral daily  potassium chloride  20 mEq/100 mL IVPB 20 milliEquivalent(s) IV Intermittent every 1 hour  sodium chloride 0.9%. 1000 milliLiter(s) IV Continuous <Continuous>    CULTURES:  Culture Results:   >100,000 CFU/ml Gram Negative Rods  Identification and susceptibility to follow. ( @ 08:47)  Culture Results:   Normal vaginal prisca ( @ 16:37)    RADIOLOGY & ADDITIONAL TESTS:      ASSESSMENT:  58y Female w/ HTN, HLD, Anxiety, Depression, h/o Breast Cancer w/ TRAM flap reconstruction admitted for expanding right rectus hematoma complicated by right MCA infarction now s/p hemicraniectomy POD #11    ABDOMINAL HEMATOMA  Handoff  MEWS Score  Anxiety  Hypertension  Breast CA  Stroke  Stroke (cerebrum)  Abdominal hematoma  Craniectomy  Central venous catheter insertion  Previous  section  History of mastectomy, left  ABDOMINAL PAIN      PLAN:  NEURO:  - neurocheck q 4  - vitals q 4   - pain control with Tylenol   - Transfer to [facility] today   - dc staples before patient is discharged.   CARDIOVASCULAR:  - Continue Asp 81 mg   - SBP < 140   PULMONARY:  - satting well in RA   - continue suctioning   - chest pt prn   - sputum culture negative   RENAL:  - K+ of 2..8, repeat BMP at 10 am   - replete prn   - IVF @ 75  - incontinent   - on Ceftriaxone for UTI, S&S pending will deescalate once avilable   GI:  - NPO for now   - S&S recs- FEES high aspiration risk   HEME:  - H&H stable   ID:  - CT abd eventually if down at CT, non-urgent   - ceftx end date   - Flagyl end date    ENDO:  - BS stable   - goal 140-180   DVT PROPHYLAXIS: Doppler negative. SQH   [] Venodynes                                [x] Heparin    FALL RISK:  [] Low Risk                                    [] Impulsive    DISPOSITION: d/w Dr. Johnson. S/Overnight events:  No acute events overnight.   Hospital Course:   POD#1: emergent crani yesterday, transferred back to [facility], sedated overnight, rpt CT with hemorrhagic conversion o/w stable. stable exam overnight. Drains remains to suction. no acute events overnight  POD#2: No seizure activity on EEG. Following commands on right side. Continue DREW x2 and Hemovac x1. Low grade fevers.   POD#3: Tolerating CPAP. Self extubated. Hemovac removed.   POD #4: Re-intubated yesterday, precedex resumed, no acute events overnight  POD#5: Pancultured for fevers. Tolerating CPAP trials. Continues to follow commands on right side. Bedside PT.  POD #6: Extubated without any issues, doing well and suctioning herself. Continues to follow commands on the right side and speaking 3-4 words very slowly. Bedside PT. Continued to be on 3% at 15 overnight. CTH done yesterday and was stable   POD #7: Overnight pulled NGT twice, replaced and confirmed. Patient did not need to suction herself overnight. Continues to follow commands on the right side and speaking more words than yesterday. Portable CT head today ASA f/u. Central line will be removed today. Neuro checks and vital signs now checked q 4h. Goal SBP normotensive 100-160. Passed S&S eval  POD#8: Failed re-eval of S&S, NGT replaced. EEG to r/o seizure.  POD#9: Better today, more awake.  No acute events overnight  POD#10: Afebrile overnight. Started ceftriaxone for UTI yesterday and continues flagyl for BV. Neuro stable, improving. Flap full, but soft.  POD #11: afebrile overnight. Neuro stable, flap soft.     Vital Signs Last 24 Hrs  T(C): 37 (2018 05:32), Max: 37.8 (10 Germain 2018 17:50)  T(F): 98.6 (2018 05:32), Max: 100.1 (10 Germain 2018 17:50)  HR: 80 (2018 03:00) (76 - 88)  BP: 158/74 (2018 03:00) (137/78 - 167/77)  BP(mean): 99 (2018 03:00) (91 - 119)  RR: 22 (2018 03:00) (12 - 22)  SpO2: 97% (2018 03:00) (95% - 98%)    I&O's Detail    2018 07:01  -  10 Germain 2018 07:00  --------------------------------------------------------  IN:    IV PiggyBack: 600 mL    sodium chloride 0.9%: 500 mL    sodium chloride 0.9%.: 350 mL  Total IN: 1450 mL    OUT:    Intermittent Catheterization - Urethral: 2 mL  Total OUT: 2 mL    Total NET: 1448 mL      10 Germain 2018 07:  -  2018 06:09  --------------------------------------------------------  IN:    IV PiggyBack: 300 mL    sodium chloride 0.9%.: 1050 mL  Total IN: 1350 mL    OUT:    Voided: 3 mL  Total OUT: 3 mL    Total NET: 1347 mL        I&O's Summary    2018 07:01  -  10 Germain 2018 07:00  --------------------------------------------------------  IN: 1450 mL / OUT: 2 mL / NET: 1448 mL    10 Germain 2018 07:  -  2018 06:09  --------------------------------------------------------  IN: 1350 mL / OUT: 3 mL / NET: 1347 mL        PHYSICAL EXAM:  AAOx3, NAD, FC, Opens eyes spontaneously, PERRL;   Verbal functions intact .  L facial droop,   LLE 4/5, LUE plegic, RLE 5/5, RUE 5/5; LUE trace withdrawal   Incision/Wound: c/d/i, staples in place.     DEVICE/DRAIN DRESSING:    TUBES/LINES:  [] CVC  [] A-line  [] Lumbar Drain  [] Ventriculostomy  [] Other    DIET:  [] NPO  [x] Mechanical  [] Tube feeds    LABS:                        10.4   13.6  )-----------( 635      ( 2018 04:28 )             31.2     06-11    140  |  104  |  8   ----------------------------<  120<H>  2.8<LL>   |  23  |  0.43<L>    Ca    9.1      2018 04:28  Phos  2.8     06-11  Mg     2.1     06-11    CAPILLARY BLOOD GLUCOSE    Drug Levels: [] N/A    CSF Analysis: [] N/A      Allergie    No Known Allergies    Intolerances      MEDICATIONS:  Antibiotics:  cefTRIAXone Injectable. 1000 milliGRAM(s) IV Push every 24 hours  metroNIDAZOLE  IVPB 500 milliGRAM(s) IV Intermittent every 12 hours    Neuro:  acetaminophen   Tablet 650 milliGRAM(s) Oral every 6 hours PRN  acetaminophen   Tablet. 650 milliGRAM(s) Oral every 6 hours PRN  levETIRAcetam  IVPB 1000 milliGRAM(s) IV Intermittent every 12 hours  ondansetron Injectable 4 milliGRAM(s) IV Push every 6 hours PRN  oxyCODONE    5 mG/acetaminophen 325 mG 1 Tablet(s) Oral every 6 hours PRN    Anticoagulation:  aspirin  chewable 81 milliGRAM(s) Oral daily  heparin  Injectable 5000 Unit(s) SubCutaneous every 8 hours    OTHER:  atorvastatin 80 milliGRAM(s) Oral at bedtime  bisoprolol   Tablet 5 milliGRAM(s) Oral daily  dextrose 40% Gel 15 Gram(s) Oral once PRN  dextrose 50% Injectable 12.5 Gram(s) IV Push once  dextrose 50% Injectable 25 Gram(s) IV Push once  dextrose 50% Injectable 25 Gram(s) IV Push once  docusate sodium Liquid 100 milliGRAM(s) Oral two times a day  glucagon  Injectable 1 milliGRAM(s) IntraMuscular once PRN  hydrALAZINE Injectable 10 milliGRAM(s) IV Push every 4 hours PRN  labetalol Injectable 10 milliGRAM(s) IV Push every 4 hours PRN  polyethylene glycol 3350 17 Gram(s) Oral at bedtime  senna Syrup 10 milliLiter(s) Oral at bedtime    IVF:  dextrose 5%. 1000 milliLiter(s) IV Continuous <Continuous>  multivitamin 1 Tablet(s) Oral daily  potassium chloride  20 mEq/100 mL IVPB 20 milliEquivalent(s) IV Intermittent every 1 hour  sodium chloride 0.9%. 1000 milliLiter(s) IV Continuous <Continuous>    CULTURES:  Culture Results:   >100,000 CFU/ml Gram Negative Rods  Identification and susceptibility to follow. ( @ 08:47)  Culture Results:   Normal vaginal prisca ( @ 16:37)    RADIOLOGY & ADDITIONAL TESTS:      ASSESSMENT:  58y Female w/ HTN, HLD, Anxiety, Depression, h/o Breast Cancer w/ TRAM flap reconstruction admitted for expanding right rectus hematoma complicated by right MCA infarction now s/p hemicraniectomy POD #11    ABDOMINAL HEMATOMA  Handoff  MEWS Score  Anxiety  Hypertension  Breast CA  Stroke  Stroke (cerebrum)  Abdominal hematoma  Craniectomy  Central venous catheter insertion  Previous  section  History of mastectomy, left  ABDOMINAL PAIN      PLAN:  NEURO:  - neurocheck q 4  - vitals q 4   - pain control with Tylenol   - Transfer to [facility] today   - dc staples before patient is discharged.   CARDIOVASCULAR:  - Continue Asp 81 mg   - SBP < 140   PULMONARY:  - satting well in RA   - continue suctioning   - chest pt prn   - sputum culture negative   RENAL:  - K+ of 2..8, repeat BMP at 10 am   - replete prn   - IVF @ 75  - incontinent   - on Ceftriaxone for UTI, S&S pending will deescalate once avilable   GI:  - NPO for now   - S&S recs- FEES high aspiration risk   HEME:  - H&H stable   ID:  - CT abd eventually if down at CT, non-urgent   - ceftx for + UCx  end date   - Flagyl for BV  end date    ENDO:  - BS stable   - goal 140-180   DVT PROPHYLAXIS: Doppler negative. SQH   [] Venodynes                                [x] Heparin    FALL RISK:  [] Low Risk                                    [] Impulsive    DISPOSITION: d/w Dr. Johnson. S/Overnight events:  No acute events overnight.   Hospital Course:   POD#1: emergent crani yesterday, transferred back to [facility], sedated overnight, rpt CT with hemorrhagic conversion o/w stable. stable exam overnight. Drains remains to suction. no acute events overnight  POD#2: No seizure activity on EEG. Following commands on right side. Continue DREW x2 and Hemovac x1. Low grade fevers.   POD#3: Tolerating CPAP. Self extubated. Hemovac removed.   POD #4: Re-intubated yesterday, precedex resumed, no acute events overnight  POD#5: Pancultured for fevers. Tolerating CPAP trials. Continues to follow commands on right side. Bedside PT.  POD #6: Extubated without any issues, doing well and suctioning herself. Continues to follow commands on the right side and speaking 3-4 words very slowly. Bedside PT. Continued to be on 3% at 15 overnight. CTH done yesterday and was stable   POD #7: Overnight pulled NGT twice, replaced and confirmed. Patient did not need to suction herself overnight. Continues to follow commands on the right side and speaking more words than yesterday. Portable CT head today ASA f/u. Central line will be removed today. Neuro checks and vital signs now checked q 4h. Goal SBP normotensive 100-160. Passed S&S eval  POD#8: Failed re-eval of S&S, NGT replaced. EEG to r/o seizure.  POD#9: Better today, more awake.  No acute events overnight  POD#10: Afebrile overnight. Started ceftriaxone for UTI yesterday and continues flagyl for BV. Neuro stable, improving. Flap full, but soft.  POD #11: afebrile overnight. Neuro stable, flap soft.     Vital Signs Last 24 Hrs  T(C): 37 (2018 05:32), Max: 37.8 (10 Germain 2018 17:50)  T(F): 98.6 (2018 05:32), Max: 100.1 (10 Germain 2018 17:50)  HR: 80 (2018 03:00) (76 - 88)  BP: 158/74 (2018 03:00) (137/78 - 167/77)  BP(mean): 99 (2018 03:00) (91 - 119)  RR: 22 (2018 03:00) (12 - 22)  SpO2: 97% (2018 03:00) (95% - 98%)    I&O's Detail    2018 07:01  -  10 Germain 2018 07:00  --------------------------------------------------------  IN:    IV PiggyBack: 600 mL    sodium chloride 0.9%: 500 mL    sodium chloride 0.9%.: 350 mL  Total IN: 1450 mL    OUT:    Intermittent Catheterization - Urethral: 2 mL  Total OUT: 2 mL    Total NET: 1448 mL      10 Germain 2018 07:  -  2018 06:09  --------------------------------------------------------  IN:    IV PiggyBack: 300 mL    sodium chloride 0.9%.: 1050 mL  Total IN: 1350 mL    OUT:    Voided: 3 mL  Total OUT: 3 mL    Total NET: 1347 mL        I&O's Summary    2018 07:01  -  10 Germain 2018 07:00  --------------------------------------------------------  IN: 1450 mL / OUT: 2 mL / NET: 1448 mL    10 Germain 2018 07:  -  2018 06:09  --------------------------------------------------------  IN: 1350 mL / OUT: 3 mL / NET: 1347 mL        PHYSICAL EXAM:  AAOx3, NAD, FC, Opens eyes spontaneously, PERRL;   Verbal functions intact .  L facial droop,   LLE 4/5, LUE plegic, RLE 5/5, RUE 5/5; LUE trace withdrawal   Incision/Wound: c/d/i, staples in place.     DEVICE/DRAIN DRESSING:    TUBES/LINES:  [] CVC  [] A-line  [] Lumbar Drain  [] Ventriculostomy  [] Other    DIET:  [] NPO  [x] Mechanical  [] Tube feeds    LABS:                        10.4   13.6  )-----------( 635      ( 2018 04:28 )             31.2     06-11    140  |  104  |  8   ----------------------------<  120<H>  2.8<LL>   |  23  |  0.43<L>    Ca    9.1      2018 04:28  Phos  2.8     06-11  Mg     2.1     06-11    CAPILLARY BLOOD GLUCOSE    Drug Levels: [] N/A    CSF Analysis: [] N/A      Allergie    No Known Allergies    Intolerances      MEDICATIONS:  Antibiotics:  cefTRIAXone Injectable. 1000 milliGRAM(s) IV Push every 24 hours  metroNIDAZOLE  IVPB 500 milliGRAM(s) IV Intermittent every 12 hours    Neuro:  acetaminophen   Tablet 650 milliGRAM(s) Oral every 6 hours PRN  acetaminophen   Tablet. 650 milliGRAM(s) Oral every 6 hours PRN  levETIRAcetam  IVPB 1000 milliGRAM(s) IV Intermittent every 12 hours  ondansetron Injectable 4 milliGRAM(s) IV Push every 6 hours PRN  oxyCODONE    5 mG/acetaminophen 325 mG 1 Tablet(s) Oral every 6 hours PRN    Anticoagulation:  aspirin  chewable 81 milliGRAM(s) Oral daily  heparin  Injectable 5000 Unit(s) SubCutaneous every 8 hours    OTHER:  atorvastatin 80 milliGRAM(s) Oral at bedtime  bisoprolol   Tablet 5 milliGRAM(s) Oral daily  dextrose 40% Gel 15 Gram(s) Oral once PRN  dextrose 50% Injectable 12.5 Gram(s) IV Push once  dextrose 50% Injectable 25 Gram(s) IV Push once  dextrose 50% Injectable 25 Gram(s) IV Push once  docusate sodium Liquid 100 milliGRAM(s) Oral two times a day  glucagon  Injectable 1 milliGRAM(s) IntraMuscular once PRN  hydrALAZINE Injectable 10 milliGRAM(s) IV Push every 4 hours PRN  labetalol Injectable 10 milliGRAM(s) IV Push every 4 hours PRN  polyethylene glycol 3350 17 Gram(s) Oral at bedtime  senna Syrup 10 milliLiter(s) Oral at bedtime    IVF:  dextrose 5%. 1000 milliLiter(s) IV Continuous <Continuous>  multivitamin 1 Tablet(s) Oral daily  potassium chloride  20 mEq/100 mL IVPB 20 milliEquivalent(s) IV Intermittent every 1 hour  sodium chloride 0.9%. 1000 milliLiter(s) IV Continuous <Continuous>    CULTURES:  Culture Results:   >100,000 CFU/ml Gram Negative Rods  Identification and susceptibility to follow. ( @ 08:47)  Culture Results:   Normal vaginal prisca ( @ 16:37)    RADIOLOGY & ADDITIONAL TESTS:      ASSESSMENT:  58y Female w/ HTN, HLD, Anxiety, Depression, h/o Breast Cancer w/ TRAM flap reconstruction admitted for expanding right rectus hematoma complicated by right MCA infarction now s/p hemicraniectomy POD #11    ABDOMINAL HEMATOMA  Handoff  MEWS Score  Anxiety  Hypertension  Breast CA  Stroke  Stroke (cerebrum)  Abdominal hematoma  Craniectomy  Central venous catheter insertion  Previous  section  History of mastectomy, left  ABDOMINAL PAIN      PLAN:  NEURO:  - neurocheck q 4  - vitals q 4   - pain control with Tylenol   - Transfer to [facility] service today   - dc staples before patient is discharged.   - continue Keppra 1 g BID, hx of seizure  CARDIOVASCULAR:  - Continue Asp 81 mg   - SBP < 140   PULMONARY:  - satting well in RA   - continue suctioning   - chest pt prn   - sputum culture negative   RENAL:  - K+ of 2..8, repeat BMP at 10 am   - replete prn   - IVF @ 75  - incontinent   - on Ceftriaxone for UTI, S&S pending will deescalate once avilable   GI:  - NPO for now   - S&S recs- FEES high aspiration risk   HEME:  - H&H stable   ID:  - CT abd eventually if down at CT, non-urgent   - ceftx for + UCx  end date   - Flagyl for BV  end date    ENDO:  - BS stable   - goal 140-180   DVT PROPHYLAXIS: Doppler negative. SQH   [] Venodynes                                [x] Heparin    FALL RISK:  [] Low Risk                                    [] Impulsive    DISPOSITION: d/w Dr. Johnson.

## 2018-06-12 PROCEDURE — 99233 SBSQ HOSP IP/OBS HIGH 50: CPT

## 2018-06-12 RX ORDER — SENNA PLUS 8.6 MG/1
10 TABLET ORAL AT BEDTIME
Qty: 0 | Refills: 0 | Status: DISCONTINUED | OUTPATIENT
Start: 2018-06-12 | End: 2018-06-18

## 2018-06-12 RX ORDER — POLYETHYLENE GLYCOL 3350 17 G/17G
17 POWDER, FOR SOLUTION ORAL AT BEDTIME
Qty: 0 | Refills: 0 | Status: DISCONTINUED | OUTPATIENT
Start: 2018-06-12 | End: 2018-06-21

## 2018-06-12 RX ADMIN — POLYETHYLENE GLYCOL 3350 17 GRAM(S): 17 POWDER, FOR SOLUTION ORAL at 22:07

## 2018-06-12 RX ADMIN — LEVETIRACETAM 400 MILLIGRAM(S): 250 TABLET, FILM COATED ORAL at 01:28

## 2018-06-12 RX ADMIN — HEPARIN SODIUM 5000 UNIT(S): 5000 INJECTION INTRAVENOUS; SUBCUTANEOUS at 15:00

## 2018-06-12 RX ADMIN — LEVETIRACETAM 400 MILLIGRAM(S): 250 TABLET, FILM COATED ORAL at 11:02

## 2018-06-12 RX ADMIN — CEFTRIAXONE 1000 MILLIGRAM(S): 500 INJECTION, POWDER, FOR SOLUTION INTRAMUSCULAR; INTRAVENOUS at 11:02

## 2018-06-12 RX ADMIN — Medication 100 MILLIGRAM(S): at 17:02

## 2018-06-12 RX ADMIN — Medication 100 MILLIGRAM(S): at 17:03

## 2018-06-12 RX ADMIN — SODIUM CHLORIDE 75 MILLILITER(S): 9 INJECTION INTRAMUSCULAR; INTRAVENOUS; SUBCUTANEOUS at 06:13

## 2018-06-12 RX ADMIN — HEPARIN SODIUM 5000 UNIT(S): 5000 INJECTION INTRAVENOUS; SUBCUTANEOUS at 22:08

## 2018-06-12 RX ADMIN — Medication 81 MILLIGRAM(S): at 11:03

## 2018-06-12 RX ADMIN — BISOPROLOL FUMARATE 5 MILLIGRAM(S): 10 TABLET, FILM COATED ORAL at 06:11

## 2018-06-12 RX ADMIN — LEVETIRACETAM 400 MILLIGRAM(S): 250 TABLET, FILM COATED ORAL at 23:32

## 2018-06-12 RX ADMIN — ATORVASTATIN CALCIUM 80 MILLIGRAM(S): 80 TABLET, FILM COATED ORAL at 22:08

## 2018-06-12 RX ADMIN — SODIUM CHLORIDE 75 MILLILITER(S): 9 INJECTION INTRAMUSCULAR; INTRAVENOUS; SUBCUTANEOUS at 22:18

## 2018-06-12 RX ADMIN — HEPARIN SODIUM 5000 UNIT(S): 5000 INJECTION INTRAVENOUS; SUBCUTANEOUS at 06:11

## 2018-06-12 RX ADMIN — Medication 1 TABLET(S): at 11:03

## 2018-06-12 RX ADMIN — SENNA PLUS 10 MILLILITER(S): 8.6 TABLET ORAL at 22:09

## 2018-06-12 RX ADMIN — Medication 100 MILLIGRAM(S): at 06:11

## 2018-06-12 NOTE — PROGRESS NOTE ADULT - SUBJECTIVE AND OBJECTIVE BOX
INTERVAL HPI/OVERNIGHT EVENTS: CHRISTOPHE    SUBJECTIVE: Patient seen and examined at bedside. No complaints    OBJECTIVE:    VITAL SIGNS:  ICU Vital Signs Last 24 Hrs  T(C): 37.5 (12 Jun 2018 16:22), Max: 37.5 (12 Jun 2018 16:22)  T(F): 99.5 (12 Jun 2018 16:22), Max: 99.5 (12 Jun 2018 16:22)  HR: 78 (12 Jun 2018 10:57) (62 - 78)  BP: 168/80 (12 Jun 2018 10:57) (158/91 - 179/83)  BP(mean): 115 (12 Jun 2018 10:57) (111 - 120)  ABP: --  ABP(mean): --  RR: 17 (12 Jun 2018 10:57) (16 - 18)  SpO2: 98% (12 Jun 2018 10:57) (95% - 98%)        06-11 @ 07:01  -  06-12 @ 07:00  --------------------------------------------------------  IN: 1625 mL / OUT: 200 mL / NET: 1425 mL      CAPILLARY BLOOD GLUCOSE          PHYSICAL EXAM:    General: NAD  HEENT: NC/AT; PERRL, clear conjunctiva  Neck: supple  Respiratory: CTA b/l  Cardiovascular: +S1/S2; RRR  Abdomen: soft, NT/ND; +BS x4  Extremities: WWP, 2+ peripheral pulses b/l; no LE edema  Skin: normal color and turgor; no rash  Neurological: Complete left neglect. Eyes rarely go past midline. Left facial droop. 5/5 strength and sensation (light touch) on right. 0/5 sensation on left (light touch) 1/5 strength in LUE, 4/5 strength in LLE. Gait not assessed.      MEDICATIONS:  MEDICATIONS  (STANDING):  aspirin  chewable 81 milliGRAM(s) Oral daily  atorvastatin 80 milliGRAM(s) Oral at bedtime  bisoprolol   Tablet 5 milliGRAM(s) Oral daily  cefTRIAXone Injectable. 1000 milliGRAM(s) IV Push every 24 hours  dextrose 5%. 1000 milliLiter(s) (50 mL/Hr) IV Continuous <Continuous>  dextrose 50% Injectable 12.5 Gram(s) IV Push once  dextrose 50% Injectable 25 Gram(s) IV Push once  dextrose 50% Injectable 25 Gram(s) IV Push once  docusate sodium Liquid 100 milliGRAM(s) Oral two times a day  heparin  Injectable 5000 Unit(s) SubCutaneous every 8 hours  levETIRAcetam  IVPB 1000 milliGRAM(s) IV Intermittent every 12 hours  metroNIDAZOLE  IVPB 500 milliGRAM(s) IV Intermittent every 12 hours  multivitamin 1 Tablet(s) Oral daily  polyethylene glycol 3350 17 Gram(s) Oral at bedtime  senna Syrup 10 milliLiter(s) Oral at bedtime  sodium chloride 0.9%. 1000 milliLiter(s) (75 mL/Hr) IV Continuous <Continuous>    MEDICATIONS  (PRN):  acetaminophen   Tablet 650 milliGRAM(s) Oral every 6 hours PRN For Temp greater than 38 C (100.4 F)  acetaminophen   Tablet. 650 milliGRAM(s) Oral every 6 hours PRN Mild Pain (1 - 3)  dextrose 40% Gel 15 Gram(s) Oral once PRN Blood Glucose LESS THAN 70 milliGRAM(s)/deciliter  glucagon  Injectable 1 milliGRAM(s) IntraMuscular once PRN Glucose LESS THAN 70 milligrams/deciliter  hydrALAZINE Injectable 10 milliGRAM(s) IV Push every 4 hours PRN SBP >160  labetalol Injectable 10 milliGRAM(s) IV Push every 4 hours PRN SBP>160  ondansetron Injectable 4 milliGRAM(s) IV Push every 6 hours PRN Nausea  oxyCODONE    5 mG/acetaminophen 325 mG 1 Tablet(s) Oral every 6 hours PRN Moderate Pain (4 - 6)      ALLERGIES:  Allergies    No Known Allergies    Intolerances        LABS:                        10.4   13.6  )-----------( 635      ( 11 Jun 2018 04:28 )             31.2     06-11    140  |  105  |  7   ----------------------------<  96  4.4   |  24  |  0.44<L>    Ca    9.3      11 Jun 2018 11:40  Phos  2.8     06-11  Mg     2.1     06-11            RADIOLOGY & ADDITIONAL TESTS: Reviewed. INTERVAL HPI/OVERNIGHT EVENTS: CHRISTOPHE    SUBJECTIVE: Patient seen and examined at bedside. No complaints    OBJECTIVE:    VITAL SIGNS:  ICU Vital Signs Last 24 Hrs  T(C): 37.5 (12 Jun 2018 16:22), Max: 37.5 (12 Jun 2018 16:22)  T(F): 99.5 (12 Jun 2018 16:22), Max: 99.5 (12 Jun 2018 16:22)  HR: 78 (12 Jun 2018 10:57) (62 - 78)  BP: 168/80 (12 Jun 2018 10:57) (158/91 - 179/83)  BP(mean): 115 (12 Jun 2018 10:57) (111 - 120)  RR: 17 (12 Jun 2018 10:57) (16 - 18)  SpO2: 98% (12 Jun 2018 10:57) (95% - 98%)        06-11 @ 07:01  -  06-12 @ 07:00  --------------------------------------------------------  IN: 1625 mL / OUT: 200 mL / NET: 1425 mL      PHYSICAL EXAM:    General: NAD  HEENT: NC/AT; PERRL, clear conjunctiva  Neck: supple  Respiratory: CTA b/l  Cardiovascular: +S1/S2; RRR  Abdomen: soft, NT/ND; +BS x4  Extremities: WWP, 2+ peripheral pulses b/l; no LE edema  Skin: normal color and turgor; no rash  Neurological: Complete left neglect. Eyes go past midline but don't pocket fully. Left facial droop. 5/5 strength and sensation (light touch) on right. 0/5 sensation on left (light touch) 1/5 strength in LUE, 4/5 strength in LLE. Gait not assessed.      MEDICATIONS:  MEDICATIONS  (STANDING):  aspirin  chewable 81 milliGRAM(s) Oral daily  atorvastatin 80 milliGRAM(s) Oral at bedtime  bisoprolol   Tablet 5 milliGRAM(s) Oral daily  cefTRIAXone Injectable. 1000 milliGRAM(s) IV Push every 24 hours  dextrose 5%. 1000 milliLiter(s) (50 mL/Hr) IV Continuous <Continuous>  dextrose 50% Injectable 12.5 Gram(s) IV Push once  dextrose 50% Injectable 25 Gram(s) IV Push once  dextrose 50% Injectable 25 Gram(s) IV Push once  docusate sodium Liquid 100 milliGRAM(s) Oral two times a day  heparin  Injectable 5000 Unit(s) SubCutaneous every 8 hours  levETIRAcetam  IVPB 1000 milliGRAM(s) IV Intermittent every 12 hours  metroNIDAZOLE  IVPB 500 milliGRAM(s) IV Intermittent every 12 hours  multivitamin 1 Tablet(s) Oral daily  polyethylene glycol 3350 17 Gram(s) Oral at bedtime  senna Syrup 10 milliLiter(s) Oral at bedtime  sodium chloride 0.9%. 1000 milliLiter(s) (75 mL/Hr) IV Continuous <Continuous>    MEDICATIONS  (PRN):  acetaminophen   Tablet 650 milliGRAM(s) Oral every 6 hours PRN For Temp greater than 38 C (100.4 F)  acetaminophen   Tablet. 650 milliGRAM(s) Oral every 6 hours PRN Mild Pain (1 - 3)  dextrose 40% Gel 15 Gram(s) Oral once PRN Blood Glucose LESS THAN 70 milliGRAM(s)/deciliter  glucagon  Injectable 1 milliGRAM(s) IntraMuscular once PRN Glucose LESS THAN 70 milligrams/deciliter  hydrALAZINE Injectable 10 milliGRAM(s) IV Push every 4 hours PRN SBP >160  labetalol Injectable 10 milliGRAM(s) IV Push every 4 hours PRN SBP>160  ondansetron Injectable 4 milliGRAM(s) IV Push every 6 hours PRN Nausea  oxyCODONE    5 mG/acetaminophen 325 mG 1 Tablet(s) Oral every 6 hours PRN Moderate Pain (4 - 6)      ALLERGIES:  Allergies  No Known Allergies      LABS:                        10.4   13.6  )-----------( 635      ( 11 Jun 2018 04:28 )             31.2     06-11    140  |  105  |  7   ----------------------------<  96  4.4   |  24  |  0.44<L>    Ca    9.3      11 Jun 2018 11:40  Phos  2.8     06-11  Mg     2.1     06-11    RADIOLOGY & ADDITIONAL TESTS: Reviewed.

## 2018-06-12 NOTE — PROGRESS NOTE ADULT - ATTENDING COMMENTS
Patient seen and examined with Resident.  Agree with above.  Mental status stable.  Not moving left UE and has decreased sensation of left side but speaking and interacting.  Passed FEES so now on diet - puree with nectar thick liquid  Continue Aspirin 81mg and Atorvastatin 80mg for secondary stroke prevention  Continue Keppra 1,000mg BID for seizure prophylaxis  Preparing for rehab once stable medically and neurologically.

## 2018-06-12 NOTE — PROGRESS NOTE ADULT - ASSESSMENT
58y Female w/ HTN, HLD, Anxiety, Depression, h/o Breast Cancer w/ TRAM flap reconstruction admitted for expanding right rectus hematoma complicated by right MCA infarction now s/p hemicraniectomy POD #11. Also found to have UTI and Bacterial vaginosos.

## 2018-06-13 LAB
ANION GAP SERPL CALC-SCNC: 11 MMOL/L — SIGNIFICANT CHANGE UP (ref 5–17)
APPEARANCE UR: ABNORMAL
BILIRUB UR-MCNC: NEGATIVE — SIGNIFICANT CHANGE UP
BUN SERPL-MCNC: 5 MG/DL — LOW (ref 7–23)
CALCIUM SERPL-MCNC: 8.4 MG/DL — SIGNIFICANT CHANGE UP (ref 8.4–10.5)
CHLORIDE SERPL-SCNC: 107 MMOL/L — SIGNIFICANT CHANGE UP (ref 96–108)
CO2 SERPL-SCNC: 23 MMOL/L — SIGNIFICANT CHANGE UP (ref 22–31)
COLOR SPEC: YELLOW — SIGNIFICANT CHANGE UP
CREAT SERPL-MCNC: 0.37 MG/DL — LOW (ref 0.5–1.3)
DIFF PNL FLD: NEGATIVE — SIGNIFICANT CHANGE UP
GLUCOSE SERPL-MCNC: 110 MG/DL — HIGH (ref 70–99)
GLUCOSE UR QL: NEGATIVE — SIGNIFICANT CHANGE UP
HCT VFR BLD CALC: 30.7 % — LOW (ref 34.5–45)
HGB BLD-MCNC: 9.9 G/DL — LOW (ref 11.5–15.5)
KETONES UR-MCNC: NEGATIVE — SIGNIFICANT CHANGE UP
LEUKOCYTE ESTERASE UR-ACNC: NEGATIVE — SIGNIFICANT CHANGE UP
MAGNESIUM SERPL-MCNC: 1.7 MG/DL — SIGNIFICANT CHANGE UP (ref 1.6–2.6)
MCHC RBC-ENTMCNC: 28.9 PG — SIGNIFICANT CHANGE UP (ref 27–34)
MCHC RBC-ENTMCNC: 32.2 G/DL — SIGNIFICANT CHANGE UP (ref 32–36)
MCV RBC AUTO: 89.8 FL — SIGNIFICANT CHANGE UP (ref 80–100)
NITRITE UR-MCNC: NEGATIVE — SIGNIFICANT CHANGE UP
PH UR: 7 — SIGNIFICANT CHANGE UP (ref 5–8)
PHOSPHATE SERPL-MCNC: 2.4 MG/DL — LOW (ref 2.5–4.5)
PLATELET # BLD AUTO: 652 K/UL — HIGH (ref 150–400)
POTASSIUM SERPL-MCNC: 3.1 MMOL/L — LOW (ref 3.5–5.3)
POTASSIUM SERPL-SCNC: 3.1 MMOL/L — LOW (ref 3.5–5.3)
PROT UR-MCNC: NEGATIVE MG/DL — SIGNIFICANT CHANGE UP
RAPID RVP RESULT: SIGNIFICANT CHANGE UP
RBC # BLD: 3.42 M/UL — LOW (ref 3.8–5.2)
RBC # FLD: 16.5 % — SIGNIFICANT CHANGE UP (ref 10.3–16.9)
SODIUM SERPL-SCNC: 141 MMOL/L — SIGNIFICANT CHANGE UP (ref 135–145)
SP GR SPEC: 1.01 — SIGNIFICANT CHANGE UP (ref 1–1.03)
UROBILINOGEN FLD QL: 1 E.U./DL — SIGNIFICANT CHANGE UP
WBC # BLD: 11.2 K/UL — HIGH (ref 3.8–10.5)
WBC # FLD AUTO: 11.2 K/UL — HIGH (ref 3.8–10.5)

## 2018-06-13 PROCEDURE — 99233 SBSQ HOSP IP/OBS HIGH 50: CPT

## 2018-06-13 PROCEDURE — 71045 X-RAY EXAM CHEST 1 VIEW: CPT | Mod: 26

## 2018-06-13 RX ORDER — HYDRALAZINE HCL 50 MG
10 TABLET ORAL EVERY 8 HOURS
Qty: 0 | Refills: 0 | Status: DISCONTINUED | OUTPATIENT
Start: 2018-06-13 | End: 2018-06-13

## 2018-06-13 RX ORDER — HYDRALAZINE HCL 50 MG
10 TABLET ORAL EVERY 8 HOURS
Qty: 0 | Refills: 0 | Status: DISCONTINUED | OUTPATIENT
Start: 2018-06-13 | End: 2018-06-14

## 2018-06-13 RX ORDER — POTASSIUM CHLORIDE 20 MEQ
40 PACKET (EA) ORAL EVERY 6 HOURS
Qty: 0 | Refills: 0 | Status: DISCONTINUED | OUTPATIENT
Start: 2018-06-13 | End: 2018-06-13

## 2018-06-13 RX ORDER — POTASSIUM CHLORIDE 20 MEQ
10 PACKET (EA) ORAL
Qty: 0 | Refills: 0 | Status: COMPLETED | OUTPATIENT
Start: 2018-06-13 | End: 2018-06-13

## 2018-06-13 RX ORDER — MAGNESIUM SULFATE 500 MG/ML
1 VIAL (ML) INJECTION ONCE
Qty: 0 | Refills: 0 | Status: COMPLETED | OUTPATIENT
Start: 2018-06-13 | End: 2018-06-13

## 2018-06-13 RX ORDER — POTASSIUM CHLORIDE 20 MEQ
40 PACKET (EA) ORAL EVERY 4 HOURS
Qty: 0 | Refills: 0 | Status: COMPLETED | OUTPATIENT
Start: 2018-06-13 | End: 2018-06-13

## 2018-06-13 RX ORDER — HYDRALAZINE HCL 50 MG
10 TABLET ORAL EVERY 4 HOURS
Qty: 0 | Refills: 0 | Status: DISCONTINUED | OUTPATIENT
Start: 2018-06-13 | End: 2018-06-13

## 2018-06-13 RX ADMIN — OXYCODONE AND ACETAMINOPHEN 1 TABLET(S): 5; 325 TABLET ORAL at 18:10

## 2018-06-13 RX ADMIN — Medication 81 MILLIGRAM(S): at 15:25

## 2018-06-13 RX ADMIN — Medication 100 MILLIEQUIVALENT(S): at 15:50

## 2018-06-13 RX ADMIN — Medication 100 MILLIGRAM(S): at 23:00

## 2018-06-13 RX ADMIN — POLYETHYLENE GLYCOL 3350 17 GRAM(S): 17 POWDER, FOR SOLUTION ORAL at 23:01

## 2018-06-13 RX ADMIN — SENNA PLUS 10 MILLILITER(S): 8.6 TABLET ORAL at 23:02

## 2018-06-13 RX ADMIN — Medication 10 MILLIGRAM(S): at 23:01

## 2018-06-13 RX ADMIN — LEVETIRACETAM 400 MILLIGRAM(S): 250 TABLET, FILM COATED ORAL at 23:00

## 2018-06-13 RX ADMIN — HEPARIN SODIUM 5000 UNIT(S): 5000 INJECTION INTRAVENOUS; SUBCUTANEOUS at 13:46

## 2018-06-13 RX ADMIN — HEPARIN SODIUM 5000 UNIT(S): 5000 INJECTION INTRAVENOUS; SUBCUTANEOUS at 23:01

## 2018-06-13 RX ADMIN — ATORVASTATIN CALCIUM 80 MILLIGRAM(S): 80 TABLET, FILM COATED ORAL at 23:00

## 2018-06-13 RX ADMIN — Medication 1 TABLET(S): at 15:26

## 2018-06-13 RX ADMIN — Medication 100 GRAM(S): at 07:26

## 2018-06-13 RX ADMIN — LEVETIRACETAM 400 MILLIGRAM(S): 250 TABLET, FILM COATED ORAL at 14:34

## 2018-06-13 RX ADMIN — HEPARIN SODIUM 5000 UNIT(S): 5000 INJECTION INTRAVENOUS; SUBCUTANEOUS at 05:46

## 2018-06-13 RX ADMIN — Medication 40 MILLIEQUIVALENT(S): at 18:27

## 2018-06-13 RX ADMIN — OXYCODONE AND ACETAMINOPHEN 1 TABLET(S): 5; 325 TABLET ORAL at 17:10

## 2018-06-13 RX ADMIN — Medication 650 MILLIGRAM(S): at 16:05

## 2018-06-13 RX ADMIN — BISOPROLOL FUMARATE 5 MILLIGRAM(S): 10 TABLET, FILM COATED ORAL at 15:25

## 2018-06-13 RX ADMIN — Medication 40 MILLIEQUIVALENT(S): at 23:01

## 2018-06-13 RX ADMIN — Medication 100 MILLIGRAM(S): at 05:46

## 2018-06-13 NOTE — PROCEDURE NOTE - ADDITIONAL PROCEDURE DETAILS
Staple removal  06/13/2018  Patient was positioned appropriately. Right frontal scalp incision clean, dry, and intact prior to procedure.. Using a staple remover, approximately 50 staples removed without causing any bleeding at the incision site. After procedure, incision remains clean, dry, intact, without any bleeding are drainage. Patient tolerated the procedure well and no pain medication was administered prior or after procedure.
left radial artery prepped and draped in sterile fashion. Second attempt, arterial flow noted and guideiwre advanced easily. The needle was removed and catherter advanced over the guidewire using seldenger technique without complication. The guidewire was retireved in its entirety, pulsatile blood flow noted. Catheter anchored with suture, streile dressing applied. Patient tolerated procedure well without complication in aseptic technique.
left SC site prepped and drape din usual sterile fashion. Using anatomical landmarks, Lt SC vein aspirated, and guidewire advanced easily. a small skin incision was made and dilator passed easily. The guidewire was removed using seldenger technique wite over wire through CVC. the catheter was removed in its entirety and all three ports were easily aspirated and flushed. A sterile biopatch dressing was applied after the catheter was affixed to the skin. patient tolerated the procedure well without complication.

## 2018-06-13 NOTE — PROGRESS NOTE ADULT - SUBJECTIVE AND OBJECTIVE BOX
S/Overnight events:    Patient seen and examined at bedside, doing well and afebrile. Overnight and this morning, patient was complaining of throbbing headache. This morning patient was no longer able to swallow liquids. No acute events overnight.     Hospital Course:   POD#1: emergent crani yesterday, transferred back to [facility], sedated overnight, [facility] with hemorrhagic conversion o/w stable. stable exam overnight. Drains remains to suction. no acute events overnight  POD#2: No seizure activity on EEG. Following commands on right side. Continue DREW x2 and Hemovac x1. Low grade fevers.   POD#3: Tolerating CPAP. Self extubated. Hemovac removed.   POD #4: Re-intubated yesterday, precedex resumed, no acute events overnight  POD#5: Pancultured for fevers. Tolerating CPAP trials. Continues to follow commands on right side. Bedside PT.  POD #6: Extubated without any issues, doing well and suctioning herself. Continues to follow commands on the right side and speaking 3-4 words very slowly. Bedside PT. Continued to be on 3% at 15 overnight. CTH done yesterday and was stable   POD #7: Overnight pulled NGT twice, replaced and confirmed. Patient did not need to suction herself overnight. Continues to follow commands on the right side and speaking more words than yesterday. Portable CT head today ASA f/u. Central line will be removed today. Neuro checks and vital signs now checked q 4h. Goal SBP normotensive 100-160. Passed S&S eval  POD#8: Failed re-eval of S&S, NGT replaced. EEG to r/o seizure.  POD#9: Better today, more awake.  No acute events overnight  POD#10: Afebrile overnight. Started ceftriaxone for UTI yesterday and continues flagyl for BV. Neuro stable, improving. Flap full, but soft.  POD #11: afebrile overnight. Neuro stable, flap soft.   POD#12: Afebrile overnight. No longer tolerating liquids. Neuro exam stable. All staples removed from the head.     Vital Signs Last 24 Hrs  T(C): 37.3 (2018 09:37), Max: 37.7 (2018 06:00)  T(F): 99.2 (2018 09:37), Max: 99.9 (2018 06:00)  HR: 72 (2018 12:00) (60 - 84)  BP: 171/86 (2018 12:00) (162/91 - 174/74)  BP(mean): 122 (2018 12:00) (107 - 123)  RR: 15 (2018 12:00) (11 - 18)  SpO2: 100% (2018 12:00) (94% - 100%)    I&O's Detail    :  -  2018 07:00  --------------------------------------------------------  IN:    IV PiggyBack: 600 mL    sodium chloride 0.9%.: 1800 mL  Total IN: 2400 mL    OUT:  Total OUT: 0 mL    Total NET: 2400 mL      :  -  2018 13:05  --------------------------------------------------------  IN:    sodium chloride 0.9%.: 225 mL  Total IN: 225 mL    OUT:  Total OUT: 0 mL    Total NET: 225 mL    I&O's Summary    :  -  2018 07:00  --------------------------------------------------------  IN: 2400 mL / OUT: 0 mL / NET: 2400 mL    :  -  2018 13:05  --------------------------------------------------------  IN: 225 mL / OUT: 0 mL / NET: 225 mL    PHYSICAL EXAM:  General: No acute distress, lying comfortably and cooperative during exam.   HEENT: Right scalp incision C/D/I. Right side scalp flap is soft and flat. PERRL. EOMI.   Neck:   Respiratory: Clear lung sounds bilaterally, no wheezes, rales or rhonchi.  Cardiovascular: Regular rate and rhythm, S1 and S2 presents, no murmurs, rubs or gallops.  Gastrointestinal: No lesions, scars present, normoactive bowel sounds x4, soft, non-tender and non-distended in all 4 quadrants.  Extremities:  Warm, well perfused. Pulses 2+ throughout.   Neurological: A&Ox3. Left facial asymmetry. LUE hemiparesis with withdrawal to noxious, triple flexion of LLE. Moving right side with good strength and following commands. Clear speech but hypophonic.    :    Incision/Wound: Clean, dry, and intact.    DEVICE/DRAIN DRESSING:  TUBES/LINES:  [] CVC  [] A-line  [] Lumbar Drain  [] Ventriculostomy  [] Other    DIET:  [x] NPO  [] Mechanical  [] Tube feeds    LABS:                 9.9    11.2  )-----------( 652      ( 2018 06:02 )             30.7     06-13    141  |  107  |  5<L>  ----------------------------<  110<H>  3.1<L>   |  23  |  0.37<L>    Ca    8.4      2018 06:00  Phos  2.4     06-13  Mg     1.7     06-13              CAPILLARY BLOOD GLUCOSE          Drug Levels: [] N/A    CSF Analysis: [] N/A      Allergies    No Known Allergies    Intolerances      MEDICATIONS:  Antibiotics:  metroNIDAZOLE  IVPB 500 milliGRAM(s) IV Intermittent every 12 hours    Neuro:  acetaminophen   Tablet 650 milliGRAM(s) Oral every 6 hours PRN  acetaminophen   Tablet. 650 milliGRAM(s) Oral every 6 hours PRN  levETIRAcetam  IVPB 1000 milliGRAM(s) IV Intermittent every 12 hours  ondansetron Injectable 4 milliGRAM(s) IV Push every 6 hours PRN  oxyCODONE    5 mG/acetaminophen 325 mG 1 Tablet(s) Oral every 6 hours PRN    Anticoagulation:  aspirin  chewable 81 milliGRAM(s) Oral daily  heparin  Injectable 5000 Unit(s) SubCutaneous every 8 hours    OTHER:  atorvastatin 80 milliGRAM(s) Oral at bedtime  bisoprolol   Tablet 5 milliGRAM(s) Oral daily  dextrose 40% Gel 15 Gram(s) Oral once PRN  dextrose 50% Injectable 12.5 Gram(s) IV Push once  dextrose 50% Injectable 25 Gram(s) IV Push once  dextrose 50% Injectable 25 Gram(s) IV Push once  docusate sodium Liquid 100 milliGRAM(s) Oral two times a day  glucagon  Injectable 1 milliGRAM(s) IntraMuscular once PRN  polyethylene glycol 3350 17 Gram(s) Oral at bedtime  senna Syrup 10 milliLiter(s) Oral at bedtime    IVF:  dextrose 5%. 1000 milliLiter(s) IV Continuous <Continuous>  multivitamin 1 Tablet(s) Oral daily  potassium chloride   Powder 40 milliEquivalent(s) Oral every 6 hours  sodium chloride 0.9%. 1000 milliLiter(s) IV Continuous <Continuous>    CULTURES:  Culture Results:   >100,000 CFU/ml Escherichia coli ( @ 08:47)  Culture Results:   Normal vaginal prisca ( @ 16:37)    RADIOLOGY & ADDITIONAL TESTS:      ASSESSMENT:  58y Female s/p    ABDOMINAL HEMATOMA  Handoff  MEWS Score  Anxiety  Hypertension  Breast CA  Stroke  Stroke (cerebrum)  CVA (cerebral vascular accident)  Abdominal hematoma  Prophylactic measure  Nutrition, metabolism, and development symptoms  Bacterial vaginosis  UTI (urinary tract infection)  CAD (coronary artery disease)  CVA (cerebral vascular accident)  Staple removal  Craniectomy  Central venous catheter insertion  Previous  section  History of mastectomy, left  ABDOMINAL PAIN  Abdominal hematoma      PLAN:  NEURO:    CARDIOVASCULAR:    PULMONARY:    RENAL:    GI:    HEME:    ID:    ENDO:    DVT PROPHYLAXIS:  [] Venodynes                                [] Heparin/Lovenox    FALL RISK:  [] Low Risk                                    [] Impulsive    DISPOSITION: S/Overnight events:    Patient seen and examined at bedside, doing well and afebrile. Overnight and this morning, patient was complaining of throbbing headache. This morning patient was no longer able to swallow liquids. No acute events overnight.     Hospital Course:   POD#1: emergent crani yesterday, transferred back to [facility], sedated overnight, [facility] with hemorrhagic conversion o/w stable. stable exam overnight. Drains remains to suction. no acute events overnight  POD#2: No seizure activity on EEG. Following commands on right side. Continue DREW x2 and Hemovac x1. Low grade fevers.   POD#3: Tolerating CPAP. Self extubated. Hemovac removed.   POD #4: Re-intubated yesterday, precedex resumed, no acute events overnight  POD#5: Pancultured for fevers. Tolerating CPAP trials. Continues to follow commands on right side. Bedside PT.  POD #6: Extubated without any issues, doing well and suctioning herself. Continues to follow commands on the right side and speaking 3-4 words very slowly. Bedside PT. Continued to be on 3% at 15 overnight. CTH done yesterday and was stable   POD #7: Overnight pulled NGT twice, replaced and confirmed. Patient did not need to suction herself overnight. Continues to follow commands on the right side and speaking more words than yesterday. Portable CT head today ASA f/u. Central line will be removed today. Neuro checks and vital signs now checked q 4h. Goal SBP normotensive 100-160. Passed S&S eval  POD#8: Failed re-eval of S&S, NGT replaced. EEG to r/o seizure.  POD#9: Better today, more awake.  No acute events overnight  POD#10: Afebrile overnight. Started ceftriaxone for UTI yesterday and continues flagyl for BV. Neuro stable, improving. Flap full, but soft.  POD #11: afebrile overnight. Neuro stable, flap soft.   POD#12: Afebrile overnight. No longer tolerating liquids. Neuro exam stable. All staples removed from the head.     Vital Signs Last 24 Hrs  T(C): 37.3 (13 Jun 2018 09:37), Max: 37.7 (13 Jun 2018 06:00)  T(F): 99.2 (13 Jun 2018 09:37), Max: 99.9 (13 Jun 2018 06:00)  HR: 72 (13 Jun 2018 12:00) (60 - 84)  BP: 171/86 (13 Jun 2018 12:00) (162/91 - 174/74)  BP(mean): 122 (13 Jun 2018 12:00) (107 - 123)  RR: 15 (13 Jun 2018 12:00) (11 - 18)  SpO2: 100% (13 Jun 2018 12:00) (94% - 100%)    I&O's Detail    12 Jun 2018 07:01  -  13 Jun 2018 07:00  --------------------------------------------------------  IN:    IV PiggyBack: 600 mL    sodium chloride 0.9%.: 1800 mL  Total IN: 2400 mL    OUT:  Total OUT: 0 mL    Total NET: 2400 mL      13 Jun 2018 07:01  -  13 Jun 2018 13:05  --------------------------------------------------------  IN:    sodium chloride 0.9%.: 225 mL  Total IN: 225 mL    OUT:  Total OUT: 0 mL    Total NET: 225 mL    I&O's Summary    12 Jun 2018 07:01  -  13 Jun 2018 07:00  --------------------------------------------------------  IN: 2400 mL / OUT: 0 mL / NET: 2400 mL    13 Jun 2018 07:01  -  13 Jun 2018 13:05  --------------------------------------------------------  IN: 225 mL / OUT: 0 mL / NET: 225 mL    PHYSICAL EXAM:  General: Lying in bed, alert, mildly somnolent, no acute distress and cooperative during exam.   HEENT: Right scalp incision C/D/I. Right side scalp flap is soft and flat. PERRL. EOMI. Sclera non-icteric or injected.  Neck: Supple, no JVD  Respiratory: Clear lung sounds bilaterally, no wheezes, rales or rhonchi.  Cardiovascular: Regular rate and rhythm, S1 and S2 presents, no murmurs, rubs or gallops.  Gastrointestinal: No lesions, scars present, normoactive bowel sounds x4, soft, non-tender and non-distended in all 4 quadrants.  Extremities:  Warm, well perfused. Pulses 2+ throughout.   Neurological: A&Ox3. Left facial asymmetry. LUE hemiparesis with withdrawal to noxious. 1/5 left shoulder strength. Triple flexion of LLE. Moving RUE and RLE with 5/5 strength and following commands. Clear speech and speaking in 3-5 word sentences.     Incision/Wound: Clean, dry, and intact.    DEVICE/DRAIN DRESSING:  TUBES/LINES:  [] CVC  [] A-line  [] Lumbar Drain  [] Ventriculostomy  [] Other    DIET:  [x] NPO  [] Mechanical  [] Tube feeds    LABS:                 9.9    11.2  )-----------( 652      ( 13 Jun 2018 06:02 )             30.7     06-13    141  |  107  |  5<L>  ----------------------------<  110<H>  3.1<L>   |  23  |  0.37<L>    Ca    8.4      13 Jun 2018 06:00  Phos  2.4     06-13  Mg     1.7     06-13    CAPILLARY BLOOD GLUCOSE    Drug Levels: [] N/A    CSF Analysis: [] N/A    Allergies    No Known Allergies    Intolerances      MEDICATIONS:  Antibiotics:  metroNIDAZOLE  IVPB 500 milliGRAM(s) IV Intermittent every 12 hours    Neuro:  acetaminophen   Tablet 650 milliGRAM(s) Oral every 6 hours PRN  acetaminophen   Tablet. 650 milliGRAM(s) Oral every 6 hours PRN  levETIRAcetam  IVPB 1000 milliGRAM(s) IV Intermittent every 12 hours  ondansetron Injectable 4 milliGRAM(s) IV Push every 6 hours PRN  oxyCODONE    5 mG/acetaminophen 325 mG 1 Tablet(s) Oral every 6 hours PRN    Anticoagulation:  aspirin  chewable 81 milliGRAM(s) Oral daily  heparin  Injectable 5000 Unit(s) SubCutaneous every 8 hours    OTHER:  atorvastatin 80 milliGRAM(s) Oral at bedtime  bisoprolol   Tablet 5 milliGRAM(s) Oral daily  dextrose 40% Gel 15 Gram(s) Oral once PRN  dextrose 50% Injectable 12.5 Gram(s) IV Push once  dextrose 50% Injectable 25 Gram(s) IV Push once  dextrose 50% Injectable 25 Gram(s) IV Push once  docusate sodium Liquid 100 milliGRAM(s) Oral two times a day  glucagon  Injectable 1 milliGRAM(s) IntraMuscular once PRN  polyethylene glycol 3350 17 Gram(s) Oral at bedtime  senna Syrup 10 milliLiter(s) Oral at bedtime    IVF:  dextrose 5%. 1000 milliLiter(s) IV Continuous <Continuous>  multivitamin 1 Tablet(s) Oral daily  potassium chloride   Powder 40 milliEquivalent(s) Oral every 6 hours  sodium chloride 0.9%. 1000 milliLiter(s) IV Continuous <Continuous>    CULTURES:  Culture Results:   >100,000 CFU/ml Escherichia coli (06-09 @ 08:47)  Culture Results:   Normal vaginal prisca (06-06 @ 16:37)    RADIOLOGY & ADDITIONAL TESTS:    ASSESSMENT:  58y Female w/ HTN, HLD, Anxiety, Depression, h/o Breast Cancer w/ TRAM flap reconstruction admitted for expanding right rectus hematoma complicated by right MCA infarction now s/p hemicraniectomy POD #12.    PLAN:  NEURO:  - Neuro check q 4h  - Vitals q 4h  - Pain control PRN  - Staples discontinued   - Continue Keprra 1g BID for seizure prophylaxis   - Continue helmet when out of bed    CARDIOVASCULAR:  - Daily labs, electrolyte repletion PRN  - Goal SBP- normotensive 100-140    PULMONARY:  - Satting well in room air  - Chest PT PRN    RENAL:    GI:  - Continue bowel regimen  - NPO, f/u recommendations of speech and swallow  - CT abdomen at a later time.     HEME:  - H&H stable    ID:  - Continue Flagyl for bacterial vaginosis until 06/14.    ENDO:  - Blood sugar stable  - Goal 140-180    DVT PROPHYLAXIS:  [] Venodynes                                [X] Heparin/Lovenox    DISPOSITION:   -Continue PT/OT, helmet when OOB  - Discussed with Dr. Johnson and Dr. Bentley

## 2018-06-13 NOTE — PROVIDER CONTACT NOTE (MEDICATION) - SITUATION
Gave patient colace and bisoprolol, however patient drooled. Per MD, suction patient as it's patient's baseline.

## 2018-06-13 NOTE — PROGRESS NOTE ADULT - ASSESSMENT
58y Female w/ HTN, HLD, Anxiety, Depression, h/o Breast Cancer w/ TRAM flap reconstruction admitted for expanding right rectus hematoma complicated by right MCA infarction now s/p hemicraniectomy. Also found to have UTI and Bacterial vaginosos.

## 2018-06-13 NOTE — PROGRESS NOTE ADULT - SUBJECTIVE AND OBJECTIVE BOX
INTERVAL HPI/OVERNIGHT EVENTS: CHRISTOPHE    Patient was seen and examined at bedside. As per nurse and patient, no o/n events, patient resting comfortably. Complaining of headache, similar to previous episodes. Patient denies: fever, chills, dizziness, weakness, CP, palpitations, SOB, cough, N/V/D/C, dysuria, changes in bowel movements, LE edema.    ROS: as above    VITAL SIGNS:  T(F): 99.2 (06-13-18 @ 09:37)  HR: 82 (06-13-18 @ 09:35)  BP: 169/89 (06-13-18 @ 09:35)  RR: 17 (06-13-18 @ 09:35)  SpO2: 97% (06-13-18 @ 09:35)  Wt(kg): --    PHYSICAL EXAM:    Constitutional: lying in bed, alert, mildly somnolent  Head: S/p craniotomy on right side with staples in  Eyes: PERRL, EOMI, sclera non-icteric  Mouth: mucous membranes moist  Neck: supple, trachea midline, no masses, no JVD  Respiratory: CTA b/l, good air entry b/l, no wheezing, no rhonchi, no rales, without accessory muscle use and no intercostal retractions  Cardiovascular: RRR, normal S1S2, no M/R/G  Gastrointestinal: soft, NTND, no masses palpable, BS normal  Extremities: Warm, well perfused, pulses equal bilateral upper and lower extremities, no edema, no clubbing  Neurological: AAOx3, CN Grossly intact, left sided neglect. 1/5 left shoulder strength, 0/5 left arm strength, 4/5 left leg strength. Strength full on right  Skin: Normal temperature, warm, dry    MEDICATIONS  (STANDING):  aspirin  chewable 81 milliGRAM(s) Oral daily  atorvastatin 80 milliGRAM(s) Oral at bedtime  bisoprolol   Tablet 5 milliGRAM(s) Oral daily  dextrose 5%. 1000 milliLiter(s) (50 mL/Hr) IV Continuous <Continuous>  dextrose 50% Injectable 12.5 Gram(s) IV Push once  dextrose 50% Injectable 25 Gram(s) IV Push once  dextrose 50% Injectable 25 Gram(s) IV Push once  docusate sodium Liquid 100 milliGRAM(s) Oral two times a day  heparin  Injectable 5000 Unit(s) SubCutaneous every 8 hours  levETIRAcetam  IVPB 1000 milliGRAM(s) IV Intermittent every 12 hours  metroNIDAZOLE  IVPB 500 milliGRAM(s) IV Intermittent every 12 hours  multivitamin 1 Tablet(s) Oral daily  polyethylene glycol 3350 17 Gram(s) Oral at bedtime  potassium chloride   Powder 40 milliEquivalent(s) Oral every 6 hours  senna Syrup 10 milliLiter(s) Oral at bedtime  sodium chloride 0.9%. 1000 milliLiter(s) (75 mL/Hr) IV Continuous <Continuous>    MEDICATIONS  (PRN):  acetaminophen   Tablet 650 milliGRAM(s) Oral every 6 hours PRN For Temp greater than 38 C (100.4 F)  acetaminophen   Tablet. 650 milliGRAM(s) Oral every 6 hours PRN Mild Pain (1 - 3)  dextrose 40% Gel 15 Gram(s) Oral once PRN Blood Glucose LESS THAN 70 milliGRAM(s)/deciliter  glucagon  Injectable 1 milliGRAM(s) IntraMuscular once PRN Glucose LESS THAN 70 milligrams/deciliter  ondansetron Injectable 4 milliGRAM(s) IV Push every 6 hours PRN Nausea  oxyCODONE    5 mG/acetaminophen 325 mG 1 Tablet(s) Oral every 6 hours PRN Moderate Pain (4 - 6)      Allergies    No Known Allergies    Intolerances        LABS:                        9.9    11.2  )-----------( 652      ( 13 Jun 2018 06:02 )             30.7     06-13    141  |  107  |  5<L>  ----------------------------<  110<H>  3.1<L>   |  23  |  0.37<L>    Ca    8.4      13 Jun 2018 06:00  Phos  2.4     06-13  Mg     1.7     06-13            RADIOLOGY & ADDITIONAL TESTS: INTERVAL HPI/OVERNIGHT EVENTS: CHRISTOPHE    Patient was seen and examined at bedside. As per nurse and patient, no o/n events, patient resting comfortably. Complaining of headache, similar to previous episodes. Patient denies: fever, chills, dizziness, weakness, CP, palpitations, SOB, cough, N/V/D/C, dysuria, changes in bowel movements, LE edema.    ROS: as above    VITAL SIGNS:  T(F): 99.2 (06-13-18 @ 09:37)  HR: 82 (06-13-18 @ 09:35)  BP: 169/89 (06-13-18 @ 09:35)  RR: 17 (06-13-18 @ 09:35)  SpO2: 97% (06-13-18 @ 09:35)  Wt(kg): --    PHYSICAL EXAM:    Constitutional: lying in bed, alert, mildly somnolent  Head: S/p craniotomy on right side with staples in  Eyes: PERRL, EOMI, sclera non-icteric  Mouth: mucous membranes moist  Neck: supple, trachea midline, no masses, no JVD  Respiratory: CTA b/l, good air entry b/l, no wheezing, no rhonchi, no rales, without accessory muscle use and no intercostal retractions  Cardiovascular: RRR, normal S1S2, no M/R/G  Gastrointestinal: soft, NTND, no masses palpable, BS normal  Extremities: Warm, well perfused, pulses equal bilateral upper and lower extremities, no edema, no clubbing  Neurological: AAOx3, CN Grossly intact, left sided neglect. 1/5 left shoulder strength, 0/5 left arm strength, 4/5 left leg strength. Strength full on right  Skin: Normal temperature, warm, dry    MEDICATIONS  (STANDING):  aspirin  chewable 81 milliGRAM(s) Oral daily  atorvastatin 80 milliGRAM(s) Oral at bedtime  bisoprolol   Tablet 5 milliGRAM(s) Oral daily  dextrose 5%. 1000 milliLiter(s) (50 mL/Hr) IV Continuous <Continuous>  dextrose 50% Injectable 12.5 Gram(s) IV Push once  dextrose 50% Injectable 25 Gram(s) IV Push once  dextrose 50% Injectable 25 Gram(s) IV Push once  docusate sodium Liquid 100 milliGRAM(s) Oral two times a day  heparin  Injectable 5000 Unit(s) SubCutaneous every 8 hours  levETIRAcetam  IVPB 1000 milliGRAM(s) IV Intermittent every 12 hours  metroNIDAZOLE  IVPB 500 milliGRAM(s) IV Intermittent every 12 hours  multivitamin 1 Tablet(s) Oral daily  polyethylene glycol 3350 17 Gram(s) Oral at bedtime  potassium chloride   Powder 40 milliEquivalent(s) Oral every 6 hours  senna Syrup 10 milliLiter(s) Oral at bedtime  sodium chloride 0.9%. 1000 milliLiter(s) (75 mL/Hr) IV Continuous <Continuous>    MEDICATIONS  (PRN):  acetaminophen   Tablet 650 milliGRAM(s) Oral every 6 hours PRN For Temp greater than 38 C (100.4 F)  acetaminophen   Tablet. 650 milliGRAM(s) Oral every 6 hours PRN Mild Pain (1 - 3)  dextrose 40% Gel 15 Gram(s) Oral once PRN Blood Glucose LESS THAN 70 milliGRAM(s)/deciliter  glucagon  Injectable 1 milliGRAM(s) IntraMuscular once PRN Glucose LESS THAN 70 milligrams/deciliter  ondansetron Injectable 4 milliGRAM(s) IV Push every 6 hours PRN Nausea  oxyCODONE    5 mG/acetaminophen 325 mG 1 Tablet(s) Oral every 6 hours PRN Moderate Pain (4 - 6)      Allergies  No Known Allergies      LABS:                        9.9    11.2  )-----------( 652      ( 13 Jun 2018 06:02 )             30.7     06-13    141  |  107  |  5<L>  ----------------------------<  110<H>  3.1<L>   |  23  |  0.37<L>    Ca    8.4      13 Jun 2018 06:00  Phos  2.4     06-13  Mg     1.7     06-13            RADIOLOGY & ADDITIONAL TESTS:

## 2018-06-13 NOTE — PROVIDER CONTACT NOTE (MEDICATION) - ACTION/TREATMENT ORDERED:
Per MD, suction patient as it's patient's baseline. Per MD, suction patient as it's patient's baseline. Ok to reschedule bisoprolol and colace Per MD, suction patient, as drooling is patient's baseline. Ok per MD to reschedule bisoprolol and colace.

## 2018-06-13 NOTE — PROCEDURE NOTE - NSINDICATIONS_GEN_A_CORE
staple removal
arterial puncture to obtain ABG's/blood sampling/cannulation purposes/critical patient/monitoring purposes
critical illness

## 2018-06-13 NOTE — PROCEDURE NOTE - NSINFORMCONSENT_GEN_A_CORE
This was an emergent procedure.
Benefits, risks, and possible complications of procedure explained to patient/caregiver who verbalized understanding and gave verbal consent.
Benefits, risks, and possible complications of procedure explained to patient/caregiver who verbalized understanding and gave verbal consent.
Benefits, risks, and possible complications of procedure explained to patient/caregiver who verbalized understanding and gave written consent.

## 2018-06-13 NOTE — PROVIDER CONTACT NOTE (MEDICATION) - RECOMMENDATIONS
Recommended Speech and swallow as patient is drooling and failed dysphagia Recommended Speech and swallow as patient is drooling and failed dysphagia, as patient drooled.

## 2018-06-13 NOTE — PROGRESS NOTE ADULT - ATTENDING COMMENTS
Patient seen and examined with Resident.  Agree with above.  Patient did well this morning in terms of interactions but then had fever in the afternoon.  Unclear etiology for the fever so blood cultures sent.  Hard to send U/A since patient incontinent.    1) Secondary stroke prevention -   a) Continue Aspirin 81mg for antiplatelet  b) Continue Atorvastatin 80mg for statin.    2) Stroke risk factors -   a) HTN - sbp 160-170's - Monitor for now, can start antihypertensives slowly for better control.  She's already on low dose Bisoprolol with HR in 50-60's.    b) Hyperlipidemia - on statin.    3) Further workup for stroke -   Consider loop recorder placement to rule out atrial fibrillation given that has mildly dilated left atrium and large stroke    4) GI - repeat swallow eval since she has some cough and throat clearing.    5) DVT prophylaxis - Heparin SQ TID    6) Seizure -   - Continue Keppra 1,000mg BID    7) Dispo - to acute rehab once neurologically cleared    Discussed with Dr. Johnson, Neurosurgery

## 2018-06-13 NOTE — PROCEDURE NOTE - NSPOSTCAREGUIDE_GEN_A_CORE
Verbal/written post procedure instructions were given to patient/caregiver
Verbal/written post procedure instructions were given to patient/caregiver
Instructed patient/caregiver regarding signs and symptoms of infection/Verbal/written post procedure instructions were given to patient/caregiver
Verbal/written post procedure instructions were given to patient/caregiver

## 2018-06-14 DIAGNOSIS — R50.9 FEVER, UNSPECIFIED: ICD-10-CM

## 2018-06-14 LAB
ALBUMIN SERPL ELPH-MCNC: 3.7 G/DL — SIGNIFICANT CHANGE UP (ref 3.3–5)
ALP SERPL-CCNC: 94 U/L — SIGNIFICANT CHANGE UP (ref 40–120)
ALT FLD-CCNC: 34 U/L — SIGNIFICANT CHANGE UP (ref 10–45)
ANION GAP SERPL CALC-SCNC: 12 MMOL/L — SIGNIFICANT CHANGE UP (ref 5–17)
AST SERPL-CCNC: 36 U/L — SIGNIFICANT CHANGE UP (ref 10–40)
BASOPHILS NFR BLD AUTO: 0.3 % — SIGNIFICANT CHANGE UP (ref 0–2)
BILIRUB DIRECT SERPL-MCNC: <0.2 MG/DL — SIGNIFICANT CHANGE UP (ref 0–0.2)
BILIRUB INDIRECT FLD-MCNC: >0.7 MG/DL — SIGNIFICANT CHANGE UP (ref 0.2–1)
BILIRUB SERPL-MCNC: 0.9 MG/DL — SIGNIFICANT CHANGE UP (ref 0.2–1.2)
BUN SERPL-MCNC: 7 MG/DL — SIGNIFICANT CHANGE UP (ref 7–23)
CALCIUM SERPL-MCNC: 9.4 MG/DL — SIGNIFICANT CHANGE UP (ref 8.4–10.5)
CHLORIDE SERPL-SCNC: 101 MMOL/L — SIGNIFICANT CHANGE UP (ref 96–108)
CO2 SERPL-SCNC: 23 MMOL/L — SIGNIFICANT CHANGE UP (ref 22–31)
CREAT SERPL-MCNC: 0.4 MG/DL — LOW (ref 0.5–1.3)
CULTURE RESULTS: NO GROWTH — SIGNIFICANT CHANGE UP
EOSINOPHIL NFR BLD AUTO: 2.8 % — SIGNIFICANT CHANGE UP (ref 0–6)
GLUCOSE SERPL-MCNC: 127 MG/DL — HIGH (ref 70–99)
HCT VFR BLD CALC: 35.2 % — SIGNIFICANT CHANGE UP (ref 34.5–45)
HGB BLD-MCNC: 11.3 G/DL — LOW (ref 11.5–15.5)
LYMPHOCYTES # BLD AUTO: 7.4 % — LOW (ref 13–44)
MAGNESIUM SERPL-MCNC: 2 MG/DL — SIGNIFICANT CHANGE UP (ref 1.6–2.6)
MCHC RBC-ENTMCNC: 28.4 PG — SIGNIFICANT CHANGE UP (ref 27–34)
MCHC RBC-ENTMCNC: 32.1 G/DL — SIGNIFICANT CHANGE UP (ref 32–36)
MCV RBC AUTO: 88.4 FL — SIGNIFICANT CHANGE UP (ref 80–100)
MONOCYTES NFR BLD AUTO: 8.2 % — SIGNIFICANT CHANGE UP (ref 2–14)
NEUTROPHILS NFR BLD AUTO: 81.3 % — HIGH (ref 43–77)
PLATELET # BLD AUTO: 833 K/UL — HIGH (ref 150–400)
POTASSIUM SERPL-MCNC: 3.7 MMOL/L — SIGNIFICANT CHANGE UP (ref 3.5–5.3)
POTASSIUM SERPL-SCNC: 3.7 MMOL/L — SIGNIFICANT CHANGE UP (ref 3.5–5.3)
PROT SERPL-MCNC: 7 G/DL — SIGNIFICANT CHANGE UP (ref 6–8.3)
RBC # BLD: 3.98 M/UL — SIGNIFICANT CHANGE UP (ref 3.8–5.2)
RBC # FLD: 16.4 % — SIGNIFICANT CHANGE UP (ref 10.3–16.9)
SODIUM SERPL-SCNC: 136 MMOL/L — SIGNIFICANT CHANGE UP (ref 135–145)
SPECIMEN SOURCE: SIGNIFICANT CHANGE UP
WBC # BLD: 11.9 K/UL — HIGH (ref 3.8–10.5)
WBC # FLD AUTO: 11.9 K/UL — HIGH (ref 3.8–10.5)

## 2018-06-14 PROCEDURE — 99254 IP/OBS CNSLTJ NEW/EST MOD 60: CPT | Mod: GC

## 2018-06-14 PROCEDURE — 74019 RADEX ABDOMEN 2 VIEWS: CPT | Mod: 26

## 2018-06-14 PROCEDURE — 99233 SBSQ HOSP IP/OBS HIGH 50: CPT

## 2018-06-14 PROCEDURE — 70450 CT HEAD/BRAIN W/O DYE: CPT | Mod: 26

## 2018-06-14 RX ORDER — LISINOPRIL 2.5 MG/1
20 TABLET ORAL DAILY
Qty: 0 | Refills: 0 | Status: DISCONTINUED | OUTPATIENT
Start: 2018-06-14 | End: 2018-06-19

## 2018-06-14 RX ORDER — POTASSIUM CHLORIDE 20 MEQ
40 PACKET (EA) ORAL ONCE
Qty: 0 | Refills: 0 | Status: COMPLETED | OUTPATIENT
Start: 2018-06-14 | End: 2018-06-14

## 2018-06-14 RX ORDER — POTASSIUM CHLORIDE 20 MEQ
10 PACKET (EA) ORAL
Qty: 0 | Refills: 0 | Status: COMPLETED | OUTPATIENT
Start: 2018-06-14 | End: 2018-06-14

## 2018-06-14 RX ADMIN — Medication 10 MILLIGRAM(S): at 06:19

## 2018-06-14 RX ADMIN — OXYCODONE AND ACETAMINOPHEN 1 TABLET(S): 5; 325 TABLET ORAL at 10:20

## 2018-06-14 RX ADMIN — OXYCODONE AND ACETAMINOPHEN 1 TABLET(S): 5; 325 TABLET ORAL at 09:41

## 2018-06-14 RX ADMIN — LEVETIRACETAM 400 MILLIGRAM(S): 250 TABLET, FILM COATED ORAL at 11:16

## 2018-06-14 RX ADMIN — OXYCODONE AND ACETAMINOPHEN 1 TABLET(S): 5; 325 TABLET ORAL at 18:50

## 2018-06-14 RX ADMIN — LISINOPRIL 20 MILLIGRAM(S): 2.5 TABLET ORAL at 11:16

## 2018-06-14 RX ADMIN — Medication 650 MILLIGRAM(S): at 15:15

## 2018-06-14 RX ADMIN — Medication 100 MILLIGRAM(S): at 06:19

## 2018-06-14 RX ADMIN — HEPARIN SODIUM 5000 UNIT(S): 5000 INJECTION INTRAVENOUS; SUBCUTANEOUS at 06:19

## 2018-06-14 RX ADMIN — HEPARIN SODIUM 5000 UNIT(S): 5000 INJECTION INTRAVENOUS; SUBCUTANEOUS at 14:59

## 2018-06-14 RX ADMIN — Medication 40 MILLIEQUIVALENT(S): at 12:14

## 2018-06-14 RX ADMIN — Medication 1 TABLET(S): at 11:16

## 2018-06-14 RX ADMIN — HEPARIN SODIUM 5000 UNIT(S): 5000 INJECTION INTRAVENOUS; SUBCUTANEOUS at 22:46

## 2018-06-14 RX ADMIN — OXYCODONE AND ACETAMINOPHEN 1 TABLET(S): 5; 325 TABLET ORAL at 18:12

## 2018-06-14 RX ADMIN — Medication 100 MILLIEQUIVALENT(S): at 09:53

## 2018-06-14 RX ADMIN — ATORVASTATIN CALCIUM 80 MILLIGRAM(S): 80 TABLET, FILM COATED ORAL at 22:46

## 2018-06-14 RX ADMIN — Medication 100 MILLIGRAM(S): at 10:31

## 2018-06-14 RX ADMIN — Medication 100 MILLIEQUIVALENT(S): at 07:52

## 2018-06-14 RX ADMIN — BISOPROLOL FUMARATE 5 MILLIGRAM(S): 10 TABLET, FILM COATED ORAL at 06:19

## 2018-06-14 RX ADMIN — Medication 81 MILLIGRAM(S): at 11:16

## 2018-06-14 NOTE — CONSULT NOTE ADULT - SUBJECTIVE AND OBJECTIVE BOX
HPI:  58F with a hx of left breast cancer s/p left mastectomy with TRAM flap reconstruction (4-5 years prior), recently placed on Plavix (about a month ago) by her cardiologist 2/2 calcified arterial disease and is now presenting to the [facility] ED with a painful bulge in her right abdomen.  Pt reports that she's had a cough for several days and yesterday noticed a bulge in her right abdomen that has grown and only become more painful.  She at times has difficulty breathing and some lightheadedness but otherwise denies fevers, chills, chest pain, nausea, vomiting, diarrhea, dysuria.    PMH: HTN, CAD?, HLD, Anxiety, Depression  Meds: bisoprolol 5mg qd, lorazepam 1mg q8?, Plavix 5qd, rosuvastatin, HCTZ, buproprion, baclofen  NKDA  PSH: Left mastectomy with TRAM rotational flap reconstruction  Social: 1/2 bottle of wine most nights, remote cigarette hx, denies IVDA (30 May 2018 13:13)      PAST MEDICAL & SURGICAL HISTORY:  Anxiety  Hypertension  Breast CA  Previous  section  History of mastectomy, left: with TRAM flap recon        REVIEW OF SYSTEMS:    CONSTITUTIONAL: No weakness, fevers or chills  EYES/ENT: No visual changes;  No vertigo or throat pain   NECK: No pain or stiffness  RESPIRATORY: No cough, wheezing, hemoptysis; No shortness of breath  CARDIOVASCULAR: No chest pain or palpitations  GASTROINTESTINAL: No abdominal or epigastric pain. No nausea, vomiting, or hematemesis; No diarrhea or constipation. No melena or hematochezia.  GENITOURINARY: No dysuria, frequency or hematuria  NEUROLOGICAL: No numbness or weakness  SKIN: No itching, burning, rashes, or lesions   MSK: no joint pain, no joint swelling  All other review of systems is negative unless indicated above.      MEDICATIONS  (STANDING):  aspirin  chewable 81 milliGRAM(s) Oral daily  atorvastatin 80 milliGRAM(s) Oral at bedtime  bisoprolol   Tablet 5 milliGRAM(s) Oral daily  docusate sodium Liquid 100 milliGRAM(s) Oral two times a day  heparin  Injectable 5000 Unit(s) SubCutaneous every 8 hours  levETIRAcetam  IVPB 1000 milliGRAM(s) IV Intermittent every 12 hours  lisinopril 20 milliGRAM(s) Oral daily  multivitamin 1 Tablet(s) Oral daily  polyethylene glycol 3350 17 Gram(s) Oral at bedtime  senna Syrup 10 milliLiter(s) Oral at bedtime    MEDICATIONS  (PRN):  acetaminophen   Tablet 650 milliGRAM(s) Oral every 6 hours PRN For Temp greater than 38 C (100.4 F)  acetaminophen   Tablet. 650 milliGRAM(s) Oral every 6 hours PRN Mild Pain (1 - 3)  ondansetron Injectable 4 milliGRAM(s) IV Push every 6 hours PRN Nausea  oxyCODONE    5 mG/acetaminophen 325 mG 1 Tablet(s) Oral every 6 hours PRN Moderate Pain (4 - 6)      Allergies    No Known Allergies    Intolerances        SOCIAL HISTORY:    FAMILY HISTORY:      Vital Signs Last 24 Hrs  T(C): 37.9 (2018 12:30), Max: 38.3 (2018 16:00)  T(F): 100.3 (2018 12:30), Max: 101 (2018 16:00)  HR: 70 (2018 12:30) (62 - 78)  BP: 149/75 (2018 12:30) (143/75 - 183/87)  BP(mean): 105 (2018 12:30) (103 - 125)  RR: 17 (2018 12:30) (14 - 23)  SpO2: 97% (2018 12:30) (93% - 100%)      PHYSICAL EXAM:    Constitutional: WDWN resting comfortably in bed; NAD  Head: NC/AT  Eyes: PERRLA, EOMI, clear conjunctiva  ENT: no nasal discharge; no oropharyngeal erythema or exudates; dry oral mucosa  Neck: supple; no JVD or thyromegaly  Respiratory: CTA B/L; no W/R/R, no retractions  Cardiac: +S1/S2; RRR; no M/R/G; PMI non-displaced  Gastrointestinal: soft, NT/ND; no rebound or guarding; +BS, no hepatosplenomegaly  Extremities: WWP, no clubbing or cyanosis; no peripheral edema  Vascular: 2+ radial, DP/PT pulses B/L  Lymphatic: no submandibular or cervical LAD  Skin: no rash, no ulcers  Neurologic: AAOx3; answers questions appropriately, follows commands, moves all extremities, CNII-XII grossly intact; no focal deficits, motor 5/5 in UE and LE, Reflexes 2+ in UE and LE b/l      LABS:                        11.3   11.9  )-----------( 833      ( 2018 06:16 )             35.2     06-14    136  |  101  |  7   ----------------------------<  127<H>  3.7   |  23  |  0.40<L>    Ca    9.4      2018 06:15  Phos  2.4       Mg     2.0             Urinalysis Basic - ( 2018 20:01 )    Color: Yellow / Appearance: SL Cloudy / S.010 / pH: x  Gluc: x / Ketone: NEGATIVE  / Bili: Negative / Urobili: 1.0 E.U./dL   Blood: x / Protein: NEGATIVE mg/dL / Nitrite: NEGATIVE   Leuk Esterase: NEGATIVE / RBC: x / WBC x   Sq Epi: x / Non Sq Epi: x / Bacteria: x        RADIOLOGY & ADDITIONAL STUDIES: HPI:  58F with a hx of left breast cancer s/p left mastectomy with TRAM flap reconstruction (4-5 years prior), HTN, HLD, anxiety who initially came in on  with R rectus abdominal hematoma after recent initiation of plavix for non-obstructive CAD.  Patient found to have 2 pseudo-aneurysms of the artery supplying the R rectus abd muscle s/p ultrasound guided thrombin injection. Post-operative course c/b hypotension,AMS and bradypnea. CTH s/f large R MCA stroke with mass effect c/b herniation of the cerebral tonsils s/p emergent decompressive hemicraniectomy. Patient with persistent fevers since  despite successful tx of pansensitive E. Coli UTI from - with CTX 1g qd. ID consulted for persistent fevers.     PAST MEDICAL & SURGICAL HISTORY:  Anxiety  Hypertension  Breast CA  Previous  section  History of mastectomy, left: with TRAM flap recon    REVIEW OF SYSTEMS:    CONSTITUTIONAL: No weakness, fevers or chills  EYES/ENT: No visual changes;  No vertigo or throat pain   NECK: No pain or stiffness  RESPIRATORY: No cough, wheezing, hemoptysis; No shortness of breath  CARDIOVASCULAR: No chest pain or palpitations  GASTROINTESTINAL: No abdominal or epigastric pain. No nausea, vomiting, or hematemesis; No diarrhea or constipation. No melena or hematochezia.  GENITOURINARY: No dysuria, frequency or hematuria  NEUROLOGICAL: No numbness or weakness  SKIN: No itching, burning, rashes, or lesions   MSK: no joint pain, no joint swelling  All other review of systems is negative unless indicated above.      MEDICATIONS  (STANDING):  aspirin  chewable 81 milliGRAM(s) Oral daily  atorvastatin 80 milliGRAM(s) Oral at bedtime  bisoprolol   Tablet 5 milliGRAM(s) Oral daily  docusate sodium Liquid 100 milliGRAM(s) Oral two times a day  heparin  Injectable 5000 Unit(s) SubCutaneous every 8 hours  levETIRAcetam  IVPB 1000 milliGRAM(s) IV Intermittent every 12 hours  lisinopril 20 milliGRAM(s) Oral daily  multivitamin 1 Tablet(s) Oral daily  polyethylene glycol 3350 17 Gram(s) Oral at bedtime  senna Syrup 10 milliLiter(s) Oral at bedtime    MEDICATIONS  (PRN):  acetaminophen   Tablet 650 milliGRAM(s) Oral every 6 hours PRN For Temp greater than 38 C (100.4 F)  acetaminophen   Tablet. 650 milliGRAM(s) Oral every 6 hours PRN Mild Pain (1 - 3)  ondansetron Injectable 4 milliGRAM(s) IV Push every 6 hours PRN Nausea  oxyCODONE    5 mG/acetaminophen 325 mG 1 Tablet(s) Oral every 6 hours PRN Moderate Pain (4 - 6)      Allergies    No Known Allergies    Intolerances        SOCIAL HISTORY:    FAMILY HISTORY:      Vital Signs Last 24 Hrs  T(C): 37.9 (2018 12:30), Max: 38.3 (2018 16:00)  T(F): 100.3 (2018 12:30), Max: 101 (2018 16:00)  HR: 70 (2018 12:30) (62 - 78)  BP: 149/75 (2018 12:30) (143/75 - 183/87)  BP(mean): 105 (2018 12:30) (103 - 125)  RR: 17 (2018 12:30) (14 - 23)  SpO2: 97% (2018 12:30) (93% - 100%)      PHYSICAL EXAM:    Constitutional: WDWN resting comfortably in bed; NAD  Head: NC/AT  Eyes: PERRLA, EOMI, clear conjunctiva  ENT: no nasal discharge; no oropharyngeal erythema or exudates; dry oral mucosa  Neck: supple; no JVD or thyromegaly  Respiratory: CTA B/L; no W/R/R, no retractions  Cardiac: +S1/S2; RRR; no M/R/G; PMI non-displaced  Gastrointestinal: soft, NT/ND; no rebound or guarding; +BS, no hepatosplenomegaly  Extremities: WWP, no clubbing or cyanosis; no peripheral edema  Vascular: 2+ radial, DP/PT pulses B/L  Lymphatic: no submandibular or cervical LAD  Skin: no rash, no ulcers  Neurologic: AAOx3; answers questions appropriately, follows commands, moves all extremities, CNII-XII grossly intact; no focal deficits, motor 5/5 in UE and LE, Reflexes 2+ in UE and LE b/l      LABS:                        11.3   11.9  )-----------( 833      ( 2018 06:16 )             35.2     06-14    136  |  101  |  7   ----------------------------<  127<H>  3.7   |  23  |  0.40<L>    Ca    9.4      2018 06:15  Phos  2.4     06-13  Mg     2.0     06-14        Urinalysis Basic - ( 2018 20:01 )    Color: Yellow / Appearance: SL Cloudy / S.010 / pH: x  Gluc: x / Ketone: NEGATIVE  / Bili: Negative / Urobili: 1.0 E.U./dL   Blood: x / Protein: NEGATIVE mg/dL / Nitrite: NEGATIVE   Leuk Esterase: NEGATIVE / RBC: x / WBC x   Sq Epi: x / Non Sq Epi: x / Bacteria: x        RADIOLOGY & ADDITIONAL STUDIES: HPI:  58F with a hx of left breast cancer s/p left mastectomy with TRAM flap reconstruction (4-5 years prior), HTN, HLD, anxiety who initially came in on  with R rectus abdominal hematoma after recent initiation of plavix for non-obstructive CAD.  Patient found to have 2 pseudo-aneurysms of the artery supplying the R rectus abd muscle s/p ultrasound guided thrombin injection. Post-operative course c/b hypotension,AMS and bradypnea. CTH s/f large R MCA stroke with mass effect c/b herniation of the cerebral tonsils s/p emergent decompressive hemicraniectomy. Patient with persistent fevers since  despite successful tx of pansensitive E. Coli UTI from - with CTX 1g qd. ID consulted for persistent fevers.     PAST MEDICAL & SURGICAL HISTORY:  Anxiety  Hypertension  Breast CA  Previous  section  History of mastectomy, left: with TRAM flap recon    REVIEW OF SYSTEMS:    CONSTITUTIONAL: No weakness, fevers or chills  EYES/ENT: No visual changes;  No vertigo or throat pain   NECK: No pain or stiffness  RESPIRATORY: No cough, wheezing, hemoptysis; No shortness of breath  CARDIOVASCULAR: No chest pain or palpitations  GASTROINTESTINAL: No abdominal or epigastric pain. No nausea, vomiting, or hematemesis; No diarrhea or constipation. No melena or hematochezia.  GENITOURINARY: No dysuria, frequency or hematuria  NEUROLOGICAL: No numbness or weakness  SKIN: No itching, burning, rashes, or lesions   MSK: no joint pain, no joint swelling  All other review of systems is negative unless indicated above.      MEDICATIONS  (STANDING):  aspirin  chewable 81 milliGRAM(s) Oral daily  atorvastatin 80 milliGRAM(s) Oral at bedtime  bisoprolol   Tablet 5 milliGRAM(s) Oral daily  docusate sodium Liquid 100 milliGRAM(s) Oral two times a day  heparin  Injectable 5000 Unit(s) SubCutaneous every 8 hours  levETIRAcetam  IVPB 1000 milliGRAM(s) IV Intermittent every 12 hours  lisinopril 20 milliGRAM(s) Oral daily  multivitamin 1 Tablet(s) Oral daily  polyethylene glycol 3350 17 Gram(s) Oral at bedtime  senna Syrup 10 milliLiter(s) Oral at bedtime    MEDICATIONS  (PRN):  acetaminophen   Tablet 650 milliGRAM(s) Oral every 6 hours PRN For Temp greater than 38 C (100.4 F)  acetaminophen   Tablet. 650 milliGRAM(s) Oral every 6 hours PRN Mild Pain (1 - 3)  ondansetron Injectable 4 milliGRAM(s) IV Push every 6 hours PRN Nausea  oxyCODONE    5 mG/acetaminophen 325 mG 1 Tablet(s) Oral every 6 hours PRN Moderate Pain (4 - 6)      Allergies    No Known Allergies    Intolerances        SOCIAL HISTORY:    FAMILY HISTORY:      Vital Signs Last 24 Hrs  T(C): 37.9 (2018 12:30), Max: 38.3 (2018 16:00)  T(F): 100.3 (2018 12:30), Max: 101 (2018 16:00)  HR: 70 (2018 12:30) (62 - 78)  BP: 149/75 (2018 12:30) (143/75 - 183/87)  BP(mean): 105 (2018 12:30) (103 - 125)  RR: 17 (2018 12:30) (14 - 23)  SpO2: 97% (2018 12:30) (93% - 100%)      PHYSICAL EXAM:    Constitutional: lying in bed, alert, very pleasant  Head: S/p craniotomy on right side with staples in; wound site c/d/i  Eyes: PERRL, EOMI, sclera non-icteric  Mouth: mucous membranes moist  Neck: supple, trachea midline, no masses, no JVD  Respiratory: CTA b/l, good air entry b/l, no wheezing, no rhonchi, no rales, without accessory muscle use and no intercostal retractions  Cardiovascular: RRR, normal S1S2, no M/R/G  Gastrointestinal: distended, tenderness to palpation of the R midepigastrium and the RLQ; no rebound or guarding of note  Extremities: Warm, well perfused, pulses equal bilateral upper and lower extremities, no edema, no clubbing  Neurological: AAOx3, CN 2-12 intact on the R side; CN: 7 not intact on the L side; left sided neglect. 0/5 left shoulder strength, 0/5 left arm strength, 0/5 left leg strength (but ? as primary team mentions patient does have at least 3/5 strength in the LLE, but has difficulty initiation movements in the LLE 2/2 hemineglect). 5/5 strength b/l in the RUE and RLE. Sensation in RUE and RLE intact; no sensation in the LUE and LLE    LABS:                        11.3   11.9  )-----------( 833      ( 2018 06:16 )             35.2     06-14    136  |  101  |  7   ----------------------------<  127<H>  3.7   |  23  |  0.40<L>    Ca    9.4      2018 06:15  Phos  2.4     06-13  Mg     2.0     -14        Urinalysis Basic - ( 2018 20:01 )    Color: Yellow / Appearance: SL Cloudy / S.010 / pH: x  Gluc: x / Ketone: NEGATIVE  / Bili: Negative / Urobili: 1.0 E.U./dL   Blood: x / Protein: NEGATIVE mg/dL / Nitrite: NEGATIVE   Leuk Esterase: NEGATIVE / RBC: x / WBC x   Sq Epi: x / Non Sq Epi: x / Bacteria: x        RADIOLOGY & ADDITIONAL STUDIES: HPI:  58F with a hx of left breast cancer s/p left mastectomy with TRAM flap reconstruction (4-5 years prior), HTN, HLD, anxiety who initially came in on  with R rectus abdominal hematoma after recent initiation of plavix for non-obstructive CAD.  Patient found to have 2 pseudo-aneurysms of the artery supplying the R rectus abd muscle s/p ultrasound guided thrombin injection. Post-operative course c/b hypotension,AMS and bradypnea. CTH s/f large R MCA stroke with mass effect c/b herniation of the cerebral tonsils s/p emergent decompressive hemicraniectomy. Patient with persistent fevers since  despite successful tx of pansensitive E. Coli UTI from - with CTX 1g qd. ID consulted for persistent fevers.     PAST MEDICAL & SURGICAL HISTORY:  Anxiety  Hypertension  Breast CA  Previous  section  History of mastectomy, left: with TRAM flap recon    REVIEW OF SYSTEMS:    CONSTITUTIONAL: No weakness, fevers or chills  EYES/ENT: No visual changes;  No vertigo or throat pain   NECK: No pain or stiffness  RESPIRATORY: No cough, wheezing, hemoptysis; No shortness of breath  CARDIOVASCULAR: No chest pain or palpitations  GASTROINTESTINAL: No abdominal or epigastric pain. No nausea, vomiting, or hematemesis; No diarrhea or constipation. No melena or hematochezia.  GENITOURINARY: No dysuria, frequency or hematuria  NEUROLOGICAL: No numbness or weakness  SKIN: No itching, burning, rashes, or lesions   MSK: no joint pain, no joint swelling  All other review of systems is negative unless indicated above.      MEDICATIONS  (STANDING):  aspirin  chewable 81 milliGRAM(s) Oral daily  atorvastatin 80 milliGRAM(s) Oral at bedtime  bisoprolol   Tablet 5 milliGRAM(s) Oral daily  docusate sodium Liquid 100 milliGRAM(s) Oral two times a day  heparin  Injectable 5000 Unit(s) SubCutaneous every 8 hours  levETIRAcetam  IVPB 1000 milliGRAM(s) IV Intermittent every 12 hours  lisinopril 20 milliGRAM(s) Oral daily  multivitamin 1 Tablet(s) Oral daily  polyethylene glycol 3350 17 Gram(s) Oral at bedtime  senna Syrup 10 milliLiter(s) Oral at bedtime    MEDICATIONS  (PRN):  acetaminophen   Tablet 650 milliGRAM(s) Oral every 6 hours PRN For Temp greater than 38 C (100.4 F)  acetaminophen   Tablet. 650 milliGRAM(s) Oral every 6 hours PRN Mild Pain (1 - 3)  ondansetron Injectable 4 milliGRAM(s) IV Push every 6 hours PRN Nausea  oxyCODONE    5 mG/acetaminophen 325 mG 1 Tablet(s) Oral every 6 hours PRN Moderate Pain (4 - 6)      Allergies    No Known Allergies    Intolerances        SOCIAL HISTORY:  She is , was born in [place].  Worked as  for [employer] shows.    FAMILY HISTORY:  No pertinent FHx in first degree relatives.      Vital Signs Last 24 Hrs  T(C): 37.9 (2018 12:30), Max: 38.3 (2018 16:00)  T(F): 100.3 (2018 12:30), Max: 101 (2018 16:00)  HR: 70 (2018 12:30) (62 - 78)  BP: 149/75 (2018 12:30) (143/75 - 183/87)  BP(mean): 105 (2018 12:30) (103 - 125)  RR: 17 (2018 12:30) (14 - 23)  SpO2: 97% (2018 12:30) (93% - 100%)      PHYSICAL EXAM:    Constitutional: lying in bed, alert, very pleasant  Head: S/p craniotomy on right side with staples in; wound site c/d/i  Eyes: PERRL, EOMI, sclera non-icteric  Mouth: mucous membranes moist  Neck: supple, trachea midline, no masses, no JVD  Respiratory: CTA b/l, good air entry b/l, no wheezing, no rhonchi, no rales, without accessory muscle use and no intercostal retractions  Cardiovascular: RRR, normal S1S2, no M/R/G  Gastrointestinal: distended, tenderness to palpation of the R midepigastrium and the RLQ; no rebound or guarding of note  Extremities: Warm, well perfused, pulses equal bilateral upper and lower extremities, no edema, no clubbing  Neurological: AAOx3, CN 2-12 intact on the R side; CN: 7 not intact on the L side; left sided neglect. 0/5 left shoulder strength, 0/5 left arm strength, 0/5 left leg strength (but ? as primary team mentions patient does have at least 3/5 strength in the LLE, but has difficulty initiation movements in the LLE 2/2 hemineglect). 5/5 strength b/l in the RUE and RLE. Sensation in RUE and RLE intact; no sensation in the LUE and LLE    LABS:                        11.3   11.9  )-----------( 833      ( 2018 06:16 )             35.2     06-14    136  |  101  |  7   ----------------------------<  127<H>  3.7   |  23  |  0.40<L>    Ca    9.4      2018 06:15  Phos  2.4     06-13  Mg     2.0     -14        Urinalysis Basic - ( 2018 20:01 )    Color: Yellow / Appearance: SL Cloudy / S.010 / pH: x  Gluc: x / Ketone: NEGATIVE  / Bili: Negative / Urobili: 1.0 E.U./dL   Blood: x / Protein: NEGATIVE mg/dL / Nitrite: NEGATIVE   Leuk Esterase: NEGATIVE / RBC: x / WBC x   Sq Epi: x / Non Sq Epi: x / Bacteria: x        RADIOLOGY & ADDITIONAL STUDIES:

## 2018-06-14 NOTE — CONSULT NOTE ADULT - CONSULT REASON
Stroke Code
58F with a hx of TRAM flap reconstruction on plavix now with a spontaneous right rectus expanding hematoma taken to IR for Rt groin accesss for dx angio, and rt abd percutaneous thrombin injection of 2 pseudoaneurysms in abd wall. Pt over sedated post procedure Rapid response called and pt given narcan.
Stroke
Stroke code
persistent fever

## 2018-06-14 NOTE — CONSULT NOTE ADULT - ASSESSMENT
58F with a hx of left breast cancer s/p left mastectomy with TRAM flap reconstruction (4-5 years prior), HTN, HLD, anxiety who initially came in on 5/31 with R rectus abdominal hematoma after recent initiation of plavix for non-obstructive CAD.  Patient found to have 2 pseudo-aneurysms of the artery supplying the R rectus abd muscle s/p ultrasound guided thrombin injection. Post-operative course c/b hypotension, AMS and bradypnea. CTH s/f large R MCA stroke with mass effect resulting in herniation of the cerebral tonsils s/p emergent decompressive hemicraniectomy. Patient with persistent fevers since June 5th despite successful tx of pansensitive E. Coli UTI from June 9-12 with CTX 1g qd. ID consulted for persistent fevers.     Constitutional: lying in bed, alert, very pleasant  Head: S/p craniotomy on right side with staples in; wound site c/d/i  Eyes: PERRL, EOMI, sclera non-icteric  Mouth: mucous membranes moist  Neck: supple, trachea midline, no masses, no JVD  Respiratory: CTA b/l, good air entry b/l, no wheezing, no rhonchi, no rales, without accessory muscle use and no intercostal retractions  Cardiovascular: RRR, normal S1S2, no M/R/G  Gastrointestinal: distended, tenderness to palpation of the R midepigastrium and the RLQ; no rebound or guarding of note  Extremities: Warm, well perfused, pulses equal bilateral upper and lower extremities, no edema, no clubbing  Neurological: AAOx3, CN 2-12 intact on the R side; CN 4,6,7, 11 not intact on the L side; left sided neglect. 0/5 left shoulder strength, 0/5 left arm strength, 0/5 left leg strength. 5/5 strength. Sensation in upper and lower extremities intact. 58F with a hx of left breast cancer s/p left mastectomy with TRAM flap reconstruction (4-5 years prior), HTN, HLD, anxiety who initially came in on 5/31 with R rectus abdominal hematoma after recent initiation of plavix for non-obstructive CAD.  Patient found to have 2 pseudo-aneurysms of the artery supplying the R rectus abd muscle s/p ultrasound guided thrombin injection. Post-operative course c/b hypotension, AMS and bradypnea. CTH s/f large R MCA stroke with mass effect resulting in herniation of the cerebral tonsils s/p emergent decompressive hemicraniectomy. Patient with persistent fevers since June 5th despite successful tx of pansensitive E. Coli UTI from June 9-12 with CTX 1g qd. ID consulted for persistent fevers.     #persistent fevers  -Tmax 101 at 4 pm on June 13  -s/p blood cx NGTD  -UA (-) but was positive for nitrites and WBCs on June 7th  -urine cx from 6/13 NGTD  -patient noted to have abdominal tenderness on the R side on exam; also complaining of headache and photophobia today; potential etiologies of patient's fever could be superimposed infection on rectus abdominal muscle hematoma vs intra-cerebral infection after recent instrumentation  -consider gallium scan for further evaluation of etiology of fevers  -continue to observe off abx at this time 58F with a hx of left breast cancer s/p left mastectomy with TRAM flap reconstruction (4-5 years prior), HTN, HLD, anxiety who initially came in on 5/31 with R rectus abdominal hematoma after recent initiation of plavix for non-obstructive CAD.  Patient found to have 2 pseudo-aneurysms of the artery supplying the R rectus abd muscle s/p ultrasound guided thrombin injection. Post-operative course c/b hypotension, AMS and bradypnea. CTH s/f large R MCA stroke with mass effect resulting in herniation of the cerebral tonsils s/p emergent decompressive hemicraniectomy. Patient with persistent fevers since June 5th despite successful tx of pansensitive E. Coli UTI from June 9-12 with CTX 1g qd. ID consulted for persistent fevers.     #persistent fevers  -Tmax 101 at 4 pm on June 13  -s/p blood cx NGTD  -UA (-) but was positive for nitrites and WBCs on June 7th  -urine cx from 6/13 NGTD  -patient noted to have abdominal tenderness on the R side on exam; also complaining of headache and photophobia today; potential etiologies of patient's fever could be superimposed infection on rectus abdominal muscle hematoma vs intra-cerebral infection after recent instrumentation  -consider gallium scan for further evaluation of etiology of fevers  -continue to observe off abx at this time  Recommendations discussed with primary team. 58F with a hx of left breast cancer s/p left mastectomy with TRAM flap reconstruction (4-5 years prior), HTN, HLD, anxiety who initially came in on 5/31 with R rectus abdominal hematoma after recent initiation of plavix for non-obstructive CAD.  Patient found to have 2 pseudo-aneurysms of the artery supplying the R rectus abd muscle s/p ultrasound guided thrombin injection. Post-operative course c/b hypotension, AMS and bradypnea. CTH s/f large R MCA stroke with mass effect resulting in herniation of the cerebral tonsils s/p emergent decompressive hemicraniectomy. Patient with persistent fevers since June 5th despite treatment for possible pansensitive E. Coli UTI from June 9-12 with CTX 1g qd. ID consulted for persistent fevers.     #persistent fevers  -Tmax 101 at 4 pm on June 13  -s/p blood cx NGTD  -UA (-) but was positive for nitrites and WBCs on June 7th  -urine cx from 6/13 NGTD  -patient noted to have abdominal tenderness on the R side on exam; also complaining of headache and photophobia today; potential etiologies of patient's fever could be superimposed infection on rectus abdominal muscle hematoma vs intra-cerebral infection after recent instrumentation  -consider gallium scan for further evaluation of etiology of fevers  -continue to observe off abx at this time  Recommendations discussed with primary team.

## 2018-06-14 NOTE — PROGRESS NOTE ADULT - SUBJECTIVE AND OBJECTIVE BOX
INTERVAL HPI/OVERNIGHT EVENTS: added hydralazine 10mg q8h for SBP>160. UA negative. Mental status improved from lethargy at beginning of shift to conversational near midnight.     Patient was seen and examined at bedside. As per nurse and patient, no o/n events, patient resting comfortably, no headahce. No complaints at this time. Patient denies: fever, chills, dizziness, weakness, CP, palpitations, SOB, cough, N/V/D/C, dysuria, changes in bowel movements, LE edema.    ROS: as above    VITAL SIGNS:  T(F): 100.3 (18 @ 12:30)  HR: 70 (18 @ 12:30)  BP: 149/75 (18 @ 12:30)  RR: 17 (18 @ 12:30)  SpO2: 97% (18 @ 12:30)  Wt(kg): --    PHYSICAL EXAM:    Constitutional: lying in bed, alert, very somnolent this AM  Head: S/p craniotomy on right side with staples in  Eyes: PERRL, EOMI, sclera non-icteric  Mouth: mucous membranes moist  Neck: supple, trachea midline, no masses, no JVD  Respiratory: CTA b/l, good air entry b/l, no wheezing, no rhonchi, no rales, without accessory muscle use and no intercostal retractions  Cardiovascular: RRR, normal S1S2, no M/R/G  Gastrointestinal: soft, NTND, no masses palpable, BS normal  Extremities: Warm, well perfused, pulses equal bilateral upper and lower extremities, no edema, no clubbing  Neurological: AAOx3, CN 2-12 intact, left sided neglect. 1/5 left shoulder strength, 0/5 left arm strength, 4/5 left leg strength. 5/5 strength   Skin: Normal temperature, warm, dry  MEDICATIONS  (STANDING):  aspirin  chewable 81 milliGRAM(s) Oral daily  atorvastatin 80 milliGRAM(s) Oral at bedtime  bisoprolol   Tablet 5 milliGRAM(s) Oral daily  docusate sodium Liquid 100 milliGRAM(s) Oral two times a day  heparin  Injectable 5000 Unit(s) SubCutaneous every 8 hours  levETIRAcetam  IVPB 1000 milliGRAM(s) IV Intermittent every 12 hours  lisinopril 20 milliGRAM(s) Oral daily  multivitamin 1 Tablet(s) Oral daily  polyethylene glycol 3350 17 Gram(s) Oral at bedtime  senna Syrup 10 milliLiter(s) Oral at bedtime    MEDICATIONS  (PRN):  acetaminophen   Tablet 650 milliGRAM(s) Oral every 6 hours PRN For Temp greater than 38 C (100.4 F)  acetaminophen   Tablet. 650 milliGRAM(s) Oral every 6 hours PRN Mild Pain (1 - 3)  ondansetron Injectable 4 milliGRAM(s) IV Push every 6 hours PRN Nausea  oxyCODONE    5 mG/acetaminophen 325 mG 1 Tablet(s) Oral every 6 hours PRN Moderate Pain (4 - 6)      Allergies    No Known Allergies    Intolerances        LABS:                        11.3   11.9  )-----------( 833      ( 2018 06:16 )             35.2     06-14    136  |  101  |  7   ----------------------------<  127<H>  3.7   |  23  |  0.40<L>    Ca    9.4      2018 06:15  Phos  2.4     06-13  Mg     2.0     06-14        Urinalysis Basic - ( 2018 20:01 )    Color: Yellow / Appearance: SL Cloudy / S.010 / pH: x  Gluc: x / Ketone: NEGATIVE  / Bili: Negative / Urobili: 1.0 E.U./dL   Blood: x / Protein: NEGATIVE mg/dL / Nitrite: NEGATIVE   Leuk Esterase: NEGATIVE / RBC: x / WBC x   Sq Epi: x / Non Sq Epi: x / Bacteria: x        RADIOLOGY & ADDITIONAL TESTS: INTERVAL HPI/OVERNIGHT EVENTS: added hydralazine 10mg q8h for SBP>160. UA negative. Mental status improved from lethargy at beginning of shift to conversational near midnight.     Patient was seen and examined at bedside. As per nurse and patient, no o/n events, patient resting comfortably, no headahce. No complaints at this time. Patient denies: fever, chills, dizziness, weakness, CP, palpitations, SOB, cough, N/V/D/C, dysuria, changes in bowel movements, LE edema.    ROS: as above    VITAL SIGNS:  T(F): 100.3 (18 @ 12:30)  HR: 70 (18 @ 12:30)  BP: 149/75 (18 @ 12:30)  RR: 17 (18 @ 12:30)  SpO2: 97% (18 @ 12:30)  Wt(kg): --    PHYSICAL EXAM:    Constitutional: lying in bed, alert, very somnolent this AM  Head: S/p craniotomy on right side with staples in  Eyes: PERRL, EOMI, sclera non-icteric  Mouth: mucous membranes moist  Neck: supple, trachea midline, no masses, no JVD  Respiratory: CTA b/l, good air entry b/l, no wheezing, no rhonchi, no rales, without accessory muscle use and no intercostal retractions  Cardiovascular: RRR, normal S1S2, no M/R/G  Gastrointestinal: soft, NTND, no masses palpable, BS normal  Extremities: Warm, well perfused, pulses equal bilateral upper and lower extremities, no edema, no clubbing  Neurological: AAOx3, CN 2-12 intact, left sided neglect. 1/5 left shoulder strength, 0/5 left arm strength, 4/5 left leg strength. 5/5 strength. Sensation in upper and lower extremities intact. Gait not assessed.    Skin: Normal temperature, warm, dry  MEDICATIONS  (STANDING):  aspirin  chewable 81 milliGRAM(s) Oral daily  atorvastatin 80 milliGRAM(s) Oral at bedtime  bisoprolol   Tablet 5 milliGRAM(s) Oral daily  docusate sodium Liquid 100 milliGRAM(s) Oral two times a day  heparin  Injectable 5000 Unit(s) SubCutaneous every 8 hours  levETIRAcetam  IVPB 1000 milliGRAM(s) IV Intermittent every 12 hours  lisinopril 20 milliGRAM(s) Oral daily  multivitamin 1 Tablet(s) Oral daily  polyethylene glycol 3350 17 Gram(s) Oral at bedtime  senna Syrup 10 milliLiter(s) Oral at bedtime    MEDICATIONS  (PRN):  acetaminophen   Tablet 650 milliGRAM(s) Oral every 6 hours PRN For Temp greater than 38 C (100.4 F)  acetaminophen   Tablet. 650 milliGRAM(s) Oral every 6 hours PRN Mild Pain (1 - 3)  ondansetron Injectable 4 milliGRAM(s) IV Push every 6 hours PRN Nausea  oxyCODONE    5 mG/acetaminophen 325 mG 1 Tablet(s) Oral every 6 hours PRN Moderate Pain (4 - 6)      Allergies    No Known Allergies    Intolerances        LABS:                        11.3   11.9  )-----------( 833      ( 2018 06:16 )             35.2     06-14    136  |  101  |  7   ----------------------------<  127<H>  3.7   |  23  |  0.40<L>    Ca    9.4      2018 06:15  Phos  2.4     06-13  Mg     2.0     06-14        Urinalysis Basic - ( 2018 20:01 )    Color: Yellow / Appearance: SL Cloudy / S.010 / pH: x  Gluc: x / Ketone: NEGATIVE  / Bili: Negative / Urobili: 1.0 E.U./dL   Blood: x / Protein: NEGATIVE mg/dL / Nitrite: NEGATIVE   Leuk Esterase: NEGATIVE / RBC: x / WBC x   Sq Epi: x / Non Sq Epi: x / Bacteria: x        RADIOLOGY & ADDITIONAL TESTS:

## 2018-06-14 NOTE — PROGRESS NOTE ADULT - ATTENDING COMMENTS
Patient seen and examined with Resident.  Agree with above.  Patient intermittently febrile overnight.  U/A negative.  Blood cultures pending.    Patient is neurologically stable in terms of waking easily, speaking her thoughts with minimal dysarthria, follows simple commands, eyes midline and moves eyes almost all the way to the left, PERRL+, right HHA, left facial droop, tongue midline, moves right side well, left UE 0/5, flexes left LE with at least 4-/5 strength, decreased light touch to left side with some extinction and neglect    Plan:  1) Continue Aspirin 81mg for antiplatelet and Atorvastatin 80mg for statin.    2) Stroke workup completed.  3) Outstanding issue now is fever of unknown origin causing intermittent encephalopathy -   - will consult infectious disease to assess need for additional antibiotics and/or testing  - agree with abdominal x-ray to rule out obvious source  - LE dopplers to rule out DVT  4) DVT prophylaxis   5) for rehab once medically and neurologically stable

## 2018-06-14 NOTE — PROGRESS NOTE ADULT - NSHPATTENDINGPLANDISCUSS_GEN_ALL_CORE
NSICU team
NSICU team
nsg team
7 [facility] team
7 [facility] team
[facility] and 7 [facility] teams
7 [facility] team

## 2018-06-14 NOTE — CONSULT NOTE ADULT - ATTENDING COMMENTS
I have reviewed the medical record, including laboratory and radiographic studies, interviewed and examined the patient and discussed the plan with Dr. Linton, the ID Resident.  Agree with above.  Will continue to follow with you – ID service. I have reviewed the medical record, including laboratory and radiographic studies, interviewed and examined the patient and discussed the plan with Dr. Linton, the ID Resident.  Agree with above.  Unclear that she had UTI - had minimal pyuria and denies having had dysuria or lower abd pain during hospitalization.  She states that she would have known if she had. E coli in urine may have represented colonization.  Her leukocytosis improved with ceftriaxone.  Source for ongoing low grade temp increases/mild leukocytosis is unclear with ddx as above.  Will continue to follow with you – ID service

## 2018-06-15 LAB
ANION GAP SERPL CALC-SCNC: 13 MMOL/L — SIGNIFICANT CHANGE UP (ref 5–17)
BASOPHILS NFR BLD AUTO: 0.4 % — SIGNIFICANT CHANGE UP (ref 0–2)
BUN SERPL-MCNC: 9 MG/DL — SIGNIFICANT CHANGE UP (ref 7–23)
CALCIUM SERPL-MCNC: 9.2 MG/DL — SIGNIFICANT CHANGE UP (ref 8.4–10.5)
CHLORIDE SERPL-SCNC: 102 MMOL/L — SIGNIFICANT CHANGE UP (ref 96–108)
CO2 SERPL-SCNC: 23 MMOL/L — SIGNIFICANT CHANGE UP (ref 22–31)
CREAT SERPL-MCNC: 0.4 MG/DL — LOW (ref 0.5–1.3)
EOSINOPHIL NFR BLD AUTO: 3.1 % — SIGNIFICANT CHANGE UP (ref 0–6)
GLUCOSE SERPL-MCNC: 130 MG/DL — HIGH (ref 70–99)
HCT VFR BLD CALC: 33.9 % — LOW (ref 34.5–45)
HGB BLD-MCNC: 10.9 G/DL — LOW (ref 11.5–15.5)
LYMPHOCYTES # BLD AUTO: 8.8 % — LOW (ref 13–44)
MAGNESIUM SERPL-MCNC: 2 MG/DL — SIGNIFICANT CHANGE UP (ref 1.6–2.6)
MCHC RBC-ENTMCNC: 28.7 PG — SIGNIFICANT CHANGE UP (ref 27–34)
MCHC RBC-ENTMCNC: 32.2 G/DL — SIGNIFICANT CHANGE UP (ref 32–36)
MCV RBC AUTO: 89.2 FL — SIGNIFICANT CHANGE UP (ref 80–100)
MONOCYTES NFR BLD AUTO: 8.4 % — SIGNIFICANT CHANGE UP (ref 2–14)
NEUTROPHILS NFR BLD AUTO: 79.3 % — HIGH (ref 43–77)
PLATELET # BLD AUTO: 788 K/UL — HIGH (ref 150–400)
POTASSIUM SERPL-MCNC: 3.5 MMOL/L — SIGNIFICANT CHANGE UP (ref 3.5–5.3)
POTASSIUM SERPL-SCNC: 3.5 MMOL/L — SIGNIFICANT CHANGE UP (ref 3.5–5.3)
RBC # BLD: 3.8 M/UL — SIGNIFICANT CHANGE UP (ref 3.8–5.2)
RBC # FLD: 16.9 % — SIGNIFICANT CHANGE UP (ref 10.3–16.9)
SODIUM SERPL-SCNC: 138 MMOL/L — SIGNIFICANT CHANGE UP (ref 135–145)
WBC # BLD: 9.7 K/UL — SIGNIFICANT CHANGE UP (ref 3.8–10.5)
WBC # FLD AUTO: 9.7 K/UL — SIGNIFICANT CHANGE UP (ref 3.8–10.5)

## 2018-06-15 PROCEDURE — 71045 X-RAY EXAM CHEST 1 VIEW: CPT | Mod: 26

## 2018-06-15 PROCEDURE — 99233 SBSQ HOSP IP/OBS HIGH 50: CPT | Mod: GC

## 2018-06-15 PROCEDURE — 99233 SBSQ HOSP IP/OBS HIGH 50: CPT

## 2018-06-15 PROCEDURE — 78806: CPT | Mod: 26

## 2018-06-15 RX ORDER — MORPHINE SULFATE 50 MG/1
1 CAPSULE, EXTENDED RELEASE ORAL ONCE
Qty: 0 | Refills: 0 | Status: DISCONTINUED | OUTPATIENT
Start: 2018-06-15 | End: 2018-06-15

## 2018-06-15 RX ORDER — POTASSIUM CHLORIDE 20 MEQ
40 PACKET (EA) ORAL ONCE
Qty: 0 | Refills: 0 | Status: COMPLETED | OUTPATIENT
Start: 2018-06-15 | End: 2018-06-15

## 2018-06-15 RX ADMIN — BISOPROLOL FUMARATE 5 MILLIGRAM(S): 10 TABLET, FILM COATED ORAL at 06:40

## 2018-06-15 RX ADMIN — MORPHINE SULFATE 1 MILLIGRAM(S): 50 CAPSULE, EXTENDED RELEASE ORAL at 09:04

## 2018-06-15 RX ADMIN — OXYCODONE AND ACETAMINOPHEN 1 TABLET(S): 5; 325 TABLET ORAL at 00:21

## 2018-06-15 RX ADMIN — LEVETIRACETAM 400 MILLIGRAM(S): 250 TABLET, FILM COATED ORAL at 00:04

## 2018-06-15 RX ADMIN — OXYCODONE AND ACETAMINOPHEN 1 TABLET(S): 5; 325 TABLET ORAL at 03:00

## 2018-06-15 RX ADMIN — MORPHINE SULFATE 1 MILLIGRAM(S): 50 CAPSULE, EXTENDED RELEASE ORAL at 12:03

## 2018-06-15 RX ADMIN — Medication 650 MILLIGRAM(S): at 19:23

## 2018-06-15 RX ADMIN — HEPARIN SODIUM 5000 UNIT(S): 5000 INJECTION INTRAVENOUS; SUBCUTANEOUS at 21:27

## 2018-06-15 RX ADMIN — HEPARIN SODIUM 5000 UNIT(S): 5000 INJECTION INTRAVENOUS; SUBCUTANEOUS at 14:37

## 2018-06-15 RX ADMIN — OXYCODONE AND ACETAMINOPHEN 1 TABLET(S): 5; 325 TABLET ORAL at 15:28

## 2018-06-15 RX ADMIN — Medication 650 MILLIGRAM(S): at 19:52

## 2018-06-15 RX ADMIN — HEPARIN SODIUM 5000 UNIT(S): 5000 INJECTION INTRAVENOUS; SUBCUTANEOUS at 06:40

## 2018-06-15 RX ADMIN — LISINOPRIL 20 MILLIGRAM(S): 2.5 TABLET ORAL at 06:40

## 2018-06-15 RX ADMIN — OXYCODONE AND ACETAMINOPHEN 1 TABLET(S): 5; 325 TABLET ORAL at 06:40

## 2018-06-15 RX ADMIN — OXYCODONE AND ACETAMINOPHEN 1 TABLET(S): 5; 325 TABLET ORAL at 14:37

## 2018-06-15 RX ADMIN — MORPHINE SULFATE 1 MILLIGRAM(S): 50 CAPSULE, EXTENDED RELEASE ORAL at 13:21

## 2018-06-15 RX ADMIN — MORPHINE SULFATE 1 MILLIGRAM(S): 50 CAPSULE, EXTENDED RELEASE ORAL at 07:39

## 2018-06-15 RX ADMIN — LEVETIRACETAM 400 MILLIGRAM(S): 250 TABLET, FILM COATED ORAL at 11:22

## 2018-06-15 NOTE — PROGRESS NOTE ADULT - ATTENDING COMMENTS
I have reviewed the medical record, including laboratory and radiographic studies, interviewed and examined the patient and discussed the plan with Dr. Linton, the ID Resident.  Agree with above.  Will continue to follow with you – ID service.

## 2018-06-15 NOTE — PROGRESS NOTE ADULT - SUBJECTIVE AND OBJECTIVE BOX
INTERVAL HPI/OVERNIGHT EVENTS: s/p CTH for persistent lethargy; s/f new fluid accumulation surrounding recently placed mesh    OVERNIGHT: No overnight events.  SUBJECTIVE: Patient was seen and examined at bedside. As per nurse and patient, no o/n events, patient resting comfortably. No complaints at this time. Patient denies: fever, chills, dizziness, weakness, HA, CP, palpitations, SOB, cough, N/V/D/C, dysuria, changes in bowel movements, LE edema.    REVIEW OF SYSTEMS:    CONSTITUTIONAL: No weakness, fevers or chills  EYES/ENT: No visual changes;  No vertigo or throat pain   NECK: No pain or stiffness  RESPIRATORY: No cough, wheezing, hemoptysis; No shortness of breath  CARDIOVASCULAR: No chest pain or palpitations  GASTROINTESTINAL: No abdominal or epigastric pain. No nausea, vomiting, or hematemesis; No diarrhea or constipation. No melena or hematochezia.  GENITOURINARY: No dysuria, frequency or hematuria  NEUROLOGICAL: No numbness or weakness  SKIN: No itching, burning, rashes, or lesions   MSK: no joint pain, no joint swelling  All other review of systems is negative unless indicated above.      Allergies    No Known Allergies    Intolerances        ANTIBIOTICS/RELEVANT:  antimicrobials    immunologic:    OTHER:  acetaminophen   Tablet 650 milliGRAM(s) Oral every 6 hours PRN  acetaminophen   Tablet. 650 milliGRAM(s) Oral every 6 hours PRN  aspirin  chewable 81 milliGRAM(s) Oral daily  atorvastatin 80 milliGRAM(s) Oral at bedtime  bisacodyl Suppository 10 milliGRAM(s) Rectal daily PRN  bisoprolol   Tablet 5 milliGRAM(s) Oral daily  docusate sodium Liquid 100 milliGRAM(s) Oral two times a day  heparin  Injectable 5000 Unit(s) SubCutaneous every 8 hours  levETIRAcetam  IVPB 1000 milliGRAM(s) IV Intermittent every 12 hours  lisinopril 20 milliGRAM(s) Oral daily  multivitamin 1 Tablet(s) Oral daily  ondansetron Injectable 4 milliGRAM(s) IV Push every 6 hours PRN  oxyCODONE    5 mG/acetaminophen 325 mG 1 Tablet(s) Oral every 6 hours PRN  polyethylene glycol 3350 17 Gram(s) Oral at bedtime  senna Syrup 10 milliLiter(s) Oral at bedtime      Objective:  Vital Signs Last 24 Hrs  T(C): 37.4 (15 Germain 2018 10:05), Max: 38.1 (2018 15:00)  T(F): 99.4 (15 Germain 2018 10:05), Max: 100.5 (2018 15:00)  HR: 70 (15 Germain 2018 12:05) (60 - 76)  BP: 146/77 (15 Germain 2018 12:05) (123/63 - 146/77)  BP(mean): 103 (15 Germain 2018 12:05) (87 - 106)  RR: 17 (15 Germain 2018 12:05) (11 - 20)  SpO2: 96% (15 Germain 2018 12:05) (95% - 100%)      PHYSICAL EXAM:  Constitutional: lying in bed, alert, NAD  Head: S/p craniotomy on right side with staples in; wound site c/d/i  Eyes: PERRL, EOMI, sclera non-icteric  Mouth: mucous membranes moist  Neck: supple, trachea midline, no masses, no JVD  Respiratory: CTA b/l, good air entry b/l, no wheezing, no rhonchi, no rales, without accessory muscle use and no intercostal retractions  Cardiovascular: RRR, normal S1S2, no M/R/G  Gastrointestinal: distended, tenderness to palpation of the R midepigastrium and the RLQ; no rebound or guarding of note  Extremities: Warm, well perfused, pulses equal bilateral upper and lower extremities, no edema, no clubbing  Neurological: AAOx3, CN 2-12 intact on the R side; CN: 7 not intact on the L side; left sided neglect. 0/5 left shoulder strength, 0/5 left arm strength, 0/5 left leg strength (but ? as primary team mentions patient does have at least 3/5 strength in the LLE, but has difficulty initiation movements in the LLE 2/2 hemineglect). 5/5 strength b/l in the RUE and RLE. Sensation in RUE and RLE intact; no sensation in the LUE and LLE    LABS:                        10.9   9.7   )-----------( 788      ( 15 Germain 2018 08:12 )             33.9     06-15    138  |  102  |  9   ----------------------------<  130<H>  3.5   |  23  |  0.40<L>    Ca    9.2      15 Germain 2018 08:12  Mg     2.0     -15    TPro  7.0  /  Alb  3.7  /  TBili  0.9  /  DBili  <0.2  /  AST  36  /  ALT  34  /  AlkPhos  94  06-14      Urinalysis Basic - ( 2018 20:01 )    Color: Yellow / Appearance: SL Cloudy / S.010 / pH: x  Gluc: x / Ketone: NEGATIVE  / Bili: Negative / Urobili: 1.0 E.U./dL   Blood: x / Protein: NEGATIVE mg/dL / Nitrite: NEGATIVE   Leuk Esterase: NEGATIVE / RBC: x / WBC x   Sq Epi: x / Non Sq Epi: x / Bacteria: x        MICROBIOLOGY:            RECENT CULTURES:   @ 21:45  .Urine None  --  --  --    No growth  --   @ 16:53  .Blood Blood  --  --  --    No growth at 1 day.  --   @ 16:52  .Blood Blood  --  --  --    No growth at 1 day.  --   @ 08:47  .Urine Catheterized  Escherichia coli  Escherichia coli  DANTE    >100,000 CFU/ml Escherichia coli  --      RADIOLOGY & ADDITIONAL STUDIES: INTERVAL HPI/OVERNIGHT EVENTS: s/p CTH for persistent lethargy; s/f new fluid accumulation surrounding recently placed mesh    OVERNIGHT: No overnight events.  SUBJECTIVE: Patient was seen and examined at bedside. As per nurse and patient, no o/n events, patient resting comfortably. No complaints at this time. Patient denies: fever, chills, dizziness, weakness, HA, CP, palpitations, SOB, cough, N/V/D/C, dysuria, changes in bowel movements, LE edema.    REVIEW OF SYSTEMS:    CONSTITUTIONAL: No weakness, fevers or chills  EYES/ENT: No visual changes;  No vertigo or throat pain   NECK: No pain or stiffness  RESPIRATORY: No cough, wheezing, hemoptysis; No shortness of breath  CARDIOVASCULAR: No chest pain or palpitations  GASTROINTESTINAL: No abdominal or epigastric pain. No nausea, vomiting, or hematemesis; No diarrhea or constipation. No melena or hematochezia.  GENITOURINARY: No dysuria, frequency or hematuria  NEUROLOGICAL: No numbness or weakness  SKIN: No itching, burning, rashes, or lesions   MSK: no joint pain, no joint swelling  All other review of systems is negative unless indicated above.      Allergies    No Known Allergies    Intolerances        ANTIBIOTICS/RELEVANT:  antimicrobials    immunologic:    OTHER:  acetaminophen   Tablet 650 milliGRAM(s) Oral every 6 hours PRN  acetaminophen   Tablet. 650 milliGRAM(s) Oral every 6 hours PRN  aspirin  chewable 81 milliGRAM(s) Oral daily  atorvastatin 80 milliGRAM(s) Oral at bedtime  bisacodyl Suppository 10 milliGRAM(s) Rectal daily PRN  bisoprolol   Tablet 5 milliGRAM(s) Oral daily  docusate sodium Liquid 100 milliGRAM(s) Oral two times a day  heparin  Injectable 5000 Unit(s) SubCutaneous every 8 hours  levETIRAcetam  IVPB 1000 milliGRAM(s) IV Intermittent every 12 hours  lisinopril 20 milliGRAM(s) Oral daily  multivitamin 1 Tablet(s) Oral daily  ondansetron Injectable 4 milliGRAM(s) IV Push every 6 hours PRN  oxyCODONE    5 mG/acetaminophen 325 mG 1 Tablet(s) Oral every 6 hours PRN  polyethylene glycol 3350 17 Gram(s) Oral at bedtime  senna Syrup 10 milliLiter(s) Oral at bedtime      Objective:  Vital Signs Last 24 Hrs  T(C): 37.4 (15 Germain 2018 10:05), Max: 38.1 (2018 15:00)  T(F): 99.4 (15 Germain 2018 10:05), Max: 100.5 (2018 15:00)  HR: 70 (15 Germain 2018 12:05) (60 - 76)  BP: 146/77 (15 Germain 2018 12:05) (123/63 - 146/77)  BP(mean): 103 (15 Germain 2018 12:05) (87 - 106)  RR: 17 (15 Germain 2018 12:05) (11 - 20)  SpO2: 96% (15 Germain 2018 12:05) (95% - 100%)      PHYSICAL EXAM:  Constitutional: lying in bed, alert, NAD  Head: S/p craniotomy on right side with staples in; wound site c/d/i  Eyes: PERRL, EOMI, sclera non-icteric  Mouth: mucous membranes moist  Neck: supple, trachea midline, no masses, no JVD  Respiratory: CTA b/l, good air entry b/l, no wheezing, no rhonchi, no rales, without accessory muscle use and no intercostal retractions  Cardiovascular: RRR, normal S1S2, no M/R/G  Gastrointestinal: distended, tenderness to palpation of the R midepigastrium and the RLQ; no rebound or guarding of note  Extremities: Warm, well perfused, pulses equal bilateral upper and lower extremities, no edema, no clubbing  Neurological: AAOx3, CN 2-12 intact on the R side; CN: 7 not intact on the L side; left sided neglect. 0/5 left shoulder strength, 0/5 left arm strength, 0/5 left leg strength (but ? as primary team mentions patient does have at least 3/5 strength in the LLE, but has difficulty initiation movements in the LLE 2/2 hemineglect). 5/5 strength b/l in the RUE and RLE. Sensation in RUE and RLE intact; no sensation in the LUE and LLE    LABS:                        10.9   9.7   )-----------( 788      ( 15 Germain 2018 08:12 )             33.9     06-15    138  |  102  |  9   ----------------------------<  130<H>  3.5   |  23  |  0.40<L>    Ca    9.2      15 Germain 2018 08:12  Mg     2.0     -15    TPro  7.0  /  Alb  3.7  /  TBili  0.9  /  DBili  <0.2  /  AST  36  /  ALT  34  /  AlkPhos  94  06-14      Urinalysis Basic - ( 2018 20:01 )    Color: Yellow / Appearance: SL Cloudy / S.010 / pH: x  Gluc: x / Ketone: NEGATIVE  / Bili: Negative / Urobili: 1.0 E.U./dL   Blood: x / Protein: NEGATIVE mg/dL / Nitrite: NEGATIVE   Leuk Esterase: NEGATIVE / RBC: x / WBC x   Sq Epi: x / Non Sq Epi: x / Bacteria: x        MICROBIOLOGY:            RECENT CULTURES:   @ 21:45  .Urine None  --  --  --    No growth  --   @ 16:53  .Blood Blood  --  --  --    No growth at 1 day.  --   @ 16:52  .Blood Blood  --  --  --    No growth at 1 day.  --   @ 08:47  .Urine Catheterized  Escherichia coli  Escherichia coli  DANTE    >100,000 CFU/ml Escherichia coli  --

## 2018-06-15 NOTE — PROGRESS NOTE ADULT - ASSESSMENT
58y Female w/ HTN, HLD, Anxiety, Depression, h/o Breast Cancer w/ TRAM flap reconstruction admitted for expanding right rectus hematoma complicated by right MCA infarction now s/p hemicraniectomy. Also found to have UTI and Bacterial vaginosos, recently spiking fevers of unknown origin with waxing and waning mental status.

## 2018-06-15 NOTE — PROGRESS NOTE ADULT - SUBJECTIVE AND OBJECTIVE BOX
Neurosurgery Followup:    Pt seen at bedside with  and images reviewed with . Pt is wide awake , although there are reports of intermittent lethargy. Concerns for infection last few days. Pt w/o signs of meningitis.   Able to hold conversation.  On Exam:  wound c/d/i no dishesence or drainage. Flat full but soft  CTH showing normal post op changes without sign of infection.     Recommend fever workup as per ID. No suspicion of CNS infection.   D/W

## 2018-06-15 NOTE — PROGRESS NOTE ADULT - SUBJECTIVE AND OBJECTIVE BOX
INTERVAL HPI/OVERNIGHT EVENTS: Patient still with waxing and waning mental status. IV line was replaced    Patient was seen and examined at bedside. As per nurse and patient, no o/n events, patient resting comfortably. No complaints at this time. Patient denies: fever, chills, dizziness, weakness, HA, CP, palpitations, SOB, cough, N/V/D/C, dysuria, changes in bowel movements, LE edema.    ROS: as above    VITAL SIGNS:  T(F): 99.4 (06-15-18 @ 10:05)  HR: 70 (06-15-18 @ 08:35)  BP: 124/70 (06-15-18 @ 08:35)  RR: 16 (06-15-18 @ 08:35)  SpO2: 98% (06-15-18 @ 08:35)  Wt(kg): --    PHYSICAL EXAM:    Constitutional: lying in bed, alert, alert and awake this AM  Head: S/p craniotomy on right side with staples in  Eyes: PERRL, EOMI, sclera non-icteric  Mouth: mucous membranes moist  Neck: supple, trachea midline, no masses, no JVD  Respiratory: CTA b/l, good air entry b/l, no wheezing, no rhonchi, no rales, without accessory muscle use and no intercostal retractions  Cardiovascular: RRR, normal S1S2, no M/R/G  Gastrointestinal: soft, NTND, no masses palpable, BS normal  Extremities: Warm, well perfused, pulses equal bilateral upper and lower extremities, no edema, no clubbing  Neurological: AAOx3, CN 2-12 intact, left sided neglect. 1/5 left shoulder strength, 0/5 left arm strength, 4/5 left leg strength. 5/5 strength. Sensation in upper and lower extremities intact. Gait not assessed.    MEDICATIONS  (STANDING):  aspirin  chewable 81 milliGRAM(s) Oral daily  atorvastatin 80 milliGRAM(s) Oral at bedtime  bisoprolol   Tablet 5 milliGRAM(s) Oral daily  docusate sodium Liquid 100 milliGRAM(s) Oral two times a day  heparin  Injectable 5000 Unit(s) SubCutaneous every 8 hours  levETIRAcetam  IVPB 1000 milliGRAM(s) IV Intermittent every 12 hours  lisinopril 20 milliGRAM(s) Oral daily  multivitamin 1 Tablet(s) Oral daily  polyethylene glycol 3350 17 Gram(s) Oral at bedtime  potassium chloride   Powder 40 milliEquivalent(s) Oral once  senna Syrup 10 milliLiter(s) Oral at bedtime    MEDICATIONS  (PRN):  acetaminophen   Tablet 650 milliGRAM(s) Oral every 6 hours PRN For Temp greater than 38 C (100.4 F)  acetaminophen   Tablet. 650 milliGRAM(s) Oral every 6 hours PRN Mild Pain (1 - 3)  ondansetron Injectable 4 milliGRAM(s) IV Push every 6 hours PRN Nausea  oxyCODONE    5 mG/acetaminophen 325 mG 1 Tablet(s) Oral every 6 hours PRN Moderate Pain (4 - 6)      Allergies    No Known Allergies    Intolerances        LABS:                        10.9   9.7   )-----------( 788      ( 15 Germain 2018 08:12 )             33.9     06-15    138  |  102  |  9   ----------------------------<  130<H>  3.5   |  23  |  0.40<L>    Ca    9.2      15 Germain 2018 08:12  Mg     2.0     06-15    TPro  7.0  /  Alb  3.7  /  TBili  0.9  /  DBili  <0.2  /  AST  36  /  ALT  34  /  AlkPhos  94  06-14      Urinalysis Basic - ( 2018 20:01 )    Color: Yellow / Appearance: SL Cloudy / S.010 / pH: x  Gluc: x / Ketone: NEGATIVE  / Bili: Negative / Urobili: 1.0 E.U./dL   Blood: x / Protein: NEGATIVE mg/dL / Nitrite: NEGATIVE   Leuk Esterase: NEGATIVE / RBC: x / WBC x   Sq Epi: x / Non Sq Epi: x / Bacteria: x        RADIOLOGY & ADDITIONAL TESTS:

## 2018-06-15 NOTE — PROGRESS NOTE ADULT - ASSESSMENT
58F with a hx of left breast cancer s/p left mastectomy with TRAM flap reconstruction (4-5 years prior), HTN, HLD, anxiety who initially came in on 5/31 with R rectus abdominal hematoma after recent initiation of plavix for non-obstructive CAD.  Patient found to have 2 pseudo-aneurysms of the artery supplying the R rectus abd muscle s/p ultrasound guided thrombin injection. Post-operative course c/b hypotension, AMS and bradypnea. CTH s/f large R MCA stroke with mass effect resulting in herniation of the cerebral tonsils s/p emergent decompressive hemicraniectomy. Patient with persistent fevers since June 5th despite treatment for possible pansensitive E. Coli UTI from June 9-12 with CTX 1g qd. ID consulted for persistent fevers.     #persistent fevers  -last fever yesterday at 100.5   -s/p blood cx NGTD  -patient noted to have abdominal tenderness on the R side on exam; also complaining of headache and photophobia today; potential etiologies of patient's fever could be superimposed infection on rectus abdominal muscle hematoma vs intra-cerebral infection after recent instrumentation  -consider gallium scan for further evaluation of etiology of fevers  -continue to observe off abx at this time  Recommendations discussed with primary team. 58F with a hx of left breast cancer s/p left mastectomy with TRAM flap reconstruction (4-5 years prior), HTN, HLD, anxiety who initially came in on 5/31 with R rectus abdominal hematoma after recent initiation of plavix for non-obstructive CAD.  Patient found to have 2 pseudo-aneurysms of the artery supplying the R rectus abd muscle s/p ultrasound guided thrombin injection. Post-operative course c/b hypotension, AMS and bradypnea. CTH s/f large R MCA stroke with mass effect resulting in herniation of the cerebral tonsils s/p emergent decompressive hemicraniectomy. Patient with persistent fevers since June 5th despite treatment for possible pansensitive E. Coli UTI from June 9-12 with CTX 1g qd. ID consulted for persistent fevers.     #persistent fevers  -last fever 6/14 at 100.5   -s/p blood cx NGTD  -patient noted to have abdominal tenderness on the R side on exam; also complaining of headache and photophobia today; potential etiologies of patient's fever could be superimposed infection on rectus abdominal muscle hematoma vs intra-cerebral infection after recent instrumentation  -s/p 1st phase of gallium scan with ? LLL hyper-metabolic focus; would consider CT Chest for further evalution                             -CTH done yesterday for persistent lethargy s/f "interval appearance of a right frontal temporal low density subdural collection with distortion and mass effect on the previously placed overlying mesh. Interval marked increase in size of a right frontal extracranial fluid collection. Interval appearance of a tiny amount of subdural fluid seen within the intrahemispheric fissure and along the inferior posterior temporal lobe"  -unclear whether these findings are s/f ?abscess around recent surgical site; need input from neurosurgery on what these findings signify  -f/u 2nd phase of gallium scan   -recommendations communicated to primary team 58F with a hx of left breast cancer s/p left mastectomy with TRAM flap reconstruction (4-5 years prior), HTN, HLD, anxiety who initially came in on 5/31 with R rectus abdominal hematoma after recent initiation of plavix for non-obstructive CAD.  Patient found to have 2 pseudo-aneurysms of the artery supplying the R rectus abd muscle s/p ultrasound guided thrombin injection. Post-operative course c/b hypotension, AMS and bradypnea. CTH s/f large R MCA stroke with mass effect resulting in herniation of the cerebral tonsils s/p emergent decompressive hemicraniectomy. Patient with persistent fevers since June 5th despite treatment for possible pansensitive E. Coli UTI from June 9-12 with CTX 1g qd. ID consulted for persistent fevers.     # persistent fevers  -last fever 6/14 at 100.5   -s/p blood cx NGTD  -patient noted to have abdominal tenderness on the R side on exam; also complaining of headache and photophobia today; potential etiologies of patient's fever could be superimposed infection on rectus abdominal muscle hematoma vs intra-cerebral infection after recent instrumentation  -s/p 1st phase of gallium scan with ? LLL hyper-metabolic focus; would consider CT Chest for further evalution                             -CTH done yesterday for persistent lethargy s/f "interval appearance of a right frontal temporal low density subdural collection with distortion and mass effect on the previously placed overlying mesh. Interval marked increase in size of a right frontal extracranial fluid collection. Interval appearance of a tiny amount of subdural fluid seen within the intrahemispheric fissure and along the inferior posterior temporal lobe"  -unclear whether these findings are s/f ?abscess around recent surgical site; need input from neurosurgery on what these findings signify  -f/u 2nd phase of gallium scan   -recommendations communicated to primary team 58F with a hx of left breast cancer s/p left mastectomy with TRAM flap reconstruction (4-5 years prior), HTN, HLD, anxiety who initially came in on 5/31 with R rectus abdominal hematoma after recent initiation of plavix for non-obstructive CAD.  Patient found to have 2 pseudo-aneurysms of the artery supplying the R rectus abd muscle s/p ultrasound guided thrombin injection. Post-operative course c/b hypotension, AMS and bradypnea. CTH s/f large R MCA stroke with mass effect resulting in herniation of the cerebral tonsils s/p emergent decompressive hemicraniectomy. Patient with persistent fevers since June 5th despite treatment for possible pansensitive E. Coli UTI from June 9-12 with CTX 1g qd. ID consulted for persistent fevers.     # persistent fevers  -last fever 6/14 at 100.5   -s/p blood cx NGTD; repeat urine cx and UA (-)   -at this time: potential etiologies of patient's fever could be superimposed infection on rectus abdominal muscle hematoma vs intra-cerebral infection after recent instrumentation  -s/p 1st phase of gallium scan with ? LLL hyper-metabolic focus; await results of gallium scan after 2nd phase  -CTH done yesterday for persistent lethargy s/f "interval appearance of a right frontal temporal low density subdural collection with distortion and mass effect on the previously placed overlying mesh. Interval marked increase in size of a right frontal extracranial fluid collection. Interval appearance of a tiny amount of subdural fluid seen within the intra-hemispheric fissure and along the inferior posterior temporal lobe"  -unclear whether these findings are s/f ?abscess around recent surgical site; need input from neurosurgery on what these findings signify  -no abx at this time as patient's fever curve has down-trended and leukocytosis is improving; further decision making based on input from NSG and final results of gallium scan  -recommendations communicated to primary team

## 2018-06-16 LAB
ANION GAP SERPL CALC-SCNC: 11 MMOL/L — SIGNIFICANT CHANGE UP (ref 5–17)
BUN SERPL-MCNC: 8 MG/DL — SIGNIFICANT CHANGE UP (ref 7–23)
CALCIUM SERPL-MCNC: 9.1 MG/DL — SIGNIFICANT CHANGE UP (ref 8.4–10.5)
CHLORIDE SERPL-SCNC: 100 MMOL/L — SIGNIFICANT CHANGE UP (ref 96–108)
CO2 SERPL-SCNC: 24 MMOL/L — SIGNIFICANT CHANGE UP (ref 22–31)
CREAT SERPL-MCNC: 0.37 MG/DL — LOW (ref 0.5–1.3)
GLUCOSE SERPL-MCNC: 108 MG/DL — HIGH (ref 70–99)
HCT VFR BLD CALC: 33.5 % — LOW (ref 34.5–45)
HGB BLD-MCNC: 10.7 G/DL — LOW (ref 11.5–15.5)
MAGNESIUM SERPL-MCNC: 1.8 MG/DL — SIGNIFICANT CHANGE UP (ref 1.6–2.6)
MCHC RBC-ENTMCNC: 28.4 PG — SIGNIFICANT CHANGE UP (ref 27–34)
MCHC RBC-ENTMCNC: 31.9 G/DL — LOW (ref 32–36)
MCV RBC AUTO: 88.9 FL — SIGNIFICANT CHANGE UP (ref 80–100)
PLATELET # BLD AUTO: 758 K/UL — HIGH (ref 150–400)
POTASSIUM SERPL-MCNC: 3.7 MMOL/L — SIGNIFICANT CHANGE UP (ref 3.5–5.3)
POTASSIUM SERPL-SCNC: 3.7 MMOL/L — SIGNIFICANT CHANGE UP (ref 3.5–5.3)
RBC # BLD: 3.77 M/UL — LOW (ref 3.8–5.2)
RBC # FLD: 16.4 % — SIGNIFICANT CHANGE UP (ref 10.3–16.9)
SODIUM SERPL-SCNC: 135 MMOL/L — SIGNIFICANT CHANGE UP (ref 135–145)
WBC # BLD: 7.9 K/UL — SIGNIFICANT CHANGE UP (ref 3.8–10.5)
WBC # FLD AUTO: 7.9 K/UL — SIGNIFICANT CHANGE UP (ref 3.8–10.5)

## 2018-06-16 PROCEDURE — 99232 SBSQ HOSP IP/OBS MODERATE 35: CPT | Mod: 24

## 2018-06-16 RX ORDER — LEVETIRACETAM 250 MG/1
1000 TABLET, FILM COATED ORAL
Qty: 0 | Refills: 0 | Status: DISCONTINUED | OUTPATIENT
Start: 2018-06-16 | End: 2018-06-21

## 2018-06-16 RX ORDER — MAGNESIUM SULFATE 500 MG/ML
1 VIAL (ML) INJECTION ONCE
Qty: 0 | Refills: 0 | Status: COMPLETED | OUTPATIENT
Start: 2018-06-16 | End: 2018-06-16

## 2018-06-16 RX ADMIN — Medication 1 TABLET(S): at 12:49

## 2018-06-16 RX ADMIN — Medication 100 GRAM(S): at 10:12

## 2018-06-16 RX ADMIN — LISINOPRIL 20 MILLIGRAM(S): 2.5 TABLET ORAL at 08:33

## 2018-06-16 RX ADMIN — Medication 81 MILLIGRAM(S): at 12:49

## 2018-06-16 RX ADMIN — OXYCODONE AND ACETAMINOPHEN 1 TABLET(S): 5; 325 TABLET ORAL at 03:42

## 2018-06-16 RX ADMIN — LEVETIRACETAM 400 MILLIGRAM(S): 250 TABLET, FILM COATED ORAL at 12:49

## 2018-06-16 RX ADMIN — LEVETIRACETAM 1000 MILLIGRAM(S): 250 TABLET, FILM COATED ORAL at 17:22

## 2018-06-16 RX ADMIN — OXYCODONE AND ACETAMINOPHEN 1 TABLET(S): 5; 325 TABLET ORAL at 05:58

## 2018-06-16 RX ADMIN — OXYCODONE AND ACETAMINOPHEN 1 TABLET(S): 5; 325 TABLET ORAL at 21:00

## 2018-06-16 RX ADMIN — OXYCODONE AND ACETAMINOPHEN 1 TABLET(S): 5; 325 TABLET ORAL at 21:39

## 2018-06-16 RX ADMIN — LEVETIRACETAM 400 MILLIGRAM(S): 250 TABLET, FILM COATED ORAL at 00:25

## 2018-06-16 RX ADMIN — ATORVASTATIN CALCIUM 80 MILLIGRAM(S): 80 TABLET, FILM COATED ORAL at 21:00

## 2018-06-16 RX ADMIN — HEPARIN SODIUM 5000 UNIT(S): 5000 INJECTION INTRAVENOUS; SUBCUTANEOUS at 06:06

## 2018-06-16 RX ADMIN — HEPARIN SODIUM 5000 UNIT(S): 5000 INJECTION INTRAVENOUS; SUBCUTANEOUS at 13:10

## 2018-06-16 RX ADMIN — Medication 650 MILLIGRAM(S): at 10:40

## 2018-06-16 RX ADMIN — BISOPROLOL FUMARATE 5 MILLIGRAM(S): 10 TABLET, FILM COATED ORAL at 08:47

## 2018-06-16 RX ADMIN — OXYCODONE AND ACETAMINOPHEN 1 TABLET(S): 5; 325 TABLET ORAL at 13:10

## 2018-06-16 RX ADMIN — Medication 650 MILLIGRAM(S): at 10:12

## 2018-06-16 RX ADMIN — OXYCODONE AND ACETAMINOPHEN 1 TABLET(S): 5; 325 TABLET ORAL at 13:50

## 2018-06-16 RX ADMIN — HEPARIN SODIUM 5000 UNIT(S): 5000 INJECTION INTRAVENOUS; SUBCUTANEOUS at 21:00

## 2018-06-16 NOTE — PROGRESS NOTE ADULT - ASSESSMENT
58F with   1.  acute cerebrovascular accident involving R MCA / SELINA, malignant infarction, cerebral edema, brain compression s/p decompressive hemicraniectomy (05/31/2018, Dr. Johnson)  2.  Hypertension dyslipidemia   3.  h/o breast CA  4.  CAD  5.  R rectus abdominis intramuscular hematoma  6.  seizures    PLAN:   NEURO: neurochecks q4h, PRN pain meds tylenol, percocet  malignant R MCA infarction, s/p DHC:  continue aspirin  seizure disorder:  continue levetiracetam 1G BID - switch to PO  REHAB:  physical therapy evaluation and management (with helmet) EARLY MOB:  ambulate with assist    PULM:  PRN O2 support to keep sats >/=92%  CARDIO:  SBP goal 100-150 mm Hg, echo EF 55-60% mod dilated LA, continue bisoprolol, lisinopril  ENDO:  Blood sugar goals 140-180 mg/dL, off insulin sliding scale; continue high-dose statins  GI:  docusate senna  DIET: mech soft   RENAL:  IVL  HEM/ONC: Hb stable  VTE Prophylaxis: SCDs,  SQH   ID: afebrile, no leukocytosis; s/p bacterial vaginosis / UTI - completed ceftriaxone / MNZ; f/u gallium scan  Social: will update family.  MISC:  h/o ETOH abuse, continue MVI thiamine folate    ATTENDING ATTESTATION:  I was physically present for the key portions of the evaluation and management (E/M) service provided.  I agree with the above history, physical and plan which I have reviewed and edited where appropriate.     Patient not at high risk for neurologic deterioration / death.  Time spent on this noncritically ill patient: 45 minutes spent on total encounter, more than 50% of the visit was spent counseling and/or coordinating care by the attending physician.    Plan discussed with RN, house staff.

## 2018-06-16 NOTE — PROGRESS NOTE ADULT - SUBJECTIVE AND OBJECTIVE BOX
=================================  STROKE ATTENDING NOTE  =================================    VERONIKA CALERO   MRN-[id number]  Summary:  58F with h/o breast cancer, s/p TRAM flap reconstruction, recently started on plavix presented with painful bulge in R abdomen.  Imaging showed large expanding intramuscular hematoma R rects abdominis.  Admitted to surgery , diagnostic angio, R abd percutaneous thrombin injection of 2 pseudoaneurysms.  Post-procedure, noted to be hypotensive, low respiratory rate, altered mental status.  Naloxone given with improved respiratory status, but AMS persisted and noted L-sided weakness.  Stroke code called.  Post-op, noted  L UE hemiplegia / L LE weakness.  Stroke code called.  CT showed large R MCA small SELINA infarcts, with mass effect.  Mannitol 50g IV given, 23.4% x1 given.  Intubated for airway protection.  Seizure noted during intubation, given 4 ativan, started on propofol drip.  Brought to OR for decompressive hemicraniectomy.    Overnight Events: No significant events overnight, afebrile  REVIEW OF SYSTEMS:  No headaches, no nausea or vomiting; 14 -point review of systems otherwise unremarkable.    Past Medical History: Anxiety Hypertension Breast CA dyslipidemia CAD Hypertension   Allergies:  No Known Allergies  Home Meds: bisoprolol 5mg qd, lorazepam 1mg q8?, Plavix 5qd, rosuvastatin, HCTZ, buproprion, baclofen  Social: 1/2 bottle of wine most nights, remote cigarette hx, denies IVDA     PHYSICAL EXAMINATION  T(C): 36.6 ( @ 06:00), Max: 37.6 (06-15 @ 14:07) HR: 68 ( @ 08:15) (64 - 76) BP: 145/67 ( @ 08:15) (114/62 - 163/77) RR: 11 ( @ 08:15) (10 - 16) SpO2: 100% ( @ 08:15) (100% - 100%)  NEUROLOGIC EXAMINATION:  Patient is awake, alert, fully oriented, L neglect, L UE 1/5, L LE 4/5, R UE/LE 5/5.  moves RUE/LE spontaneously, 1/5 L UE, L LE 4/5  does not open eyes, follows commands on R, no verbal output, no gaze preference, pupils 3mm reactive and equal, moves R UE/LE spontaneously , withdraws to noxious on L LE 0/5 L UE  GENERAL:   not intubated  EENT: anicteric, flap soft  CARDIOVASC:  (+) S1 S2, normal rate and regular rhythm  PULMONARY:  clear to auscultation bilaterally  ABDOMEN:  soft, nontender, with normoactive bowel sounds  EXTREMITIES:  no edema  SKIN:  no rash    LABS:             10.7   7.9   )-----------( 758      ( 2018 06:42 )             33.5     135  |  100  |  8   ----------------------------<  108<H>  3.7   |  24  |  0.37<L>    Ca    9.1      2018 06:42  Mg     1.8         HbA1C = 5.3 ()  LDL = 106   /  HDL = 52 ()  /  TG = 84 ()  TSH = 0.614 ()    Bacteriology: UA Nitrite (+) (06/10)  RECENT CULTURES:  Culture - Urine (collected ) Final Report ():   No growth  Culture - Blood (collected ) Preliminary Report (06-15):   No growth at 2 days.  Culture - Blood (collected ) Preliminary Report (06-15):   No growth at 2 days.    CSF studies:  EEG:  Neuroimagin/14 CT head: R FTP craniectomy, R frontal low density subdural collection with distortion and mass effect of mesh, marked increase in R frontal extracranil gluid collection, tiny amount subdural fluid within intrahemispheric fissre along inferior posterior temporal lobe., subacute infarction R MCA/ICA and PCA distribution   CT head:  E FTRP crani, subacute infarction R MCA/ICA and PCA territories spared cortex vs hemorrhage - gyriform hyperdensity)   CT head:  no change in small foci of hemorrhage, continued evolution large R MCA infarction small R SELINA territory infarct   CT head: evolving R MCA / SELINA infarction, global mass effect, MLS to L, early L lateral ventricle entrapment, hemorrhagic transformation suspected   CTP:  abnormal perfusion, mismatch volume 12ml   CTA:  acute occlusion R M2   CT head:  acute R MCA infarction, no ICH   CT abd:  large expanding intramuscular hematoma within R rectus abdomin  Other imagin/15 CXR:  retrocardiac opacity  06/10 LE Doppler: NEG   CXR: clear   CXR small bilateral effusions    LE Doppler limited exam, NEG    MEDICATIONS: asa 81mg daily atorvastatin 80mg HS bisacodyl PRN bisoprolol 5mg daily docusate 100 TID SQH q8h levetiracetam 1g IV q12h lisinopril 20mg daily MVI daily percocet Miralax senna HS    IV FLUIDS: IVL  DRIPS:   DIET: mech soft  Lines:   Drains:    Wounds:    CODE STATUS:  Full Code                       GOALS OF CARE:  aggressive                      DISPOSITION:  [facility]

## 2018-06-16 NOTE — PROGRESS NOTE ADULT - SUBJECTIVE AND OBJECTIVE BOX
INTERVAL HPI/OVERNIGHT EVENTS: CHRISTOPHE, not tolerating PO feeds    Patient was seen and examined at bedside. As per nurse and patient, no o/n events, patient resting comfortably. No complaints at this time, no headache this AM. Patient denies: fever, chills, dizziness, weakness, CP, palpitations, SOB, cough, N/V/D/C, dysuria, changes in bowel movements, LE edema.    ROS: as above    VITAL SIGNS:  T(F): 99.4 (06-16-18 @ 13:40)  HR: 68 (06-16-18 @ 13:04)  BP: 150/87 (06-16-18 @ 13:04)  RR: 18 (06-16-18 @ 13:04)  SpO2: 100% (06-16-18 @ 13:04)  Wt(kg): --    PHYSICAL EXAM:    Constitutional: lying in bed, alert, alert and awake this AM  Head: S/p craniotomy on right side with staples in  Eyes: PERRL, EOMI, sclera non-icteric  Mouth: mucous membranes moist  Neck: supple, trachea midline, no masses, no JVD  Respiratory: CTA b/l, good air entry b/l, no wheezing, no rhonchi, no rales, without accessory muscle use and no intercostal retractions  Cardiovascular: RRR, normal S1S2, no M/R/G  Gastrointestinal: soft, NTND, no masses palpable, BS normal  Extremities: Warm, well perfused, pulses equal bilateral upper and lower extremities, no edema, no clubbing  Neurological: AAOx3, CN 2-12 intact, left sided neglect. 1/5 left shoulder strength, 0/5 left arm strength, 4/5 left leg strength. 5/5 strength. Sensation in upper and lower extremities intact. Gait not assessed.    MEDICATIONS  (STANDING):  aspirin  chewable 81 milliGRAM(s) Oral daily  atorvastatin 80 milliGRAM(s) Oral at bedtime  bisoprolol   Tablet 5 milliGRAM(s) Oral daily  docusate sodium Liquid 100 milliGRAM(s) Oral two times a day  heparin  Injectable 5000 Unit(s) SubCutaneous every 8 hours  levETIRAcetam 1000 milliGRAM(s) Oral two times a day  lisinopril 20 milliGRAM(s) Oral daily  multivitamin 1 Tablet(s) Oral daily  polyethylene glycol 3350 17 Gram(s) Oral at bedtime  senna Syrup 10 milliLiter(s) Oral at bedtime    MEDICATIONS  (PRN):  acetaminophen   Tablet 650 milliGRAM(s) Oral every 6 hours PRN For Temp greater than 38 C (100.4 F)  acetaminophen   Tablet. 650 milliGRAM(s) Oral every 6 hours PRN Mild Pain (1 - 3)  bisacodyl Suppository 10 milliGRAM(s) Rectal daily PRN Constipation  ondansetron Injectable 4 milliGRAM(s) IV Push every 6 hours PRN Nausea  oxyCODONE    5 mG/acetaminophen 325 mG 1 Tablet(s) Oral every 6 hours PRN Moderate Pain (4 - 6)      Allergies    No Known Allergies    Intolerances        LABS:                        10.7   7.9   )-----------( 758      ( 16 Jun 2018 06:42 )             33.5     06-16    135  |  100  |  8   ----------------------------<  108<H>  3.7   |  24  |  0.37<L>    Ca    9.1      16 Jun 2018 06:42  Mg     1.8     06-16            RADIOLOGY & ADDITIONAL TESTS:

## 2018-06-17 LAB
ANION GAP SERPL CALC-SCNC: 13 MMOL/L — SIGNIFICANT CHANGE UP (ref 5–17)
APTT BLD: 33 SEC — SIGNIFICANT CHANGE UP (ref 27.5–37.4)
BLD GP AB SCN SERPL QL: NEGATIVE — SIGNIFICANT CHANGE UP
BUN SERPL-MCNC: 7 MG/DL — SIGNIFICANT CHANGE UP (ref 7–23)
CALCIUM SERPL-MCNC: 9.4 MG/DL — SIGNIFICANT CHANGE UP (ref 8.4–10.5)
CHLORIDE SERPL-SCNC: 99 MMOL/L — SIGNIFICANT CHANGE UP (ref 96–108)
CO2 SERPL-SCNC: 25 MMOL/L — SIGNIFICANT CHANGE UP (ref 22–31)
CREAT SERPL-MCNC: 0.45 MG/DL — LOW (ref 0.5–1.3)
GLUCOSE SERPL-MCNC: 116 MG/DL — HIGH (ref 70–99)
HCT VFR BLD CALC: 35.2 % — SIGNIFICANT CHANGE UP (ref 34.5–45)
HCT VFR BLD CALC: 36 % — SIGNIFICANT CHANGE UP (ref 34.5–45)
HGB BLD-MCNC: 11.1 G/DL — LOW (ref 11.5–15.5)
HGB BLD-MCNC: 11.5 G/DL — SIGNIFICANT CHANGE UP (ref 11.5–15.5)
INR BLD: 1.18 — HIGH (ref 0.88–1.16)
MAGNESIUM SERPL-MCNC: 2 MG/DL — SIGNIFICANT CHANGE UP (ref 1.6–2.6)
MCHC RBC-ENTMCNC: 28.2 PG — SIGNIFICANT CHANGE UP (ref 27–34)
MCHC RBC-ENTMCNC: 28.5 PG — SIGNIFICANT CHANGE UP (ref 27–34)
MCHC RBC-ENTMCNC: 31.5 G/DL — LOW (ref 32–36)
MCHC RBC-ENTMCNC: 31.9 G/DL — LOW (ref 32–36)
MCV RBC AUTO: 89.1 FL — SIGNIFICANT CHANGE UP (ref 80–100)
MCV RBC AUTO: 89.3 FL — SIGNIFICANT CHANGE UP (ref 80–100)
PHOSPHATE SERPL-MCNC: 3.1 MG/DL — SIGNIFICANT CHANGE UP (ref 2.5–4.5)
PLATELET # BLD AUTO: 798 K/UL — HIGH (ref 150–400)
PLATELET # BLD AUTO: 799 K/UL — HIGH (ref 150–400)
POTASSIUM SERPL-MCNC: 3.7 MMOL/L — SIGNIFICANT CHANGE UP (ref 3.5–5.3)
POTASSIUM SERPL-SCNC: 3.7 MMOL/L — SIGNIFICANT CHANGE UP (ref 3.5–5.3)
PROTHROM AB SERPL-ACNC: 13.1 SEC — HIGH (ref 9.8–12.7)
RBC # BLD: 3.94 M/UL — SIGNIFICANT CHANGE UP (ref 3.8–5.2)
RBC # BLD: 4.04 M/UL — SIGNIFICANT CHANGE UP (ref 3.8–5.2)
RBC # FLD: 16.3 % — SIGNIFICANT CHANGE UP (ref 10.3–16.9)
RBC # FLD: 16.5 % — SIGNIFICANT CHANGE UP (ref 10.3–16.9)
RH IG SCN BLD-IMP: POSITIVE — SIGNIFICANT CHANGE UP
SODIUM SERPL-SCNC: 137 MMOL/L — SIGNIFICANT CHANGE UP (ref 135–145)
WBC # BLD: 7.4 K/UL — SIGNIFICANT CHANGE UP (ref 3.8–10.5)
WBC # BLD: 7.6 K/UL — SIGNIFICANT CHANGE UP (ref 3.8–10.5)
WBC # FLD AUTO: 7.4 K/UL — SIGNIFICANT CHANGE UP (ref 3.8–10.5)
WBC # FLD AUTO: 7.6 K/UL — SIGNIFICANT CHANGE UP (ref 3.8–10.5)

## 2018-06-17 PROCEDURE — 99232 SBSQ HOSP IP/OBS MODERATE 35: CPT | Mod: 24

## 2018-06-17 PROCEDURE — 74177 CT ABD & PELVIS W/CONTRAST: CPT | Mod: 26

## 2018-06-17 PROCEDURE — 99233 SBSQ HOSP IP/OBS HIGH 50: CPT

## 2018-06-17 RX ORDER — OXYCODONE HYDROCHLORIDE 5 MG/1
5 TABLET ORAL EVERY 6 HOURS
Qty: 0 | Refills: 0 | Status: DISCONTINUED | OUTPATIENT
Start: 2018-06-17 | End: 2018-06-21

## 2018-06-17 RX ORDER — POTASSIUM CHLORIDE 20 MEQ
40 PACKET (EA) ORAL ONCE
Qty: 0 | Refills: 0 | Status: COMPLETED | OUTPATIENT
Start: 2018-06-17 | End: 2018-06-17

## 2018-06-17 RX ORDER — POTASSIUM CHLORIDE 20 MEQ
40 PACKET (EA) ORAL ONCE
Qty: 0 | Refills: 0 | Status: DISCONTINUED | OUTPATIENT
Start: 2018-06-17 | End: 2018-06-17

## 2018-06-17 RX ORDER — MORPHINE SULFATE 50 MG/1
1 CAPSULE, EXTENDED RELEASE ORAL ONCE
Qty: 0 | Refills: 0 | Status: DISCONTINUED | OUTPATIENT
Start: 2018-06-17 | End: 2018-06-17

## 2018-06-17 RX ORDER — POTASSIUM CHLORIDE 20 MEQ
10 PACKET (EA) ORAL
Qty: 0 | Refills: 0 | Status: DISCONTINUED | OUTPATIENT
Start: 2018-06-17 | End: 2018-06-17

## 2018-06-17 RX ADMIN — LEVETIRACETAM 1000 MILLIGRAM(S): 250 TABLET, FILM COATED ORAL at 09:28

## 2018-06-17 RX ADMIN — OXYCODONE HYDROCHLORIDE 5 MILLIGRAM(S): 5 TABLET ORAL at 22:17

## 2018-06-17 RX ADMIN — ATORVASTATIN CALCIUM 80 MILLIGRAM(S): 80 TABLET, FILM COATED ORAL at 22:17

## 2018-06-17 RX ADMIN — HEPARIN SODIUM 5000 UNIT(S): 5000 INJECTION INTRAVENOUS; SUBCUTANEOUS at 06:28

## 2018-06-17 RX ADMIN — Medication 100 MILLIEQUIVALENT(S): at 07:32

## 2018-06-17 RX ADMIN — OXYCODONE AND ACETAMINOPHEN 1 TABLET(S): 5; 325 TABLET ORAL at 11:59

## 2018-06-17 RX ADMIN — Medication 650 MILLIGRAM(S): at 08:16

## 2018-06-17 RX ADMIN — Medication 81 MILLIGRAM(S): at 11:09

## 2018-06-17 RX ADMIN — HEPARIN SODIUM 5000 UNIT(S): 5000 INJECTION INTRAVENOUS; SUBCUTANEOUS at 22:17

## 2018-06-17 RX ADMIN — HEPARIN SODIUM 5000 UNIT(S): 5000 INJECTION INTRAVENOUS; SUBCUTANEOUS at 15:28

## 2018-06-17 RX ADMIN — BISOPROLOL FUMARATE 5 MILLIGRAM(S): 10 TABLET, FILM COATED ORAL at 09:28

## 2018-06-17 RX ADMIN — OXYCODONE AND ACETAMINOPHEN 1 TABLET(S): 5; 325 TABLET ORAL at 11:10

## 2018-06-17 RX ADMIN — OXYCODONE HYDROCHLORIDE 5 MILLIGRAM(S): 5 TABLET ORAL at 22:47

## 2018-06-17 RX ADMIN — MORPHINE SULFATE 1 MILLIGRAM(S): 50 CAPSULE, EXTENDED RELEASE ORAL at 03:29

## 2018-06-17 RX ADMIN — MORPHINE SULFATE 1 MILLIGRAM(S): 50 CAPSULE, EXTENDED RELEASE ORAL at 01:54

## 2018-06-17 RX ADMIN — Medication 1 TABLET(S): at 11:09

## 2018-06-17 RX ADMIN — Medication 650 MILLIGRAM(S): at 20:21

## 2018-06-17 RX ADMIN — Medication 650 MILLIGRAM(S): at 20:51

## 2018-06-17 RX ADMIN — OXYCODONE AND ACETAMINOPHEN 1 TABLET(S): 5; 325 TABLET ORAL at 17:41

## 2018-06-17 RX ADMIN — Medication 40 MILLIEQUIVALENT(S): at 11:10

## 2018-06-17 RX ADMIN — OXYCODONE AND ACETAMINOPHEN 1 TABLET(S): 5; 325 TABLET ORAL at 16:57

## 2018-06-17 RX ADMIN — LEVETIRACETAM 1000 MILLIGRAM(S): 250 TABLET, FILM COATED ORAL at 17:01

## 2018-06-17 RX ADMIN — LISINOPRIL 20 MILLIGRAM(S): 2.5 TABLET ORAL at 09:28

## 2018-06-17 NOTE — PROGRESS NOTE ADULT - SUBJECTIVE AND OBJECTIVE BOX
=================================  STROKE ATTENDING NOTE  =================================    VERONIKA CALERO   MRN-[id number]  Summary:  58F with h/o breast cancer, s/p TRAM flap reconstruction, recently started on plavix presented with painful bulge in R abdomen.  Imaging showed large expanding intramuscular hematoma R rects abdominis.  Admitted to surgery , diagnostic angio, R abd percutaneous thrombin injection of 2 pseudoaneurysms.  Post-procedure, noted to be hypotensive, low respiratory rate, altered mental status.  Naloxone given with improved respiratory status, but AMS persisted and noted L-sided weakness.  Stroke code called.  Post-op, noted  L UE hemiplegia / L LE weakness.  Stroke code called.  CT showed large R MCA small SELIAN infarcts, with mass effect.  Mannitol 50g IV given, 23.4% x1 given.  Intubated for airway protection.  Seizure noted during intubation, given 4 ativan, started on propofol drip.  Brought to OR for decompressive hemicraniectomy.    Overnight Events: c/o abdominal pain yesterday, repeat CT abd done  REVIEW OF SYSTEMS:  No headaches, no nausea or vomiting; 14 -point review of systems otherwise unremarkable.    Past Medical History: Anxiety Hypertension Breast CA dyslipidemia CAD Hypertension   Allergies:  No Known Allergies  Home Meds: bisoprolol 5mg qd, lorazepam 1mg q8?, Plavix 5qd, rosuvastatin, HCTZ, buproprion, baclofen  Social: 1/2 bottle of wine most nights, remote cigarette hx, denies IVDA     PHYSICAL EXAMINATION  T(C): 36.9 ( @ 13:39), Max: 37.8 ( @ 18:34) HR: 66 ( @ 12:47) (54 - 76) BP: 112/68 ( @ 12:47) (112/68 - 163/77) RR: 18 ( @ 12:47) (14 - 18) SpO2: 97% ( @ 12:47) (93% - 100%)   NEUROLOGIC EXAMINATION:  Patient is awake, alert, fully oriented, L neglect, L UE 0/5, L LE 4/5, R UE/LE 5/5.  moves RUE/LE spontaneously, 1/5 L UE, L LE 4/5    GENERAL:   not intubated  EENT: anicteric, flap soft  CARDIOVASC:  (+) S1 S2, normal rate and regular rhythm  PULMONARY:  clear to auscultation bilaterally  ABDOMEN:  soft, nontender, with normoactive bowel sounds  EXTREMITIES:  no edema  SKIN:  no rash    LABS:             11.5   7.4   )-----------( 798      ( 2018 06:24 )             36.0     137  |  99  |  7   ----------------------------<  116<H>  3.7   |  25  |  0.45<L>    Ca    9.4      2018 06:24  Phos  3.1       Mg     2.0           HbA1C = 5.3 ()  LDL = 106   /  HDL = 52 ()  /  TG = 84 ()  TSH = 0.614 ()    Bacteriology: UA Nitrite (+) (06/10)    RECENT CULTURES:  Culture - Urine (collected ) Final Report ():   No growth  Culture - Blood (collected ) Preliminary Report ():   No growth at 3 days.  Culture - Blood (collected ) Preliminary Report ():   No growth at 3 days.    CSF studies:  EEG:  Neuroimagin/14 CT head: R FTP craniectomy, R frontal low density subdural collection with distortion and mass effect of mesh, marked increase in R frontal extracranil gluid collection, tiny amount subdural fluid within intrahemispheric fissre along inferior posterior temporal lobe., subacute infarction R MCA/ICA and PCA distribution   CT head:  E FTRP crani, subacute infarction R MCA/ICA and PCA territories spared cortex vs hemorrhage - gyriform hyperdensity)   CT head:  no change in small foci of hemorrhage, continued evolution large R MCA infarction small R SELINA territory infarct   CT head: evolving R MCA / SELINA infarction, global mass effect, MLS to L, early L lateral ventricle entrapment, hemorrhagic transformation suspected   CTP:  abnormal perfusion, mismatch volume 12ml   CTA:  acute occlusion R M2   CT head:  acute R MCA infarction, no ICH   CT abd:  large expanding intramuscular hematoma within R rectus abdomin  Other imagin/17 CT abd pelvis:  interval resolution of active arterial hemorrhage, within stable large rectus sheath hematoma  06/15 gallium scan: ?abnormality L lung base, repeat CXR  06/15 CXR:  retrocardiac opacity  06/10 LE Doppler: NEG   CXR: clear   CXR small bilateral effusions    LE Doppler limited exam, NEG    MEDICATIONS: asa 81mg daily atorvastatin 80mg HS bisoprolol 5mg daily docusate 100mg BID SQH q8h levetiracetam 1000 BID lisinopril 20mg daily MVI daily percocet PRN Miralax senna HS    IV FLUIDS: IVL  DRIPS:   DIET: mech soft  Lines:   Drains:    Wounds:    CODE STATUS:  Full Code                       GOALS OF CARE:  aggressive                      DISPOSITION:  [facility]

## 2018-06-17 NOTE — PROGRESS NOTE ADULT - ASSESSMENT
59 yo F with HTN, left breast CA s/p mastectomy with TRAM flap reconstruction admitted with right rectus abdominal hematoma on Plavix txed with thrombin, followed by large R MCA CVA with mass effect requiring hemicraniectomy.  She had had recurrent low grade temp increases with leukocytosis.  Received ceftriaxone for 3 d, though unclear that she had UTI, rather than colonization.  Had been afebrile on 6/15, then with one T 100.1 yesterday.  Leukocytosis has resolved.  Concern was raised for pneumonia on 24 h imaging from gallium scan but no lower lobe infiltrates were seen on CT scan, which showed stable size of hematoma and stercoral colitis.  Suggest:  - Continue to observe off antibiotics  - Bowel regimen  - Will f/u results of gallium scan  Will follow with you - ID service

## 2018-06-17 NOTE — PROGRESS NOTE ADULT - SUBJECTIVE AND OBJECTIVE BOX
SUBJECTIVE:   surgery re-consulted for acute-onset right sided abdominal pain earlier tonight.  pt seen early in AM around 0415, pt sleeping without abd pain, no N/V, hemodynamically stable, hb stable.  Taken tonight for CT abd/pelvis to assess rectus sheath hematoma    Vital Signs Last 24 Hrs  T(C): 37.2 (17 Jun 2018 02:00), Max: 37.8 (16 Jun 2018 18:34)  T(F): 98.9 (17 Jun 2018 02:00), Max: 100.1 (16 Jun 2018 18:34)  HR: 76 (17 Jun 2018 00:20) (64 - 76)  BP: 163/77 (17 Jun 2018 00:20) (114/62 - 163/77)  BP(mean): 111 (17 Jun 2018 00:20) (82 - 114)  RR: 16 (17 Jun 2018 00:20) (10 - 18)  SpO2: 93% (17 Jun 2018 00:20) (93% - 100%)    Physical Exam:  General: NAD  Pulmonary: Nonlabored breathing, no respiratory distress  Cardiovascular: NSR  Abdominal: right abdominal fullness/firmness with mild tenderness, but ND, no guarding, no rigidity, no rebound tenderness  Extremities: WWP, normal strength, no clubbing/cyanosis/edema  Neuro: somnolent, responsive to verbal stimulus, but difficult to arouse      LABS:                        11.1   7.6   )-----------( 799      ( 17 Jun 2018 01:14 )             35.2     06-16    135  |  100  |  8   ----------------------------<  108<H>  3.7   |  24  |  0.37<L>    Ca    9.1      16 Jun 2018 06:42  Mg     1.8     06-16      PT/INR - ( 17 Jun 2018 01:14 )   PT: 13.1 sec;   INR: 1.18          PTT - ( 17 Jun 2018 01:14 )  PTT:33.0 sec

## 2018-06-17 NOTE — PROGRESS NOTE ADULT - ASSESSMENT
surgery re-consulted for acute-onset right sided abdominal pain earlier tonight.  pt seen early in AM around 0415, pt sleeping without abd pain, no N/V.  right abdominal fullness/firmness with mild tenderness, but ND, no guarding, no rigidity, no rebound tenderness    recs:  - f/u final read on CT  - serial abd exams 58F admitted with spontaneous right rectus expanding hematoma (was new to Plavix) taken to IR for Rt groin access for dx angio, and rt abd percutaneous thrombin injection of 2 pseudoaneurysms in abd wall c/b herniation of cerebral tonsils, transferred to neurosurgery for decompressive hemicraniectomy 5/31, now on neurology service after stroke.    Hgb and vitals stable, no need for urgent surgical intervention at this time. Right rectus sheath hematoma on CT today vs 18 days ago appears only slightly (0.5 cm) larger in both directions.    Recs:  - f/u final read on CT  - serial abd exams  - surgery to follow, call team 4 surg with questions 58F admitted with spontaneous right rectus expanding hematoma (was new to Plavix) taken to IR for Rt groin access for dx angio, and rt abd percutaneous thrombin injection of 2 pseudoaneurysms in abd wall c/b herniation of cerebral tonsils, transferred to neurosurgery for decompressive hemicraniectomy 5/31, now on neurology service after stroke.    Hgb and vitals stable, no need for urgent surgical intervention at this time. Right rectus sheath hematoma on CT today vs 18 days ago appears only slightly (0.5 cm) larger in both directions.    Recs:  - f/u final read on CT  - monitor vitals closely, trend CBC if pain worsens  - serial abd exams  - surgery to follow, call team 4 surg with questions

## 2018-06-17 NOTE — PROGRESS NOTE ADULT - SUBJECTIVE AND OBJECTIVE BOX
OVERNIGHT EVENTS:  Increased pain and tenderness on RUQ at site of hematoma.  Repeat CT abd/pelvis showed possible interval increase in size.  Surgery aware.  No contrast extravasation.  HD stable.  Hgb stable.  Morphine given for pain    SUBJECTIVE / INTERVAL HPI: Patient seen and examined at bedside. Patient endorses 11/10 R sided headache this morning which she reports is unchanged from her prior headaches. Otherwise, patient    VITAL SIGNS:  Vital Signs Last 24 Hrs  T(C): 37.1 (17 Jun 2018 06:19), Max: 37.8 (16 Jun 2018 18:34)  T(F): 98.8 (17 Jun 2018 06:19), Max: 100.1 (16 Jun 2018 18:34)  HR: 54 (17 Jun 2018 06:29) (54 - 76)  BP: 162/74 (17 Jun 2018 06:29) (136/75 - 163/77)  BP(mean): 107 (17 Jun 2018 06:29) (96 - 114)  RR: 16 (17 Jun 2018 06:29) (11 - 18)  SpO2: 100% (17 Jun 2018 06:29) (93% - 100%)    PHYSICAL EXAM:    General: WDWN  HEENT: NC/AT; PERRL, anicteric sclera; MMM  Neck: supple  Cardiovascular: +S1/S2; RRR  Respiratory: CTA B/L; no W/R/R  Gastrointestinal: soft, NT/ND; +BSx4  Extremities: WWP; no edema, clubbing or cyanosis  Vascular: 2+ radial, DP/PT pulses B/L  Neurological: AAOx3; no focal deficits    MEDICATIONS:  MEDICATIONS  (STANDING):  aspirin  chewable 81 milliGRAM(s) Oral daily  atorvastatin 80 milliGRAM(s) Oral at bedtime  bisoprolol   Tablet 5 milliGRAM(s) Oral daily  docusate sodium Liquid 100 milliGRAM(s) Oral two times a day  heparin  Injectable 5000 Unit(s) SubCutaneous every 8 hours  levETIRAcetam 1000 milliGRAM(s) Oral two times a day  lisinopril 20 milliGRAM(s) Oral daily  multivitamin 1 Tablet(s) Oral daily  polyethylene glycol 3350 17 Gram(s) Oral at bedtime  potassium chloride  10 mEq/100 mL IVPB 10 milliEquivalent(s) IV Intermittent every 1 hour  senna Syrup 10 milliLiter(s) Oral at bedtime    MEDICATIONS  (PRN):  acetaminophen   Tablet 650 milliGRAM(s) Oral every 6 hours PRN For Temp greater than 38 C (100.4 F)  acetaminophen   Tablet. 650 milliGRAM(s) Oral every 6 hours PRN Mild Pain (1 - 3)  bisacodyl Suppository 10 milliGRAM(s) Rectal daily PRN Constipation  ondansetron Injectable 4 milliGRAM(s) IV Push every 6 hours PRN Nausea  oxyCODONE    5 mG/acetaminophen 325 mG 1 Tablet(s) Oral every 6 hours PRN Moderate Pain (4 - 6)      ALLERGIES:  Allergies    No Known Allergies    Intolerances        LABS:                        11.5   7.4   )-----------( 798      ( 17 Jun 2018 06:24 )             36.0     06-17    137  |  99  |  7   ----------------------------<  116<H>  3.7   |  25  |  0.45<L>    Ca    9.4      17 Jun 2018 06:24  Phos  3.1     06-17  Mg     2.0     06-17      PT/INR - ( 17 Jun 2018 01:14 )   PT: 13.1 sec;   INR: 1.18          PTT - ( 17 Jun 2018 01:14 )  PTT:33.0 sec    CAPILLARY BLOOD GLUCOSE          RADIOLOGY & ADDITIONAL TESTS: Reviewed.    ASSESSMENT:    PLAN: OVERNIGHT EVENTS:  Increased pain and tenderness on RUQ at site of hematoma.  Repeat CT abd/pelvis showed possible interval increase in size (0.5 cm bidirectionally).  Surgery aware, recommending no surgical intervention at this time.  No contrast extravasation.  HD stable.  Hgb stable.  Morphine 1 mg x1 given for pain.    SUBJECTIVE / INTERVAL HPI: Patient seen and examined at bedside. Patient endorses 11/10 R sided headache this morning which she reports is unchanged from her prior headaches. Otherwise, patient    VITAL SIGNS:  Vital Signs Last 24 Hrs  T(C): 37.1 (17 Jun 2018 06:19), Max: 37.8 (16 Jun 2018 18:34)  T(F): 98.8 (17 Jun 2018 06:19), Max: 100.1 (16 Jun 2018 18:34)  HR: 54 (17 Jun 2018 06:29) (54 - 76)  BP: 162/74 (17 Jun 2018 06:29) (136/75 - 163/77)  BP(mean): 107 (17 Jun 2018 06:29) (96 - 114)  RR: 16 (17 Jun 2018 06:29) (11 - 18)  SpO2: 100% (17 Jun 2018 06:29) (93% - 100%)    PHYSICAL EXAM:    Constitutional: lying in bed, alert, alert and awake this AM  HEENT: S/p craniotomy on right side with staples in, PERRL, EOMI, sclera non-icteric, MMM  Neck: supple, trachea midline, no masses, no JVD  Respiratory: CTA b/l, good air entry b/l, no wheezing, no rhonchi, no rales, without accessory muscle use and no intercostal retractions  Cardiovascular: RRR, normal S1S2, no M/R/G  Gastrointestinal: soft, NTND, no masses palpable, BS normal  Extremities: Warm, well perfused, pulses equal bilateral upper and lower extremities, no edema, no clubbing  Neurological: AAOx3, CN 2-12 intact, left sided neglect. 1/5 left shoulder strength, 0/5 left arm strength, 4/5 left leg strength. 5/5 strength. Sensation in upper and lower extremities intact. Gait not assessed.    MEDICATIONS:  MEDICATIONS  (STANDING):  aspirin  chewable 81 milliGRAM(s) Oral daily  atorvastatin 80 milliGRAM(s) Oral at bedtime  bisoprolol   Tablet 5 milliGRAM(s) Oral daily  docusate sodium Liquid 100 milliGRAM(s) Oral two times a day  heparin  Injectable 5000 Unit(s) SubCutaneous every 8 hours  levETIRAcetam 1000 milliGRAM(s) Oral two times a day  lisinopril 20 milliGRAM(s) Oral daily  multivitamin 1 Tablet(s) Oral daily  polyethylene glycol 3350 17 Gram(s) Oral at bedtime  potassium chloride  10 mEq/100 mL IVPB 10 milliEquivalent(s) IV Intermittent every 1 hour  senna Syrup 10 milliLiter(s) Oral at bedtime    MEDICATIONS  (PRN):  acetaminophen   Tablet 650 milliGRAM(s) Oral every 6 hours PRN For Temp greater than 38 C (100.4 F)  acetaminophen   Tablet. 650 milliGRAM(s) Oral every 6 hours PRN Mild Pain (1 - 3)  bisacodyl Suppository 10 milliGRAM(s) Rectal daily PRN Constipation  ondansetron Injectable 4 milliGRAM(s) IV Push every 6 hours PRN Nausea  oxyCODONE    5 mG/acetaminophen 325 mG 1 Tablet(s) Oral every 6 hours PRN Moderate Pain (4 - 6)      ALLERGIES:  Allergies    No Known Allergies    Intolerances        LABS:                        11.5   7.4   )-----------( 798      ( 17 Jun 2018 06:24 )             36.0     06-17    137  |  99  |  7   ----------------------------<  116<H>  3.7   |  25  |  0.45<L>    Ca    9.4      17 Jun 2018 06:24  Phos  3.1     06-17  Mg     2.0     06-17      PT/INR - ( 17 Jun 2018 01:14 )   PT: 13.1 sec;   INR: 1.18          PTT - ( 17 Jun 2018 01:14 )  PTT:33.0 sec    CAPILLARY BLOOD GLUCOSE          RADIOLOGY & ADDITIONAL TESTS: Reviewed.    ASSESSMENT:    PLAN:

## 2018-06-17 NOTE — PROGRESS NOTE ADULT - SUBJECTIVE AND OBJECTIVE BOX
INTERVAL HPI/OVERNIGHT EVENTS:  When seen on 6/16, she had increase in right abd pain - described as same as when she came in.  She had been afebrile but then had T 100.1 last night, now again afebrile.  Abd pain is much better    CONSTITUTIONAL:  No fever, chills, night sweats  EYES:  No photophobia or visual changes  CARDIOVASCULAR:  No chest pain  RESPIRATORY:  No cough, wheezing, or SOB   GASTROINTESTINAL:  No nausea, vomiting, diarrhea.    GENITOURINARY:  No frequency, urgency, dysuria or hematuria  NEUROLOGIC:  HA is improved.  Still with photophobia      ANTIBIOTICS/RELEVANT:    cefazolin 5/31  CAX 6/9-6/12  Metronidazole 6/6-6/14      Vital Signs Last 24 Hrs  T(C): 36.9 (17 Jun 2018 13:39), Max: 37.8 (16 Jun 2018 18:34)  T(F): 98.5 (17 Jun 2018 13:39), Max: 100.1 (16 Jun 2018 18:34)  HR: 66 (17 Jun 2018 12:47) (54 - 76)  BP: 112/68 (17 Jun 2018 12:47) (112/68 - 163/77)  BP(mean): 83 (17 Jun 2018 12:47) (83 - 111)  RR: 18 (17 Jun 2018 12:47) (14 - 18)  SpO2: 97% (17 Jun 2018 12:47) (93% - 100%)    PHYSICAL EXAM:  Constitutional:  Chronically ill-appearing  Eyes:  Sclerae anicteric, conjunctivae clear  Ear/Nose/Throat:  No nasal exudate or sinus tenderness;  No buccal mucosal lesions, no pharyngeal erythema or exudate	  Neck:  Supple, no adenopathy  Respiratory:  Clear bilaterally  Cardiovascular:  RRR, S1S2, no murmur appreciated  Gastrointestinal: +BS, Right abd wall hematoma - less tender than on 6/16  Extremities:  No edema  Neuro:  Left facial droop, left hemiplegia      LABS:                        11.5   7.4   )-----------( 798      ( 17 Jun 2018 06:24 )             36.0         06-17    137  |  99  |  7   ----------------------------<  116<H>  3.7   |  25  |  0.45<L>    Ca    9.4      17 Jun 2018 06:24  Phos  3.1     06-17  Mg     2.0     06-17            MICROBIOLOGY:  Blood cultures:  6/5 X 2 sets - NG;  6/13 X 2 - NGTD    Urine:  6/9:  >10^5 E coli;  6/13 - NG    rRVP 6/13 - ND      RADIOLOGY & ADDITIONAL STUDIES:  < from: CT Abdomen and Pelvis w/ IV Cont (06.17.18 @ 02:51) >  FINDINGS: CT of the abdomen and pelvis was performed without the   administration of intravenous contrast. Reconstructions were performed in   the sagittal and coronal planes. Comparison is made to prior exam dated   5/30/2018. Examination is limited secondary to patient motion.    Evaluation of the chest demonstrates coronary arterial calcification. No   pleural or pericardial effusion. Lower lung parenchyma is unremarkable   aside from compressive atelectasis of the left lower lobe with elevation   of the left hemidiaphragm. Evaluation of the abdomen demonstrates the   liver, gallbladder, spleen, pancreas and bilateral adrenal glands are   unremarkable. Evaluation of the kidneys demonstrates no interval change   in mild fullness of the bilateral renal collecting systems and a cyst in   the lateral interpolar region of the left kidney measuring 2.6 cm.   Evaluation of the gastrointestinal tract demonstrates a large left   diaphragmatic hernia containing nearly the entirety of the stomach. No   bowel obstruction or bowel thickening. Fecal loading of stool within the   rectum with perirectal edematous infiltration, consistent with stercoral   colitis. Evaluation of the pelvis demonstrates the uterus, bilateral   adnexal regions and bladder areunremarkable. Small bilateral   fat-containing inguinal hernias. No free fluid. No adenopathy. Mild   aortic vascular calcification. Very large hematoma of the right rectus   muscle belly measuring 14.3 x 8.3 cm, previously measuring 13.4 x 8.2 cm,   not significantly changed, with subacute hemorrhagic component image;   interval resolution of previous demonstrated active arterial hemorrhagic   component. Negative for retroperitoneal or intraperitoneal hematoma.   Evaluation of the osseous structures demonstrates no destructive lesion.          IMPRESSION:  Reviewed scans and reports    Interval resolution of active arterial hemorrhage within stable large   rectus sheath hematoma.    < end of copied text >    < from: NM Inflammatory Loc Wholebody, Gallium (06.15.18 @ 10:49) >  Findings: There is an area of focal activity near the left lung base with   hazy activity involving the entire lower portion of the lung. Because   this is an image obtained at 24 hours, the exact significance is unclear.   48 hour imaging will be performed.    Impression: Questionable abnormality at the left lung base. After   discussion with the resident, repeat chest x-ray was requested. In   addition, 48 hour imaging will be performed.      < end of copied text >

## 2018-06-17 NOTE — PROGRESS NOTE ADULT - ASSESSMENT
58F with   1.  acute cerebrovascular accident involving R MCA / SELINA, malignant infarction, cerebral edema, brain compression s/p decompressive hemicraniectomy (05/31/2018, Dr. Johnson)  2.  Hypertension dyslipidemia   3.  h/o breast CA  4.  CAD  5.  R rectus abdominis intramuscular hematoma  6.  seizures    PLAN:   NEURO: neurochecks q4h, PRN pain meds tylenol, percocet  malignant R MCA infarction, s/p DHC:  continue aspirin  seizure disorder:  continue levetiracetam 1G BID PO  REHAB:  physical therapy evaluation and management (with helmet) EARLY MOB:  ambulate with assist    PULM:  room air  CARDIO:  SBP goal 100-150 mm Hg, echo EF 55-60% mod dilated LA, continue bisoprolol, lisinopril  ENDO:  Blood sugar goals 140-180 mg/dL, off insulin sliding scale; continue high-dose statins  GI:  docusate senna  DIET: mech soft diet  RENAL:  IVL  HEM/ONC: Hb stable  VTE Prophylaxis: SCDs,  SQH   ID: afebrile, no leukocytosis; s/p bacterial vaginosis / UTI - completed ceftriaxone / MNZ; repeat CXR  Social: will update family.  MISC:  h/o ETOH abuse, continue MVI thiamine folate; h/o rectus hematoma - management as per surgery    ATTENDING ATTESTATION:  I was physically present for the key portions of the evaluation and management (E/M) service provided.  I agree with the above history, physical and plan which I have reviewed and edited where appropriate.     Patient not at high risk for neurologic deterioration / death.  Time spent on this noncritically ill patient: 45 minutes spent on total encounter, more than 50% of the visit was spent counseling and/or coordinating care by the attending physician.    Plan discussed with RN, house staff.

## 2018-06-18 LAB
ANION GAP SERPL CALC-SCNC: 11 MMOL/L — SIGNIFICANT CHANGE UP (ref 5–17)
BUN SERPL-MCNC: 7 MG/DL — SIGNIFICANT CHANGE UP (ref 7–23)
CALCIUM SERPL-MCNC: 9.5 MG/DL — SIGNIFICANT CHANGE UP (ref 8.4–10.5)
CHLORIDE SERPL-SCNC: 104 MMOL/L — SIGNIFICANT CHANGE UP (ref 96–108)
CO2 SERPL-SCNC: 26 MMOL/L — SIGNIFICANT CHANGE UP (ref 22–31)
CREAT SERPL-MCNC: 0.46 MG/DL — LOW (ref 0.5–1.3)
CULTURE RESULTS: SIGNIFICANT CHANGE UP
CULTURE RESULTS: SIGNIFICANT CHANGE UP
GLUCOSE SERPL-MCNC: 110 MG/DL — HIGH (ref 70–99)
HCT VFR BLD CALC: 35.9 % — SIGNIFICANT CHANGE UP (ref 34.5–45)
HGB BLD-MCNC: 11.5 G/DL — SIGNIFICANT CHANGE UP (ref 11.5–15.5)
MAGNESIUM SERPL-MCNC: 2.1 MG/DL — SIGNIFICANT CHANGE UP (ref 1.6–2.6)
MCHC RBC-ENTMCNC: 28.3 PG — SIGNIFICANT CHANGE UP (ref 27–34)
MCHC RBC-ENTMCNC: 32 G/DL — SIGNIFICANT CHANGE UP (ref 32–36)
MCV RBC AUTO: 88.2 FL — SIGNIFICANT CHANGE UP (ref 80–100)
PLATELET # BLD AUTO: 661 K/UL — HIGH (ref 150–400)
POTASSIUM SERPL-MCNC: 4 MMOL/L — SIGNIFICANT CHANGE UP (ref 3.5–5.3)
POTASSIUM SERPL-SCNC: 4 MMOL/L — SIGNIFICANT CHANGE UP (ref 3.5–5.3)
RBC # BLD: 4.07 M/UL — SIGNIFICANT CHANGE UP (ref 3.8–5.2)
RBC # FLD: 16.6 % — SIGNIFICANT CHANGE UP (ref 10.3–16.9)
SODIUM SERPL-SCNC: 141 MMOL/L — SIGNIFICANT CHANGE UP (ref 135–145)
SPECIMEN SOURCE: SIGNIFICANT CHANGE UP
SPECIMEN SOURCE: SIGNIFICANT CHANGE UP
WBC # BLD: 7 K/UL — SIGNIFICANT CHANGE UP (ref 3.8–10.5)
WBC # FLD AUTO: 7 K/UL — SIGNIFICANT CHANGE UP (ref 3.8–10.5)

## 2018-06-18 PROCEDURE — 99233 SBSQ HOSP IP/OBS HIGH 50: CPT

## 2018-06-18 PROCEDURE — 93970 EXTREMITY STUDY: CPT | Mod: 26

## 2018-06-18 PROCEDURE — 99232 SBSQ HOSP IP/OBS MODERATE 35: CPT | Mod: GC

## 2018-06-18 PROCEDURE — 74019 RADEX ABDOMEN 2 VIEWS: CPT | Mod: 26

## 2018-06-18 RX ORDER — ONDANSETRON 8 MG/1
4 TABLET, FILM COATED ORAL ONCE
Qty: 0 | Refills: 0 | Status: DISCONTINUED | OUTPATIENT
Start: 2018-06-18 | End: 2018-06-19

## 2018-06-18 RX ORDER — FAMOTIDINE 10 MG/ML
20 INJECTION INTRAVENOUS ONCE
Qty: 0 | Refills: 0 | Status: DISCONTINUED | OUTPATIENT
Start: 2018-06-18 | End: 2018-06-18

## 2018-06-18 RX ORDER — ESCITALOPRAM OXALATE 10 MG/1
10 TABLET, FILM COATED ORAL DAILY
Qty: 0 | Refills: 0 | Status: DISCONTINUED | OUTPATIENT
Start: 2018-06-18 | End: 2018-06-21

## 2018-06-18 RX ORDER — LIDOCAINE 4 G/100G
1 CREAM TOPICAL ONCE
Qty: 0 | Refills: 0 | Status: COMPLETED | OUTPATIENT
Start: 2018-06-18 | End: 2018-06-18

## 2018-06-18 RX ORDER — FAMOTIDINE 10 MG/ML
20 INJECTION INTRAVENOUS ONCE
Qty: 0 | Refills: 0 | Status: COMPLETED | OUTPATIENT
Start: 2018-06-18 | End: 2018-06-18

## 2018-06-18 RX ORDER — PANTOPRAZOLE SODIUM 20 MG/1
40 TABLET, DELAYED RELEASE ORAL
Qty: 0 | Refills: 0 | Status: DISCONTINUED | OUTPATIENT
Start: 2018-06-18 | End: 2018-06-21

## 2018-06-18 RX ORDER — SENNA PLUS 8.6 MG/1
2 TABLET ORAL AT BEDTIME
Qty: 0 | Refills: 0 | Status: DISCONTINUED | OUTPATIENT
Start: 2018-06-18 | End: 2018-06-21

## 2018-06-18 RX ADMIN — OXYCODONE HYDROCHLORIDE 5 MILLIGRAM(S): 5 TABLET ORAL at 21:54

## 2018-06-18 RX ADMIN — LIDOCAINE 1 PATCH: 4 CREAM TOPICAL at 23:00

## 2018-06-18 RX ADMIN — Medication 81 MILLIGRAM(S): at 11:39

## 2018-06-18 RX ADMIN — OXYCODONE HYDROCHLORIDE 5 MILLIGRAM(S): 5 TABLET ORAL at 05:15

## 2018-06-18 RX ADMIN — LEVETIRACETAM 1000 MILLIGRAM(S): 250 TABLET, FILM COATED ORAL at 05:15

## 2018-06-18 RX ADMIN — BISOPROLOL FUMARATE 5 MILLIGRAM(S): 10 TABLET, FILM COATED ORAL at 06:00

## 2018-06-18 RX ADMIN — OXYCODONE HYDROCHLORIDE 5 MILLIGRAM(S): 5 TABLET ORAL at 21:22

## 2018-06-18 RX ADMIN — HEPARIN SODIUM 5000 UNIT(S): 5000 INJECTION INTRAVENOUS; SUBCUTANEOUS at 05:15

## 2018-06-18 RX ADMIN — OXYCODONE HYDROCHLORIDE 5 MILLIGRAM(S): 5 TABLET ORAL at 11:41

## 2018-06-18 RX ADMIN — OXYCODONE HYDROCHLORIDE 5 MILLIGRAM(S): 5 TABLET ORAL at 05:45

## 2018-06-18 RX ADMIN — OXYCODONE HYDROCHLORIDE 5 MILLIGRAM(S): 5 TABLET ORAL at 12:54

## 2018-06-18 RX ADMIN — HEPARIN SODIUM 5000 UNIT(S): 5000 INJECTION INTRAVENOUS; SUBCUTANEOUS at 13:19

## 2018-06-18 RX ADMIN — ATORVASTATIN CALCIUM 80 MILLIGRAM(S): 80 TABLET, FILM COATED ORAL at 21:22

## 2018-06-18 RX ADMIN — LEVETIRACETAM 1000 MILLIGRAM(S): 250 TABLET, FILM COATED ORAL at 17:28

## 2018-06-18 RX ADMIN — Medication 10 MILLIGRAM(S): at 13:20

## 2018-06-18 RX ADMIN — FAMOTIDINE 104 MILLIGRAM(S): 10 INJECTION INTRAVENOUS at 13:20

## 2018-06-18 RX ADMIN — Medication 1 TABLET(S): at 11:39

## 2018-06-18 RX ADMIN — LISINOPRIL 20 MILLIGRAM(S): 2.5 TABLET ORAL at 05:15

## 2018-06-18 RX ADMIN — HEPARIN SODIUM 5000 UNIT(S): 5000 INJECTION INTRAVENOUS; SUBCUTANEOUS at 21:23

## 2018-06-18 RX ADMIN — ESCITALOPRAM OXALATE 10 MILLIGRAM(S): 10 TABLET, FILM COATED ORAL at 11:39

## 2018-06-18 NOTE — PROGRESS NOTE ADULT - SUBJECTIVE AND OBJECTIVE BOX
SUBJECTIVE: Patient seen and examined bedside, no nausea/vomiting, no abd pain, able to tolerate food, H/H stable. no chest pain, no headache    aspirin  chewable 81 milliGRAM(s) Oral daily  bisoprolol   Tablet 5 milliGRAM(s) Oral daily  heparin  Injectable 5000 Unit(s) SubCutaneous every 8 hours  lisinopril 20 milliGRAM(s) Oral daily      Vital Signs Last 24 Hrs  T(C): 37.2 (18 Jun 2018 09:02), Max: 37.2 (18 Jun 2018 09:02)  T(F): 98.9 (18 Jun 2018 09:02), Max: 98.9 (18 Jun 2018 09:02)  HR: 56 (18 Jun 2018 08:12) (56 - 76)  BP: 135/76 (18 Jun 2018 08:12) (112/68 - 167/80)  BP(mean): 101 (18 Jun 2018 08:12) (80 - 115)  RR: 18 (18 Jun 2018 08:12) (18 - 18)  SpO2: 97% (18 Jun 2018 08:12) (95% - 97%)  I&O's Detail      General: NAD, resting comfortably in bed  Pulm: Nonlabored breathing, no respiratory distress  Abd: soft except at the right lumbar area, firm mass - hematoma, no expanding, non tender, non distended.  Extrem: WWP, no edema, SCDs in place        LABS:                        11.5   7.0   )-----------( 661      ( 18 Jun 2018 07:27 )             35.9     06-18    141  |  104  |  7   ----------------------------<  110<H>  4.0   |  26  |  0.46<L>    Ca    9.5      18 Jun 2018 07:27  Phos  3.1     06-17  Mg     2.1     06-18      PT/INR - ( 17 Jun 2018 01:14 )   PT: 13.1 sec;   INR: 1.18          PTT - ( 17 Jun 2018 01:14 )  PTT:33.0 sec      RADIOLOGY & ADDITIONAL STUDIES:

## 2018-06-18 NOTE — PROGRESS NOTE ADULT - ATTENDING COMMENTS
Patient without fever x 2 days and now with normal wbc. Awaiting final 48h part of gallium scan.  Patient is depressed and was tearful when I walked into the room.   will start lexapro at 10 mg daily  awaiting acute rehab

## 2018-06-18 NOTE — PROGRESS NOTE ADULT - ASSESSMENT
58F admitted with spontaneous right rectus expanding hematoma (was new to Plavix) taken to IR for Rt groin access for dx angio, and rt abd percutaneous thrombin injection of 2 pseudoaneurysms in abd wall c/b herniation of cerebral tonsils, transferred to neurosurgery for decompressive hemicraniectomy 5/31, now on neurology service after stroke.    Hgb and vitals stable (today is 11.5)  Right rectus sheath hematoma on CT on 6/17 revealed : interval resolution of active arterial hemorrhage within stable large   rectus sheath hematoma.    No surgical intervention at this time  Team 4 will sign off  call if any acute change    Plan d/w

## 2018-06-18 NOTE — PROGRESS NOTE ADULT - ASSESSMENT
58F with a hx of left breast cancer s/p left mastectomy with TRAM flap reconstruction (4-5 years prior), HTN, HLD, anxiety who initially came in on 5/31 with R rectus abdominal hematoma after recent initiation of plavix for non-obstructive CAD.  Patient found to have 2 pseudo-aneurysms of the artery supplying the R rectus abd muscle s/p ultrasound guided thrombin injection. Post-operative course c/b hypotension, AMS and bradypnea. CTH s/f large R MCA stroke with mass effect resulting in herniation of the cerebral tonsils s/p emergent decompressive hemicraniectomy. Patient with persistent fevers since June 5th despite treatment for possible pansensitive E. Coli UTI from June 9-12 with CTX 1g qd. ID consulted for persistent fevers.     #persistent fevers-resolving  -most recent fever June 16 to 100.1 rectally  -had repeat CT A/P for eval of hematoma over the weekend which showed stable hematoma and incidental finding of stercoral colitis  -patient initiated on bowel regimen  -? LLL area of hypermetabolic acitivty on 1st phase of gallium scan; awaiting final results of gallium scan  -cont to monitor off abx at this time

## 2018-06-18 NOTE — PROGRESS NOTE ADULT - SUBJECTIVE AND OBJECTIVE BOX
INTERVAL HPI/OVERNIGHT EVENTS: CHRISTOPHE    Patient was seen and examined at bedside. As per nurse and patient, no o/n events, patient in emotional distress. Patient denies: fever, chills, dizziness, weakness, HA, CP, palpitations, SOB, cough, N/V/D/C, dysuria, changes in bowel movements, LE edema.    ROS: as above    VITAL SIGNS:  T(F): 98.9 (06-18-18 @ 09:02)  HR: 72 (06-18-18 @ 11:25)  BP: 151/79 (06-18-18 @ 11:25)  RR: 18 (06-18-18 @ 08:12)  SpO2: 98% (06-18-18 @ 11:25)  Wt(kg): --    PHYSICAL EXAM:    Constitutional: lying in bed, alert, alert and awake this AM  HEENT: S/p craniotomy on right side with staples in, PERRL, EOMI, sclera non-icteric, MMM  Neck: supple, trachea midline, no masses, no JVD  Respiratory: CTA b/l, good air entry b/l, no wheezing, no rhonchi, no rales, without accessory muscle use and no intercostal retractions  Cardiovascular: RRR, normal S1S2, no M/R/G  Gastrointestinal: soft, NTND, no masses palpable, BS normal  Extremities: Warm, well perfused, pulses equal bilateral upper and lower extremities, no edema, no clubbing  Neurological: AAOx3, CN 2-12 intact, left sided neglect. 1/5 left shoulder strength, 0/5 left arm strength, 4/5 left leg strength. 5/5 strength. Sensation in upper and lower extremities intact. Gait not assessed.    MEDICATIONS  (STANDING):  aspirin  chewable 81 milliGRAM(s) Oral daily  atorvastatin 80 milliGRAM(s) Oral at bedtime  bisoprolol   Tablet 5 milliGRAM(s) Oral daily  docusate sodium Liquid 100 milliGRAM(s) Oral two times a day  escitalopram 10 milliGRAM(s) Oral daily  heparin  Injectable 5000 Unit(s) SubCutaneous every 8 hours  levETIRAcetam 1000 milliGRAM(s) Oral two times a day  lisinopril 20 milliGRAM(s) Oral daily  multivitamin 1 Tablet(s) Oral daily  polyethylene glycol 3350 17 Gram(s) Oral at bedtime  senna Syrup 10 milliLiter(s) Oral at bedtime    MEDICATIONS  (PRN):  acetaminophen   Tablet. 650 milliGRAM(s) Oral every 6 hours PRN Mild Pain (1 - 3)  bisacodyl Suppository 10 milliGRAM(s) Rectal daily PRN Constipation  ondansetron Injectable 4 milliGRAM(s) IV Push every 6 hours PRN Nausea  oxyCODONE    IR 5 milliGRAM(s) Oral every 6 hours PRN Severe Pain (7 - 10)      Allergies    No Known Allergies    Intolerances        LABS:                        11.5   7.0   )-----------( 661      ( 18 Jun 2018 07:27 )             35.9     06-18    141  |  104  |  7   ----------------------------<  110<H>  4.0   |  26  |  0.46<L>    Ca    9.5      18 Jun 2018 07:27  Phos  3.1     06-17  Mg     2.1     06-18      PT/INR - ( 17 Jun 2018 01:14 )   PT: 13.1 sec;   INR: 1.18          PTT - ( 17 Jun 2018 01:14 )  PTT:33.0 sec      RADIOLOGY & ADDITIONAL TESTS:

## 2018-06-18 NOTE — PROVIDER CONTACT NOTE (OTHER) - SITUATION
Pt crying out in pain.  Acetaminophen available however pt does not want it.  Pt not able to get oxycodone until 4:20AM.

## 2018-06-18 NOTE — PROVIDER CONTACT NOTE (OTHER) - ACTION/TREATMENT ORDERED:
No action at this time.  MD to speak with resident and decide what to give
Aerosol mask in place. Monitor resp status closely. ABG ordered for 1300. Keep precedex/sedation off.

## 2018-06-18 NOTE — PROGRESS NOTE ADULT - SUBJECTIVE AND OBJECTIVE BOX
INTERVAL HPI/OVERNIGHT EVENTS:    OVERNIGHT: No overnight events.  SUBJECTIVE: Low grade fever over the weekend; Patient examined at bedside, not in any acute distress. Denies any fevers, chills, sob, cough, chest pain, nausea or vomiting.    REVIEW OF SYSTEMS:    CONSTITUTIONAL: No weakness, fevers or chills  EYES/ENT: No visual changes;  No vertigo or throat pain   NECK: No pain or stiffness  RESPIRATORY: No cough, wheezing, hemoptysis; No shortness of breath  CARDIOVASCULAR: No chest pain or palpitations  GASTROINTESTINAL: No abdominal or epigastric pain. No nausea, vomiting, or hematemesis; No diarrhea or constipation. No melena or hematochezia.  GENITOURINARY: No dysuria, frequency or hematuria  NEUROLOGICAL: No numbness or weakness  SKIN: No itching, burning, rashes, or lesions   MSK: no joint pain, no joint swelling  All other review of systems is negative unless indicated above.      Allergies    No Known Allergies    Intolerances        ANTIBIOTICS/RELEVANT:  antimicrobials    immunologic:    OTHER:  acetaminophen   Tablet. 650 milliGRAM(s) Oral every 6 hours PRN  aspirin  chewable 81 milliGRAM(s) Oral daily  atorvastatin 80 milliGRAM(s) Oral at bedtime  bisacodyl Suppository 10 milliGRAM(s) Rectal daily PRN  bisacodyl Suppository 10 milliGRAM(s) Rectal daily PRN  bisoprolol   Tablet 5 milliGRAM(s) Oral daily  docusate sodium Liquid 100 milliGRAM(s) Oral two times a day  escitalopram 10 milliGRAM(s) Oral daily  famotidine  IVPB 20 milliGRAM(s) IV Intermittent once  heparin  Injectable 5000 Unit(s) SubCutaneous every 8 hours  levETIRAcetam 1000 milliGRAM(s) Oral two times a day  lisinopril 20 milliGRAM(s) Oral daily  multivitamin 1 Tablet(s) Oral daily  ondansetron Injectable 4 milliGRAM(s) IV Push every 6 hours PRN  oxyCODONE    IR 5 milliGRAM(s) Oral every 6 hours PRN  pantoprazole    Tablet 40 milliGRAM(s) Oral before breakfast  polyethylene glycol 3350 17 Gram(s) Oral at bedtime  senna Syrup 10 milliLiter(s) Oral at bedtime      Objective:  Vital Signs Last 24 Hrs  T(C): 37.2 (18 Jun 2018 09:02), Max: 37.2 (18 Jun 2018 09:02)  T(F): 98.9 (18 Jun 2018 09:02), Max: 98.9 (18 Jun 2018 09:02)  HR: 72 (18 Jun 2018 12:29) (56 - 76)  BP: 147/81 (18 Jun 2018 12:29) (112/68 - 167/80)  BP(mean): 108 (18 Jun 2018 12:29) (80 - 115)  RR: 18 (18 Jun 2018 12:29) (18 - 18)  SpO2: 97% (18 Jun 2018 12:29) (95% - 98%)      PHYSICAL EXAM:    Constitutional: lying in bed, alert, NAD  Head: S/p craniotomy on right side with staples in; wound site c/d/i  Eyes: PERRL, EOMI, sclera non-icteric  Mouth: mucous membranes moist  Neck: supple, trachea midline, no masses, no JVD  Respiratory: CTA b/l, good air entry b/l, no wheezing, no rhonchi, no rales, without accessory muscle use and no intercostal retractions  Cardiovascular: RRR, normal S1S2, no M/R/G  Gastrointestinal: soft but mildly distended, mild tenderness to palpation of the R midepigastrium and the RLQ but improved from prior exam on Friday; no rebound or guarding of note  Extremities: Warm, well perfused, pulses equal bilateral upper and lower extremities, no edema, no clubbing  Neurological: AAOx3, CN 2-12 intact on the R side; CN: 7 not intact on the L side; left sided neglect. 0/5 left shoulder strength, 0/5 left arm strength, 0/5 left leg strength (but ? as primary team mentions patient does have at least 3/5 strength in the LLE, but has difficulty initiation        LABS:                        11.5   7.0   )-----------( 661      ( 18 Jun 2018 07:27 )             35.9     06-18    141  |  104  |  7   ----------------------------<  110<H>  4.0   |  26  |  0.46<L>    Ca    9.5      18 Jun 2018 07:27  Phos  3.1     06-17  Mg     2.1     06-18      PT/INR - ( 17 Jun 2018 01:14 )   PT: 13.1 sec;   INR: 1.18          PTT - ( 17 Jun 2018 01:14 )  PTT:33.0 sec      MICROBIOLOGY:            RECENT CULTURES:  06-13 @ 21:45  .Urine None  --  --  --    No growth  --  06-13 @ 16:53  .Blood Blood  --  --  --    No growth at 4 days.  --  06-13 @ 16:52  .Blood Blood  --  --  --    No growth at 4 days.  --      RADIOLOGY & ADDITIONAL STUDIES:

## 2018-06-18 NOTE — PROGRESS NOTE ADULT - ASSESSMENT
58y Female w/ HTN, HLD, Anxiety, Depression, h/o Breast Cancer w/ TRAM flap reconstruction admitted for expanding right rectus hematoma complicated by right MCA infarction now s/p hemicraniectomy. Also found to have UTI and Bacterial vaginosos, previously spiking fevers of unknown origin with waxing and waning mental status.

## 2018-06-18 NOTE — PROGRESS NOTE ADULT - ATTENDING COMMENTS
I have reviewed the medical record, including laboratory and radiographic studies, interviewed and examined the patient and discussed the plan with Dr. Bhagat, the ID Resident.  Agree with above.  Will continue to follow with you – ID service.

## 2018-06-18 NOTE — CHART NOTE - NSCHARTNOTEFT_GEN_A_CORE
Admitting Diagnosis:   Patient is a 58y old  Female who presents with a chief complaint of Expanding hematoma (2018 03:05)      PAST MEDICAL & SURGICAL HISTORY:  Anxiety  Hypertension  Breast CA  Previous  section  History of mastectomy, left: with TRAM flap recon      Current Nutrition Order:  Mechanical Soft/Nectar Thick Liquids     PO Intake: Good (%) [   ]  Fair (50-75%) [ X  ] Poor (<25%) [   ]    GI Issues: No N/V/C/D reported at this time.     Pain: No pain endorsed    Skin Integrity: Jair 13, surgical wound  Labs:       141  |  104  |  7   ----------------------------<  110<H>  4.0   |  26  |  0.46<L>    Ca    9.5      2018 07:27  Phos  3.1     06-17  Mg     2.1     -      CAPILLARY BLOOD GLUCOSE          Medications:  MEDICATIONS  (STANDING):  aspirin  chewable 81 milliGRAM(s) Oral daily  atorvastatin 80 milliGRAM(s) Oral at bedtime  bisoprolol   Tablet 5 milliGRAM(s) Oral daily  docusate sodium Liquid 100 milliGRAM(s) Oral two times a day  escitalopram 10 milliGRAM(s) Oral daily  heparin  Injectable 5000 Unit(s) SubCutaneous every 8 hours  levETIRAcetam 1000 milliGRAM(s) Oral two times a day  lisinopril 20 milliGRAM(s) Oral daily  multivitamin 1 Tablet(s) Oral daily  pantoprazole    Tablet 40 milliGRAM(s) Oral before breakfast  polyethylene glycol 3350 17 Gram(s) Oral at bedtime  senna Syrup 10 milliLiter(s) Oral at bedtime    MEDICATIONS  (PRN):  acetaminophen   Tablet. 650 milliGRAM(s) Oral every 6 hours PRN Mild Pain (1 - 3)  bisacodyl Suppository 10 milliGRAM(s) Rectal daily PRN Constipation  bisacodyl Suppository 10 milliGRAM(s) Rectal daily PRN Constipation  ondansetron Injectable 4 milliGRAM(s) IV Push every 6 hours PRN Nausea  oxyCODONE    IR 5 milliGRAM(s) Oral every 6 hours PRN Severe Pain (7 - 10)        Weight: 78kg   Daily     Daily     Weight Change: No new weights recorded since admit     Estimated energy needs: Ideal body weight (61.2kg) used for calculations as pt >120% of IBW. Needs adjusted for s/p CVA, wound healing  Calories: 25-30kcal/kg = 3443-3212 kcal/day  Protein: 1.2-1.4 g/kg = 86-98g protein/day  Fluids; 30-35mL/kg = 6249-7681 mL/day    Subjective: 57 yo/female with PMHx L. breast cancer s/p mastectomy w/flap reconstruction, HTN, admitted with abdominal pain/bulge. Found to have R. rectus expanding hematoma. Course c/b large R. MCA stroke. S/p decompressive craniotomy on . Pt extubated on  though has been lethargic since. Seen by SLP on  for FEES study and recommended for mechanical soft/nectar thick liquids. Pt seen in room, awake, more alert/oriented today but tearful. Gallium scan showed some abnormality in L. lung base. Pt endorses having some breakfast this morning but could not tell me how much or what she ate. Denies nutrition-related complaints or pain. Attempted to obtain food preferences again but could not obtain information from patient at this time. Lytes WNL.     Previous Nutrition Diagnosis:   Increased nutrient needs RT increased demand for protein-calorie intake AEB s/p CVA, wound healing     Active [  X ]  Resolved [   ]    If resolved, new PES:     Goal: Pt will meet % of EER per day via tolerated route     Recommendations:  1. Encourage PO intake and provide assistance as necessary- recommend Ensure Clear BID (480 kcal, 16g protein)  *continue to attempt to obtain food preferences   2. Maintain aspiration precautions at all times*  3. Monitor lytes and replete prn.   4. Trend weights   5. Ensure pain control    Education:   N/A- not appropriate at this time     Risk Level: High [ X  ] Moderate [   ] Low [   ]

## 2018-06-19 LAB
ANION GAP SERPL CALC-SCNC: 11 MMOL/L — SIGNIFICANT CHANGE UP (ref 5–17)
BUN SERPL-MCNC: 7 MG/DL — SIGNIFICANT CHANGE UP (ref 7–23)
CALCIUM SERPL-MCNC: 9.8 MG/DL — SIGNIFICANT CHANGE UP (ref 8.4–10.5)
CHLORIDE SERPL-SCNC: 101 MMOL/L — SIGNIFICANT CHANGE UP (ref 96–108)
CO2 SERPL-SCNC: 26 MMOL/L — SIGNIFICANT CHANGE UP (ref 22–31)
CREAT SERPL-MCNC: 0.38 MG/DL — LOW (ref 0.5–1.3)
GLUCOSE SERPL-MCNC: 119 MG/DL — HIGH (ref 70–99)
HCT VFR BLD CALC: 36.6 % — SIGNIFICANT CHANGE UP (ref 34.5–45)
HGB BLD-MCNC: 11.6 G/DL — SIGNIFICANT CHANGE UP (ref 11.5–15.5)
MAGNESIUM SERPL-MCNC: 2.1 MG/DL — SIGNIFICANT CHANGE UP (ref 1.6–2.6)
MCHC RBC-ENTMCNC: 28.3 PG — SIGNIFICANT CHANGE UP (ref 27–34)
MCHC RBC-ENTMCNC: 31.7 G/DL — LOW (ref 32–36)
MCV RBC AUTO: 89.3 FL — SIGNIFICANT CHANGE UP (ref 80–100)
PHOSPHATE SERPL-MCNC: 3.9 MG/DL — SIGNIFICANT CHANGE UP (ref 2.5–4.5)
PLATELET # BLD AUTO: 674 K/UL — HIGH (ref 150–400)
POTASSIUM SERPL-MCNC: 3.8 MMOL/L — SIGNIFICANT CHANGE UP (ref 3.5–5.3)
POTASSIUM SERPL-SCNC: 3.8 MMOL/L — SIGNIFICANT CHANGE UP (ref 3.5–5.3)
RBC # BLD: 4.1 M/UL — SIGNIFICANT CHANGE UP (ref 3.8–5.2)
RBC # FLD: 16.3 % — SIGNIFICANT CHANGE UP (ref 10.3–16.9)
SODIUM SERPL-SCNC: 138 MMOL/L — SIGNIFICANT CHANGE UP (ref 135–145)
WBC # BLD: 8.4 K/UL — SIGNIFICANT CHANGE UP (ref 3.8–10.5)
WBC # FLD AUTO: 8.4 K/UL — SIGNIFICANT CHANGE UP (ref 3.8–10.5)

## 2018-06-19 PROCEDURE — 99233 SBSQ HOSP IP/OBS HIGH 50: CPT

## 2018-06-19 PROCEDURE — 99232 SBSQ HOSP IP/OBS MODERATE 35: CPT | Mod: GC

## 2018-06-19 PROCEDURE — 71260 CT THORAX DX C+: CPT | Mod: 26

## 2018-06-19 RX ORDER — BISOPROLOL FUMARATE 10 MG/1
1 TABLET, FILM COATED ORAL
Qty: 0 | Refills: 0 | COMMUNITY
Start: 2018-06-19

## 2018-06-19 RX ORDER — ESCITALOPRAM OXALATE 10 MG/1
1 TABLET, FILM COATED ORAL
Qty: 0 | Refills: 0 | COMMUNITY
Start: 2018-06-19

## 2018-06-19 RX ORDER — LISINOPRIL 2.5 MG/1
1 TABLET ORAL
Qty: 0 | Refills: 0 | COMMUNITY
Start: 2018-06-19

## 2018-06-19 RX ORDER — SENNA PLUS 8.6 MG/1
2 TABLET ORAL
Qty: 0 | Refills: 0 | COMMUNITY
Start: 2018-06-19

## 2018-06-19 RX ORDER — LISINOPRIL 2.5 MG/1
40 TABLET ORAL DAILY
Qty: 0 | Refills: 0 | Status: DISCONTINUED | OUTPATIENT
Start: 2018-06-19 | End: 2018-06-21

## 2018-06-19 RX ORDER — LISINOPRIL 2.5 MG/1
20 TABLET ORAL ONCE
Qty: 0 | Refills: 0 | Status: COMPLETED | OUTPATIENT
Start: 2018-06-19 | End: 2018-06-19

## 2018-06-19 RX ORDER — POLYETHYLENE GLYCOL 3350 17 G/17G
17 POWDER, FOR SOLUTION ORAL
Qty: 0 | Refills: 0 | COMMUNITY
Start: 2018-06-19

## 2018-06-19 RX ORDER — ACETAMINOPHEN 500 MG
2 TABLET ORAL
Qty: 0 | Refills: 0 | COMMUNITY
Start: 2018-06-19

## 2018-06-19 RX ORDER — OXYCODONE HYDROCHLORIDE 5 MG/1
1 TABLET ORAL
Qty: 0 | Refills: 0 | COMMUNITY
Start: 2018-06-19

## 2018-06-19 RX ORDER — ATORVASTATIN CALCIUM 80 MG/1
1 TABLET, FILM COATED ORAL
Qty: 0 | Refills: 0 | COMMUNITY
Start: 2018-06-19

## 2018-06-19 RX ORDER — BISOPROLOL FUMARATE 10 MG/1
1 TABLET, FILM COATED ORAL
Qty: 0 | Refills: 0 | COMMUNITY

## 2018-06-19 RX ORDER — PANTOPRAZOLE SODIUM 20 MG/1
1 TABLET, DELAYED RELEASE ORAL
Qty: 0 | Refills: 0 | COMMUNITY
Start: 2018-06-19

## 2018-06-19 RX ORDER — ASPIRIN/CALCIUM CARB/MAGNESIUM 324 MG
1 TABLET ORAL
Qty: 0 | Refills: 0 | COMMUNITY
Start: 2018-06-19

## 2018-06-19 RX ORDER — LEVETIRACETAM 250 MG/1
1 TABLET, FILM COATED ORAL
Qty: 0 | Refills: 0 | COMMUNITY
Start: 2018-06-19

## 2018-06-19 RX ADMIN — HEPARIN SODIUM 5000 UNIT(S): 5000 INJECTION INTRAVENOUS; SUBCUTANEOUS at 14:26

## 2018-06-19 RX ADMIN — LISINOPRIL 20 MILLIGRAM(S): 2.5 TABLET ORAL at 06:22

## 2018-06-19 RX ADMIN — OXYCODONE HYDROCHLORIDE 5 MILLIGRAM(S): 5 TABLET ORAL at 18:50

## 2018-06-19 RX ADMIN — SENNA PLUS 2 TABLET(S): 8.6 TABLET ORAL at 21:09

## 2018-06-19 RX ADMIN — ATORVASTATIN CALCIUM 80 MILLIGRAM(S): 80 TABLET, FILM COATED ORAL at 21:09

## 2018-06-19 RX ADMIN — Medication 650 MILLIGRAM(S): at 21:22

## 2018-06-19 RX ADMIN — LEVETIRACETAM 1000 MILLIGRAM(S): 250 TABLET, FILM COATED ORAL at 17:07

## 2018-06-19 RX ADMIN — PANTOPRAZOLE SODIUM 40 MILLIGRAM(S): 20 TABLET, DELAYED RELEASE ORAL at 06:22

## 2018-06-19 RX ADMIN — LISINOPRIL 20 MILLIGRAM(S): 2.5 TABLET ORAL at 11:28

## 2018-06-19 RX ADMIN — HEPARIN SODIUM 5000 UNIT(S): 5000 INJECTION INTRAVENOUS; SUBCUTANEOUS at 06:22

## 2018-06-19 RX ADMIN — Medication 81 MILLIGRAM(S): at 11:27

## 2018-06-19 RX ADMIN — ONDANSETRON 4 MILLIGRAM(S): 8 TABLET, FILM COATED ORAL at 00:02

## 2018-06-19 RX ADMIN — OXYCODONE HYDROCHLORIDE 5 MILLIGRAM(S): 5 TABLET ORAL at 17:07

## 2018-06-19 RX ADMIN — HEPARIN SODIUM 5000 UNIT(S): 5000 INJECTION INTRAVENOUS; SUBCUTANEOUS at 21:09

## 2018-06-19 RX ADMIN — LEVETIRACETAM 1000 MILLIGRAM(S): 250 TABLET, FILM COATED ORAL at 06:22

## 2018-06-19 RX ADMIN — LISINOPRIL 40 MILLIGRAM(S): 2.5 TABLET ORAL at 21:09

## 2018-06-19 RX ADMIN — BISOPROLOL FUMARATE 5 MILLIGRAM(S): 10 TABLET, FILM COATED ORAL at 06:22

## 2018-06-19 RX ADMIN — POLYETHYLENE GLYCOL 3350 17 GRAM(S): 17 POWDER, FOR SOLUTION ORAL at 21:09

## 2018-06-19 RX ADMIN — ESCITALOPRAM OXALATE 10 MILLIGRAM(S): 10 TABLET, FILM COATED ORAL at 11:35

## 2018-06-19 RX ADMIN — Medication 1 TABLET(S): at 11:27

## 2018-06-19 RX ADMIN — Medication 650 MILLIGRAM(S): at 20:52

## 2018-06-19 RX ADMIN — LIDOCAINE 1 PATCH: 4 CREAM TOPICAL at 18:53

## 2018-06-19 NOTE — PROGRESS NOTE ADULT - SUBJECTIVE AND OBJECTIVE BOX
INTERVAL HPI/OVERNIGHT EVENTS: No acute events overnight. ROS negative as below.    CONSTITUTIONAL:  Negative fever or chills, feels well, good appetite  EYES:  Negative  blurry vision or double vision  CARDIOVASCULAR:  Negative for chest pain or palpitations  RESPIRATORY:  Negative for cough, wheezing, or SOB   GASTROINTESTINAL:  Negative for nausea, vomiting, diarrhea, constipation, or abdominal pain  GENITOURINARY:  Negative frequency, urgency or dysuria  NEUROLOGIC:  No headache, confusion, dizziness, lightheadedness      ANTIBIOTICS/RELEVANT: negative    MEDICATIONS  (STANDING):  aspirin  chewable 81 milliGRAM(s) Oral daily  atorvastatin 80 milliGRAM(s) Oral at bedtime  bisoprolol   Tablet 5 milliGRAM(s) Oral daily  escitalopram 10 milliGRAM(s) Oral daily  heparin  Injectable 5000 Unit(s) SubCutaneous every 8 hours  levETIRAcetam 1000 milliGRAM(s) Oral two times a day  lisinopril 40 milliGRAM(s) Oral daily  multivitamin 1 Tablet(s) Oral daily  pantoprazole    Tablet 40 milliGRAM(s) Oral before breakfast  polyethylene glycol 3350 17 Gram(s) Oral at bedtime  senna 2 Tablet(s) Oral at bedtime    MEDICATIONS  (PRN):  acetaminophen   Tablet. 650 milliGRAM(s) Oral every 6 hours PRN Mild Pain (1 - 3)  bisacodyl Suppository 10 milliGRAM(s) Rectal daily PRN Constipation  ondansetron Injectable 4 milliGRAM(s) IV Push every 6 hours PRN Nausea  oxyCODONE    IR 5 milliGRAM(s) Oral every 6 hours PRN Severe Pain (7 - 10)        Vital Signs Last 24 Hrs  T(C): 37.3 (19 Jun 2018 02:00), Max: 37.4 (18 Jun 2018 17:03)  T(F): 99.2 (19 Jun 2018 02:00), Max: 99.4 (18 Jun 2018 17:03)  HR: 62 (19 Jun 2018 12:00) (54 - 82)  BP: 149/76 (19 Jun 2018 12:00) (149/76 - 177/90)  BP(mean): 101 (19 Jun 2018 12:00) (101 - 123)  RR: 18 (19 Jun 2018 12:00) (17 - 19)  SpO2: 98% (19 Jun 2018 12:00) (97% - 98%)    PHYSICAL EXAM:  Constitutional: lying in bed, alert, NAD  Head: S/p craniotomy on right side with staples in place c/d/i  Eyes: PERRL, EOMI, sclera non-icteric  Mouth: MMM  Neck: supple  Respiratory: CTA b/l, good air entry b/l, no wheezing, no rhonchi, no rales  Cardiovascular: RRR, normal S1S2, no M/R/G  Gastrointestinal: soft, mildly distended, no tenderness to palpation; BS normal throughout  Extremities: WWP,, no edema, no cuanosis  Neurological: AAOx3, CNII-XII intact on R; CN: 7 not intact on the L side; left sided neglect. Strength 1/5 left deltoid, 0/5 left biceps/triceps, 4/5 left hip flexor/leg flexor/extensors. Strength on RUE and RLE 5/5 throughout.      LABS:                        11.6   8.4   )-----------( 674      ( 19 Jun 2018 08:09 )             36.6     06-19    138  |  101  |  7   ----------------------------<  119<H>  3.8   |  26  |  0.38<L>    Ca    9.8      19 Jun 2018 08:09  Phos  3.9     06-19  Mg     2.1     06-19            MICROBIOLOGY:  06-13 @ 21:45  .Urine None  --  --  --    No growth  --  06-13 @ 16:53  .Blood Blood  --  --  --    No growth at 4 days.  --  06-13 @ 16:52  .Blood Blood  --  --  --    No growth at 4 days.  --    RADIOLOGY & ADDITIONAL STUDIES:  < from: NM Inflammatory Loc Wholebody, Gallium (06.15.18 @ 10:49) >  Findings: There is an area of focal activity near the left lung base with   hazy activity involving the entire lower portion of the lung. Because   this is an image obtained at 24 hours, the exact significance is unclear.   48 hour imaging will be performed.    Impression: Questionable abnormality at the left lung base. After   discussion with the resident, repeat chest x-ray was requested. In   addition, 48 hour imaging will be performed.    < end of copied text >

## 2018-06-19 NOTE — PROGRESS NOTE ADULT - ASSESSMENT
58F with a hx of left breast cancer s/p left mastectomy with TRAM flap reconstruction (4-5 years prior), HTN, HLD, anxiety who initially came in on 5/31 with R rectus abdominal hematoma after recent initiation of plavix for non-obstructive CAD.  Patient found to have 2 pseudo-aneurysms of the artery supplying the R rectus abd muscle s/p ultrasound guided thrombin injection. Post-operative course c/b hypotension, AMS and bradypnea. CTH s/f large R MCA stroke with mass effect resulting in herniation of the cerebral tonsils s/p emergent decompressive hemicraniectomy. Patient with persistent fevers since June 5th despite treatment for possible pansensitive E. Coli UTI from June 9-12 with CTX 1g qd. ID consulted for persistent fevers.     #Fevers of unclear etiology. No source of infection at this time. Resolved- last low grade temp to 100.1 rectally 6/16. Gallium scan concerning for Left lower base enhancement.  - f/u CT chest w/ IV contrast today  - continue to hold off on antibiotics at this time as patient has clinically improved and is pending CT chest    Above discussed with ID attending and primary team. 58F with a hx of left breast cancer s/p left mastectomy with TRAM flap reconstruction (4-5 years prior), HTN, HLD, anxiety who initially came in on 5/31 with R rectus abdominal hematoma after recent initiation of plavix for non-obstructive CAD.  Patient found to have 2 pseudo-aneurysms of the artery supplying the R rectus abd muscle s/p ultrasound guided thrombin injection. Post-operative course c/b hypotension, AMS and bradypnea. CTH s/f large R MCA stroke with mass effect resulting in herniation of the cerebral tonsils s/p emergent decompressive hemicraniectomy. Patient with persistent fevers since June 5th despite treatment for possible pansensitive E. Coli UTI from June 9-12 with CTX 1g qd. ID consulted for persistent fevers.     #Fevers of unclear etiology. No source of infection at this time. Resolved- last low grade temp to 100.1 rectally 6/16. Gallium scan concerning for Left lower base enhancement but CT Abd did not show infiltrate in LLL  - f/u CT chest today  - continue to hold off on antibiotics at this time as patient has clinically improved and is pending CT chest    Above discussed with ID attending and primary team.

## 2018-06-19 NOTE — PROGRESS NOTE ADULT - SUBJECTIVE AND OBJECTIVE BOX
Pt seen and evaluated at bedside. Reports she does not feel like eating her breakfast. Denies headaches, neck pain, acute changes in vision, photophobia, worsening weakness/loss of sensation of extremities.    Vital Signs Last 24 Hrs  T(C): 37.3 (2018 02:00), Max: 37.4 (2018 17:03)  T(F): 99.2 (2018 02:00), Max: 99.4 (2018 17:03)  HR: 54 (2018 08:24) (54 - 82)  BP: 162/75 (2018 08:24) (147/81 - 177/90)  BP(mean): 108 (2018 08:24) (103 - 123)  RR: 17 (2018 08:24) (17 - 19)  SpO2: 98% (2018 08:24) (97% - 98%)    I&O's Summary    PHYSICAL EXAM:  Neurological: AAOx3, FC, speech coherent  CNII-XII: EOM intact, PERRL, left facial droop, tongue midline   Motor: RUE/RLE 5/5, LLE 4+/5, LUE withdrawal to noxious  SILT throughout  Incision/Wound: Scalp incision site C/D/I, healing well    TUBES/LINES:  [] Eddy  [] Lumbar Drain  [] Wound Drains  [] Others    DIET:  [] NPO  [x] Mechanical  [] Tube feeds    LABS:                        11.6   8.4   )-----------( 674      ( 2018 08:09 )             36.6     06-19    138  |  101  |  7   ----------------------------<  119<H>  3.8   |  26  |  0.38<L>    Ca    9.8      2018 08:09  Phos  3.9     06-19  Mg     2.1     06-19              CAPILLARY BLOOD GLUCOSE          Drug Levels: [] N/A    CSF Analysis: [] N/A      Allergies    No Known Allergies    Intolerances      MEDICATIONS:  Antibiotics:    Neuro:  acetaminophen   Tablet. 650 milliGRAM(s) Oral every 6 hours PRN  escitalopram 10 milliGRAM(s) Oral daily  levETIRAcetam 1000 milliGRAM(s) Oral two times a day  ondansetron Injectable 4 milliGRAM(s) IV Push every 6 hours PRN  oxyCODONE    IR 5 milliGRAM(s) Oral every 6 hours PRN    Anticoagulation:  aspirin  chewable 81 milliGRAM(s) Oral daily  heparin  Injectable 5000 Unit(s) SubCutaneous every 8 hours    OTHER:  atorvastatin 80 milliGRAM(s) Oral at bedtime  bisacodyl Suppository 10 milliGRAM(s) Rectal daily PRN  bisoprolol   Tablet 5 milliGRAM(s) Oral daily  lisinopril 20 milliGRAM(s) Oral daily  pantoprazole    Tablet 40 milliGRAM(s) Oral before breakfast  polyethylene glycol 3350 17 Gram(s) Oral at bedtime  senna 2 Tablet(s) Oral at bedtime    IVF:  multivitamin 1 Tablet(s) Oral daily    CULTURES:  Culture Results:   No growth ( @ 21:45)  Culture Results:   No growth at 5 days. ( @ 16:53)    RADIOLOGY & ADDITIONAL TESTS:    ASSESSMENT:  58y Female s/p right decompressive craniectomy for R MCA territory infarct (18)    ABDOMINAL HEMATOMA  Handoff  MEWS Score  Anxiety  Hypertension  Breast CA  Stroke  Stroke (cerebrum)  CVA (cerebral vascular accident)  Abdominal hematoma  Fever  Prophylactic measure  Nutrition, metabolism, and development symptoms  Bacterial vaginosis  UTI (urinary tract infection)  CAD (coronary artery disease)  CVA (cerebral vascular accident)  Staple removal  Craniectomy  Central venous catheter insertion  Previous  section  History of mastectomy, left  ABDOMINAL PAIN  Abdominal hematoma    PLAN:  -neuro checks  -stroke core measures: continue lipitor, aspirin  -continue keppra 1g BID  -continue care as per primary team  -D/w Dr. Johnson

## 2018-06-19 NOTE — PROGRESS NOTE ADULT - SUBJECTIVE AND OBJECTIVE BOX
INTERVAL HPI/OVERNIGHT EVENTS: Patient with nausea, received zosyn x1    Patient was seen and examined at bedside. As per nurse and patient, no o/n events, patient resting comfortably. No complaints at this time. Patient denies: fever, chills, dizziness, weakness, HA, CP, palpitations, SOB, cough, N/V/D/C, dysuria, changes in bowel movements, LE edema.    ROS: as above    VITAL SIGNS:  T(F): 99.2 (06-19-18 @ 02:00)  HR: 54 (06-19-18 @ 08:24)  BP: 162/75 (06-19-18 @ 08:24)  RR: 17 (06-19-18 @ 08:24)  SpO2: 98% (06-19-18 @ 08:24)  Wt(kg): --    PHYSICAL EXAM:    Constitutional: lying in bed, alert, alert and awake this AM  HEENT: S/p craniotomy on right side with staples in, PERRL, EOMI, sclera non-icteric, MMM  Neck: supple, trachea midline, no masses, no JVD  Respiratory: CTA b/l, good air entry b/l, no wheezing, no rhonchi, no rales, without accessory muscle use and no intercostal retractions  Cardiovascular: RRR, normal S1S2, no M/R/G  Gastrointestinal: soft, NTND, no masses palpable, BS normal  Extremities: Warm, well perfused, pulses equal bilateral upper and lower extremities, no edema, no clubbing  Neurological: AAOx3, CN 2-12 intact, left sided neglect. 1/5 left shoulder strength, 0/5 left arm strength, 4/5 left leg strength. 5/5 strength. Sensation in upper and lower extremities intact. Gait not assessed.    MEDICATIONS  (STANDING):  aspirin  chewable 81 milliGRAM(s) Oral daily  atorvastatin 80 milliGRAM(s) Oral at bedtime  bisoprolol   Tablet 5 milliGRAM(s) Oral daily  escitalopram 10 milliGRAM(s) Oral daily  heparin  Injectable 5000 Unit(s) SubCutaneous every 8 hours  levETIRAcetam 1000 milliGRAM(s) Oral two times a day  lisinopril 40 milliGRAM(s) Oral daily  multivitamin 1 Tablet(s) Oral daily  pantoprazole    Tablet 40 milliGRAM(s) Oral before breakfast  polyethylene glycol 3350 17 Gram(s) Oral at bedtime  senna 2 Tablet(s) Oral at bedtime    MEDICATIONS  (PRN):  acetaminophen   Tablet. 650 milliGRAM(s) Oral every 6 hours PRN Mild Pain (1 - 3)  bisacodyl Suppository 10 milliGRAM(s) Rectal daily PRN Constipation  ondansetron Injectable 4 milliGRAM(s) IV Push every 6 hours PRN Nausea  oxyCODONE    IR 5 milliGRAM(s) Oral every 6 hours PRN Severe Pain (7 - 10)      Allergies    No Known Allergies    Intolerances        LABS:                        11.6   8.4   )-----------( 674      ( 19 Jun 2018 08:09 )             36.6     06-19    138  |  101  |  7   ----------------------------<  119<H>  3.8   |  26  |  0.38<L>    Ca    9.8      19 Jun 2018 08:09  Phos  3.9     06-19  Mg     2.1     06-19            RADIOLOGY & ADDITIONAL TESTS:

## 2018-06-20 PROCEDURE — 99232 SBSQ HOSP IP/OBS MODERATE 35: CPT | Mod: GC

## 2018-06-20 PROCEDURE — 99231 SBSQ HOSP IP/OBS SF/LOW 25: CPT

## 2018-06-20 RX ADMIN — Medication 1 TABLET(S): at 12:53

## 2018-06-20 RX ADMIN — HEPARIN SODIUM 5000 UNIT(S): 5000 INJECTION INTRAVENOUS; SUBCUTANEOUS at 22:32

## 2018-06-20 RX ADMIN — LEVETIRACETAM 1000 MILLIGRAM(S): 250 TABLET, FILM COATED ORAL at 18:13

## 2018-06-20 RX ADMIN — OXYCODONE HYDROCHLORIDE 5 MILLIGRAM(S): 5 TABLET ORAL at 02:58

## 2018-06-20 RX ADMIN — HEPARIN SODIUM 5000 UNIT(S): 5000 INJECTION INTRAVENOUS; SUBCUTANEOUS at 07:27

## 2018-06-20 RX ADMIN — OXYCODONE HYDROCHLORIDE 5 MILLIGRAM(S): 5 TABLET ORAL at 22:30

## 2018-06-20 RX ADMIN — ESCITALOPRAM OXALATE 10 MILLIGRAM(S): 10 TABLET, FILM COATED ORAL at 14:18

## 2018-06-20 RX ADMIN — OXYCODONE HYDROCHLORIDE 5 MILLIGRAM(S): 5 TABLET ORAL at 23:00

## 2018-06-20 RX ADMIN — PANTOPRAZOLE SODIUM 40 MILLIGRAM(S): 20 TABLET, DELAYED RELEASE ORAL at 07:27

## 2018-06-20 RX ADMIN — LEVETIRACETAM 1000 MILLIGRAM(S): 250 TABLET, FILM COATED ORAL at 07:27

## 2018-06-20 RX ADMIN — LISINOPRIL 40 MILLIGRAM(S): 2.5 TABLET ORAL at 07:28

## 2018-06-20 RX ADMIN — ATORVASTATIN CALCIUM 80 MILLIGRAM(S): 80 TABLET, FILM COATED ORAL at 22:32

## 2018-06-20 RX ADMIN — OXYCODONE HYDROCHLORIDE 5 MILLIGRAM(S): 5 TABLET ORAL at 03:28

## 2018-06-20 RX ADMIN — Medication 81 MILLIGRAM(S): at 12:53

## 2018-06-20 RX ADMIN — BISOPROLOL FUMARATE 5 MILLIGRAM(S): 10 TABLET, FILM COATED ORAL at 07:28

## 2018-06-20 RX ADMIN — OXYCODONE HYDROCHLORIDE 5 MILLIGRAM(S): 5 TABLET ORAL at 14:17

## 2018-06-20 NOTE — PROGRESS NOTE ADULT - ATTENDING COMMENTS
Persistent left hemiparesis - CT head abd CTA negative sugeestive of small vessel stroke or a small embolus.   MRI pending  YONY shows no evidence of cardioembolic source patient gallium scan CT chest negative for infection. Mood slightly better.   Cleared for dc

## 2018-06-20 NOTE — PROGRESS NOTE ADULT - PROVIDER SPECIALTY LIST ADULT
Anesthesia
Infectious Disease
Internal Medicine
Intervent Radiology
NSICU
Neurology
Neurosurgery
SICU
SICU
Surgery
Infectious Disease
NSICU
Neurosurgery
Infectious Disease
NSICU
NSICU
Neurosurgery
Internal Medicine
Neurology
Neurology

## 2018-06-20 NOTE — PROGRESS NOTE ADULT - PROBLEM SELECTOR PLAN 1
Hospital course c/b right MCA infarction now s/p hemicraniectomy   - neuro checks q4h patient with waxing and waning mental status, AA0x3 in AM (6/17) repeat head CT negative. Possible due to stroke sequelae vs infection. f/u ID recs  - gallium scan performed, f/u results. Ordered for chest CT  - pain control with Tylenol and percocet prn  - continue Keppra 1 g BID, hx of seizure  - ASA 81 mg daily  - Maintain SBPs < 140  - patient with stable swallow ability as per s/s eval on 6/13  - Per neurosurgery, ok for rehab. Will need to wear helmet.
Hospital course c/b right MCA infarction now s/p hemicraniectomy   - neuro checks q4h patient with waxing and waning mental status, AA0x3 in AM (6/17) repeat head CT negative. Possible due to stroke sequelae vs infection. f/u ID recs  - gallium scan performed, Gallium still showing left lower lobe activity. Chest CT obtained showing hiatal hernia but unlikely consolidation  - pain control with Tylenol and percocet prn  - continue Keppra 1 g BID, hx of seizure  - ASA 81 mg daily  - Maintain SBPs < 140  - patient with stable swallow ability as per s/s eval on 6/13  - Per neurosurgery, ok for rehab. Will need to wear helmet.
Hospital course c/b right MCA infarction now s/p hemicraniectomy   - neuro checks q4h patient with waxing and waning mental status, AA0x3 in AM (6/17) repeat head CT negative. Possible due to stroke sequelae vs infection. f/u ID recs  - gallium scan performed, f/u results  - pain control with Tylenol and percocet prn  - continue Keppra 1 g BID, hx of seizure. change to PO  - ASA 81 mg daily  - Maintain SBPs < 140  - patient with stable swallow ability as per s/s eval on 6/13  - Per neurosurgery, ok for rehab. Will need to wear helmet.
right MCA infarction now s/p hemicraniectomy   - neurocheck q 4  - vitals q 4   - pain control with Tylenol   - dc staples before patient is discharged.   - continue Keppra 1 g BID, hx of seizure  - ASA 81.  - SBP < 140  - Per NSG ok for rehab. Will need to wear helmet.
right MCA infarction now s/p hemicraniectomy   - neurocheck q 4 patient with waxing and waning mental status. head CT negative. Possible due to stroke sequelae vs infection. f/u ID recs  - gallium scan today, f/u results  - vitals q 4   - pain control with Tylenol and percocet prn  - continue Keppra 1 g BID, hx of seizure  - ASA 81.  - SBP < 140  - patient with stable swallow ability as per s/s eval on 6/13  - Per NSG ok for rehab. Will need to wear helmet.
right MCA infarction now s/p hemicraniectomy   - neurocheck q 4 patient with waxing and waning mental status. head CT negative. Possible due to stroke sequelae vs infection. f/u ID recs  - gallium scan today, f/u results  - vitals q 4   - pain control with Tylenol and percocet prn  - continue Keppra 1 g BID, hx of seizure. change to PO  - ASA 81.  - SBP < 140  - patient with stable swallow ability as per s/s eval on 6/13  - Per NSG ok for rehab. Will need to wear helmet.
Hospital course c/b right MCA infarction now s/p hemicraniectomy   - neuro checks q4h patient with waxing and waning mental status, AA0x3 in AM (6/17) repeat head CT negative. Possible due to stroke sequelae vs infection. f/u ID recs  - gallium scan performed, f/u results  - pain control with Tylenol and percocet prn  - continue Keppra 1 g BID, hx of seizure. change to PO  - ASA 81 mg daily  - Maintain SBPs < 140  - patient with stable swallow ability as per s/s eval on 6/13  - Per neurosurgery, ok for rehab. Will need to wear helmet.
right MCA infarction now s/p hemicraniectomy   - neurocheck q 4 patient with waxing and waning mental status. f/u head CT. Possibly 2/2 infection. f/u ID recs  - vitals q 4   - pain control with Tylenol   - continue Keppra 1 g BID, hx of seizure  - ASA 81.  - SBP < 140  - patient with stable swallow ability as per s/s eval on 6/13  - Per NSG ok for rehab. Will need to wear helmet.
right MCA infarction now s/p hemicraniectomy   - neurocheck q 4  - vitals q 4   - pain control with Tylenol   - dc staples before patient is discharged.   - continue Keppra 1 g BID, hx of seizure  - ASA 81.  - SBP < 140  - patient with difficulty swallowing pills and water this AM, f/u speech and swallow to ensure this is not new neurologic deficit  - Per NSG ok for rehab. Will need to wear helmet. Staples to be removed today
right MCA infarction now s/p hemicraniectomy   - neurocheck q 4  - vitals q 4   - pain control with Tylenol   - dc staples before patient is discharged.   - continue Keppra 1 g BID, hx of seizure  - ASA 81.  - SBP < 140  - f/u Dr. Brent resendiz.

## 2018-06-20 NOTE — PROGRESS NOTE ADULT - PROBLEM SELECTOR PLAN 2
- ASA 81.
- ASA 81 mg daily  -Atorvastatin 80 mg qHS
- ASA 81.
- ASA 81 mg daily  -Atorvastatin 80 mg qHS
- ASA 81.
- ASA 81.

## 2018-06-20 NOTE — PROGRESS NOTE ADULT - PROBLEM SELECTOR PROBLEM 6
Prophylactic measure
Prophylactic measure
Nutrition, metabolism, and development symptoms
Prophylactic measure
Nutrition, metabolism, and development symptoms
Nutrition, metabolism, and development symptoms

## 2018-06-20 NOTE — PROGRESS NOTE ADULT - PROBLEM SELECTOR PLAN 3
- c/w ceftriaxone through tomorrow.
Pt afebrile overnight however has been having recurring fevers over the last several days, likely centrally mediated. No source. Bcx with NGTD  -gallium scan read as left lower lobe abnormality, f/u chest CT  -lower extremity dopplers are negative for clots  -f/u ID recs  -once cleared can go to [facility]
- c/w ceftriaxone through 6/12 - discontinued
Pt afebrile overnight however has been having recurring fevers over the last several days, likely centrally mediated. No source. Bcx with NGTD  -f/u gallium scan read and lower extremity dopplers  -f/u ID recs  -once cleared can go to [facility]
Pt afebrile overnight however has been having recurring fevers over the last several days, likely centrally mediated. No source. Bcx with NGTD  -gallium scan read as left lower lobe abnormality, chest CT not showing consolidation  -lower extremity dopplers are negative for clots  -f/u ID recs  -once cleared can go to [facility]
Pt afebrile overnight.   -f/u bcx  -f/u lower extremity dopplers
Pt afebrile overnight.   -f/u bcx  -f/u lower extremity dopplers
Pt afebrile overnight however has been having recurring fevers over the last several days, likely centrally mediated. No source. Bcx with NGTD  -f/u lower extremity dopplers
Pt with fever to 101. Bcx drawn, CXR appears unchanged, UA unremarkable  -f/u bcx  -f/u lower extremity dopplers
- c/w ceftriaxone through 6/12 - discontinued

## 2018-06-20 NOTE — PROGRESS NOTE ADULT - PROBLEM SELECTOR PROBLEM 5
Nutrition, metabolism, and development symptoms
Nutrition, metabolism, and development symptoms
Bacterial vaginosis
Nutrition, metabolism, and development symptoms
Bacterial vaginosis
Bacterial vaginosis

## 2018-06-20 NOTE — PROGRESS NOTE ADULT - PROBLEM SELECTOR PROBLEM 4
Bacterial vaginosis
UTI (urinary tract infection)
Bacterial vaginosis
UTI (urinary tract infection)
Bacterial vaginosis

## 2018-06-20 NOTE — PROGRESS NOTE ADULT - SUBJECTIVE AND OBJECTIVE BOX
INTERVAL HPI/OVERNIGHT EVENTS: CHRISTOPHE    Patient was seen and examined at bedside. As per nurse and patient, no o/n events, patient resting comfortably. No complaints at this time. Patient denies: fever, chills, dizziness, weakness, HA, CP, palpitations, SOB, cough, N/V/D/C, dysuria, changes in bowel movements, LE edema.    ROS: as above    VITAL SIGNS:  T(F): 98.5 (06-20-18 @ 06:00)  HR: 52 (06-20-18 @ 08:10)  BP: 131/57 (06-20-18 @ 08:10)  RR: 16 (06-20-18 @ 08:10)  SpO2: 95% (06-20-18 @ 08:10)  Wt(kg): --    PHYSICAL EXAM:    Constitutional: lying in bed, alert, alert and awake this AM  HEENT: S/p craniotomy on right side with staples in, PERRL, EOMI, sclera non-icteric, MMM  Neck: supple, trachea midline, no masses, no JVD  Respiratory: CTA b/l, good air entry b/l, no wheezing, no rhonchi, no rales, without accessory muscle use and no intercostal retractions  Cardiovascular: RRR, normal S1S2, no M/R/G  Gastrointestinal: soft, NTND, no masses palpable, BS normal  Extremities: Warm, well perfused, pulses equal bilateral upper and lower extremities, no edema, no clubbing  Neurological: AAOx3, CN 2-12 intact, left sided neglect. 1/5 left shoulder strength, 0/5 left arm strength, 4/5 left leg strength. 5/5 strength. Sensation in upper and lower extremities intact. Gait not assessed.    MEDICATIONS  (STANDING):  aspirin  chewable 81 milliGRAM(s) Oral daily  atorvastatin 80 milliGRAM(s) Oral at bedtime  bisoprolol   Tablet 5 milliGRAM(s) Oral daily  escitalopram 10 milliGRAM(s) Oral daily  heparin  Injectable 5000 Unit(s) SubCutaneous every 8 hours  levETIRAcetam 1000 milliGRAM(s) Oral two times a day  lisinopril 40 milliGRAM(s) Oral daily  multivitamin 1 Tablet(s) Oral daily  pantoprazole    Tablet 40 milliGRAM(s) Oral before breakfast  polyethylene glycol 3350 17 Gram(s) Oral at bedtime  senna 2 Tablet(s) Oral at bedtime    MEDICATIONS  (PRN):  acetaminophen   Tablet. 650 milliGRAM(s) Oral every 6 hours PRN Mild Pain (1 - 3)  bisacodyl Suppository 10 milliGRAM(s) Rectal daily PRN Constipation  ondansetron Injectable 4 milliGRAM(s) IV Push every 6 hours PRN Nausea  oxyCODONE    IR 5 milliGRAM(s) Oral every 6 hours PRN Severe Pain (7 - 10)      Allergies    No Known Allergies    Intolerances        LABS:                        11.6   8.4   )-----------( 674      ( 19 Jun 2018 08:09 )             36.6     06-19    138  |  101  |  7   ----------------------------<  119<H>  3.8   |  26  |  0.38<L>    Ca    9.8      19 Jun 2018 08:09  Phos  3.9     06-19  Mg     2.1     06-19            RADIOLOGY & ADDITIONAL TESTS:

## 2018-06-20 NOTE — PROGRESS NOTE ADULT - PROBLEM SELECTOR PLAN 4
- c/w metronidazole 6/14.
s/p treatment with ceftriaxone, last dose 6/12
- c/w metronidazole 6/14.
-ceftriaxone through 6/12 - discontinued
-ceftriaxone through 6/12 - discontinued
s/p treatment with ceftriaxone, last dose 6/12
s/p treatment with ceftriaxone, last dose 6/12
-ceftriaxone through 6/12 - discontinued
s/p treatment with ceftriaxone, last dose 6/12
- c/w metronidazole 6/14.

## 2018-06-20 NOTE — PROGRESS NOTE ADULT - PROBLEM SELECTOR PLAN 5
- replete prn   - IVF @ 75  - NPO pending fees
- replete prn   - IVF @ 75  - NPO pending fees
s/p metronidazole, last dose: 6/14.
- metronidazole finished on 6/14.
- metronidazole finished on 6/14.
- replete prn   - IVF @ 75  - NPO pending fees
s/p metronidazole, last dose: 6/14.
s/p metronidazole, last dose: 6/14.
- metronidazole finished on 6/14.
s/p metronidazole, last dose: 6/14.

## 2018-06-20 NOTE — PROGRESS NOTE ADULT - PROBLEM SELECTOR PLAN 7
F: no IVF indicated  E: Replete electrolytes PRN, K>4, Mg>2  N: Dysphagia    DVT ppx: SQH/SCDs.  No GI ppx indicated.  CODE: FULL  Dispo: [facility]
SQH/SCDs.  No GI ppx indicated.
SQH/SCDs.  No GI ppx indicated.
F: no IVF indicated  E: Replete electrolytes PRN, K>4, Mg>2  N: Dysphagia    DVT ppx: SQH/SCDs.  No GI ppx indicated.  CODE: FULL  Dispo: [facility]
SQH/SCDs.  No GI ppx indicated.

## 2018-06-20 NOTE — PROGRESS NOTE ADULT - SUBJECTIVE AND OBJECTIVE BOX
INTERVAL HPI/OVERNIGHT EVENTS:  CT chest done yesterday b/o persistent abnormality in LLL on gallium scan.  Remains afebrile.    CONSTITUTIONAL:  No fever, chills, night sweats  EYES:  No photophobia or visual changes  CARDIOVASCULAR:  No chest pain  RESPIRATORY:  No cough, wheezing, or SOB   GASTROINTESTINAL:  No nausea, vomiting, diarrhea, constipation, or abdominal pain.  Right abd pain recurs when off pain meds  GENITOURINARY:  No frequency, urgency, dysuria or hematuria  NEUROLOGIC:  No headache at present      ANTIBIOTICS/RELEVANT:    None      Vital Signs Last 24 Hrs  T(C): 36.1 (20 Jun 2018 17:23), Max: 37.2 (20 Jun 2018 09:09)  T(F): 96.9 (20 Jun 2018 17:23), Max: 99 (20 Jun 2018 09:09)  HR: 60 (20 Jun 2018 18:05) (52 - 66)  BP: 124/58 (20 Jun 2018 18:05) (124/58 - 163/79)  BP(mean): 83 (20 Jun 2018 18:05) (82 - 114)  RR: 16 (20 Jun 2018 18:05) (16 - 17)  SpO2: 100% (20 Jun 2018 18:05) (95% - 100%)    PHYSICAL EXAM:  Constitutional:  Chronically ill-appearing  Eyes:  Sclerae anicteric, conjunctivae clear  Ear/Nose/Throat:  No nasal exudate or sinus tenderness;  No buccal mucosal lesions, no pharyngeal erythema or exudate	  Neck:  Supple, no adenopathy  Respiratory:  Clear bilaterally  Cardiovascular:  RRR, S1S2, no murmur appreciated  Gastrointestinal: +BS, Right abd wall hematoma - less tender than on 6/16  Extremities:  No edema  Neuro:  Left facial droop, left hemiplegia      LABS:                        11.6   8.4   )-----------( 674      ( 19 Jun 2018 08:09 )             36.6         06-19    138  |  101  |  7   ----------------------------<  119<H>  3.8   |  26  |  0.38<L>    Ca    9.8      19 Jun 2018 08:09  Phos  3.9     06-19  Mg     2.1     06-19            MICROBIOLOGY:  Blood cultures:  6/5 X 2 sets - NG;  6/13 X 2 - NG    Urine:  6/9:  >10^5 E coli;  6/13 - NG    rRVP 6/13 - ND    RADIOLOGY & ADDITIONAL STUDIES:  Reviewed images and reports  < from: CT Chest w/ IV Cont (06.19.18 @ 14:14) >  Findings:     Lungs and airways: There is new consolidated-compressive atelectasis of   the posterior basal segment of the left lower lobe adjacent to a large   type III hiatal hernia which contains the stomach. New linear focus of   atelectasis right lower lobe. Mild paraseptal emphysema was pronounced in   the right apical region. Micronodule in the lingula (series 3, image 197).    Pleura:  No pleural effusion.    Adenopathy: No thoracic lymphadenopathy.    Heart and pericardium:  Heart size is normal. No pericardial effusion.    Vessels:  Mild coronary artery calcifications. Slight uncoiling of the   thoracic aorta.    Chest wall and lower neck:  Normal.    Upper abdomen: There is a large type III gastric hiatal hernia. Coarse   calcifications involvingthe inferior aspect left breast unchanged from   5/30/2018.    Bones:  Minimal degenerative changes of the spine.      Impression:     1. Large type III hiatal hernia containing the majority of the stomach.   Adjacent compressive atelectasis involving the posterior basal segment of   the left lower lobe. Findings are unchanged from CT abdomen and pelvis   dated 5/30/2018.  2. Micronodule involving the lingula. If this patient is at high risk, as   per Fleischner society 2017 guidelines, consideration for an optional 12   month surveillance thoracic CT is suggested.  3. Mild biapical paraseptal emphysema.    < end of copied text >

## 2018-06-20 NOTE — PROGRESS NOTE ADULT - ATTENDING COMMENTS
57 yo F with HTN, left breast CA s/p mastectomy with TRAM flap reconstruction admitted with right rectus abdominal hematoma on Plavix txed with thrombin, followed by large R MCA CVA with mass effect requiring hemicraniectomy.  She had had recurrent low grade temp increases with leukocytosis.  Received ceftriaxone for 3 d, though unclear that she had UTI, rather than colonization.   Off antibiotics.  Last Tm>100 on 6/16.  leukocytosis has resolved.  No source for infection found in extensive w/u.  Suggest:  - Continue to follow off antibiotics  Recommendations discussed with ID Resident (Dr. Bhagat) who discussed with primary team.  Please recall if further ID input is desired - ID service.

## 2018-06-21 VITALS — TEMPERATURE: 98 F

## 2018-06-21 PROCEDURE — 92612 ENDOSCOPY SWALLOW (FEES) VID: CPT | Mod: GN

## 2018-06-21 PROCEDURE — C1887: CPT

## 2018-06-21 PROCEDURE — 83735 ASSAY OF MAGNESIUM: CPT

## 2018-06-21 PROCEDURE — 84295 ASSAY OF SERUM SODIUM: CPT

## 2018-06-21 PROCEDURE — 37242 VASC EMBOLIZE/OCCLUDE ARTERY: CPT

## 2018-06-21 PROCEDURE — 92526 ORAL FUNCTION THERAPY: CPT | Mod: GN

## 2018-06-21 PROCEDURE — 36245 INS CATH ABD/L-EXT ART 1ST: CPT

## 2018-06-21 PROCEDURE — 36217 PLACE CATHETER IN ARTERY: CPT

## 2018-06-21 PROCEDURE — C1769: CPT

## 2018-06-21 PROCEDURE — A9556: CPT

## 2018-06-21 PROCEDURE — 97112 NEUROMUSCULAR REEDUCATION: CPT

## 2018-06-21 PROCEDURE — 93005 ELECTROCARDIOGRAM TRACING: CPT

## 2018-06-21 PROCEDURE — C1894: CPT

## 2018-06-21 PROCEDURE — 93307 TTE W/O DOPPLER COMPLETE: CPT

## 2018-06-21 PROCEDURE — 93970 EXTREMITY STUDY: CPT

## 2018-06-21 PROCEDURE — 96375 TX/PRO/DX INJ NEW DRUG ADDON: CPT

## 2018-06-21 PROCEDURE — 85027 COMPLETE CBC AUTOMATED: CPT

## 2018-06-21 PROCEDURE — 87040 BLOOD CULTURE FOR BACTERIA: CPT

## 2018-06-21 PROCEDURE — 85018 HEMOGLOBIN: CPT

## 2018-06-21 PROCEDURE — 87070 CULTURE OTHR SPECIMN AEROBIC: CPT

## 2018-06-21 PROCEDURE — 31500 INSERT EMERGENCY AIRWAY: CPT

## 2018-06-21 PROCEDURE — P9016: CPT

## 2018-06-21 PROCEDURE — C1889: CPT

## 2018-06-21 PROCEDURE — 83690 ASSAY OF LIPASE: CPT

## 2018-06-21 PROCEDURE — 70496 CT ANGIOGRAPHY HEAD: CPT

## 2018-06-21 PROCEDURE — 85610 PROTHROMBIN TIME: CPT

## 2018-06-21 PROCEDURE — 70450 CT HEAD/BRAIN W/O DYE: CPT

## 2018-06-21 PROCEDURE — 0042T: CPT

## 2018-06-21 PROCEDURE — 84443 ASSAY THYROID STIM HORMONE: CPT

## 2018-06-21 PROCEDURE — 92610 EVALUATE SWALLOWING FUNCTION: CPT | Mod: GN

## 2018-06-21 PROCEDURE — 85576 BLOOD PLATELET AGGREGATION: CPT

## 2018-06-21 PROCEDURE — 78802 RP LOCLZJ TUM WHBDY 1 D IMG: CPT

## 2018-06-21 PROCEDURE — P9035: CPT

## 2018-06-21 PROCEDURE — 87086 URINE CULTURE/COLONY COUNT: CPT

## 2018-06-21 PROCEDURE — 86901 BLOOD TYPING SEROLOGIC RH(D): CPT

## 2018-06-21 PROCEDURE — 85730 THROMBOPLASTIN TIME PARTIAL: CPT

## 2018-06-21 PROCEDURE — 94003 VENT MGMT INPAT SUBQ DAY: CPT

## 2018-06-21 PROCEDURE — 80053 COMPREHEN METABOLIC PANEL: CPT

## 2018-06-21 PROCEDURE — 87633 RESP VIRUS 12-25 TARGETS: CPT

## 2018-06-21 PROCEDURE — 82803 BLOOD GASES ANY COMBINATION: CPT

## 2018-06-21 PROCEDURE — 36415 COLL VENOUS BLD VENIPUNCTURE: CPT

## 2018-06-21 PROCEDURE — 74177 CT ABD & PELVIS W/CONTRAST: CPT

## 2018-06-21 PROCEDURE — 85025 COMPLETE CBC W/AUTO DIFF WBC: CPT

## 2018-06-21 PROCEDURE — 86850 RBC ANTIBODY SCREEN: CPT

## 2018-06-21 PROCEDURE — 87581 M.PNEUMON DNA AMP PROBE: CPT

## 2018-06-21 PROCEDURE — 86921 COMPATIBILITY TEST INCUBATE: CPT

## 2018-06-21 PROCEDURE — 99291 CRITICAL CARE FIRST HOUR: CPT | Mod: 25

## 2018-06-21 PROCEDURE — 84132 ASSAY OF SERUM POTASSIUM: CPT

## 2018-06-21 PROCEDURE — 96374 THER/PROPH/DIAG INJ IV PUSH: CPT | Mod: XU

## 2018-06-21 PROCEDURE — 97163 PT EVAL HIGH COMPLEX 45 MIN: CPT

## 2018-06-21 PROCEDURE — 81001 URINALYSIS AUTO W/SCOPE: CPT

## 2018-06-21 PROCEDURE — 83605 ASSAY OF LACTIC ACID: CPT

## 2018-06-21 PROCEDURE — 74019 RADEX ABDOMEN 2 VIEWS: CPT

## 2018-06-21 PROCEDURE — 81003 URINALYSIS AUTO W/O SCOPE: CPT

## 2018-06-21 PROCEDURE — 70498 CT ANGIOGRAPHY NECK: CPT

## 2018-06-21 PROCEDURE — 82962 GLUCOSE BLOOD TEST: CPT

## 2018-06-21 PROCEDURE — 97530 THERAPEUTIC ACTIVITIES: CPT

## 2018-06-21 PROCEDURE — 76705 ECHO EXAM OF ABDOMEN: CPT

## 2018-06-21 PROCEDURE — 71045 X-RAY EXAM CHEST 1 VIEW: CPT

## 2018-06-21 PROCEDURE — 97116 GAIT TRAINING THERAPY: CPT

## 2018-06-21 PROCEDURE — 96376 TX/PRO/DX INJ SAME DRUG ADON: CPT

## 2018-06-21 PROCEDURE — 87798 DETECT AGENT NOS DNA AMP: CPT

## 2018-06-21 PROCEDURE — 80048 BASIC METABOLIC PNL TOTAL CA: CPT

## 2018-06-21 PROCEDURE — 97110 THERAPEUTIC EXERCISES: CPT

## 2018-06-21 PROCEDURE — 82330 ASSAY OF CALCIUM: CPT

## 2018-06-21 PROCEDURE — 80076 HEPATIC FUNCTION PANEL: CPT

## 2018-06-21 PROCEDURE — 88300 SURGICAL PATH GROSS: CPT

## 2018-06-21 PROCEDURE — 97162 PT EVAL MOD COMPLEX 30 MIN: CPT

## 2018-06-21 PROCEDURE — 87486 CHLMYD PNEUM DNA AMP PROBE: CPT

## 2018-06-21 PROCEDURE — 87186 SC STD MICRODIL/AGAR DIL: CPT

## 2018-06-21 PROCEDURE — 71260 CT THORAX DX C+: CPT

## 2018-06-21 PROCEDURE — 86900 BLOOD TYPING SEROLOGIC ABO: CPT

## 2018-06-21 PROCEDURE — 84100 ASSAY OF PHOSPHORUS: CPT

## 2018-06-21 PROCEDURE — 95951: CPT

## 2018-06-21 PROCEDURE — 80061 LIPID PANEL: CPT

## 2018-06-21 PROCEDURE — 86922 COMPATIBILITY TEST ANTIGLOB: CPT

## 2018-06-21 PROCEDURE — 36430 TRANSFUSION BLD/BLD COMPNT: CPT

## 2018-06-21 PROCEDURE — 94002 VENT MGMT INPAT INIT DAY: CPT

## 2018-06-21 PROCEDURE — 83036 HEMOGLOBIN GLYCOSYLATED A1C: CPT

## 2018-06-21 RX ADMIN — PANTOPRAZOLE SODIUM 40 MILLIGRAM(S): 20 TABLET, DELAYED RELEASE ORAL at 06:25

## 2018-06-21 RX ADMIN — OXYCODONE HYDROCHLORIDE 5 MILLIGRAM(S): 5 TABLET ORAL at 06:00

## 2018-06-21 RX ADMIN — ESCITALOPRAM OXALATE 10 MILLIGRAM(S): 10 TABLET, FILM COATED ORAL at 12:20

## 2018-06-21 RX ADMIN — BISOPROLOL FUMARATE 5 MILLIGRAM(S): 10 TABLET, FILM COATED ORAL at 06:08

## 2018-06-21 RX ADMIN — Medication 81 MILLIGRAM(S): at 12:19

## 2018-06-21 RX ADMIN — OXYCODONE HYDROCHLORIDE 5 MILLIGRAM(S): 5 TABLET ORAL at 12:20

## 2018-06-21 RX ADMIN — LISINOPRIL 40 MILLIGRAM(S): 2.5 TABLET ORAL at 06:06

## 2018-06-21 RX ADMIN — Medication 1 TABLET(S): at 12:19

## 2018-06-21 RX ADMIN — HEPARIN SODIUM 5000 UNIT(S): 5000 INJECTION INTRAVENOUS; SUBCUTANEOUS at 06:06

## 2018-06-21 RX ADMIN — OXYCODONE HYDROCHLORIDE 5 MILLIGRAM(S): 5 TABLET ORAL at 07:00

## 2018-06-21 RX ADMIN — LEVETIRACETAM 1000 MILLIGRAM(S): 250 TABLET, FILM COATED ORAL at 06:06

## 2018-06-27 DIAGNOSIS — I10 ESSENTIAL (PRIMARY) HYPERTENSION: ICD-10-CM

## 2018-06-27 DIAGNOSIS — G93.2 BENIGN INTRACRANIAL HYPERTENSION: ICD-10-CM

## 2018-06-27 DIAGNOSIS — M79.81 NONTRAUMATIC HEMATOMA OF SOFT TISSUE: ICD-10-CM

## 2018-06-27 DIAGNOSIS — Z90.12 ACQUIRED ABSENCE OF LEFT BREAST AND NIPPLE: ICD-10-CM

## 2018-06-27 DIAGNOSIS — R29.726 NIHSS SCORE 26: ICD-10-CM

## 2018-06-27 DIAGNOSIS — N76.0 ACUTE VAGINITIS: ICD-10-CM

## 2018-06-27 DIAGNOSIS — I63.9 CEREBRAL INFARCTION, UNSPECIFIED: ICD-10-CM

## 2018-06-27 DIAGNOSIS — F41.9 ANXIETY DISORDER, UNSPECIFIED: ICD-10-CM

## 2018-06-27 DIAGNOSIS — G81.94 HEMIPLEGIA, UNSPECIFIED AFFECTING LEFT NONDOMINANT SIDE: ICD-10-CM

## 2018-06-27 DIAGNOSIS — F32.9 MAJOR DEPRESSIVE DISORDER, SINGLE EPISODE, UNSPECIFIED: ICD-10-CM

## 2018-06-27 DIAGNOSIS — R50.9 FEVER, UNSPECIFIED: ICD-10-CM

## 2018-06-27 DIAGNOSIS — E78.5 HYPERLIPIDEMIA, UNSPECIFIED: ICD-10-CM

## 2018-06-27 DIAGNOSIS — G93.6 CEREBRAL EDEMA: ICD-10-CM

## 2018-06-27 DIAGNOSIS — B96.20 UNSPECIFIED ESCHERICHIA COLI [E. COLI] AS THE CAUSE OF DISEASES CLASSIFIED ELSEWHERE: ICD-10-CM

## 2018-06-27 DIAGNOSIS — N39.0 URINARY TRACT INFECTION, SITE NOT SPECIFIED: ICD-10-CM

## 2018-06-27 DIAGNOSIS — I61.9 NONTRAUMATIC INTRACEREBRAL HEMORRHAGE, UNSPECIFIED: ICD-10-CM

## 2018-06-27 DIAGNOSIS — I25.10 ATHEROSCLEROTIC HEART DISEASE OF NATIVE CORONARY ARTERY WITHOUT ANGINA PECTORIS: ICD-10-CM

## 2018-06-27 DIAGNOSIS — G93.5 COMPRESSION OF BRAIN: ICD-10-CM

## 2018-06-27 DIAGNOSIS — R27.0 ATAXIA, UNSPECIFIED: ICD-10-CM

## 2018-06-27 DIAGNOSIS — R29.810 FACIAL WEAKNESS: ICD-10-CM

## 2018-06-27 DIAGNOSIS — Z85.3 PERSONAL HISTORY OF MALIGNANT NEOPLASM OF BREAST: ICD-10-CM

## 2018-06-27 DIAGNOSIS — R56.9 UNSPECIFIED CONVULSIONS: ICD-10-CM

## 2018-06-27 DIAGNOSIS — R13.10 DYSPHAGIA, UNSPECIFIED: ICD-10-CM

## 2018-06-28 PROBLEM — I10 ESSENTIAL (PRIMARY) HYPERTENSION: Chronic | Status: ACTIVE | Noted: 2018-05-30

## 2018-06-28 PROBLEM — F41.9 ANXIETY DISORDER, UNSPECIFIED: Chronic | Status: ACTIVE | Noted: 2018-05-30

## 2018-06-29 PROBLEM — Z00.00 ENCOUNTER FOR PREVENTIVE HEALTH EXAMINATION: Status: ACTIVE | Noted: 2018-06-29

## 2018-07-23 ENCOUNTER — APPOINTMENT (OUTPATIENT)
Dept: NEUROSURGERY | Facility: CLINIC | Age: 58
End: 2018-07-23
Payer: MEDICARE

## 2018-07-23 VITALS
OXYGEN SATURATION: 97 % | TEMPERATURE: 97.9 F | HEART RATE: 76 BPM | HEIGHT: 67 IN | RESPIRATION RATE: 16 BRPM | WEIGHT: 151 LBS | DIASTOLIC BLOOD PRESSURE: 78 MMHG | SYSTOLIC BLOOD PRESSURE: 120 MMHG | BODY MASS INDEX: 23.7 KG/M2

## 2018-07-23 DIAGNOSIS — Z85.3 PERSONAL HISTORY OF MALIGNANT NEOPLASM OF BREAST: ICD-10-CM

## 2018-07-23 DIAGNOSIS — Z86.59 PERSONAL HISTORY OF OTHER MENTAL AND BEHAVIORAL DISORDERS: ICD-10-CM

## 2018-07-23 DIAGNOSIS — Z78.9 OTHER SPECIFIED HEALTH STATUS: ICD-10-CM

## 2018-07-23 DIAGNOSIS — Z85.9 PERSONAL HISTORY OF MALIGNANT NEOPLASM, UNSPECIFIED: ICD-10-CM

## 2018-07-23 PROCEDURE — 99024 POSTOP FOLLOW-UP VISIT: CPT

## 2018-07-24 PROBLEM — Z85.9 HISTORY OF CANCER: Status: RESOLVED | Noted: 2018-07-23 | Resolved: 2018-07-24

## 2018-07-24 PROBLEM — Z86.59 HISTORY OF DEPRESSION: Status: RESOLVED | Noted: 2018-07-23 | Resolved: 2018-07-24

## 2018-07-24 PROBLEM — Z78.9 DOES NOT USE ILLICIT DRUGS: Status: ACTIVE | Noted: 2018-07-23

## 2018-07-24 PROBLEM — Z85.3 HISTORY OF BREAST CANCER: Status: RESOLVED | Noted: 2018-07-24 | Resolved: 2018-07-24

## 2018-08-03 ENCOUNTER — APPOINTMENT (OUTPATIENT)
Dept: NEUROLOGY | Facility: CLINIC | Age: 58
End: 2018-08-03
Payer: COMMERCIAL

## 2018-08-03 VITALS
SYSTOLIC BLOOD PRESSURE: 109 MMHG | TEMPERATURE: 98.1 F | HEIGHT: 67 IN | WEIGHT: 150 LBS | OXYGEN SATURATION: 97 % | BODY MASS INDEX: 23.54 KG/M2 | DIASTOLIC BLOOD PRESSURE: 74 MMHG | HEART RATE: 72 BPM

## 2018-08-03 DIAGNOSIS — I10 ESSENTIAL (PRIMARY) HYPERTENSION: ICD-10-CM

## 2018-08-03 DIAGNOSIS — R29.898 OTHER SYMPTOMS AND SIGNS INVOLVING THE MUSCULOSKELETAL SYSTEM: ICD-10-CM

## 2018-08-03 DIAGNOSIS — I63.9 CEREBRAL INFARCTION, UNSPECIFIED: ICD-10-CM

## 2018-08-03 DIAGNOSIS — Z87.891 PERSONAL HISTORY OF NICOTINE DEPENDENCE: ICD-10-CM

## 2018-08-03 PROCEDURE — 99215 OFFICE O/P EST HI 40 MIN: CPT

## 2018-08-03 RX ORDER — PANTOPRAZOLE 40 MG/1
40 TABLET, DELAYED RELEASE ORAL DAILY
Refills: 0 | Status: ACTIVE | COMMUNITY

## 2018-08-03 RX ORDER — ATORVASTATIN CALCIUM 80 MG/1
80 TABLET, FILM COATED ORAL DAILY
Refills: 0 | Status: ACTIVE | COMMUNITY

## 2018-08-03 RX ORDER — HYDROCHLOROTHIAZIDE 25 MG/1
25 TABLET ORAL DAILY
Refills: 0 | Status: ACTIVE | COMMUNITY

## 2018-08-03 RX ORDER — AMITRIPTYLINE HYDROCHLORIDE 25 MG/1
25 TABLET, FILM COATED ORAL
Qty: 30 | Refills: 6 | Status: ACTIVE | COMMUNITY

## 2018-08-03 RX ORDER — VALSARTAN 160 MG/1
160 TABLET ORAL DAILY
Refills: 0 | Status: ACTIVE | COMMUNITY

## 2018-08-03 RX ORDER — ACETAMINOPHEN 325 MG/1
325 TABLET ORAL
Refills: 0 | Status: ACTIVE | COMMUNITY

## 2018-08-03 RX ORDER — ESCITALOPRAM OXALATE 20 MG/1
20 TABLET, FILM COATED ORAL DAILY
Refills: 0 | Status: ACTIVE | COMMUNITY

## 2018-08-03 RX ORDER — CLOPIDOGREL BISULFATE 75 MG/1
75 TABLET, FILM COATED ORAL
Refills: 0 | Status: DISCONTINUED | COMMUNITY
End: 2018-08-03

## 2018-08-03 RX ORDER — BUPROPION HYDROCHLORIDE 300 MG/1
300 TABLET, EXTENDED RELEASE ORAL DAILY
Refills: 0 | Status: ACTIVE | COMMUNITY

## 2018-08-03 RX ORDER — BISOPROLOL FUMARATE 5 MG/1
5 TABLET, FILM COATED ORAL DAILY
Refills: 0 | Status: ACTIVE | COMMUNITY

## 2018-09-05 ENCOUNTER — OUTPATIENT (OUTPATIENT)
Dept: OUTPATIENT SERVICES | Facility: HOSPITAL | Age: 58
LOS: 1 days | End: 2018-09-05
Payer: COMMERCIAL

## 2018-09-05 ENCOUNTER — APPOINTMENT (OUTPATIENT)
Dept: NEUROSURGERY | Facility: CLINIC | Age: 58
End: 2018-09-05
Payer: COMMERCIAL

## 2018-09-05 ENCOUNTER — APPOINTMENT (OUTPATIENT)
Dept: CT IMAGING | Facility: HOSPITAL | Age: 58
End: 2018-09-05
Payer: COMMERCIAL

## 2018-09-05 DIAGNOSIS — Z98.891 HISTORY OF UTERINE SCAR FROM PREVIOUS SURGERY: Chronic | ICD-10-CM

## 2018-09-05 DIAGNOSIS — Z90.12 ACQUIRED ABSENCE OF LEFT BREAST AND NIPPLE: Chronic | ICD-10-CM

## 2018-09-05 PROCEDURE — 70450 CT HEAD/BRAIN W/O DYE: CPT

## 2018-09-05 PROCEDURE — 99215 OFFICE O/P EST HI 40 MIN: CPT

## 2018-09-05 PROCEDURE — 70450 CT HEAD/BRAIN W/O DYE: CPT | Mod: 26

## 2018-09-17 ENCOUNTER — APPOINTMENT (OUTPATIENT)
Dept: SURGERY | Facility: CLINIC | Age: 58
End: 2018-09-17

## 2018-09-26 VITALS
TEMPERATURE: 97 F | OXYGEN SATURATION: 95 % | RESPIRATION RATE: 20 BRPM | DIASTOLIC BLOOD PRESSURE: 70 MMHG | SYSTOLIC BLOOD PRESSURE: 146 MMHG | WEIGHT: 159.84 LBS | HEIGHT: 67 IN | HEART RATE: 63 BPM

## 2018-09-26 NOTE — PATIENT PROFILE ADULT. - PSH
History of craniotomy    History of mastectomy, left  with TRAM flap recon  Previous  section History of mastectomy, left  with TRAM flap recon  Previous  section Elective surgery  skull surgery (right)  History of mastectomy, left  with TRAM flap recon  Previous  section

## 2018-09-26 NOTE — PATIENT PROFILE ADULT. - PMH
Anxiety    Breast CA    CAD (coronary artery disease)    Dyslipidemia    History of right MCA stroke    Hypertension    Stomach ulcer  5/2018 Anxiety    Breast CA  left  CAD (coronary artery disease)    Chronic headaches    Dyslipidemia    History of right MCA stroke  left arm hemiparesis, 5/2018  Hypertension    Seizures  following stroke  Stomach ulcer  5/2018

## 2018-09-27 ENCOUNTER — INPATIENT (INPATIENT)
Facility: HOSPITAL | Age: 58
LOS: 1 days | Discharge: HOME | DRG: 517 | End: 2018-09-29
Attending: NEUROLOGICAL SURGERY | Admitting: NEUROLOGICAL SURGERY
Payer: COMMERCIAL

## 2018-09-27 ENCOUNTER — APPOINTMENT (OUTPATIENT)
Dept: NEUROSURGERY | Facility: HOSPITAL | Age: 58
End: 2018-09-27

## 2018-09-27 DIAGNOSIS — Z41.9 ENCOUNTER FOR PROCEDURE FOR PURPOSES OTHER THAN REMEDYING HEALTH STATE, UNSPECIFIED: Chronic | ICD-10-CM

## 2018-09-27 DIAGNOSIS — Z98.891 HISTORY OF UTERINE SCAR FROM PREVIOUS SURGERY: Chronic | ICD-10-CM

## 2018-09-27 DIAGNOSIS — Z90.12 ACQUIRED ABSENCE OF LEFT BREAST AND NIPPLE: Chronic | ICD-10-CM

## 2018-09-27 LAB
ALBUMIN SERPL ELPH-MCNC: 4.1 G/DL — SIGNIFICANT CHANGE UP (ref 3.3–5)
ALP SERPL-CCNC: 113 U/L — SIGNIFICANT CHANGE UP (ref 40–120)
ALT FLD-CCNC: 33 U/L — SIGNIFICANT CHANGE UP (ref 10–45)
ANION GAP SERPL CALC-SCNC: 12 MMOL/L — SIGNIFICANT CHANGE UP (ref 5–17)
APTT BLD: 27.4 SEC — LOW (ref 27.5–37.4)
AST SERPL-CCNC: 31 U/L — SIGNIFICANT CHANGE UP (ref 10–40)
BASOPHILS NFR BLD AUTO: 1 % — SIGNIFICANT CHANGE UP (ref 0–2)
BILIRUB SERPL-MCNC: 0.7 MG/DL — SIGNIFICANT CHANGE UP (ref 0.2–1.2)
BUN SERPL-MCNC: 6 MG/DL — LOW (ref 7–23)
CALCIUM SERPL-MCNC: 9.7 MG/DL — SIGNIFICANT CHANGE UP (ref 8.4–10.5)
CHLORIDE SERPL-SCNC: 103 MMOL/L — SIGNIFICANT CHANGE UP (ref 96–108)
CO2 SERPL-SCNC: 27 MMOL/L — SIGNIFICANT CHANGE UP (ref 22–31)
CREAT SERPL-MCNC: 0.51 MG/DL — SIGNIFICANT CHANGE UP (ref 0.5–1.3)
EOSINOPHIL NFR BLD AUTO: 3.3 % — SIGNIFICANT CHANGE UP (ref 0–6)
GLUCOSE SERPL-MCNC: 176 MG/DL — HIGH (ref 70–99)
HCT VFR BLD CALC: 34.6 % — SIGNIFICANT CHANGE UP (ref 34.5–45)
HGB BLD-MCNC: 11.2 G/DL — LOW (ref 11.5–15.5)
INR BLD: 1.08 — SIGNIFICANT CHANGE UP (ref 0.88–1.16)
LYMPHOCYTES # BLD AUTO: 16.4 % — SIGNIFICANT CHANGE UP (ref 13–44)
MCHC RBC-ENTMCNC: 29.1 PG — SIGNIFICANT CHANGE UP (ref 27–34)
MCHC RBC-ENTMCNC: 32.4 G/DL — SIGNIFICANT CHANGE UP (ref 32–36)
MCV RBC AUTO: 89.9 FL — SIGNIFICANT CHANGE UP (ref 80–100)
MONOCYTES NFR BLD AUTO: 2.9 % — SIGNIFICANT CHANGE UP (ref 2–14)
NEUTROPHILS NFR BLD AUTO: 76.4 % — SIGNIFICANT CHANGE UP (ref 43–77)
PLATELET # BLD AUTO: 312 K/UL — SIGNIFICANT CHANGE UP (ref 150–400)
POTASSIUM SERPL-MCNC: 3.9 MMOL/L — SIGNIFICANT CHANGE UP (ref 3.5–5.3)
POTASSIUM SERPL-SCNC: 3.9 MMOL/L — SIGNIFICANT CHANGE UP (ref 3.5–5.3)
PROT SERPL-MCNC: 6.4 G/DL — SIGNIFICANT CHANGE UP (ref 6–8.3)
PROTHROM AB SERPL-ACNC: 12 SEC — SIGNIFICANT CHANGE UP (ref 9.8–12.7)
RBC # BLD: 3.85 M/UL — SIGNIFICANT CHANGE UP (ref 3.8–5.2)
RBC # FLD: 15.1 % — SIGNIFICANT CHANGE UP (ref 10.3–16.9)
SODIUM SERPL-SCNC: 142 MMOL/L — SIGNIFICANT CHANGE UP (ref 135–145)
WBC # BLD: 4.8 K/UL — SIGNIFICANT CHANGE UP (ref 3.8–10.5)
WBC # FLD AUTO: 4.8 K/UL — SIGNIFICANT CHANGE UP (ref 3.8–10.5)

## 2018-09-27 PROCEDURE — 62141 CRNOP SKULL DEFECT>5 CM DIAM: CPT

## 2018-09-27 PROCEDURE — 70450 CT HEAD/BRAIN W/O DYE: CPT | Mod: 26

## 2018-09-27 RX ORDER — LOSARTAN POTASSIUM 100 MG/1
50 TABLET, FILM COATED ORAL DAILY
Qty: 0 | Refills: 0 | Status: DISCONTINUED | OUTPATIENT
Start: 2018-09-27 | End: 2018-09-28

## 2018-09-27 RX ORDER — SODIUM CHLORIDE 9 MG/ML
1000 INJECTION INTRAMUSCULAR; INTRAVENOUS; SUBCUTANEOUS
Qty: 0 | Refills: 0 | Status: DISCONTINUED | OUTPATIENT
Start: 2018-09-27 | End: 2018-09-28

## 2018-09-27 RX ORDER — DEXTROSE 50 % IN WATER 50 %
25 SYRINGE (ML) INTRAVENOUS ONCE
Qty: 0 | Refills: 0 | Status: DISCONTINUED | OUTPATIENT
Start: 2018-09-27 | End: 2018-09-29

## 2018-09-27 RX ORDER — DOCUSATE SODIUM 100 MG
100 CAPSULE ORAL THREE TIMES A DAY
Qty: 0 | Refills: 0 | Status: DISCONTINUED | OUTPATIENT
Start: 2018-09-27 | End: 2018-09-29

## 2018-09-27 RX ORDER — LISINOPRIL 2.5 MG/1
40 TABLET ORAL DAILY
Qty: 0 | Refills: 0 | Status: DISCONTINUED | OUTPATIENT
Start: 2018-09-27 | End: 2018-09-28

## 2018-09-27 RX ORDER — DEXTROSE 50 % IN WATER 50 %
15 SYRINGE (ML) INTRAVENOUS ONCE
Qty: 0 | Refills: 0 | Status: DISCONTINUED | OUTPATIENT
Start: 2018-09-27 | End: 2018-09-29

## 2018-09-27 RX ORDER — ONDANSETRON 8 MG/1
4 TABLET, FILM COATED ORAL EVERY 6 HOURS
Qty: 0 | Refills: 0 | Status: DISCONTINUED | OUTPATIENT
Start: 2018-09-27 | End: 2018-09-29

## 2018-09-27 RX ORDER — LEVETIRACETAM 250 MG/1
2 TABLET, FILM COATED ORAL
Qty: 0 | Refills: 0 | COMMUNITY

## 2018-09-27 RX ORDER — FENTANYL CITRATE 50 UG/ML
25 INJECTION INTRAVENOUS EVERY 4 HOURS
Qty: 0 | Refills: 0 | Status: DISCONTINUED | OUTPATIENT
Start: 2018-09-27 | End: 2018-09-29

## 2018-09-27 RX ORDER — DEXTROSE 50 % IN WATER 50 %
12.5 SYRINGE (ML) INTRAVENOUS ONCE
Qty: 0 | Refills: 0 | Status: DISCONTINUED | OUTPATIENT
Start: 2018-09-27 | End: 2018-09-29

## 2018-09-27 RX ORDER — LEVETIRACETAM 250 MG/1
500 TABLET, FILM COATED ORAL EVERY 12 HOURS
Qty: 0 | Refills: 0 | Status: DISCONTINUED | OUTPATIENT
Start: 2018-09-27 | End: 2018-09-28

## 2018-09-27 RX ORDER — ATORVASTATIN CALCIUM 80 MG/1
80 TABLET, FILM COATED ORAL AT BEDTIME
Qty: 0 | Refills: 0 | Status: DISCONTINUED | OUTPATIENT
Start: 2018-09-27 | End: 2018-09-29

## 2018-09-27 RX ORDER — LABETALOL HCL 100 MG
10 TABLET ORAL
Qty: 0 | Refills: 0 | Status: DISCONTINUED | OUTPATIENT
Start: 2018-09-27 | End: 2018-09-29

## 2018-09-27 RX ORDER — GLUCAGON INJECTION, SOLUTION 0.5 MG/.1ML
1 INJECTION, SOLUTION SUBCUTANEOUS ONCE
Qty: 0 | Refills: 0 | Status: DISCONTINUED | OUTPATIENT
Start: 2018-09-27 | End: 2018-09-29

## 2018-09-27 RX ORDER — PANTOPRAZOLE SODIUM 20 MG/1
40 TABLET, DELAYED RELEASE ORAL
Qty: 0 | Refills: 0 | Status: DISCONTINUED | OUTPATIENT
Start: 2018-09-27 | End: 2018-09-28

## 2018-09-27 RX ORDER — ACETAMINOPHEN 500 MG
1000 TABLET ORAL ONCE
Qty: 0 | Refills: 0 | Status: COMPLETED | OUTPATIENT
Start: 2018-09-28 | End: 2018-09-28

## 2018-09-27 RX ORDER — FENTANYL CITRATE 50 UG/ML
12.5 INJECTION INTRAVENOUS ONCE
Qty: 0 | Refills: 0 | Status: DISCONTINUED | OUTPATIENT
Start: 2018-09-27 | End: 2018-09-27

## 2018-09-27 RX ORDER — INSULIN LISPRO 100/ML
VIAL (ML) SUBCUTANEOUS
Qty: 0 | Refills: 0 | Status: DISCONTINUED | OUTPATIENT
Start: 2018-09-27 | End: 2018-09-29

## 2018-09-27 RX ORDER — ACETAMINOPHEN 500 MG
1000 TABLET ORAL ONCE
Qty: 0 | Refills: 0 | Status: COMPLETED | OUTPATIENT
Start: 2018-09-27 | End: 2018-09-27

## 2018-09-27 RX ORDER — ESCITALOPRAM OXALATE 10 MG/1
20 TABLET, FILM COATED ORAL DAILY
Qty: 0 | Refills: 0 | Status: DISCONTINUED | OUTPATIENT
Start: 2018-09-27 | End: 2018-09-29

## 2018-09-27 RX ORDER — CEFAZOLIN SODIUM 1 G
1000 VIAL (EA) INJECTION EVERY 8 HOURS
Qty: 0 | Refills: 0 | Status: COMPLETED | OUTPATIENT
Start: 2018-09-27 | End: 2018-09-28

## 2018-09-27 RX ORDER — ACETAMINOPHEN 500 MG
650 TABLET ORAL EVERY 6 HOURS
Qty: 0 | Refills: 0 | Status: DISCONTINUED | OUTPATIENT
Start: 2018-09-27 | End: 2018-09-28

## 2018-09-27 RX ORDER — HYDRALAZINE HCL 50 MG
10 TABLET ORAL
Qty: 0 | Refills: 0 | Status: DISCONTINUED | OUTPATIENT
Start: 2018-09-27 | End: 2018-09-29

## 2018-09-27 RX ADMIN — Medication 100 MILLIGRAM(S): at 18:24

## 2018-09-27 RX ADMIN — LEVETIRACETAM 420 MILLIGRAM(S): 250 TABLET, FILM COATED ORAL at 18:17

## 2018-09-27 RX ADMIN — Medication 10 MILLIGRAM(S): at 12:19

## 2018-09-27 RX ADMIN — FENTANYL CITRATE 12.5 MICROGRAM(S): 50 INJECTION INTRAVENOUS at 11:33

## 2018-09-27 RX ADMIN — FENTANYL CITRATE 25 MICROGRAM(S): 50 INJECTION INTRAVENOUS at 15:18

## 2018-09-27 RX ADMIN — FENTANYL CITRATE 12.5 MICROGRAM(S): 50 INJECTION INTRAVENOUS at 12:00

## 2018-09-27 RX ADMIN — FENTANYL CITRATE 25 MICROGRAM(S): 50 INJECTION INTRAVENOUS at 15:54

## 2018-09-27 RX ADMIN — Medication 1000 MILLIGRAM(S): at 21:58

## 2018-09-27 RX ADMIN — Medication 400 MILLIGRAM(S): at 20:47

## 2018-09-27 NOTE — PROGRESS NOTE ADULT - SUBJECTIVE AND OBJECTIVE BOX
NEUROSURGERY POST OP NOTE:    POD# 0 S/P cranioplasty for cranial defect    S: patient evaluated in recovery room. Patient is awake and alert, c/o head pain       T(C): 35.6 (09-27-18 @ 11:04), Max: 35.6 (09-27-18 @ 11:04)  HR: 74 (09-27-18 @ 15:19) (64 - 76)  BP: 131/86 (09-27-18 @ 15:19) (109/59 - 146/86)  RR: 9 (09-27-18 @ 15:19) (9 - 17)  SpO2: 99% (09-27-18 @ 15:19) (93% - 100%)      09-27-18 @ 07:01  -  09-27-18 @ 15:48  --------------------------------------------------------  IN: 1790 mL / OUT: 1300 mL / NET: 490 mL        ceFAZolin   IVPB 1000 milliGRAM(s) IV Intermittent every 8 hours  fentaNYL    Injectable 25 MICROGram(s) IV Push every 4 hours PRN  hydrALAZINE Injectable 10 milliGRAM(s) IV Push every 1 hour PRN  insulin lispro (HumaLOG) corrective regimen sliding scale   SubCutaneous Before meals and at bedtime  labetalol Injectable 10 milliGRAM(s) IV Push every 1 hour PRN  levETIRAcetam  IVPB 500 milliGRAM(s) IV Intermittent every 12 hours  ondansetron Injectable 4 milliGRAM(s) IV Push every 6 hours PRN  sodium chloride 0.9%. 1000 milliLiter(s) IV Continuous <Continuous>      RADIOLOGY:     Exam:  Gen: laying in hospital bed, NAD  Neuro: AA+Ox3, opens eyes spontaneously, following commands  CN II-XII PERRL, EOMI  Motor: MAEx4, LUE and LLE 4/5, RUE and RLE 5/5  Cardiac: regular rate and rhythm  Pulm: clear to auscultation     WOUND/DRAINS: crani site C/D/I    DEVICES: DREW drain, subgaleal       Assessment: 57yo Female s/p cranioplasty for cranial defect, POD #0    Plan: NEUROSURGERY POST OP NOTE:    POD# 0 S/P cranioplasty for cranial defect    S: patient evaluated in recovery room. Patient is awake and alert, c/o head pain       T(C): 35.6 (09-27-18 @ 11:04), Max: 35.6 (09-27-18 @ 11:04)  HR: 74 (09-27-18 @ 15:19) (64 - 76)  BP: 131/86 (09-27-18 @ 15:19) (109/59 - 146/86)  RR: 9 (09-27-18 @ 15:19) (9 - 17)  SpO2: 99% (09-27-18 @ 15:19) (93% - 100%)      09-27-18 @ 07:01  -  09-27-18 @ 15:48  --------------------------------------------------------  IN: 1790 mL / OUT: 1300 mL / NET: 490 mL        ceFAZolin   IVPB 1000 milliGRAM(s) IV Intermittent every 8 hours  fentaNYL    Injectable 25 MICROGram(s) IV Push every 4 hours PRN  hydrALAZINE Injectable 10 milliGRAM(s) IV Push every 1 hour PRN  insulin lispro (HumaLOG) corrective regimen sliding scale   SubCutaneous Before meals and at bedtime  labetalol Injectable 10 milliGRAM(s) IV Push every 1 hour PRN  levETIRAcetam  IVPB 500 milliGRAM(s) IV Intermittent every 12 hours  ondansetron Injectable 4 milliGRAM(s) IV Push every 6 hours PRN  sodium chloride 0.9%. 1000 milliLiter(s) IV Continuous <Continuous>      RADIOLOGY:     Exam:  Gen: laying in hospital bed, NAD  Neuro: AA+Ox3, opens eyes spontaneously, following commands  CN II-XII PERRL, EOMI  Motor: MAEx4, LUE and LLE 4/5, RUE and RLE 5/5  Cardiac: regular rate and rhythm  Pulm: clear to auscultation     WOUND/DRAINS: crani site C/D/I    DEVICES: DREW drain, subgaleal       Assessment: 59yo Female s/p cranioplasty for cranial defect, POD #0    Plan:  - neuro checks  - vitals checks  - pain control  - DREW drain in place, monitor output  - NEUROSURGERY POST OP NOTE:    POD# 0 S/P cranioplasty for cranial defect    S: patient evaluated in recovery room. Patient is awake and alert, c/o head pain       T(C): 35.6 (09-27-18 @ 11:04), Max: 35.6 (09-27-18 @ 11:04)  HR: 74 (09-27-18 @ 15:19) (64 - 76)  BP: 131/86 (09-27-18 @ 15:19) (109/59 - 146/86)  RR: 9 (09-27-18 @ 15:19) (9 - 17)  SpO2: 99% (09-27-18 @ 15:19) (93% - 100%)      09-27-18 @ 07:01  -  09-27-18 @ 15:48  --------------------------------------------------------  IN: 1790 mL / OUT: 1300 mL / NET: 490 mL        ceFAZolin   IVPB 1000 milliGRAM(s) IV Intermittent every 8 hours  fentaNYL    Injectable 25 MICROGram(s) IV Push every 4 hours PRN  hydrALAZINE Injectable 10 milliGRAM(s) IV Push every 1 hour PRN  insulin lispro (HumaLOG) corrective regimen sliding scale   SubCutaneous Before meals and at bedtime  labetalol Injectable 10 milliGRAM(s) IV Push every 1 hour PRN  levETIRAcetam  IVPB 500 milliGRAM(s) IV Intermittent every 12 hours  ondansetron Injectable 4 milliGRAM(s) IV Push every 6 hours PRN  sodium chloride 0.9%. 1000 milliLiter(s) IV Continuous <Continuous>      RADIOLOGY:     Exam:  Gen: laying in hospital bed, NAD  Neuro: AA+Ox3, opens eyes spontaneously, following commands  CN II-XII PERRL, EOMI  Motor: MAEx4, LUE and LLE 4/5, RUE and RLE 5/5  Cardiac: regular rate and rhythm  Pulm: clear to auscultation     WOUND/DRAINS: crani site C/D/I    DEVICES: DREW drain, subgaleal       Assessment: 57yo Female s/p cranioplasty for cranial defect, POD #0    Plan:  - neuro checks  - vitals checks  - pain control  - DREW drain in place, monitor output  - SBP goal <140 x24 hours  - Keppra 500 BID  - post op head CT  - dvt ppx    d/w Dr. Johnson

## 2018-09-27 NOTE — H&P PST ADULT - PSH
Elective surgery  skull surgery (right)  History of mastectomy, left  with TRAM flap recon  Previous  section

## 2018-09-27 NOTE — BRIEF OPERATIVE NOTE - PROCEDURE
<<-----Click on this checkbox to enter Procedure Cranioplasty for cranial defect  09/27/2018  right side  Active  [id number]

## 2018-09-27 NOTE — PROGRESS NOTE ADULT - SUBJECTIVE AND OBJECTIVE BOX
NEUROSURGERY POST OP NOTE:    POD# 0 S/P cranioplasty for cranial defect    S: patient evaluated in recovery room. Patient is awake and alert, c/o head pain       T(C): 35.6 (09-27-18 @ 11:04), Max: 35.6 (09-27-18 @ 11:04)  HR: 74 (09-27-18 @ 15:19) (64 - 76)  BP: 131/86 (09-27-18 @ 15:19) (109/59 - 146/86)  RR: 9 (09-27-18 @ 15:19) (9 - 17)  SpO2: 99% (09-27-18 @ 15:19) (93% - 100%)      09-27-18 @ 07:01  -  09-27-18 @ 15:48  --------------------------------------------------------  IN: 1790 mL / OUT: 1300 mL / NET: 490 mL    ceFAZolin   IVPB 1000 milliGRAM(s) IV Intermittent every 8 hours  fentaNYL    Injectable 25 MICROGram(s) IV Push every 4 hours PRN  hydrALAZINE Injectable 10 milliGRAM(s) IV Push every 1 hour PRN  insulin lispro (HumaLOG) corrective regimen sliding scale   SubCutaneous Before meals and at bedtime  labetalol Injectable 10 milliGRAM(s) IV Push every 1 hour PRN  levETIRAcetam  IVPB 500 milliGRAM(s) IV Intermittent every 12 hours  ondansetron Injectable 4 milliGRAM(s) IV Push every 6 hours PRN  sodium chloride 0.9%. 1000 milliLiter(s) IV Continuous <Continuous>    RADIOLOGY:     Exam:  Gen: laying in hospital bed, NAD  Neuro: AA+Ox3, opens eyes spontaneously, following commands  CN II-XII PERRL, EOMI  Motor: MAEx4, LUE and LLE 4/5, RUE and RLE 5/5  Cardiac: regular rate and rhythm  Pulm: clear to auscultation     WOUND/DRAINS: crani site C/D/I    DEVICES: DREW drain, subgaleal

## 2018-09-27 NOTE — H&P PST ADULT - ADDITIONAL PE
"AAOx3, CN 2-12 intact, left sided neglect. 1/5 left shoulder strength, 0/5 left arm strength, 4/5 left leg strength. 5/5 strength. Sensation in upper and lower extremities intact. Gait not assessed."

## 2018-09-27 NOTE — H&P PST ADULT - ASSESSMENT
59 y/o female presenting for elective cranioplasty after emergent hemicraniectomy for stroke in June of this year.    1)  Consent signed by patient and attending in chart  2)  Pre-op medical clearance in chart  3)  Home meds: will continue meds for HLD, HTN and psych meds  4)  Plan for ICU post op  5)  Mechanical DVT prophylaxis  6)  Discussed with Dr. Johnson

## 2018-09-27 NOTE — H&P PST ADULT - HISTORY OF PRESENT ILLNESS
This is a 59 y/o female well known to our service for a emergent craniectomy for ischemic stroke this past June after reconstructive surgery for breast cancer.   She presents today for replacement of her bone flap.

## 2018-09-28 LAB
ANION GAP SERPL CALC-SCNC: 13 MMOL/L — SIGNIFICANT CHANGE UP (ref 5–17)
BUN SERPL-MCNC: 7 MG/DL — SIGNIFICANT CHANGE UP (ref 7–23)
CALCIUM SERPL-MCNC: 9.5 MG/DL — SIGNIFICANT CHANGE UP (ref 8.4–10.5)
CHLORIDE SERPL-SCNC: 102 MMOL/L — SIGNIFICANT CHANGE UP (ref 96–108)
CO2 SERPL-SCNC: 25 MMOL/L — SIGNIFICANT CHANGE UP (ref 22–31)
CREAT SERPL-MCNC: 0.53 MG/DL — SIGNIFICANT CHANGE UP (ref 0.5–1.3)
GLUCOSE SERPL-MCNC: 113 MG/DL — HIGH (ref 70–99)
HBA1C BLD-MCNC: 5.8 % — HIGH (ref 4–5.6)
HCT VFR BLD CALC: 31.4 % — LOW (ref 34.5–45)
HGB BLD-MCNC: 10.4 G/DL — LOW (ref 11.5–15.5)
MAGNESIUM SERPL-MCNC: 1.8 MG/DL — SIGNIFICANT CHANGE UP (ref 1.6–2.6)
MCHC RBC-ENTMCNC: 29.7 PG — SIGNIFICANT CHANGE UP (ref 27–34)
MCHC RBC-ENTMCNC: 33.1 G/DL — SIGNIFICANT CHANGE UP (ref 32–36)
MCV RBC AUTO: 89.7 FL — SIGNIFICANT CHANGE UP (ref 80–100)
PHOSPHATE SERPL-MCNC: 3.8 MG/DL — SIGNIFICANT CHANGE UP (ref 2.5–4.5)
PLATELET # BLD AUTO: 305 K/UL — SIGNIFICANT CHANGE UP (ref 150–400)
POTASSIUM SERPL-MCNC: 3.7 MMOL/L — SIGNIFICANT CHANGE UP (ref 3.5–5.3)
POTASSIUM SERPL-SCNC: 3.7 MMOL/L — SIGNIFICANT CHANGE UP (ref 3.5–5.3)
RBC # BLD: 3.5 M/UL — LOW (ref 3.8–5.2)
RBC # FLD: 15.2 % — SIGNIFICANT CHANGE UP (ref 10.3–16.9)
SODIUM SERPL-SCNC: 140 MMOL/L — SIGNIFICANT CHANGE UP (ref 135–145)
WBC # BLD: 7.6 K/UL — SIGNIFICANT CHANGE UP (ref 3.8–10.5)
WBC # FLD AUTO: 7.6 K/UL — SIGNIFICANT CHANGE UP (ref 3.8–10.5)

## 2018-09-28 PROCEDURE — 99233 SBSQ HOSP IP/OBS HIGH 50: CPT | Mod: 24

## 2018-09-28 RX ORDER — BISOPROLOL FUMARATE 10 MG/1
1 TABLET, FILM COATED ORAL
Qty: 0 | Refills: 0 | COMMUNITY

## 2018-09-28 RX ORDER — ALPRAZOLAM 0.25 MG
1 TABLET ORAL
Qty: 0 | Refills: 0 | COMMUNITY

## 2018-09-28 RX ORDER — VALSARTAN 80 MG/1
1 TABLET ORAL
Qty: 0 | Refills: 0 | COMMUNITY

## 2018-09-28 RX ORDER — LEVETIRACETAM 250 MG/1
1 TABLET, FILM COATED ORAL
Qty: 0 | Refills: 0 | COMMUNITY

## 2018-09-28 RX ORDER — OLMESARTAN MEDOXOMIL 5 MG/1
1 TABLET, FILM COATED ORAL
Qty: 0 | Refills: 0 | COMMUNITY

## 2018-09-28 RX ORDER — BUPROPION HYDROCHLORIDE 150 MG/1
1 TABLET, EXTENDED RELEASE ORAL
Qty: 0 | Refills: 0 | COMMUNITY

## 2018-09-28 RX ORDER — MAGNESIUM OXIDE 400 MG ORAL TABLET 241.3 MG
400 TABLET ORAL ONCE
Qty: 0 | Refills: 0 | Status: COMPLETED | OUTPATIENT
Start: 2018-09-28 | End: 2018-09-28

## 2018-09-28 RX ORDER — LOSARTAN POTASSIUM 100 MG/1
100 TABLET, FILM COATED ORAL DAILY
Qty: 0 | Refills: 0 | Status: DISCONTINUED | OUTPATIENT
Start: 2018-09-28 | End: 2018-09-29

## 2018-09-28 RX ORDER — POTASSIUM CHLORIDE 20 MEQ
20 PACKET (EA) ORAL
Qty: 0 | Refills: 0 | Status: COMPLETED | OUTPATIENT
Start: 2018-09-28 | End: 2018-09-28

## 2018-09-28 RX ORDER — LEVETIRACETAM 250 MG/1
1000 TABLET, FILM COATED ORAL
Qty: 0 | Refills: 0 | Status: DISCONTINUED | OUTPATIENT
Start: 2018-09-28 | End: 2018-09-29

## 2018-09-28 RX ORDER — PANTOPRAZOLE SODIUM 20 MG/1
40 TABLET, DELAYED RELEASE ORAL DAILY
Qty: 0 | Refills: 0 | Status: DISCONTINUED | OUTPATIENT
Start: 2018-09-28 | End: 2018-09-29

## 2018-09-28 RX ORDER — ACETAMINOPHEN 500 MG
650 TABLET ORAL EVERY 6 HOURS
Qty: 0 | Refills: 0 | Status: DISCONTINUED | OUTPATIENT
Start: 2018-09-28 | End: 2018-09-29

## 2018-09-28 RX ORDER — LEVETIRACETAM 250 MG/1
500 TABLET, FILM COATED ORAL
Qty: 0 | Refills: 0 | Status: DISCONTINUED | OUTPATIENT
Start: 2018-09-28 | End: 2018-09-28

## 2018-09-28 RX ADMIN — LEVETIRACETAM 1000 MILLIGRAM(S): 250 TABLET, FILM COATED ORAL at 17:31

## 2018-09-28 RX ADMIN — LOSARTAN POTASSIUM 100 MILLIGRAM(S): 100 TABLET, FILM COATED ORAL at 17:31

## 2018-09-28 RX ADMIN — Medication 100 MILLIGRAM(S): at 14:24

## 2018-09-28 RX ADMIN — Medication 650 MILLIGRAM(S): at 02:21

## 2018-09-28 RX ADMIN — Medication 650 MILLIGRAM(S): at 22:17

## 2018-09-28 RX ADMIN — ATORVASTATIN CALCIUM 80 MILLIGRAM(S): 80 TABLET, FILM COATED ORAL at 22:17

## 2018-09-28 RX ADMIN — Medication 650 MILLIGRAM(S): at 12:30

## 2018-09-28 RX ADMIN — Medication 1000 MILLIGRAM(S): at 06:59

## 2018-09-28 RX ADMIN — ESCITALOPRAM OXALATE 20 MILLIGRAM(S): 10 TABLET, FILM COATED ORAL at 14:25

## 2018-09-28 RX ADMIN — Medication 650 MILLIGRAM(S): at 02:04

## 2018-09-28 RX ADMIN — Medication 100 MILLIGRAM(S): at 01:45

## 2018-09-28 RX ADMIN — PANTOPRAZOLE SODIUM 40 MILLIGRAM(S): 20 TABLET, DELAYED RELEASE ORAL at 14:25

## 2018-09-28 RX ADMIN — Medication 650 MILLIGRAM(S): at 12:03

## 2018-09-28 RX ADMIN — Medication 1 TABLET(S): at 11:59

## 2018-09-28 RX ADMIN — Medication 100 MILLIGRAM(S): at 22:18

## 2018-09-28 RX ADMIN — Medication 20 MILLIEQUIVALENT(S): at 11:59

## 2018-09-28 RX ADMIN — MAGNESIUM OXIDE 400 MG ORAL TABLET 400 MILLIGRAM(S): 241.3 TABLET ORAL at 09:18

## 2018-09-28 RX ADMIN — Medication 20 MILLIEQUIVALENT(S): at 09:18

## 2018-09-28 RX ADMIN — LOSARTAN POTASSIUM 50 MILLIGRAM(S): 100 TABLET, FILM COATED ORAL at 07:00

## 2018-09-28 RX ADMIN — Medication 650 MILLIGRAM(S): at 22:00

## 2018-09-28 RX ADMIN — Medication 100 MILLIGRAM(S): at 07:00

## 2018-09-28 RX ADMIN — Medication 400 MILLIGRAM(S): at 05:29

## 2018-09-28 RX ADMIN — LEVETIRACETAM 420 MILLIGRAM(S): 250 TABLET, FILM COATED ORAL at 06:59

## 2018-09-28 RX ADMIN — LISINOPRIL 40 MILLIGRAM(S): 2.5 TABLET ORAL at 07:00

## 2018-09-28 NOTE — OCCUPATIONAL THERAPY INITIAL EVALUATION ADULT - ADDITIONAL COMMENTS
Patient ambulating independently without AD. Independent for Activities of Daily Living with DME (grab bars in bath tub and near toilet, raised toilet seat, shower chair) except for bath tub transfer requiring supervision for safety. Has home health aide 5 days/week, 7am-4pm assisting mainly with instrumental activities of daily living. Patient cooks breakfast (using stove), orders lunch and spouse brings dinner. Patient was getting [facility] home care (home OT/PT) 3X/week.

## 2018-09-28 NOTE — OCCUPATIONAL THERAPY INITIAL EVALUATION ADULT - COORDINATION ASSESSED, REHAB EVAL
grossly intact right upper extremity, mild dysmetria with tremors left upper extremity/finger to nose

## 2018-09-28 NOTE — OCCUPATIONAL THERAPY INITIAL EVALUATION ADULT - GENERAL OBSERVATIONS, REHAB EVAL
Right hand dominant. Patient cleared for Occupational Therapy by neurosurgery NP Randa and RN, patient received seated in B/S chair in non-acute distress. +Tele, +IV, +head bandage C/D/I with +DREW drainX1. Patient reports HA rated 4/10, was premedicated. No other complaint.

## 2018-09-28 NOTE — OCCUPATIONAL THERAPY INITIAL EVALUATION ADULT - MANUAL MUSCLE TESTING RESULTS, REHAB EVAL
Left facial droop, tongue protrusion in midline, cheeks puff and eyebrows elevation grossly intact; right upper extremity 4+/5 throughout; left upper extremity shoudler flexion 3-/5, elbow flexion 3-/5, elbow extension 4-/5, wrist flexion 3+/5, wrist extension 3-/5,  4-/5, digits extension 3-/5

## 2018-09-28 NOTE — OCCUPATIONAL THERAPY INITIAL EVALUATION ADULT - PLANNED THERAPY INTERVENTIONS, OT EVAL
cognitive, visual perceptual/balance training/motor coordination training/neuromuscular re-education/ROM/fine motor coordination training/transfer training/ADL retraining/bed mobility training/IADL retraining/parent/caregiver training.../strengthening

## 2018-09-28 NOTE — PHYSICAL THERAPY INITIAL EVALUATION ADULT - LEVEL OF INDEPENDENCE: SIT/SUPINE, REHAB EVAL
not observed, patient left sitting in bedside chair with +call bell, VSS, cleared by Neurosurgery to sit in chair, +call bell, in no apparent distress.

## 2018-09-28 NOTE — PHYSICAL THERAPY INITIAL EVALUATION ADULT - GENERAL OBSERVATIONS, REHAB EVAL
Received semi-supine in bed with +tele, +pulse ox, on room air, gauze bandage over head c/d/i, +subgaleal drain, +IV, in no apparent distress.

## 2018-09-28 NOTE — PHYSICAL THERAPY INITIAL EVALUATION ADULT - ADDITIONAL COMMENTS
Patient lives with her  and children in an elevator apartment without steps to enter. Prior to admission, patient was receiving visiting nurse service and home PT/home OT 3x/week. Prior to admission, patient states that she was independent for all functional mobility, however had difficulty with ADLs due to LUE impaired coordination, weakness, and poor fine motor skills.

## 2018-09-28 NOTE — PROGRESS NOTE ADULT - SUBJECTIVE AND OBJECTIVE BOX
HPI:  This is a 57 y/o female well known to our service for a emergent craniectomy for ischemic stroke this past June after reconstructive surgery for breast cancer.   She presents today for replacement of her bone flap. (27 Sep 2018 10:38)    OVERNIGHT EVENTS:    Hospital Course:    Vital Signs Last 24 Hrs  T(C): 36.7 (28 Sep 2018 06:00), Max: 36.7 (27 Sep 2018 18:35)  T(F): 98.1 (28 Sep 2018 06:00), Max: 98.1 (27 Sep 2018 18:35)  HR: 68 (28 Sep 2018 07:00) (64 - 90)  BP: 140/71 (28 Sep 2018 07:00) (109/59 - 146/86)  BP(mean): 102 (28 Sep 2018 07:00) (73 - 119)  RR: 19 (28 Sep 2018 07:00) (9 - 20)  SpO2: 98% (28 Sep 2018 07:00) (93% - 100%)    I&O's Detail    27 Sep 2018 07:01  -  28 Sep 2018 07:00  --------------------------------------------------------  IN:    IV PiggyBack: 500 mL    Lactated Ringers IV Bolus: 1500 mL    Oral Fluid: 40 mL    sodium chloride 0.9%.: 1600 mL  Total IN: 3640 mL    OUT:    Drain: 90 mL    Estimated Blood Loss: 100 mL    Indwelling Catheter - Urethral: 1605 mL    Voided: 650 mL  Total OUT: 2445 mL    Total NET: 1195 mL      28 Sep 2018 07:01  -  28 Sep 2018 07:56  --------------------------------------------------------  IN:    sodium chloride 0.9%.: 80 mL  Total IN: 80 mL    OUT:  Total OUT: 0 mL    Total NET: 80 mL        I&O's Summary    27 Sep 2018 07:01  -  28 Sep 2018 07:00  --------------------------------------------------------  IN: 3640 mL / OUT: 2445 mL / NET: 1195 mL    28 Sep 2018 07:01  -  28 Sep 2018 07:56  --------------------------------------------------------  IN: 80 mL / OUT: 0 mL / NET: 80 mL        PHYSICAL EXAM:  Neurological:  CN II-XII  Motor exam:  Cardiovascular:  Respiratory:  Gastrointestinal:  Incision/Wound:    TUBES/LINES:  [] CVC  [] A-line  [] Lumbar Drain  [] Ventriculostomy  [] Other    DIET:  [] NPO  [] Mechanical  [] Tube feeds    LABS:                        10.4   7.6   )-----------( 305      ( 28 Sep 2018 06:12 )             31.4     09-28    140  |  102  |  7   ----------------------------<  113<H>  3.7   |  25  |  0.53    Ca    9.5      28 Sep 2018 06:13  Phos  3.8     09-28  Mg     1.8     09-28    TPro  6.4  /  Alb  4.1  /  TBili  0.7  /  DBili  x   /  AST  31  /  ALT  33  /  AlkPhos  113  09-27    PT/INR - ( 27 Sep 2018 11:19 )   PT: 12.0 sec;   INR: 1.08          PTT - ( 27 Sep 2018 11:19 )  PTT:27.4 sec        CAPILLARY BLOOD GLUCOSE      POCT Blood Glucose.: 113 mg/dL (28 Sep 2018 07:11)  POCT Blood Glucose.: 133 mg/dL (27 Sep 2018 23:33)  POCT Blood Glucose.: 135 mg/dL (27 Sep 2018 18:14)  POCT Blood Glucose.: 119 mg/dL (27 Sep 2018 12:14)      Drug Levels: [] N/A    CSF Analysis: [] N/A      Allergies    No Known Drug Allergies  shellfish (Urticaria)    Intolerances      MEDICATIONS:  Antibiotics:    Neuro:  acetaminophen    Suspension .. 650 milliGRAM(s) Oral every 6 hours PRN  escitalopram 20 milliGRAM(s) Oral daily  fentaNYL    Injectable 25 MICROGram(s) IV Push every 4 hours PRN  levETIRAcetam  IVPB 500 milliGRAM(s) IV Intermittent every 12 hours  ondansetron Injectable 4 milliGRAM(s) IV Push every 6 hours PRN    Anticoagulation:    OTHER:  atorvastatin 80 milliGRAM(s) Oral at bedtime  dextrose 40% Gel 15 Gram(s) Oral once PRN  dextrose 50% Injectable 12.5 Gram(s) IV Push once  dextrose 50% Injectable 25 Gram(s) IV Push once  dextrose 50% Injectable 25 Gram(s) IV Push once  docusate sodium 100 milliGRAM(s) Oral three times a day  glucagon  Injectable 1 milliGRAM(s) IntraMuscular once PRN  hydrALAZINE Injectable 10 milliGRAM(s) IV Push every 1 hour PRN  insulin lispro (HumaLOG) corrective regimen sliding scale   SubCutaneous Before meals and at bedtime  labetalol Injectable 10 milliGRAM(s) IV Push every 1 hour PRN  lisinopril 40 milliGRAM(s) Oral daily  losartan 50 milliGRAM(s) Oral daily  pantoprazole   Suspension 40 milliGRAM(s) Oral daily    IVF:  magnesium oxide 400 milliGRAM(s) Oral once  multivitamin 1 Tablet(s) Oral daily  potassium chloride    Tablet ER 20 milliEquivalent(s) Oral every 2 hours  sodium chloride 0.9%. 1000 milliLiter(s) IV Continuous <Continuous>    CULTURES:    RADIOLOGY & ADDITIONAL TESTS:      ASSESSMENT:  58y Female s/p    PLAN:  NEURO:    CARDIOVASCULAR:    PULMONARY:    RENAL:    GI:    HEME:    ID:    ENDO:    DVT PROPHYLAXIS:[x] Venodynes [] Heparin/Lovenox  PT/OT/OOB    DISPOSITION: ICU, full code    d/w  HPI:  This is a 57 y/o female well known to our service for a emergent craniectomy for ischemic stroke this past June after reconstructive surgery for breast cancer.   She presents today for replacement of her bone flap. (27 Sep 2018 10:38)    OVERNIGHT EVENTS: No issues overnight    Hospital Course:    Vital Signs Last 24 Hrs  T(C): 36.7 (28 Sep 2018 06:00), Max: 36.7 (27 Sep 2018 18:35)  T(F): 98.1 (28 Sep 2018 06:00), Max: 98.1 (27 Sep 2018 18:35)  HR: 68 (28 Sep 2018 07:00) (64 - 90)  BP: 140/71 (28 Sep 2018 07:00) (109/59 - 146/86)  BP(mean): 102 (28 Sep 2018 07:00) (73 - 119)  RR: 19 (28 Sep 2018 07:00) (9 - 20)  SpO2: 98% (28 Sep 2018 07:00) (93% - 100%)    I&O's Detail    27 Sep 2018 07:01  -  28 Sep 2018 07:00  --------------------------------------------------------  IN:    IV PiggyBack: 500 mL    Lactated Ringers IV Bolus: 1500 mL    Oral Fluid: 40 mL    sodium chloride 0.9%.: 1600 mL  Total IN: 3640 mL    OUT:    Drain: 90 mL    Estimated Blood Loss: 100 mL    Indwelling Catheter - Urethral: 1605 mL    Voided: 650 mL  Total OUT: 2445 mL    Total NET: 1195 mL      28 Sep 2018 07:01  -  28 Sep 2018 07:56  --------------------------------------------------------  IN:    sodium chloride 0.9%.: 80 mL  Total IN: 80 mL    OUT:  Total OUT: 0 mL    Total NET: 80 mL        I&O's Summary    27 Sep 2018 07:01  -  28 Sep 2018 07:00  --------------------------------------------------------  IN: 3640 mL / OUT: 2445 mL / NET: 1195 mL    28 Sep 2018 07:01  -  28 Sep 2018 07:56  --------------------------------------------------------  IN: 80 mL / OUT: 0 mL / NET: 80 mL        PHYSICAL EXAM:  Neurological:  CN II-XII  Motor exam:  Cardiovascular:  Respiratory:  Gastrointestinal:  Incision/Wound:    TUBES/LINES:  [] CVC  [] A-line  [] Lumbar Drain  [] Ventriculostomy  [] Other    DIET:  [] NPO  [] Mechanical  [] Tube feeds    LABS:                        10.4   7.6   )-----------( 305      ( 28 Sep 2018 06:12 )             31.4     09-28    140  |  102  |  7   ----------------------------<  113<H>  3.7   |  25  |  0.53    Ca    9.5      28 Sep 2018 06:13  Phos  3.8     09-28  Mg     1.8     09-28    TPro  6.4  /  Alb  4.1  /  TBili  0.7  /  DBili  x   /  AST  31  /  ALT  33  /  AlkPhos  113  09-27    PT/INR - ( 27 Sep 2018 11:19 )   PT: 12.0 sec;   INR: 1.08          PTT - ( 27 Sep 2018 11:19 )  PTT:27.4 sec        CAPILLARY BLOOD GLUCOSE      POCT Blood Glucose.: 113 mg/dL (28 Sep 2018 07:11)  POCT Blood Glucose.: 133 mg/dL (27 Sep 2018 23:33)  POCT Blood Glucose.: 135 mg/dL (27 Sep 2018 18:14)  POCT Blood Glucose.: 119 mg/dL (27 Sep 2018 12:14)      Drug Levels: [] N/A    CSF Analysis: [] N/A      Allergies    No Known Drug Allergies  shellfish (Urticaria)    Intolerances      MEDICATIONS:  Antibiotics:    Neuro:  acetaminophen    Suspension .. 650 milliGRAM(s) Oral every 6 hours PRN  escitalopram 20 milliGRAM(s) Oral daily  fentaNYL    Injectable 25 MICROGram(s) IV Push every 4 hours PRN  levETIRAcetam  IVPB 500 milliGRAM(s) IV Intermittent every 12 hours  ondansetron Injectable 4 milliGRAM(s) IV Push every 6 hours PRN    Anticoagulation:    OTHER:  atorvastatin 80 milliGRAM(s) Oral at bedtime  dextrose 40% Gel 15 Gram(s) Oral once PRN  dextrose 50% Injectable 12.5 Gram(s) IV Push once  dextrose 50% Injectable 25 Gram(s) IV Push once  dextrose 50% Injectable 25 Gram(s) IV Push once  docusate sodium 100 milliGRAM(s) Oral three times a day  glucagon  Injectable 1 milliGRAM(s) IntraMuscular once PRN  hydrALAZINE Injectable 10 milliGRAM(s) IV Push every 1 hour PRN  insulin lispro (HumaLOG) corrective regimen sliding scale   SubCutaneous Before meals and at bedtime  labetalol Injectable 10 milliGRAM(s) IV Push every 1 hour PRN  lisinopril 40 milliGRAM(s) Oral daily  losartan 50 milliGRAM(s) Oral daily  pantoprazole   Suspension 40 milliGRAM(s) Oral daily    IVF:  magnesium oxide 400 milliGRAM(s) Oral once  multivitamin 1 Tablet(s) Oral daily  potassium chloride    Tablet ER 20 milliEquivalent(s) Oral every 2 hours  sodium chloride 0.9%. 1000 milliLiter(s) IV Continuous <Continuous>    CULTURES:    RADIOLOGY & ADDITIONAL TESTS:      ASSESSMENT:  58y Female s/p    PLAN:  NEURO:    CARDIOVASCULAR:    PULMONARY:    RENAL:    GI:    HEME:    ID:    ENDO:    DVT PROPHYLAXIS:[x] Venodynes [] Heparin/Lovenox  PT/OT/OOB    DISPOSITION: ICU, full code    d/w  HPI:  This is a 57 y/o female well known to our service for a emergent craniectomy for ischemic stroke this past June after reconstructive surgery for breast cancer.   She presents today for replacement of her bone flap. (27 Sep 2018 10:38)    OVERNIGHT EVENTS: No issues overnight, pain improved. DREW drain 30cc overnight. Tolerating PO diet and OOB.    Hospital Course:   POD#0 - post op pain, CT Head performed and reviewed  POD#1 - Improved, neurologically stable, DREW with 30cc overnight. Tolerating OOB and PO diet. Stepdown today.    Vital Signs Last 24 Hrs  T(C): 36.7 (28 Sep 2018 06:00), Max: 36.7 (27 Sep 2018 18:35)  T(F): 98.1 (28 Sep 2018 06:00), Max: 98.1 (27 Sep 2018 18:35)  HR: 68 (28 Sep 2018 07:00) (64 - 90)  BP: 140/71 (28 Sep 2018 07:00) (109/59 - 146/86)  BP(mean): 102 (28 Sep 2018 07:00) (73 - 119)  RR: 19 (28 Sep 2018 07:00) (9 - 20)  SpO2: 98% (28 Sep 2018 07:00) (93% - 100%)    I&O's Detail    27 Sep 2018 07:01  -  28 Sep 2018 07:00  --------------------------------------------------------  IN:    IV PiggyBack: 500 mL    Lactated Ringers IV Bolus: 1500 mL    Oral Fluid: 40 mL    sodium chloride 0.9%.: 1600 mL  Total IN: 3640 mL    OUT:    Drain: 90 mL    Estimated Blood Loss: 100 mL    Indwelling Catheter - Urethral: 1605 mL    Voided: 650 mL  Total OUT: 2445 mL    Total NET: 1195 mL      28 Sep 2018 07:01  -  28 Sep 2018 07:56  --------------------------------------------------------  IN:    sodium chloride 0.9%.: 80 mL  Total IN: 80 mL    OUT:  Total OUT: 0 mL    Total NET: 80 mL        I&O's Summary    27 Sep 2018 07:01  -  28 Sep 2018 07:00  --------------------------------------------------------  IN: 3640 mL / OUT: 2445 mL / NET: 1195 mL    28 Sep 2018 07:01  -  28 Sep 2018 07:56  --------------------------------------------------------  IN: 80 mL / OUT: 0 mL / NET: 80 mL        PHYSICAL EXAM:  Gen: NAD, AAOx3  HEENT: PERRL. EOMI. +Head dressing C/D/I.  Lungs: Clear b/l  Heart: S1, S2. NSR.  Abd: Soft, NT/ND. +BS  Exts: Pulses 2+ througout  Neuro: Left facial drip, CNs II-XII otherwise intact. Left UE paresis - 3/5 deltoid, 4/5 elbow, 3/5  with spasticity. RUE and LEs b/l 5/5. Sensation intact throughout. Following commands. Speech clear. Gait intact.    TUBES/LINES:  [] CVC  [] A-line  [] Lumbar Drain  [] Ventriculostomy  [x] Other    DIET:  [] NPO  [x] Mechanical  [] Tube feeds    LABS:                        10.4   7.6   )-----------( 305      ( 28 Sep 2018 06:12 )             31.4     09-28    140  |  102  |  7   ----------------------------<  113<H>  3.7   |  25  |  0.53    Ca    9.5      28 Sep 2018 06:13  Phos  3.8     09-28  Mg     1.8     09-28    TPro  6.4  /  Alb  4.1  /  TBili  0.7  /  DBili  x   /  AST  31  /  ALT  33  /  AlkPhos  113  09-27    PT/INR - ( 27 Sep 2018 11:19 )   PT: 12.0 sec;   INR: 1.08          PTT - ( 27 Sep 2018 11:19 )  PTT:27.4 sec        CAPILLARY BLOOD GLUCOSE      POCT Blood Glucose.: 113 mg/dL (28 Sep 2018 07:11)  POCT Blood Glucose.: 133 mg/dL (27 Sep 2018 23:33)  POCT Blood Glucose.: 135 mg/dL (27 Sep 2018 18:14)  POCT Blood Glucose.: 119 mg/dL (27 Sep 2018 12:14)      Drug Levels: [] N/A    CSF Analysis: [] N/A      Allergies    No Known Drug Allergies  shellfish (Urticaria)    Intolerances      MEDICATIONS:  Antibiotics:    Neuro:  acetaminophen    Suspension .. 650 milliGRAM(s) Oral every 6 hours PRN  escitalopram 20 milliGRAM(s) Oral daily  fentaNYL    Injectable 25 MICROGram(s) IV Push every 4 hours PRN  levETIRAcetam  IVPB 500 milliGRAM(s) IV Intermittent every 12 hours  ondansetron Injectable 4 milliGRAM(s) IV Push every 6 hours PRN    Anticoagulation:    OTHER:  atorvastatin 80 milliGRAM(s) Oral at bedtime  dextrose 40% Gel 15 Gram(s) Oral once PRN  dextrose 50% Injectable 12.5 Gram(s) IV Push once  dextrose 50% Injectable 25 Gram(s) IV Push once  dextrose 50% Injectable 25 Gram(s) IV Push once  docusate sodium 100 milliGRAM(s) Oral three times a day  glucagon  Injectable 1 milliGRAM(s) IntraMuscular once PRN  hydrALAZINE Injectable 10 milliGRAM(s) IV Push every 1 hour PRN  insulin lispro (HumaLOG) corrective regimen sliding scale   SubCutaneous Before meals and at bedtime  labetalol Injectable 10 milliGRAM(s) IV Push every 1 hour PRN  lisinopril 40 milliGRAM(s) Oral daily  losartan 50 milliGRAM(s) Oral daily  pantoprazole   Suspension 40 milliGRAM(s) Oral daily    IVF:  magnesium oxide 400 milliGRAM(s) Oral once  multivitamin 1 Tablet(s) Oral daily  potassium chloride    Tablet ER 20 milliEquivalent(s) Oral every 2 hours  sodium chloride 0.9%. 1000 milliLiter(s) IV Continuous <Continuous>    CULTURES:    RADIOLOGY & ADDITIONAL TESTS:      ASSESSMENT:  58y Female s/p    PLAN:  NEURO:    CARDIOVASCULAR:    PULMONARY:    RENAL:    GI:    HEME:    ID:    ENDO:    DVT PROPHYLAXIS:[x] Venodynes [] Heparin/Lovenox  PT/OT/OOB    DISPOSITION: ICU, full code    d/w  HPI:  This is a 59 y/o female well known to our service for a emergent craniectomy for ischemic stroke this past June after reconstructive surgery for breast cancer.   She presents today for replacement of her bone flap. (27 Sep 2018 10:38)    OVERNIGHT EVENTS: No issues overnight, pain improved. DREW drain 30cc overnight. Tolerating PO diet and OOB.    Hospital Course:   POD#0 - post op pain, CT Head performed and reviewed  POD#1 - Improved, neurologically stable, DREW with 30cc overnight. Tolerating OOB and PO diet. Stepdown today.    Vital Signs Last 24 Hrs  T(C): 36.7 (28 Sep 2018 06:00), Max: 36.7 (27 Sep 2018 18:35)  T(F): 98.1 (28 Sep 2018 06:00), Max: 98.1 (27 Sep 2018 18:35)  HR: 68 (28 Sep 2018 07:00) (64 - 90)  BP: 140/71 (28 Sep 2018 07:00) (109/59 - 146/86)  BP(mean): 102 (28 Sep 2018 07:00) (73 - 119)  RR: 19 (28 Sep 2018 07:00) (9 - 20)  SpO2: 98% (28 Sep 2018 07:00) (93% - 100%)    I&O's Detail    27 Sep 2018 07:01  -  28 Sep 2018 07:00  --------------------------------------------------------  IN:    IV PiggyBack: 500 mL    Lactated Ringers IV Bolus: 1500 mL    Oral Fluid: 40 mL    sodium chloride 0.9%.: 1600 mL  Total IN: 3640 mL    OUT:    Drain: 90 mL    Estimated Blood Loss: 100 mL    Indwelling Catheter - Urethral: 1605 mL    Voided: 650 mL  Total OUT: 2445 mL    Total NET: 1195 mL      28 Sep 2018 07:01  -  28 Sep 2018 07:56  --------------------------------------------------------  IN:    sodium chloride 0.9%.: 80 mL  Total IN: 80 mL    OUT:  Total OUT: 0 mL    Total NET: 80 mL        I&O's Summary    27 Sep 2018 07:01  -  28 Sep 2018 07:00  --------------------------------------------------------  IN: 3640 mL / OUT: 2445 mL / NET: 1195 mL    28 Sep 2018 07:01  -  28 Sep 2018 07:56  --------------------------------------------------------  IN: 80 mL / OUT: 0 mL / NET: 80 mL        PHYSICAL EXAM:  Gen: NAD, AAOx3  HEENT: PERRL. EOMI. +Head dressing C/D/I.  Lungs: Clear b/l  Heart: S1, S2. NSR.  Abd: Soft, NT/ND. +BS  Exts: Pulses 2+ througout  Neuro: Left facial drip, CNs II-XII otherwise intact. Left UE paresis - 3/5 deltoid, 4/5 elbow, 3/5  with spasticity. RUE and LEs b/l 5/5. Sensation intact throughout. Following commands. Speech clear. Gait intact.    TUBES/LINES:  [] CVC  [] A-line  [] Lumbar Drain  [] Ventriculostomy  [x] Other    DIET:  [] NPO  [x] Mechanical  [] Tube feeds    LABS:                        10.4   7.6   )-----------( 305      ( 28 Sep 2018 06:12 )             31.4     09-28    140  |  102  |  7   ----------------------------<  113<H>  3.7   |  25  |  0.53    Ca    9.5      28 Sep 2018 06:13  Phos  3.8     09-28  Mg     1.8     09-28    TPro  6.4  /  Alb  4.1  /  TBili  0.7  /  DBili  x   /  AST  31  /  ALT  33  /  AlkPhos  113  09-27    PT/INR - ( 27 Sep 2018 11:19 )   PT: 12.0 sec;   INR: 1.08          PTT - ( 27 Sep 2018 11:19 )  PTT:27.4 sec        CAPILLARY BLOOD GLUCOSE      POCT Blood Glucose.: 113 mg/dL (28 Sep 2018 07:11)  POCT Blood Glucose.: 133 mg/dL (27 Sep 2018 23:33)  POCT Blood Glucose.: 135 mg/dL (27 Sep 2018 18:14)  POCT Blood Glucose.: 119 mg/dL (27 Sep 2018 12:14)      Drug Levels: [] N/A    CSF Analysis: [] N/A      Allergies    No Known Drug Allergies  shellfish (Urticaria)    Intolerances      MEDICATIONS:  Antibiotics:    Neuro:  acetaminophen    Suspension .. 650 milliGRAM(s) Oral every 6 hours PRN  escitalopram 20 milliGRAM(s) Oral daily  fentaNYL    Injectable 25 MICROGram(s) IV Push every 4 hours PRN  levETIRAcetam  IVPB 500 milliGRAM(s) IV Intermittent every 12 hours  ondansetron Injectable 4 milliGRAM(s) IV Push every 6 hours PRN    Anticoagulation:    OTHER:  atorvastatin 80 milliGRAM(s) Oral at bedtime  dextrose 40% Gel 15 Gram(s) Oral once PRN  dextrose 50% Injectable 12.5 Gram(s) IV Push once  dextrose 50% Injectable 25 Gram(s) IV Push once  dextrose 50% Injectable 25 Gram(s) IV Push once  docusate sodium 100 milliGRAM(s) Oral three times a day  glucagon  Injectable 1 milliGRAM(s) IntraMuscular once PRN  hydrALAZINE Injectable 10 milliGRAM(s) IV Push every 1 hour PRN  insulin lispro (HumaLOG) corrective regimen sliding scale   SubCutaneous Before meals and at bedtime  labetalol Injectable 10 milliGRAM(s) IV Push every 1 hour PRN  lisinopril 40 milliGRAM(s) Oral daily  losartan 50 milliGRAM(s) Oral daily  pantoprazole   Suspension 40 milliGRAM(s) Oral daily    IVF:  magnesium oxide 400 milliGRAM(s) Oral once  multivitamin 1 Tablet(s) Oral daily  potassium chloride    Tablet ER 20 milliEquivalent(s) Oral every 2 hours  sodium chloride 0.9%. 1000 milliLiter(s) IV Continuous <Continuous>    CULTURES:    RADIOLOGY & ADDITIONAL TESTS:      ASSESSMENT:  58y Female w/ h/o Breast Ca, Anxiety, CAD, HLD, HTN, h/o right MCA stroke w/ decompressive craniectomy now s/p elective right cranioplasty POD#1.    PLAN:  NEURO: Continue DREW drain until tomorrow, monitor output,  Pain meds PRN  Neuro checks q4h,  Post-op CT Head reviewed    CARDIOVASCULAR: Normotensive Bp goals,  DCed losartan medication, continue ACE-I,  Daily labs, electrolyte repletion PRN    PULMONARY: Incentive spirometry,  Satting well on RA    RENAL: DC sampson, voiding    GI: PO diet as tolerated, stop IVF  Stool softeners PRN    ID:  None needed, afebrile    ENDO: ISS    DVT PROPHYLAXIS:[x] Venodynes [] Heparin/Lovenox  PT/OT/OOB    DISPOSITION: Stepdown, anticipate home tomorrow with resumption of home PT/OT services,  d/w

## 2018-09-28 NOTE — PHYSICAL THERAPY INITIAL EVALUATION ADULT - PLANNED THERAPY INTERVENTIONS, PT EVAL
motor coordination training/gait training/neuromuscular re-education/ROM/transfer training/balance training/strengthening/bed mobility training

## 2018-09-28 NOTE — CONSULT NOTE ADULT - SUBJECTIVE AND OBJECTIVE BOX
Patient is a 58y old  Female who presents with a chief complaint of cranioplasty (27 Sep 2018 10:38)        HPI:  This is a 57 y/o female well known to our service for a emergent craniectomy for ischemic stroke this past  after reconstructive surgery for breast cancer.   She presents today for replacement of her bone flap. 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  Handoff  Chronic headaches  Seizures  CAD (coronary artery disease)  History of right MCA stroke  Stomach ulcer  Dyslipidemia  Anxiety  Hypertension  Breast CA  Skull defect  Skull defect  Cranioplasty for cranial defect  Elective surgery  History of craniotomy  Previous  section  History of mastectomy, left        FAMILY HISTORY:      MEDICATIONS  (STANDING):  atorvastatin 80 milliGRAM(s) Oral at bedtime  dextrose 50% Injectable 12.5 Gram(s) IV Push once  dextrose 50% Injectable 25 Gram(s) IV Push once  dextrose 50% Injectable 25 Gram(s) IV Push once  docusate sodium 100 milliGRAM(s) Oral three times a day  escitalopram 20 milliGRAM(s) Oral daily  insulin lispro (HumaLOG) corrective regimen sliding scale   SubCutaneous Before meals and at bedtime  levETIRAcetam  IVPB 500 milliGRAM(s) IV Intermittent every 12 hours  lisinopril 40 milliGRAM(s) Oral daily  losartan 50 milliGRAM(s) Oral daily  multivitamin 1 Tablet(s) Oral daily  pantoprazole   Suspension 40 milliGRAM(s) Oral daily  potassium chloride    Tablet ER 20 milliEquivalent(s) Oral every 2 hours  sodium chloride 0.9%. 1000 milliLiter(s) (80 mL/Hr) IV Continuous <Continuous>    MEDICATIONS  (PRN):  acetaminophen    Suspension .. 650 milliGRAM(s) Oral every 6 hours PRN Mild Pain (1 - 3)  dextrose 40% Gel 15 Gram(s) Oral once PRN Blood Glucose LESS THAN 70 milliGRAM(s)/deciliter  fentaNYL    Injectable 25 MICROGram(s) IV Push every 4 hours PRN Severe Pain (7 - 10)  glucagon  Injectable 1 milliGRAM(s) IntraMuscular once PRN Glucose LESS THAN 70 milligrams/deciliter  hydrALAZINE Injectable 10 milliGRAM(s) IV Push every 1 hour PRN sbp > 140 mmhg  HR <65  labetalol Injectable 10 milliGRAM(s) IV Push every 1 hour PRN Systolic blood pressure >140mmHg Hr > 65  ondansetron Injectable 4 milliGRAM(s) IV Push every 6 hours PRN Nausea and/or Vomiting      PAST MEDICAL & SURGICAL HISTORY:  Chronic headaches  Seizures: following stroke  CAD (coronary artery disease)  History of right MCA stroke: left arm hemiparesis, 2018  Stomach ulcer: 2018  Dyslipidemia  Anxiety  Hypertension  Breast CA: left  Elective surgery: skull surgery (right)  Previous  section  History of mastectomy, left: with TRAM flap recon      Labs                          10.4   7.6   )-----------( 305      ( 28 Sep 2018 06:12 )             31.4         140  |  102  |  7   ----------------------------<  113<H>  3.7   |  25  |  0.53    Ca    9.5      28 Sep 2018 06:13  Phos  3.8       Mg     1.8         TPro  6.4  /  Alb  4.1  /  TBili  0.7  /  DBili  x   /  AST  31  /  ALT  33  /  AlkPhos  113        Radiology:    Physical Exam    MENTAL STATUS  -Level of Consciousness- awake    Orientation- person, place time  Language- aphasia/ dysarthria  Memory- recent and remote      Cranial Nerve 1- 12  Pupils- equal and reactive  Eye movements- full  Facial - left facial asymmetry   Lower CN-nl    Gait and Station- n/a    MOTOR  Upper- left weakness  Lower- left sided weakness    Reflexes- left toe upgoing    Sensation left sensory loss    Cerebellar- no tremors    vascular - no bruits    Assessment- CVA    Plan  as per Dr Johnson

## 2018-09-28 NOTE — PHYSICAL THERAPY INITIAL EVALUATION ADULT - CRITERIA FOR SKILLED THERAPEUTIC INTERVENTIONS
risk reduction/prevention/functional limitations in following categories/impairments found/therapy frequency/anticipated discharge recommendation/rehab potential

## 2018-09-28 NOTE — PHYSICAL THERAPY INITIAL EVALUATION ADULT - MANUAL MUSCLE TESTING RESULTS, REHAB EVAL
L shoulder flexion/abduction limited to 95 degrees. L mass finger extension 2/5; R shoulder flexion 5/5; R elbow flexion/extension 5/5; L shoulder flexion 4-/5; L elbow flexion/extension 4/5; R hip flexion 5/5; R knee extension 5/5; R ankle dorsiflexion 5/5; L great toe extension 4+/5; R great toe extension

## 2018-09-28 NOTE — PHYSICAL THERAPY INITIAL EVALUATION ADULT - RANGE OF MOTION EXAMINATION, REHAB EVAL
L shoulder flexion/abduction limited to 95 degrees./deficits as listed below/Right UE ROM was WFL (within functional limits)

## 2018-09-28 NOTE — PHYSICAL THERAPY INITIAL EVALUATION ADULT - PERTINENT HX OF CURRENT PROBLEM, REHAB EVAL
Patient is a 58 year old F who for replacement of her bone flap. Patient had emergent craniectomy for ischemic stroke  this past June after reconstructive surgery for breast cancer.

## 2018-09-28 NOTE — OCCUPATIONAL THERAPY INITIAL EVALUATION ADULT - RANGE OF MOTION EXAMINATION, UPPER EXTREMITY
left shoulder flexion AROM 90 degrees, elbow flexion 75% of full AROM, wrist flexion WFL, wrist extension 90% of full AROM, digits flexion AROM WFL, thumb/digit II extension AROM WFL, digits III-IV-V 50% of full AROM/Right UE Active ROM was WNL (within normal limits)/Right UE Passive ROM was WNL (within normal limits)

## 2018-09-28 NOTE — DIETITIAN INITIAL EVALUATION ADULT. - OTHER INFO
59 yo/female with PMHx breast cancer, s/p emergent craniotomy for ischemic stroke s/p reconstructive breast cancer, re-admitted for bone flap replacement. S/p OR w/neurosurgery on 9/27. Pt seen in room, awake, alert, pleasant. Known to this RD from previous admission. She endorses good appetite and intake over the past few weeks, much improved since leaving hospital.  cooks often. Currently endorses 75% intake at meals, and  bringing in food from home as well. Denies complaints of N/V/C/D. Shellfish allergy confirmed. Denies difficulty chewing or swallowing (improved from dysphagia diet in June). Skin w/surgical wound with drains. Pain controlled at this time. Noted w/significant weight loss from admit in June; likely 2/2 prolonged hospitalization and decreased PO intake after initial surgery. Pt denies decreased appetite recently. Educated on increased nutrient needs, focus on protein, and limiting sodium.

## 2018-09-28 NOTE — PROGRESS NOTE ADULT - SUBJECTIVE AND OBJECTIVE BOX
NEUROCRITICAL CARE PROGRESS NOTE    VERONIKA CALERO   MRN-[id number]  Summary:  /  HPI:  This is a 59 y/o female well known to our service for a emergent craniectomy for ischemic stroke this past June after reconstructive surgery for breast cancer.   She presents today for replacement of her bone flap. (27 Sep 2018 10:38)      S/Overnight events:    POD#     EVD:    CSF :    ICPs:      Vital Signs Last 24 Hrs  T(C): 36.7 (28 Sep 2018 06:00), Max: 36.7 (27 Sep 2018 18:35)  T(F): 98.1 (28 Sep 2018 06:00), Max: 98.1 (27 Sep 2018 18:35)  HR: 70 (28 Sep 2018 08:00) (64 - 90)  BP: 127/74 (28 Sep 2018 08:00) (109/59 - 146/86)  BP(mean): 101 (28 Sep 2018 08:00) (73 - 119)  RR: 16 (28 Sep 2018 08:00) (9 - 20)  SpO2: 97% (28 Sep 2018 08:00) (93% - 100%)        I&O's Detail    27 Sep 2018 07:01  -  28 Sep 2018 07:00  --------------------------------------------------------  IN:    IV PiggyBack: 500 mL    Lactated Ringers IV Bolus: 1500 mL    Oral Fluid: 40 mL    sodium chloride 0.9%.: 1600 mL  Total IN: 3640 mL    OUT:    Drain: 90 mL    Estimated Blood Loss: 100 mL    Indwelling Catheter - Urethral: 1605 mL    Voided: 650 mL  Total OUT: 2445 mL    Total NET: 1195 mL      28 Sep 2018 07:01  -  28 Sep 2018 08:58  --------------------------------------------------------  IN:    sodium chloride 0.9%.: 80 mL  Total IN: 80 mL    OUT:  Total OUT: 0 mL    Total NET: 80 mL          LABS:                        10.4   7.6   )-----------( 305      ( 28 Sep 2018 06:12 )             31.4     09-28    140  |  102  |  7   ----------------------------<  113<H>  3.7   |  25  |  0.53    Ca    9.5      28 Sep 2018 06:13  Phos  3.8     09-28  Mg     1.8     09-28    TPro  6.4  /  Alb  4.1  /  TBili  0.7  /  DBili  x   /  AST  31  /  ALT  33  /  AlkPhos  113  09-27    PT/INR - ( 27 Sep 2018 11:19 )   PT: 12.0 sec;   INR: 1.08          PTT - ( 27 Sep 2018 11:19 )  PTT:27.4 sec        CAPILLARY BLOOD GLUCOSE      POCT Blood Glucose.: 113 mg/dL (28 Sep 2018 07:11)  POCT Blood Glucose.: 133 mg/dL (27 Sep 2018 23:33)  POCT Blood Glucose.: 135 mg/dL (27 Sep 2018 18:14)  POCT Blood Glucose.: 119 mg/dL (27 Sep 2018 12:14)      Drug Levels: [] N/A    CSF Analysis: [] N/A      Allergies    No Known Drug Allergies  shellfish (Urticaria)    Intolerances      MEDICATIONS:  Antibiotics:    Neuro:  acetaminophen    Suspension .. 650 milliGRAM(s) Oral every 6 hours PRN  escitalopram 20 milliGRAM(s) Oral daily  fentaNYL    Injectable 25 MICROGram(s) IV Push every 4 hours PRN  levETIRAcetam  IVPB 500 milliGRAM(s) IV Intermittent every 12 hours  ondansetron Injectable 4 milliGRAM(s) IV Push every 6 hours PRN    Anticoagulation:    OTHER:  atorvastatin 80 milliGRAM(s) Oral at bedtime  dextrose 40% Gel 15 Gram(s) Oral once PRN  dextrose 50% Injectable 12.5 Gram(s) IV Push once  dextrose 50% Injectable 25 Gram(s) IV Push once  dextrose 50% Injectable 25 Gram(s) IV Push once  docusate sodium 100 milliGRAM(s) Oral three times a day  glucagon  Injectable 1 milliGRAM(s) IntraMuscular once PRN  hydrALAZINE Injectable 10 milliGRAM(s) IV Push every 1 hour PRN  insulin lispro (HumaLOG) corrective regimen sliding scale   SubCutaneous Before meals and at bedtime  labetalol Injectable 10 milliGRAM(s) IV Push every 1 hour PRN  lisinopril 40 milliGRAM(s) Oral daily  losartan 50 milliGRAM(s) Oral daily  pantoprazole   Suspension 40 milliGRAM(s) Oral daily    IVF:  magnesium oxide 400 milliGRAM(s) Oral once  multivitamin 1 Tablet(s) Oral daily  potassium chloride    Tablet ER 20 milliEquivalent(s) Oral every 2 hours  sodium chloride 0.9%. 1000 milliLiter(s) IV Continuous <Continuous>    CULTURES: NEUROCRITICAL CARE PROGRESS NOTE    VERONIKA CALERO   MRN-[id number]  Summary:  /  HPI:  This is a 59 y/o female well known to our service for a emergent craniectomy for ischemic stroke this past June after reconstructive surgery for breast cancer.   She presents today for replacement of her bone flap. (27 Sep 2018 10:38)      S/Overnight events:  unchanged neuro exam,     POD# 1, DREW drain, CT head done    Vital Signs Last 24 Hrs  T(C): 36.7 (28 Sep 2018 06:00), Max: 36.7 (27 Sep 2018 18:35)  T(F): 98.1 (28 Sep 2018 06:00), Max: 98.1 (27 Sep 2018 18:35)  HR: 70 (28 Sep 2018 08:00) (64 - 90)  BP: 127/74 (28 Sep 2018 08:00) (109/59 - 146/86)  BP(mean): 101 (28 Sep 2018 08:00) (73 - 119)  RR: 16 (28 Sep 2018 08:00) (9 - 20)  SpO2: 97% (28 Sep 2018 08:00) (93% - 100%)      I&O's Detail    27 Sep 2018 07:01  -  28 Sep 2018 07:00  --------------------------------------------------------  IN:    IV PiggyBack: 500 mL    Lactated Ringers IV Bolus: 1500 mL    Oral Fluid: 40 mL    sodium chloride 0.9%.: 1600 mL  Total IN: 3640 mL    OUT:    Drain: 90 mL    Estimated Blood Loss: 100 mL    Indwelling Catheter - Urethral: 1605 mL    Voided: 650 mL  Total OUT: 2445 mL    Total NET: 1195 mL      28 Sep 2018 07:01  -  28 Sep 2018 08:58  --------------------------------------------------------  IN:    sodium chloride 0.9%.: 80 mL  Total IN: 80 mL    OUT:  Total OUT: 0 mL    Total NET: 80 mL          LABS:                        10.4   7.6   )-----------( 305      ( 28 Sep 2018 06:12 )             31.4     09-28    140  |  102  |  7   ----------------------------<  113<H>  3.7   |  25  |  0.53    Ca    9.5      28 Sep 2018 06:13  Phos  3.8     09-28  Mg     1.8     09-28    TPro  6.4  /  Alb  4.1  /  TBili  0.7  /  DBili  x   /  AST  31  /  ALT  33  /  AlkPhos  113  09-27    PT/INR - ( 27 Sep 2018 11:19 )   PT: 12.0 sec;   INR: 1.08          PTT - ( 27 Sep 2018 11:19 )  PTT:27.4 sec    CAPILLARY BLOOD GLUCOSE      POCT Blood Glucose.: 113 mg/dL (28 Sep 2018 07:11)  POCT Blood Glucose.: 133 mg/dL (27 Sep 2018 23:33)  POCT Blood Glucose.: 135 mg/dL (27 Sep 2018 18:14)  POCT Blood Glucose.: 119 mg/dL (27 Sep 2018 12:14)      Drug Levels: [] N/A    CSF Analysis: [] N/A      Allergies    No Known Drug Allergies  shellfish (Urticaria)    Intolerances      MEDICATIONS:  Antibiotics:    Neuro:  acetaminophen    Suspension .. 650 milliGRAM(s) Oral every 6 hours PRN  escitalopram 20 milliGRAM(s) Oral daily  fentaNYL    Injectable 25 MICROGram(s) IV Push every 4 hours PRN  levETIRAcetam  IVPB 500 milliGRAM(s) IV Intermittent every 12 hours  ondansetron Injectable 4 milliGRAM(s) IV Push every 6 hours PRN    Anticoagulation:    OTHER:  atorvastatin 80 milliGRAM(s) Oral at bedtime  dextrose 40% Gel 15 Gram(s) Oral once PRN  dextrose 50% Injectable 12.5 Gram(s) IV Push once  dextrose 50% Injectable 25 Gram(s) IV Push once  dextrose 50% Injectable 25 Gram(s) IV Push once  docusate sodium 100 milliGRAM(s) Oral three times a day  glucagon  Injectable 1 milliGRAM(s) IntraMuscular once PRN  hydrALAZINE Injectable 10 milliGRAM(s) IV Push every 1 hour PRN  insulin lispro (HumaLOG) corrective regimen sliding scale   SubCutaneous Before meals and at bedtime  labetalol Injectable 10 milliGRAM(s) IV Push every 1 hour PRN  lisinopril 40 milliGRAM(s) Oral daily  losartan 50 milliGRAM(s) Oral daily  pantoprazole   Suspension 40 milliGRAM(s) Oral daily    IVF:  magnesium oxide 400 milliGRAM(s) Oral once  multivitamin 1 Tablet(s) Oral daily  potassium chloride    Tablet ER 20 milliEquivalent(s) Oral every 2 hours  sodium chloride 0.9%. 1000 milliLiter(s) IV Continuous <Continuous>

## 2018-09-28 NOTE — PHYSICAL THERAPY INITIAL EVALUATION ADULT - DISCHARGE DISPOSITION, PT EVAL
home with continued home PT and family assist as needed, progressing to outpatient once deemed appropriate by home physical therapist

## 2018-09-28 NOTE — DIETITIAN INITIAL EVALUATION ADULT. - ENERGY NEEDS
Height 67"; .7#; #; 118%IBW  BMI 25  ABW used for calculations as pt between % of IBW. Needs estimated for maintenance; increased protein for wound healing/recent weight loss

## 2018-09-28 NOTE — PHYSICAL THERAPY INITIAL EVALUATION ADULT - COORDINATION ASSESSED, REHAB EVAL
finger to nose/RUE WNL; LUE with difficulty due to weakness/impaired coordination MONICA WNL; LUE with difficulty due to weakness/impaired coordination/finger to nose

## 2018-09-28 NOTE — OCCUPATIONAL THERAPY INITIAL EVALUATION ADULT - PERTINENT HX OF CURRENT PROBLEM, REHAB EVAL
57 y/o female well known to our service for a emergent craniectomy for ischemic stroke this past June after reconstructive surgery for breast cancer.   She presents today for replacement of her bone flap.  S/P cranioplasty for cranial defect 9/27.

## 2018-09-29 ENCOUNTER — TRANSCRIPTION ENCOUNTER (OUTPATIENT)
Age: 58
End: 2018-09-29

## 2018-09-29 VITALS
HEART RATE: 80 BPM | DIASTOLIC BLOOD PRESSURE: 77 MMHG | SYSTOLIC BLOOD PRESSURE: 140 MMHG | RESPIRATION RATE: 18 BRPM | OXYGEN SATURATION: 100 %

## 2018-09-29 LAB
ANION GAP SERPL CALC-SCNC: 15 MMOL/L — SIGNIFICANT CHANGE UP (ref 5–17)
BUN SERPL-MCNC: 6 MG/DL — LOW (ref 7–23)
CALCIUM SERPL-MCNC: 10 MG/DL — SIGNIFICANT CHANGE UP (ref 8.4–10.5)
CHLORIDE SERPL-SCNC: 101 MMOL/L — SIGNIFICANT CHANGE UP (ref 96–108)
CO2 SERPL-SCNC: 24 MMOL/L — SIGNIFICANT CHANGE UP (ref 22–31)
CREAT SERPL-MCNC: 0.47 MG/DL — LOW (ref 0.5–1.3)
GLUCOSE SERPL-MCNC: 112 MG/DL — HIGH (ref 70–99)
HCT VFR BLD CALC: 34.6 % — SIGNIFICANT CHANGE UP (ref 34.5–45)
HGB BLD-MCNC: 11.2 G/DL — LOW (ref 11.5–15.5)
MAGNESIUM SERPL-MCNC: 1.9 MG/DL — SIGNIFICANT CHANGE UP (ref 1.6–2.6)
MCHC RBC-ENTMCNC: 29.2 PG — SIGNIFICANT CHANGE UP (ref 27–34)
MCHC RBC-ENTMCNC: 32.4 G/DL — SIGNIFICANT CHANGE UP (ref 32–36)
MCV RBC AUTO: 90.1 FL — SIGNIFICANT CHANGE UP (ref 80–100)
PHOSPHATE SERPL-MCNC: 3.3 MG/DL — SIGNIFICANT CHANGE UP (ref 2.5–4.5)
PLATELET # BLD AUTO: 327 K/UL — SIGNIFICANT CHANGE UP (ref 150–400)
POTASSIUM SERPL-MCNC: 3.8 MMOL/L — SIGNIFICANT CHANGE UP (ref 3.5–5.3)
POTASSIUM SERPL-SCNC: 3.8 MMOL/L — SIGNIFICANT CHANGE UP (ref 3.5–5.3)
RBC # BLD: 3.84 M/UL — SIGNIFICANT CHANGE UP (ref 3.8–5.2)
RBC # FLD: 15.1 % — SIGNIFICANT CHANGE UP (ref 10.3–16.9)
SODIUM SERPL-SCNC: 140 MMOL/L — SIGNIFICANT CHANGE UP (ref 135–145)
WBC # BLD: 6.3 K/UL — SIGNIFICANT CHANGE UP (ref 3.8–10.5)
WBC # FLD AUTO: 6.3 K/UL — SIGNIFICANT CHANGE UP (ref 3.8–10.5)

## 2018-09-29 PROCEDURE — 99232 SBSQ HOSP IP/OBS MODERATE 35: CPT | Mod: 24

## 2018-09-29 RX ORDER — ACETAMINOPHEN 500 MG
1 TABLET ORAL
Qty: 0 | Refills: 0 | COMMUNITY

## 2018-09-29 RX ORDER — ATORVASTATIN CALCIUM 80 MG/1
1 TABLET, FILM COATED ORAL
Qty: 0 | Refills: 0 | COMMUNITY
Start: 2018-09-29

## 2018-09-29 RX ORDER — LEVETIRACETAM 250 MG/1
1 TABLET, FILM COATED ORAL
Qty: 60 | Refills: 0 | OUTPATIENT
Start: 2018-09-29 | End: 2018-10-28

## 2018-09-29 RX ORDER — LEVETIRACETAM 250 MG/1
1 TABLET, FILM COATED ORAL
Qty: 0 | Refills: 0 | COMMUNITY
Start: 2018-09-29

## 2018-09-29 RX ORDER — LEVETIRACETAM 250 MG/1
2 TABLET, FILM COATED ORAL
Qty: 0 | Refills: 0 | COMMUNITY

## 2018-09-29 RX ADMIN — ESCITALOPRAM OXALATE 20 MILLIGRAM(S): 10 TABLET, FILM COATED ORAL at 12:22

## 2018-09-29 RX ADMIN — Medication 1 TABLET(S): at 12:22

## 2018-09-29 RX ADMIN — Medication 650 MILLIGRAM(S): at 08:00

## 2018-09-29 RX ADMIN — Medication 100 MILLIGRAM(S): at 07:01

## 2018-09-29 RX ADMIN — FENTANYL CITRATE 25 MICROGRAM(S): 50 INJECTION INTRAVENOUS at 09:51

## 2018-09-29 RX ADMIN — PANTOPRAZOLE SODIUM 40 MILLIGRAM(S): 20 TABLET, DELAYED RELEASE ORAL at 12:22

## 2018-09-29 RX ADMIN — LEVETIRACETAM 1000 MILLIGRAM(S): 250 TABLET, FILM COATED ORAL at 07:00

## 2018-09-29 RX ADMIN — Medication 650 MILLIGRAM(S): at 07:39

## 2018-09-29 RX ADMIN — LOSARTAN POTASSIUM 100 MILLIGRAM(S): 100 TABLET, FILM COATED ORAL at 07:00

## 2018-09-29 RX ADMIN — FENTANYL CITRATE 25 MICROGRAM(S): 50 INJECTION INTRAVENOUS at 09:36

## 2018-09-29 NOTE — DISCHARGE NOTE ADULT - CARE PLAN
Principal Discharge DX:	History of right MCA stroke  Goal:	increase mobility  Assessment and plan of treatment:	1. You may wash your hair 3 days after surgery.  The staples can get wet, pat dry.  2. Mild swelling around the incision is common. Keep incision open to air and dry.  3. Call our office at [phone] to set up the appointment with an NP for wound check  once you get home.  4. Inform the doctor immediately if you have a fever (above 101), chills, night  sweats, wound drainage, increasing wound redness or pain, nausea, vomiting, or  worsening headache.  B. ACTIVITY LEVEL  1. Fatigue is common following brain surgery, rest if you are tired.  2. You should get up and walk around every hour during the daytime.  3. No bending, lifting or twisting for the first 3 weeks, but walking is  recommended.  4. Drink plenty of water, stay out of the sun.  You may be given a narcotic pain reliever such as Percocet  (oxycodone-acetaminophen). This is for short term use. Avoid use of alcohol when taking these meds.

## 2018-09-29 NOTE — DISCHARGE NOTE ADULT - MEDICATION SUMMARY - MEDICATIONS TO TAKE
I will START or STAY ON the medications listed below when I get home from the hospital:    acetaminophen 325 mg oral capsule  -- 1 cap(s) by mouth 3 times a day, As Needed, pain  -- Indication: For pain    valsartan 160 mg oral tablet  -- 1 tab(s) by mouth once a day  -- Indication: For Hypertension    levETIRAcetam 1000 mg oral tablet  -- 1 tab(s) by mouth 2 times a day  -- Indication: For Seizures    escitalopram 20 mg oral tablet  -- 1 tab(s) by mouth once a day  -- Indication: For mood    atorvastatin 80 mg oral tablet  -- 1 tab(s) by mouth once a day (at bedtime)  -- Indication: For Stroke    ALPRAZolam 0.25 mg oral tablet  -- 1 tab(s) by mouth 3 times a day, As Needed for Anxiety  -- Indication: For anxiety    bisoprolol 5 mg oral tablet  -- 1 tab(s) by mouth once a day  -- Indication: For Hypertension    pantoprazole 40 mg oral delayed release tablet  -- 1 tab(s) by mouth once a day (before a meal)  -- Indication: For Stomach ulcer    Multiple Vitamins oral tablet  -- 1 tab(s) by mouth once a day  -- Indication: For Supplement

## 2018-09-29 NOTE — DISCHARGE NOTE ADULT - PLAN OF CARE
increase mobility 1. You may wash your hair 3 days after surgery.  The staples can get wet, pat dry.  2. Mild swelling around the incision is common. Keep incision open to air and dry.  3. Call our office at [phone] to set up the appointment with an NP for wound check  once you get home.  4. Inform the doctor immediately if you have a fever (above 101), chills, night  sweats, wound drainage, increasing wound redness or pain, nausea, vomiting, or  worsening headache.  B. ACTIVITY LEVEL  1. Fatigue is common following brain surgery, rest if you are tired.  2. You should get up and walk around every hour during the daytime.  3. No bending, lifting or twisting for the first 3 weeks, but walking is  recommended.  4. Drink plenty of water, stay out of the sun.  You may be given a narcotic pain reliever such as Percocet  (oxycodone-acetaminophen). This is for short term use. Avoid use of alcohol when taking these meds.

## 2018-09-29 NOTE — PROGRESS NOTE ADULT - ASSESSMENT
58 F h/o R MCA stroke, s/p, hemicraniectomy, s/p cranioplasty POD 2. doing well.     transfer to SDU, q 4 neuro checks  cont home meds  PT/OT  cont diet  IS  cont SCDs
Assessment: 59yo Female s/p cranioplasty for cranial defect, POD #0    Plan:  - neuro checks  - vitals checks  - pain control  - DREW drain in place, monitor output  - SBP goal <140 x24 hours  - Keppra 500 BID  - post op head CT  - dvt ppx    I spent 45 minutes of critical care time examining patient, reviewing vitals, labs, medications, imaging and discussing with the team goals of care to prevent life-threatening in this patient who is at high risk for neurological deterioration or death due to:  cerebral edema
58 F h/o R MCA stroke, s/p, hemicraniectomy, s/p cranioplasty POD 1. doing well.     transfer to SDU, q 4 neuro checks  CT head?  cont home meds  PT/OT  cont diet  IS  hold heparin, cont SCDs

## 2018-09-29 NOTE — DISCHARGE NOTE ADULT - MEDICATION SUMMARY - MEDICATIONS TO STOP TAKING
I will STOP taking the medications listed below when I get home from the hospital:    lisinopril 40 mg oral tablet  -- 1 tab(s) by mouth once a day    buPROPion 300 mg/24 hours (XL) oral tablet, extended release  -- 1 tab(s) by mouth every 24 hours    Benicar 20 mg oral tablet  -- 1 tab(s) by mouth once a day    hydroCHLOROthiazide 25 mg oral tablet  -- 1 tab(s) by mouth once a day    hydroCHLOROthiazide 12.5 mg oral tablet  -- 1 tab(s) by mouth once a day

## 2018-09-29 NOTE — PROGRESS NOTE ADULT - SUBJECTIVE AND OBJECTIVE BOX
=================================  NEUROCRITICAL CARE ATTENDING NOTE  =================================    VERONIKA CALERO   MRN-[id number]  Summary:  58y/F  HPI:  This is a 57 y/o female well known to our service for a emergent craniectomy for ischemic stroke this past June after reconstructive surgery for breast cancer.   She presents today for replacement of her bone flap. (27 Sep 2018 10:38)      Overnight Events:  Denies HA/N/V/Dizziness.      PHYSICAL EXAMINATION  T(C): 36.7 (09-29 @ 10:00), Max: 37.3 (09-28 @ 18:27)  HR: 80 (09-29 @ 12:26) (64 - 80)  BP: 140/77 (09-29 @ 12:26) (117/68 - 162/84)  RR: 18 (09-29 @ 12:26) (13 - 23)  SpO2: 100% (09-29 @ 12:26) (94% - 100%)  CVP(mm Hg): --    LABS:  CAPILLARY BLOOD GLUCOSE      POCT Blood Glucose.: 111 mg/dL (29 Sep 2018 06:25)  POCT Blood Glucose.: 110 mg/dL (28 Sep 2018 22:57)  POCT Blood Glucose.: 100 mg/dL (28 Sep 2018 17:22)                          11.2   6.3   )-----------( 327      ( 29 Sep 2018 06:00 )             34.6     09-29    140  |  101  |  6<L>  ----------------------------<  112<H>  3.8   |  24  |  0.47<L>    Ca    10.0      29 Sep 2018 06:00  Phos  3.3     09-29  Mg     1.9     09-29 09-28 @ 07:01  -  09-29 @ 07:00  --------------------------------------------------------  IN: 240 mL / OUT: 1470 mL / NET: -1230 mL    09-29 @ 07:01 - 09-29 @ 13:59  --------------------------------------------------------  IN: 0 mL / OUT: 450 mL / NET: -450 mL        MEDICATIONS:  MEDICATIONS  (STANDING):  atorvastatin 80 milliGRAM(s) Oral at bedtime  dextrose 50% Injectable 12.5 Gram(s) IV Push once  dextrose 50% Injectable 25 Gram(s) IV Push once  dextrose 50% Injectable 25 Gram(s) IV Push once  docusate sodium 100 milliGRAM(s) Oral three times a day  escitalopram 20 milliGRAM(s) Oral daily  insulin lispro (HumaLOG) corrective regimen sliding scale   SubCutaneous Before meals and at bedtime  levETIRAcetam 1000 milliGRAM(s) Oral two times a day  losartan 100 milliGRAM(s) Oral daily  multivitamin 1 Tablet(s) Oral daily  pantoprazole   Suspension 40 milliGRAM(s) Oral daily    MEDICATIONS  (PRN):  acetaminophen    Suspension .. 650 milliGRAM(s) Oral every 6 hours PRN Mild Pain (1 - 3)  dextrose 40% Gel 15 Gram(s) Oral once PRN Blood Glucose LESS THAN 70 milliGRAM(s)/deciliter  fentaNYL    Injectable 25 MICROGram(s) IV Push every 4 hours PRN Severe Pain (7 - 10)  glucagon  Injectable 1 milliGRAM(s) IntraMuscular once PRN Glucose LESS THAN 70 milligrams/deciliter  hydrALAZINE Injectable 10 milliGRAM(s) IV Push every 1 hour PRN sbp > 140 mmhg  HR <65  labetalol Injectable 10 milliGRAM(s) IV Push every 1 hour PRN Systolic blood pressure >140mmHg Hr > 65  ondansetron Injectable 4 milliGRAM(s) IV Push every 6 hours PRN Nausea and/or Vomiting    Neuro:  A&Ox4, L facial palsy  L UE 4/5  LLE 4+/5

## 2018-09-29 NOTE — DISCHARGE NOTE ADULT - PROVIDER TOKENS
FREE:[LAST:[Alex],FIRST:[[place]],PHONE:[phone]],FAX:[(   [phone]    -],ADDRESS:[[address] [place] [address]]]

## 2018-09-29 NOTE — PROGRESS NOTE ADULT - SUBJECTIVE AND OBJECTIVE BOX
Neurology Follow up note    Name  VERONIKA CALERO    HPI:  This is a 59 y/o female well known to our service for a emergent craniectomy for ischemic stroke this past June after reconstructive surgery for breast cancer.   She presents today for replacement of her bone flap. (27 Sep 2018 10:38)      Interval History - continues to have left sided weakness - no headaches        REVIEW OF SYSTEMS    Vital Signs Last 24 Hrs  T(C): 36.9 (29 Sep 2018 05:00), Max: 37.3 (28 Sep 2018 18:27)  T(F): 98.5 (29 Sep 2018 05:00), Max: 99.2 (28 Sep 2018 22:05)  HR: 66 (29 Sep 2018 08:30) (64 - 86)  BP: 120/74 (29 Sep 2018 08:30) (105/55 - 162/84)  BP(mean): 89 (29 Sep 2018 08:30) (65 - 113)  RR: 17 (29 Sep 2018 08:30) (13 - 23)  SpO2: 94% (29 Sep 2018 06:14) (94% - 98%)    Physical Exam-     Mental Status- awake and responsive to commands    Cranial Nerves- full EOM    Gait and station- n/a    Motor- moves all 4 extremities- left sided weakness    Reflexes-  decrease on the left    Sensation- no sensory level    Coordination- no tremors    Vascular - no bruits    Medications  acetaminophen    Suspension .. 650 milliGRAM(s) Oral every 6 hours PRN  atorvastatin 80 milliGRAM(s) Oral at bedtime  dextrose 40% Gel 15 Gram(s) Oral once PRN  dextrose 50% Injectable 12.5 Gram(s) IV Push once  dextrose 50% Injectable 25 Gram(s) IV Push once  dextrose 50% Injectable 25 Gram(s) IV Push once  docusate sodium 100 milliGRAM(s) Oral three times a day  escitalopram 20 milliGRAM(s) Oral daily  fentaNYL    Injectable 25 MICROGram(s) IV Push every 4 hours PRN  glucagon  Injectable 1 milliGRAM(s) IntraMuscular once PRN  hydrALAZINE Injectable 10 milliGRAM(s) IV Push every 1 hour PRN  insulin lispro (HumaLOG) corrective regimen sliding scale   SubCutaneous Before meals and at bedtime  labetalol Injectable 10 milliGRAM(s) IV Push every 1 hour PRN  levETIRAcetam 1000 milliGRAM(s) Oral two times a day  losartan 100 milliGRAM(s) Oral daily  multivitamin 1 Tablet(s) Oral daily  ondansetron Injectable 4 milliGRAM(s) IV Push every 6 hours PRN  pantoprazole   Suspension 40 milliGRAM(s) Oral daily      Lab      Radiology    Assessment- left sided weakness - unchanged    Plan as per NS

## 2018-09-29 NOTE — DISCHARGE NOTE ADULT - NS AS ACTIVITY OBS
Do not drive or operate machinery/Walking-Outdoors allowed/Stairs allowed/Do not make important decisions/Showering allowed/Walking-Indoors allowed/No Heavy lifting/straining

## 2018-09-29 NOTE — DISCHARGE NOTE ADULT - HOSPITAL COURSE
58 year-old female presented elective for right cranioplasty.  Postop antibiotic course completed.  Seen and cleared by physical therapy for discharge home.  Uncomplicated hospital course

## 2018-09-29 NOTE — PROGRESS NOTE ADULT - SUBJECTIVE AND OBJECTIVE BOX
HPI:  This is a 59 y/o female well known to our service for a emergent craniectomy for ischemic stroke this past June after reconstructive surgery for breast cancer.   She presents today for replacement of her bone flap. (27 Sep 2018 10:38)      Hospital Course:   POD#0 - post op pain, CT Head performed and reviewed  POD#1 - Improved, neurologically stable, DREW with 30cc overnight. Tolerating OOB and PO diet. Stepdown today.  POD#2- DREW drain removed. Neurological stable and clear for discharge.  OVERNIGHT EVENTS:  Vital Signs Last 24 Hrs  T(C): 36.7 (29 Sep 2018 10:00), Max: 37.3 (28 Sep 2018 18:27)  T(F): 98 (29 Sep 2018 10:00), Max: 99.2 (28 Sep 2018 22:05)  HR: 80 (29 Sep 2018 12:26) (64 - 80)  BP: 140/77 (29 Sep 2018 12:26) (117/68 - 162/84)  BP(mean): 103 (29 Sep 2018 12:26) (89 - 113)  RR: 18 (29 Sep 2018 12:26) (13 - 23)  SpO2: 100% (29 Sep 2018 12:26) (94% - 100%)    I&O's Summary    28 Sep 2018 07:01  -  29 Sep 2018 07:00  --------------------------------------------------------  IN: 240 mL / OUT: 1470 mL / NET: -1230 mL    29 Sep 2018 07:01  -  29 Sep 2018 14:34  --------------------------------------------------------  IN: 780 mL / OUT: 450 mL / NET: 330 mL        PHYSICAL EXAM:  Neurological: Gen: NAD, AAOx3  HEENT: PERRL. EOMI. +Head dressing C/D/I.  Lungs: Clear b/l  Heart: S1, S2. NSR.  Abd: Soft, NT/ND. +BS  Exts: Pulses 2+ througout  Neuro: Left facial drip, CNs II-XII otherwise intact. Left UE paresis - 3/5 deltoid, 4/5 elbow, 3/5  with spasticity. RUE and LEs b/l 5/5. Sensation intact throughout. Following commands. Speech clear. Gait intact.    DIET: Regular  [] NPO  [] Mechanical  [] Tube feeds    LABS:                        11.2   6.3   )-----------( 327      ( 29 Sep 2018 06:00 )             34.6     09-29    140  |  101  |  6<L>  ----------------------------<  112<H>  3.8   |  24  |  0.47<L>    Ca    10.0      29 Sep 2018 06:00  Phos  3.3     09-29  Mg     1.9     09-29              CAPILLARY BLOOD GLUCOSE      POCT Blood Glucose.: 111 mg/dL (29 Sep 2018 06:25)  POCT Blood Glucose.: 110 mg/dL (28 Sep 2018 22:57)  POCT Blood Glucose.: 100 mg/dL (28 Sep 2018 17:22)      Drug Levels: [] N/A    CSF Analysis: [] N/A      Allergies    No Known Drug Allergies  shellfish (Urticaria)    Intolerances      MEDICATIONS:  Antibiotics:    Neuro:  acetaminophen    Suspension .. 650 milliGRAM(s) Oral every 6 hours PRN  escitalopram 20 milliGRAM(s) Oral daily  fentaNYL    Injectable 25 MICROGram(s) IV Push every 4 hours PRN  levETIRAcetam 1000 milliGRAM(s) Oral two times a day  ondansetron Injectable 4 milliGRAM(s) IV Push every 6 hours PRN    Anticoagulation:    OTHER:  atorvastatin 80 milliGRAM(s) Oral at bedtime  dextrose 40% Gel 15 Gram(s) Oral once PRN  dextrose 50% Injectable 12.5 Gram(s) IV Push once  dextrose 50% Injectable 25 Gram(s) IV Push once  dextrose 50% Injectable 25 Gram(s) IV Push once  docusate sodium 100 milliGRAM(s) Oral three times a day  glucagon  Injectable 1 milliGRAM(s) IntraMuscular once PRN  hydrALAZINE Injectable 10 milliGRAM(s) IV Push every 1 hour PRN  insulin lispro (HumaLOG) corrective regimen sliding scale   SubCutaneous Before meals and at bedtime  labetalol Injectable 10 milliGRAM(s) IV Push every 1 hour PRN  losartan 100 milliGRAM(s) Oral daily  pantoprazole   Suspension 40 milliGRAM(s) Oral daily    IVF:  multivitamin 1 Tablet(s) Oral daily    CULTURES:    RADIOLOGY & ADDITIONAL TESTS:      ASSESSMENT:  58y Female s/p day 2 cranioplasty, doing well    PLAN:    Discharge home to follow up with Dr. Johnson.  DVT PROPHYLAXIS:  [x] Venodynes                                [x] Heparin/Lovenox    DISPOSITION: Home

## 2018-09-30 ENCOUNTER — EMERGENCY (EMERGENCY)
Facility: HOSPITAL | Age: 58
LOS: 1 days | Discharge: HOME | End: 2018-09-30
Attending: EMERGENCY MEDICINE | Admitting: EMERGENCY MEDICINE
Payer: COMMERCIAL

## 2018-09-30 VITALS
HEART RATE: 81 BPM | OXYGEN SATURATION: 98 % | TEMPERATURE: 98 F | SYSTOLIC BLOOD PRESSURE: 113 MMHG | RESPIRATION RATE: 18 BRPM | DIASTOLIC BLOOD PRESSURE: 74 MMHG

## 2018-09-30 VITALS
HEART RATE: 90 BPM | WEIGHT: 149.91 LBS | TEMPERATURE: 98 F | RESPIRATION RATE: 18 BRPM | OXYGEN SATURATION: 100 % | DIASTOLIC BLOOD PRESSURE: 88 MMHG | SYSTOLIC BLOOD PRESSURE: 133 MMHG

## 2018-09-30 DIAGNOSIS — Z90.12 ACQUIRED ABSENCE OF LEFT BREAST AND NIPPLE: Chronic | ICD-10-CM

## 2018-09-30 DIAGNOSIS — Z91.013 ALLERGY TO SEAFOOD: ICD-10-CM

## 2018-09-30 DIAGNOSIS — R56.9 UNSPECIFIED CONVULSIONS: ICD-10-CM

## 2018-09-30 DIAGNOSIS — R29.702 NIHSS SCORE 2: ICD-10-CM

## 2018-09-30 DIAGNOSIS — Z98.891 HISTORY OF UTERINE SCAR FROM PREVIOUS SURGERY: Chronic | ICD-10-CM

## 2018-09-30 DIAGNOSIS — I10 ESSENTIAL (PRIMARY) HYPERTENSION: ICD-10-CM

## 2018-09-30 DIAGNOSIS — Z79.899 OTHER LONG TERM (CURRENT) DRUG THERAPY: ICD-10-CM

## 2018-09-30 DIAGNOSIS — Z79.1 LONG TERM (CURRENT) USE OF NON-STEROIDAL ANTI-INFLAMMATORIES (NSAID): ICD-10-CM

## 2018-09-30 DIAGNOSIS — Z41.9 ENCOUNTER FOR PROCEDURE FOR PURPOSES OTHER THAN REMEDYING HEALTH STATE, UNSPECIFIED: Chronic | ICD-10-CM

## 2018-09-30 DIAGNOSIS — I25.10 ATHEROSCLEROTIC HEART DISEASE OF NATIVE CORONARY ARTERY WITHOUT ANGINA PECTORIS: ICD-10-CM

## 2018-09-30 DIAGNOSIS — E78.5 HYPERLIPIDEMIA, UNSPECIFIED: ICD-10-CM

## 2018-09-30 PROBLEM — Z86.73 PERSONAL HISTORY OF TRANSIENT ISCHEMIC ATTACK (TIA), AND CEREBRAL INFARCTION WITHOUT RESIDUAL DEFICITS: Chronic | Status: ACTIVE | Noted: 2018-09-26

## 2018-09-30 PROBLEM — R51 HEADACHE: Chronic | Status: ACTIVE | Noted: 2018-09-26

## 2018-09-30 PROBLEM — C50.919 MALIGNANT NEOPLASM OF UNSPECIFIED SITE OF UNSPECIFIED FEMALE BREAST: Chronic | Status: ACTIVE | Noted: 2018-05-30

## 2018-09-30 PROBLEM — K25.9 GASTRIC ULCER, UNSPECIFIED AS ACUTE OR CHRONIC, WITHOUT HEMORRHAGE OR PERFORATION: Chronic | Status: ACTIVE | Noted: 2018-09-26

## 2018-09-30 LAB
ALBUMIN SERPL ELPH-MCNC: 4.4 G/DL — SIGNIFICANT CHANGE UP (ref 3.3–5)
ALP SERPL-CCNC: 124 U/L — HIGH (ref 40–120)
ALT FLD-CCNC: 23 U/L — SIGNIFICANT CHANGE UP (ref 10–45)
ANION GAP SERPL CALC-SCNC: 14 MMOL/L — SIGNIFICANT CHANGE UP (ref 5–17)
APPEARANCE UR: CLEAR — SIGNIFICANT CHANGE UP
APTT BLD: 28.3 SEC — SIGNIFICANT CHANGE UP (ref 27.5–37.4)
AST SERPL-CCNC: 23 U/L — SIGNIFICANT CHANGE UP (ref 10–40)
BASOPHILS NFR BLD AUTO: 0.6 % — SIGNIFICANT CHANGE UP (ref 0–2)
BILIRUB SERPL-MCNC: 0.4 MG/DL — SIGNIFICANT CHANGE UP (ref 0.2–1.2)
BILIRUB UR-MCNC: NEGATIVE — SIGNIFICANT CHANGE UP
BUN SERPL-MCNC: 12 MG/DL — SIGNIFICANT CHANGE UP (ref 7–23)
CALCIUM SERPL-MCNC: 10.2 MG/DL — SIGNIFICANT CHANGE UP (ref 8.4–10.5)
CHLORIDE SERPL-SCNC: 103 MMOL/L — SIGNIFICANT CHANGE UP (ref 96–108)
CO2 SERPL-SCNC: 23 MMOL/L — SIGNIFICANT CHANGE UP (ref 22–31)
COLOR SPEC: YELLOW — SIGNIFICANT CHANGE UP
CREAT SERPL-MCNC: 0.63 MG/DL — SIGNIFICANT CHANGE UP (ref 0.5–1.3)
DIFF PNL FLD: NEGATIVE — SIGNIFICANT CHANGE UP
EOSINOPHIL NFR BLD AUTO: 4.9 % — SIGNIFICANT CHANGE UP (ref 0–6)
GLUCOSE SERPL-MCNC: 111 MG/DL — HIGH (ref 70–99)
GLUCOSE UR QL: NEGATIVE — SIGNIFICANT CHANGE UP
HCT VFR BLD CALC: 35 % — SIGNIFICANT CHANGE UP (ref 34.5–45)
HGB BLD-MCNC: 11.1 G/DL — LOW (ref 11.5–15.5)
INR BLD: 0.99 — SIGNIFICANT CHANGE UP (ref 0.88–1.16)
KETONES UR-MCNC: NEGATIVE — SIGNIFICANT CHANGE UP
LEUKOCYTE ESTERASE UR-ACNC: NEGATIVE — SIGNIFICANT CHANGE UP
LYMPHOCYTES # BLD AUTO: 21.8 % — SIGNIFICANT CHANGE UP (ref 13–44)
MCHC RBC-ENTMCNC: 28.9 PG — SIGNIFICANT CHANGE UP (ref 27–34)
MCHC RBC-ENTMCNC: 31.7 G/DL — LOW (ref 32–36)
MCV RBC AUTO: 91.1 FL — SIGNIFICANT CHANGE UP (ref 80–100)
MONOCYTES NFR BLD AUTO: 9.1 % — SIGNIFICANT CHANGE UP (ref 2–14)
NEUTROPHILS NFR BLD AUTO: 63.6 % — SIGNIFICANT CHANGE UP (ref 43–77)
NITRITE UR-MCNC: NEGATIVE — SIGNIFICANT CHANGE UP
PH UR: 6 — SIGNIFICANT CHANGE UP (ref 5–8)
PLATELET # BLD AUTO: 314 K/UL — SIGNIFICANT CHANGE UP (ref 150–400)
POTASSIUM SERPL-MCNC: 4.4 MMOL/L — SIGNIFICANT CHANGE UP (ref 3.5–5.3)
POTASSIUM SERPL-SCNC: 4.4 MMOL/L — SIGNIFICANT CHANGE UP (ref 3.5–5.3)
PROT SERPL-MCNC: 7.5 G/DL — SIGNIFICANT CHANGE UP (ref 6–8.3)
PROT UR-MCNC: NEGATIVE MG/DL — SIGNIFICANT CHANGE UP
PROTHROM AB SERPL-ACNC: 11 SEC — SIGNIFICANT CHANGE UP (ref 9.8–12.7)
RBC # BLD: 3.84 M/UL — SIGNIFICANT CHANGE UP (ref 3.8–5.2)
RBC # FLD: 15.2 % — SIGNIFICANT CHANGE UP (ref 10.3–16.9)
SODIUM SERPL-SCNC: 140 MMOL/L — SIGNIFICANT CHANGE UP (ref 135–145)
SP GR SPEC: 1.01 — SIGNIFICANT CHANGE UP (ref 1–1.03)
TROPONIN T SERPL-MCNC: <0.01 NG/ML — SIGNIFICANT CHANGE UP (ref 0–0.01)
UROBILINOGEN FLD QL: 0.2 E.U./DL — SIGNIFICANT CHANGE UP
WBC # BLD: 6.9 K/UL — SIGNIFICANT CHANGE UP (ref 3.8–10.5)
WBC # FLD AUTO: 6.9 K/UL — SIGNIFICANT CHANGE UP (ref 3.8–10.5)

## 2018-09-30 PROCEDURE — 93010 ELECTROCARDIOGRAM REPORT: CPT | Mod: NC

## 2018-09-30 PROCEDURE — 99284 EMERGENCY DEPT VISIT MOD MDM: CPT | Mod: 25

## 2018-09-30 PROCEDURE — 93005 ELECTROCARDIOGRAM TRACING: CPT

## 2018-09-30 PROCEDURE — 81003 URINALYSIS AUTO W/O SCOPE: CPT

## 2018-09-30 PROCEDURE — 85025 COMPLETE CBC W/AUTO DIFF WBC: CPT

## 2018-09-30 PROCEDURE — 84484 ASSAY OF TROPONIN QUANT: CPT

## 2018-09-30 PROCEDURE — 96374 THER/PROPH/DIAG INJ IV PUSH: CPT

## 2018-09-30 PROCEDURE — 85730 THROMBOPLASTIN TIME PARTIAL: CPT

## 2018-09-30 PROCEDURE — 70450 CT HEAD/BRAIN W/O DYE: CPT

## 2018-09-30 PROCEDURE — 70450 CT HEAD/BRAIN W/O DYE: CPT | Mod: 26

## 2018-09-30 PROCEDURE — 85610 PROTHROMBIN TIME: CPT

## 2018-09-30 PROCEDURE — 99291 CRITICAL CARE FIRST HOUR: CPT | Mod: 24

## 2018-09-30 PROCEDURE — 80053 COMPREHEN METABOLIC PANEL: CPT

## 2018-09-30 PROCEDURE — 82962 GLUCOSE BLOOD TEST: CPT

## 2018-09-30 RX ORDER — LEVETIRACETAM 250 MG/1
1000 TABLET, FILM COATED ORAL ONCE
Qty: 0 | Refills: 0 | Status: COMPLETED | OUTPATIENT
Start: 2018-09-30 | End: 2018-09-30

## 2018-09-30 RX ORDER — LEVETIRACETAM 250 MG/1
3 TABLET, FILM COATED ORAL
Qty: 42 | Refills: 0 | OUTPATIENT
Start: 2018-09-30 | End: 2018-10-06

## 2018-09-30 RX ADMIN — LEVETIRACETAM 400 MILLIGRAM(S): 250 TABLET, FILM COATED ORAL at 14:19

## 2018-09-30 NOTE — ED PROVIDER NOTE - PROGRESS NOTE DETAILS
discussed with dr. pimentel and nsg team, dr. pimentel discussed with dr hill pt does not require admission at this time upon review of imaging, will give 1 gm of keppra, will dc home with 1500 mg of keppra twice daily, pt to f/u with nsg attending in clinic tomorrow

## 2018-09-30 NOTE — ED PROVIDER NOTE - MUSCULOSKELETAL, MLM
Spine appears normal, range of motion is not limited, no muscle or joint tenderness Spine appears normal,

## 2018-09-30 NOTE — CONSULT NOTE ADULT - SUBJECTIVE AND OBJECTIVE BOX
This is a 58y Female w/ HTN, HLD, Anxiety, Depression, h/o Breast Cancer w/ TRAM flap reconstruction, right MCA infarction now s/p hemicraniectomy who presents with an episode of blank staring. The patient was recently discharged after cranioplasty this past week. Her  states that this morning she had an episode of blank staring when he was talking to her and she did not even see This is a 58y Female w/ HTN, HLD, Anxiety, Depression, h/o Breast Cancer w/ TRAM flap reconstruction, right MCA infarction now s/p hemicraniectomy who presents with an episode of blank staring. The patient was recently discharged after cranioplasty this past week. Her  states that this morning she had an episode of blank staring when he was talking to her and she did not even see him in the room. The episode lasted 2-3 minutes and then the patient was confused after for some time. This prompted them to come to the ED. The patient states that she was worried that someone stole her jewelry **STROKE CODE CONSULT NOTE**    Last known well time/Time of onset of symptoms:    HPI:  This is a 58y Female w/ HTN, HLD, Anxiety, Depression, h/o Breast Cancer w/ TRAM flap reconstruction, right MCA infarction now s/p hemicraniectomy who presents with an episode of blank staring. The patient was recently discharged after cranioplasty this past week. Her  states that this morning she had an episode of blank staring when he was talking to her and she did not even see him in the room. The episode lasted 2-3 minutes and then the patient was confused after for some time. This prompted them to come to the ED. The patient states that she was worried that someone took her jewelry and she was blanking out her . She denies any chest pain, shortness of breath, vision changes, numbness or tingling anywhere, urinary changes, HA, dizziness, lightheadedness at the time.    At [facility] ED: pt awake and alert, with no complaints at this time. /88, CTH with more hyperdense material in inferiofrontal lobe.    PAST MEDICAL & SURGICAL HISTORY:  Chronic headaches  Seizures: following stroke  CAD (coronary artery disease)  History of right MCA stroke: left arm hemiparesis, 2018  Stomach ulcer: 2018  Dyslipidemia  Anxiety  Hypertension  Breast CA: left  Elective surgery: skull surgery (right)  Previous  section  History of mastectomy, left: with TRAM flap recon      FAMILY HISTORY:      SOCIAL HISTORY:  lives at home with her      ROS:  Constitutional: No fever, weight loss or fatigue  Eyes: No eye pain, visual disturbances, or discharge  ENMT:  No difficulty hearing, tinnitus, vertigo; No sinus or throat pain  Neck: No pain or stiffness  Respiratory: No cough, wheezing, chills or hemoptysis  Cardiovascular: No chest pain, palpitations, shortness of breath, dizziness or leg swelling  Gastrointestinal: No abdominal pain. No nausea, vomiting or hematemesis; No diarrhea or constipation. Nohematochezia.  Genitourinary: No dysuria, frequency, hematuria or incontinence  Neurological: As per HPI  Skin: No itching, burning, rashes or lesions   Endocrine: No heat or cold intolerance; No hair loss  Musculoskeletal: No joint pain or swelling; No muscle, back or extremity pain  Psychiatric: No depression, anxiety, mood swings or difficulty sleeping  Heme/Lymph: No easy bruising or bleeding gums    MEDICATIONS  (STANDING):    MEDICATIONS  (PRN):      Allergies    No Known Drug Allergies  shellfish (Urticaria)    Intolerances        Vital Signs Last 24 Hrs  T(C): 36.7 (30 Sep 2018 13:35), Max: 36.7 (30 Sep 2018 13:35)  T(F): 98 (30 Sep 2018 13:35), Max: 98 (30 Sep 2018 13:35)  HR: 90 (30 Sep 2018 13:35) (90 - 90)  BP: 133/88 (30 Sep 2018 13:35) (133/88 - 133/88)  BP(mean): --  RR: 18 (30 Sep 2018 13:35) (18 - 18)  SpO2: 100% (30 Sep 2018 13:35) (100% - 100%)    PHYSICAL EXAM:  Constitutional: WDWN; NAD  Cardiovascular: RRR, no appreciable murmurs; no carotid bruits  Neurologic:  Mental status: Awake, alert and oriented x3.  Recent and remote memory intact.  Naming, repetition and comprehension intact.  Attention/concentration intact.  No dysarthria, no aphasia.  Fund of knowledge appropriate.    Cranial nerves: Pupils equally round and reactive to light, visual fields full, no nystagmus, extraocular muscles intact, V1 through V3 intact bilaterally and symmetric, left facial droop, hearing intact to finger rub, palate elevation symmetric, tongue was midline, sternocleidomastoid/shoulder shrug strength bilaterally 5/5.    Motor: Left UE 4/5. RUE and LEs b/l 5/5. Sensation intact throughout.   Sensation: Intact to light touch, proprioception, and pinprick.  No neglect.   Coordination: No dysmetria on finger-to-nose and heel-to-shin.  No clumsiness.  Reflexes: 2+ in upper and lower extremities, downgoing toes bilaterally  Gait: Narrow and steady. No ataxia.  Romberg negative    NIHSS: 2    Fingerstick Blood Glucose: CAPILLARY BLOOD GLUCOSE  112 (30 Sep 2018 14:35)      POCT Blood Glucose.: 109 mg/dL (30 Sep 2018 13:37)       LABS:                        11.1   6.9   )-----------( 314      ( 30 Sep 2018 14:07 )             35.0     09-30    140  |  103  |  12  ----------------------------<  111<H>  4.4   |  23  |  0.63    Ca    10.2      30 Sep 2018 14:07  Phos  3.3       Mg     1.9         TPro  7.5  /  Alb  4.4  /  TBili  0.4  /  DBili  x   /  AST  23  /  ALT  23  /  AlkPhos  124<H>      PT/INR - ( 30 Sep 2018 14:07 )   PT: 11.0 sec;   INR: 0.99          PTT - ( 30 Sep 2018 14:07 )  PTT:28.3 sec  CARDIAC MARKERS ( 30 Sep 2018 14:07 )  x     / <0.01 ng/mL / x     / x     / x              RADIOLOGY & ADDITIONAL STUDIES:    IV-tPA (Y/N):                 N                  Bolus time:  Reason IV-tPA not given: major recent surgery     ASSESSMENT/PLAN:

## 2018-09-30 NOTE — CONSULT NOTE ADULT - ASSESSMENT
This is a 58y Female w/ HTN, HLD, Anxiety, Depression, h/o Breast Cancer w/ TRAM flap reconstruction, right MCA infarction now s/p hemicraniectomy who presents with an episode of blank staring.     #Recent confusion   Pt with few minutes of blank staring and likely post ictal state   CT head negative for acute stroke   More likely seizure given blank staring  Discussed with neurosurgery, they will see her in the clinic at 9am tomorrow to follow up post op  - give 1g Keppra at this time and increase dose to 1500 BID outpt  - send UA to rule out infection , will follow up in clinic tomorrow     Thank you for the consult

## 2018-09-30 NOTE — ED PROVIDER NOTE - NEUROLOGICAL, MLM
Alert and oriented, no focal deficits, no motor or sensory deficits. L sided facial droop, staples to R side of scalp

## 2018-09-30 NOTE — ED PROVIDER NOTE - PSYCHIATRIC, MLM
Alert and oriented to person, place, time/situation. normal mood and affect. no apparent risk to self or others. . normal mood and affect. no apparent risk to self or others.

## 2018-09-30 NOTE — ED PROVIDER NOTE - PMH
Anxiety    Breast CA  left  CAD (coronary artery disease)    Chronic headaches    Dyslipidemia    History of right MCA stroke  left arm hemiparesis, 5/2018  Hypertension    Seizures  following stroke  Stomach ulcer  5/2018

## 2018-09-30 NOTE — ED ADULT NURSE NOTE - OBJECTIVE STATEMENT
pt received in resus room A & O x 3 pt has a history of a previous CVA in May , pt had a cranioplasty last week for a tumor , pt c/o 15 min of confusion about an hour ago which resolved , pt has a left facial droop and left side weakness from previous stroke , IV was accessed labs sent

## 2018-09-30 NOTE — ED PROVIDER NOTE - CONSTITUTIONAL, MLM
normal... Well appearing, well nourished, awake, alert, no acute distress Well appearing, no acute distress

## 2018-09-30 NOTE — CONSULT NOTE ADULT - ATTENDING COMMENTS
h/0 seizures, p/w staring spell, slight L facial palsy more than yesterday as evaluated per myself.  Very close back to normal self.    1 gm Keppra IV now.   increase home dose of keppra 1500 mg BID  f/u w/ Dr. Johnson in office tomorrow at 9 AM    I spent 45 minutes of critical care time examining patient, reviewing vitals, labs, medications, imaging and discussing with the team goals of care to prevent life-threatening in this patient who is at high risk for neurological deterioration or death due to:  seizure

## 2018-09-30 NOTE — ED PROVIDER NOTE - OBJECTIVE STATEMENT
58F upgraded due to me due to concern for stroke. Pt with hx of seizures, recent admission to [facility] presenting to ED due to episode of alteration in mental status where she was not answering questions. Pt is s/p cranioplasty after craniectomy for stroke, recently discharged home. Pt states that she feels fine now, denies any new f/a/l weakness, L sided weakness at baseline. States she was not answering questions this morning because she lost an important necklace. Taking 1 gm of keppra bid at home 58F upgraded due to me due to concern for stroke. Pt with hx of seizures, recent admission to [facility] presenting to ED due to episode of alteration in mental status where she was not answering questions. Pt is s/p cranioplasty after craniectomy for stroke, recently discharged home. Pt states that she feels fine now, denies any new f/a/l weakness, L sided weakness at baseline. States she was not answering questions this morning because she lost an important necklace. Taking 1 gm of keppra bid at home.

## 2018-09-30 NOTE — ED PROVIDER NOTE - ENMT, MLM
Airway patent, Nasal mucosa clear. Mouth with normal mucosa. Throat has no vesicles, no oropharyngeal exudates and uvula is midline. Airway patent, Nasal mucosa clear. Mouth with normal mucosa. speaking in full sentences

## 2018-09-30 NOTE — CONSULT NOTE ADULT - SUBJECTIVE AND OBJECTIVE BOX
HISTORY OF PRESENT ILLNESS:   57 yo female, history of seizures, known to Neurosurgery service, presents to ED after  claims patient 'was confused this morning, not answer his questions.' Patient was recently admitted and underwent cranioplasty on  after having craniectomy for stroke in 2018. Patient was discharged home yesterday, . When talking with patient, she appears to be neurologically at her baseline. States she was fully aware this morning, however was sad because she 'lost an important necklace'. Patient feels at her baseline and has no complaints at this time. CTH performed- no acute infarct or MLS- stable from last CTH on . Patient currently taking keppra 1g bid at home.      PAST MEDICAL & SURGICAL HISTORY:  Chronic headaches  Seizures: following stroke  CAD (coronary artery disease)  History of right MCA stroke: left arm hemiparesis, 2018  Stomach ulcer: 2018  Dyslipidemia  Anxiety  Hypertension  Breast CA: left  Elective surgery: skull surgery (right)  Previous  section  History of mastectomy, left: with TRAM flap recon    FAMILY HISTORY:      SOCIAL HISTORY:  Tobacco Use:  EtOH use:   Substance:    Allergies    No Known Drug Allergies  shellfish (Urticaria)    Intolerances      MEDICATIONS:  Antibiotics:    Neuro:    Anticoagulation:    OTHER:    IVF:      Vital Signs Last 24 Hrs  T(C): 36.7 (30 Sep 2018 13:35), Max: 36.7 (30 Sep 2018 13:35)  T(F): 98 (30 Sep 2018 13:35), Max: 98 (30 Sep 2018 13:35)  HR: 90 (30 Sep 2018 13:35) (90 - 90)  BP: 133/88 (30 Sep 2018 13:35) (133/88 - 133/88)  BP(mean): --  RR: 18 (30 Sep 2018 13:35) (18 - 18)  SpO2: 100% (30 Sep 2018 13:35) (100% - 100%)      PHYSICAL EXAM:  Awake and alert, orientedx3, NAD, coherent speech, following commands  PERRL, EOMI, mild left facial asymmetry (baseline)  MAEx4, LUE/LLE 4/5 (baseline), RUE/RLE 5/5, no drift   SILT throughout  Incision/wound: crani incision clean, dry and intact; staples in place        LABS:                        11.1   6.9   )-----------( 314      ( 30 Sep 2018 14:07 )             35.0         140  |  101  |  6<L>  ----------------------------<  112<H>  3.8   |  24  |  0.47<L>    Ca    10.0      29 Sep 2018 06:00  Phos  3.3     -  Mg     1.9           PT/INR - ( 30 Sep 2018 14:07 )   PT: 11.0 sec;   INR: 0.99          PTT - ( 30 Sep 2018 14:07 )  PTT:28.3 sec    CULTURES:        RADIOLOGY & ADDITIONAL STUDIES:    INTERPRETATION:  PROCEDURE: CT head without intravenous contrast    INDICATIONS: Altered mental status.    TECHNIQUE:  Serial axial images were obtained from the skull base to the   vertex without the use of intravenous contrast. Coronal and sagittal   reconstructions were created.    COMPARISON EXAMINATION: CT head dated 2018.    FINDINGS: Again, patient is status post right frontal parietal temporal   craniectomy and cranioplasty. A subgaleal drainage catheter is again   noted. Extra-axial collection of air and minimal fluid and blood products   overlying the right frontoparietal temporal convexity is again noted,   with a slight increase in hyperdense blood products seen in the left   inferior frontal region (series 2 image 27). No acute transcortical   infarction. There is no evidence of mass effect or midline shift no   evidence of an intra-axial fluid collection. No hydrocephalus. Again,   large encephalomalacic changes are seen within the right frontal parietal   temporal endocervical lobes, as well as in the insula and lateral basal   ganglia. Small left anterior thalamic chronic lacunar infarct is noted.    The paranasal sinuses and bilateral mastoid air cells are clear.    IMPRESSION:  Status post right frontal parietal temporal craniectomy and   cranioplasty, as above. Postsurgical changes described above, with a   slight increase in hyperdense blood products seen along the right   inferior frontal convexity. No midline shift or herniation. No acute   transcortical infarction.        Assessment:  57 yo female, recently admitted for cranioplasty on , discharged home on , presenting to ED today after  states patient was 'confused this morning'. Neurologically at baseline. CTH stable       Plan:  -keppra 1g now  -increase keppra dose to 1500mg bid   -no neurosurgical intervention to be done at this time  -follow up in the office tomorrow morning (around 9am if possible) with Dr. Johnson for office visit  -above reviewed and discussed with Dr. Johnson

## 2018-10-01 ENCOUNTER — APPOINTMENT (OUTPATIENT)
Dept: NEUROSURGERY | Facility: CLINIC | Age: 58
End: 2018-10-01
Payer: COMMERCIAL

## 2018-10-01 VITALS
OXYGEN SATURATION: 97 % | BODY MASS INDEX: 23.54 KG/M2 | HEIGHT: 67 IN | TEMPERATURE: 98.3 F | DIASTOLIC BLOOD PRESSURE: 74 MMHG | RESPIRATION RATE: 18 BRPM | WEIGHT: 150 LBS | SYSTOLIC BLOOD PRESSURE: 120 MMHG | HEART RATE: 76 BPM

## 2018-10-01 DIAGNOSIS — H53.8 OTHER VISUAL DISTURBANCES: ICD-10-CM

## 2018-10-01 PROCEDURE — 86900 BLOOD TYPING SEROLOGIC ABO: CPT

## 2018-10-01 PROCEDURE — C9399: CPT

## 2018-10-01 PROCEDURE — C1889: CPT

## 2018-10-01 PROCEDURE — C1713: CPT

## 2018-10-01 PROCEDURE — 99024 POSTOP FOLLOW-UP VISIT: CPT

## 2018-10-01 PROCEDURE — 84100 ASSAY OF PHOSPHORUS: CPT

## 2018-10-01 PROCEDURE — 82962 GLUCOSE BLOOD TEST: CPT

## 2018-10-01 PROCEDURE — 86901 BLOOD TYPING SEROLOGIC RH(D): CPT

## 2018-10-01 PROCEDURE — 80053 COMPREHEN METABOLIC PANEL: CPT

## 2018-10-01 PROCEDURE — 97161 PT EVAL LOW COMPLEX 20 MIN: CPT

## 2018-10-01 PROCEDURE — 85025 COMPLETE CBC W/AUTO DIFF WBC: CPT

## 2018-10-01 PROCEDURE — 85027 COMPLETE CBC AUTOMATED: CPT

## 2018-10-01 PROCEDURE — 70450 CT HEAD/BRAIN W/O DYE: CPT

## 2018-10-01 PROCEDURE — 86850 RBC ANTIBODY SCREEN: CPT

## 2018-10-01 PROCEDURE — 85610 PROTHROMBIN TIME: CPT

## 2018-10-01 PROCEDURE — 83735 ASSAY OF MAGNESIUM: CPT

## 2018-10-01 PROCEDURE — 97162 PT EVAL MOD COMPLEX 30 MIN: CPT

## 2018-10-01 PROCEDURE — 80048 BASIC METABOLIC PNL TOTAL CA: CPT

## 2018-10-01 PROCEDURE — 36415 COLL VENOUS BLD VENIPUNCTURE: CPT

## 2018-10-01 PROCEDURE — 83036 HEMOGLOBIN GLYCOSYLATED A1C: CPT

## 2018-10-01 PROCEDURE — 85730 THROMBOPLASTIN TIME PARTIAL: CPT

## 2018-10-03 DIAGNOSIS — D64.9 ANEMIA, UNSPECIFIED: ICD-10-CM

## 2018-10-03 DIAGNOSIS — I69.834: ICD-10-CM

## 2018-10-03 DIAGNOSIS — M95.2 OTHER ACQUIRED DEFORMITY OF HEAD: ICD-10-CM

## 2018-10-03 DIAGNOSIS — R51 HEADACHE: ICD-10-CM

## 2018-10-03 DIAGNOSIS — Z85.3 PERSONAL HISTORY OF MALIGNANT NEOPLASM OF BREAST: ICD-10-CM

## 2018-10-03 DIAGNOSIS — E78.5 HYPERLIPIDEMIA, UNSPECIFIED: ICD-10-CM

## 2018-10-03 DIAGNOSIS — I10 ESSENTIAL (PRIMARY) HYPERTENSION: ICD-10-CM

## 2018-10-03 DIAGNOSIS — E11.9 TYPE 2 DIABETES MELLITUS WITHOUT COMPLICATIONS: ICD-10-CM

## 2018-10-03 DIAGNOSIS — Z90.12 ACQUIRED ABSENCE OF LEFT BREAST AND NIPPLE: ICD-10-CM

## 2018-10-03 DIAGNOSIS — K25.9 GASTRIC ULCER, UNSPECIFIED AS ACUTE OR CHRONIC, WITHOUT HEMORRHAGE OR PERFORATION: ICD-10-CM

## 2018-10-03 DIAGNOSIS — Z91.013 ALLERGY TO SEAFOOD: ICD-10-CM

## 2018-10-03 DIAGNOSIS — F41.9 ANXIETY DISORDER, UNSPECIFIED: ICD-10-CM

## 2018-10-03 DIAGNOSIS — I25.10 ATHEROSCLEROTIC HEART DISEASE OF NATIVE CORONARY ARTERY WITHOUT ANGINA PECTORIS: ICD-10-CM

## 2018-10-08 ENCOUNTER — APPOINTMENT (OUTPATIENT)
Dept: NEUROSURGERY | Facility: CLINIC | Age: 58
End: 2018-10-08

## 2018-10-09 ENCOUNTER — APPOINTMENT (OUTPATIENT)
Dept: NEUROSURGERY | Facility: CLINIC | Age: 58
End: 2018-10-09
Payer: COMMERCIAL

## 2018-10-09 ENCOUNTER — APPOINTMENT (OUTPATIENT)
Dept: NEUROLOGY | Facility: CLINIC | Age: 58
End: 2018-10-09
Payer: COMMERCIAL

## 2018-10-09 VITALS
WEIGHT: 157 LBS | SYSTOLIC BLOOD PRESSURE: 152 MMHG | HEIGHT: 67 IN | BODY MASS INDEX: 24.64 KG/M2 | RESPIRATION RATE: 18 BRPM | DIASTOLIC BLOOD PRESSURE: 82 MMHG | TEMPERATURE: 98.1 F | HEART RATE: 61 BPM | OXYGEN SATURATION: 99 %

## 2018-10-09 VITALS
HEIGHT: 67 IN | OXYGEN SATURATION: 100 % | SYSTOLIC BLOOD PRESSURE: 133 MMHG | DIASTOLIC BLOOD PRESSURE: 78 MMHG | HEART RATE: 53 BPM | WEIGHT: 157 LBS | BODY MASS INDEX: 24.64 KG/M2 | TEMPERATURE: 98.3 F

## 2018-10-09 DIAGNOSIS — Z78.9 OTHER SPECIFIED HEALTH STATUS: ICD-10-CM

## 2018-10-09 DIAGNOSIS — Z48.02 ENCOUNTER FOR REMOVAL OF SUTURES: ICD-10-CM

## 2018-10-09 PROCEDURE — 99024 POSTOP FOLLOW-UP VISIT: CPT

## 2018-10-09 PROCEDURE — 99214 OFFICE O/P EST MOD 30 MIN: CPT

## 2018-10-09 RX ORDER — ASPIRIN 81 MG
81 TABLET, DELAYED RELEASE (ENTERIC COATED) ORAL DAILY
Refills: 0 | Status: DISCONTINUED | COMMUNITY
End: 2018-10-09

## 2018-10-09 RX ORDER — LEVETIRACETAM 1000 MG/1
TABLET, FILM COATED ORAL
Refills: 0 | Status: DISCONTINUED | COMMUNITY
End: 2018-10-09

## 2018-10-09 RX ORDER — MULTIVITAMIN WITH FOLIC ACID 400 MCG
TABLET ORAL
Qty: 30 | Refills: 0 | Status: ACTIVE | COMMUNITY
Start: 2018-07-18

## 2018-10-09 RX ORDER — ALPRAZOLAM 0.25 MG/1
0.25 TABLET ORAL
Qty: 30 | Refills: 0 | Status: ACTIVE | COMMUNITY
Start: 2018-09-13

## 2018-10-09 RX ORDER — LEVETIRACETAM 500 MG/1
500 TABLET, FILM COATED ORAL
Refills: 0 | Status: ACTIVE | COMMUNITY

## 2018-10-09 RX ORDER — LEVETIRACETAM 1000 MG/1
1000 TABLET, FILM COATED ORAL TWICE DAILY
Qty: 60 | Refills: 2 | Status: ACTIVE | COMMUNITY

## 2018-10-10 PROBLEM — I63.9 STROKE: Status: ACTIVE | Noted: 2018-07-23

## 2018-10-10 PROBLEM — R29.898 LEFT ARM WEAKNESS: Status: ACTIVE | Noted: 2018-07-23

## 2018-10-10 PROBLEM — I10 HYPERTENSION: Status: ACTIVE | Noted: 2018-07-24

## 2018-10-10 PROBLEM — Z87.891 FORMER SMOKER: Status: ACTIVE | Noted: 2018-10-10

## 2018-10-10 PROBLEM — Z78.9 NON-SMOKER: Status: ACTIVE | Noted: 2018-07-23

## 2018-11-12 ENCOUNTER — APPOINTMENT (OUTPATIENT)
Dept: NEUROSURGERY | Facility: CLINIC | Age: 58
End: 2018-11-12
Payer: COMMERCIAL

## 2018-11-12 VITALS
RESPIRATION RATE: 16 BRPM | SYSTOLIC BLOOD PRESSURE: 143 MMHG | DIASTOLIC BLOOD PRESSURE: 86 MMHG | WEIGHT: 164 LBS | BODY MASS INDEX: 25.74 KG/M2 | HEART RATE: 55 BPM | HEIGHT: 67 IN | OXYGEN SATURATION: 99 % | TEMPERATURE: 98.7 F

## 2018-11-12 PROCEDURE — 99024 POSTOP FOLLOW-UP VISIT: CPT

## 2018-11-26 ENCOUNTER — APPOINTMENT (OUTPATIENT)
Dept: OPHTHALMOLOGY | Facility: CLINIC | Age: 58
End: 2018-11-26
Payer: COMMERCIAL

## 2018-11-26 DIAGNOSIS — H53.462 HOMONYMOUS BILATERAL FIELD DEFECTS, LEFT SIDE: ICD-10-CM

## 2018-11-26 DIAGNOSIS — H43.393 OTHER VITREOUS OPACITIES, BILATERAL: ICD-10-CM

## 2018-11-26 PROCEDURE — 92134 CPTRZ OPH DX IMG PST SGM RTA: CPT

## 2018-11-26 PROCEDURE — 92083 EXTENDED VISUAL FIELD XM: CPT

## 2018-11-26 PROCEDURE — 99204 OFFICE O/P NEW MOD 45 MIN: CPT

## 2019-01-22 ENCOUNTER — APPOINTMENT (OUTPATIENT)
Dept: NEUROLOGY | Facility: CLINIC | Age: 59
End: 2019-01-22

## 2019-02-11 ENCOUNTER — APPOINTMENT (OUTPATIENT)
Dept: NEUROSURGERY | Facility: CLINIC | Age: 59
End: 2019-02-11

## 2019-02-15 ENCOUNTER — APPOINTMENT (OUTPATIENT)
Dept: NEUROSURGERY | Facility: CLINIC | Age: 59
End: 2019-02-15

## 2019-03-25 ENCOUNTER — APPOINTMENT (OUTPATIENT)
Dept: NEUROSURGERY | Facility: CLINIC | Age: 59
End: 2019-03-25
Payer: COMMERCIAL

## 2019-03-25 VITALS
BODY MASS INDEX: 24.48 KG/M2 | OXYGEN SATURATION: 97 % | RESPIRATION RATE: 67 BRPM | WEIGHT: 156 LBS | DIASTOLIC BLOOD PRESSURE: 83 MMHG | SYSTOLIC BLOOD PRESSURE: 124 MMHG | HEIGHT: 67 IN | HEART RATE: 67 BPM

## 2019-03-25 PROCEDURE — 99215 OFFICE O/P EST HI 40 MIN: CPT

## 2019-03-25 RX ORDER — VALPROIC ACID 250 MG/1
250 CAPSULE, LIQUID FILLED ORAL
Refills: 0 | Status: ACTIVE | COMMUNITY

## 2019-03-25 RX ORDER — LACOSAMIDE 100 MG/1
100 TABLET, FILM COATED ORAL
Refills: 0 | Status: ACTIVE | COMMUNITY

## 2019-03-25 NOTE — PHYSICAL EXAM
[General Appearance - Alert] : alert [General Appearance - In No Acute Distress] : in no acute distress [Well-Healed] : well-healed [Normal Skin] : normal [Oriented To Time, Place, And Person] : oriented to person, place, and time [Sclera] : the sclera and conjunctiva were normal [Extraocular Movements] : extraocular movements were intact [Outer Ear] : the ears and nose were normal in appearance [Neck Appearance] : the appearance of the neck was normal [] : no respiratory distress [Respiration, Rhythm And Depth] : normal respiratory rhythm and effort [Heart Rate And Rhythm] : heart rate was normal and rhythm regular [Skin Color & Pigmentation] : normal skin color and pigmentation [Cranial Nerves Optic (II)] : visual acuity intact bilaterally,  pupils equal round and reactive to light [Cranial Nerves Oculomotor (III)] : extraocular motion intact [Cranial Nerves Vestibulocochlear (VIII)] : hearing was intact bilaterally [Cranial Nerves Glossopharyngeal (IX)] : tongue and palate midline [Cranial Nerves Accessory (XI - Cranial And Spinal)] : head turning and shoulder shrug symmetric [Cranial Nerves Hypoglossal (XII)] : there was no tongue deviation with protrusion [FreeTextEntry1] : RIGHT head [FreeTextEntry5] : LEFT facial droop [FreeTextEntry6] : decreased strength in LEFT upper and lower extremity 3/5\par persistent RIGHT arm tremor

## 2019-03-25 NOTE — ASSESSMENT
[FreeTextEntry1] : Ms. Calloway has had significant decline in functional status since previous visit.\par She is currently in rehab at [facility] and the  reports very little improvement in overall functional capacity in the past 8 weeks.\par Recommend CT head without contrast to evaluate for underlying abnormality.\par Follow up with Dr. Suarez for management of seizures and secondary stroke prevention.\par Return to the office after CT head complete to review with Dr. Johnson.\par \par Plan:\par \par 1. CT head without contrast\par 2. Return to the office after CT head complete\par \par Patient and patient's  verbalize agreement and understanding with plan. \par \par

## 2019-03-25 NOTE — REASON FOR VISIT
[FreeTextEntry1] : Returns today for follow up s/p RIGHT cranioplasty on 9/27/18.\par She has had seizures postoperatively and is currently on keppra under the care of her neurologist, Dr. Alisha Suarez. Per , she has been hospitalized several times since November at [facility] and has had a significant physical decline since November.\par She now primarily uses a wheelchair.\par She also has persistent RIGHT arm tremor, which was not seen prior as well as occasional RIGHT facial twitching.\par \par The  states she has had imaging of her brain done while at [facility] but does not have disc. He states he was not told that imaging was normal.\par She has follow up with her neurologist, Dr. Suarez later this week.\par She is currently on vimpat, keppra and valproic acid for seizure management.\par She is currently in rehab at [place] to help improve strength, but per her  he has not seen much improvement in the past 8 weeks she has been there.\par \par She continues to have LEFT arm and leg weakness.

## 2019-03-25 NOTE — REVIEW OF SYSTEMS
[As Noted in HPI] : as noted in HPI [Arm Weakness] : arm weakness [Hand Weakness] :  hand weakness [Leg Weakness] : leg weakness [Poor Coordination] : poor coordination [Seizures] : convulsions [Difficulty Walking] : difficulty walking [Negative] : Heme/Lymph

## 2019-04-01 NOTE — PROGRESS NOTE ADULT - SUBJECTIVE AND OBJECTIVE BOX
Collaborating MD - María Araujo M.D.    SUBJECTIVE:   Marleni Louis is a 34 year old female    who presents for her annual well woman exam. Stopped OCPs 10/2018 would like to go back on. Denies any contraindications.   Pt was seen by Dr. Sujey Gilmore last year and was told she had fibroid uterus, would like follow up u/s now for evaluation. Last pap possibly 2017, no records available.   Patient's last menstrual period was 2019 (approximate).    Obstetric History       T1      L1     SAB0   TAB0   Ectopic0   Molar0   Multiple0   Live Births1      GYN History:   Bleeding pattern: regular monthly menses, Q 28d/4-5d/moderate flow  Current Contraception: condoms  Pap History: Negative for intraepithelial lesion or malignancy  Date:     Current Outpatient Medications   Medication Sig Dispense Refill   • LORazepam (ATIVAN) 1 MG tablet   3   • mirtazapine (REMERON) 15 MG tablet Take 15 mg by mouth nightly.     • traZODone (DESYREL) 100 MG tablet Take 100 mg by mouth nightly.     • OMEPRAZOLE PO        No current facility-administered medications for this visit.      ALLERGIES:   Allergen Reactions   • Clindamycin RASH      There is no problem list on file for this patient.    Past Medical History:   Diagnosis Date   • Acid reflux    • Anxiety    • Depressive disorder       Past Surgical History:   Procedure Laterality Date   •  section, low transverse     • Cholecystectomy       Family History   Problem Relation Age of Onset   • Hypertension Father      Social History     Socioeconomic History   • Marital status: Significant other     Spouse name: Not on file   • Number of children: Not on file   • Years of education: Not on file   • Highest education level: Not on file   Social Needs   • Financial resource strain: Not on file   • Food insecurity - worry: Not on file   • Food insecurity - inability: Not on file   • Transportation needs - medical: Not on file   • Transportation  needs - non-medical: Not on file   Occupational History   • Not on file   Tobacco Use   • Smoking status: Former Smoker   • Smokeless tobacco: Never Used   Substance and Sexual Activity   • Alcohol use: No     Frequency: Never   • Drug use: No   • Sexual activity: Yes     Partners: Male     Birth control/protection: Condom   Other Topics Concern   • Not on file   Social History Narrative   • Not on file       Depression Screening:  Over the past 2 weeks, has patient felt down, depressed or hopeless? No  Over the past 2 weeks, has patient felt little interest or pleasure in doing things? No    On the basis of the above screen, the following is initiated:  normal    PREVENTATIVE MEDICINE:  Does patient exercise? no  Was counseling given: Yes     GYNE ROS:   Vaginal symptoms: none.  Vulvar symptoms: none.  Discharge described as: normal and physiologic.    ROS:   No headaches  No unexplained chest pain, dyspnea, SOB  No abdominal pain  No bowel or bladder complaints  All other systems reviewed are negative    OBJECTIVE:  Visit Vitals  /68 (BP Location: [facility], Patient Position: Sitting, Cuff Size: Regular)   Ht 5' 5\" (1.651 m)   Wt 80.7 kg (178 lb)   LMP 03/22/2019 (Approximate)   BMI 29.62 kg/m²     Marleni's BMI is Body mass index is 29.62 kg/m²., which is and outside of normal parameters.  Patient counseled on nutrition, exercise and healthy lifestyle.    OBGyn Exam    Alert and oriented x 3.   General exam: Patient appears well.  Neck supple, no adenopathy or masses noted in neck or supraclavicular regions.  Thyroid is not enlarged.   Chest is clear.   Heart sounds are normal without murmur. RRR.   Abdomen is normal, soft without masses, organomegaly or tenderness.   Skin: no rashes or suspicious skin lesions noted.    Breast Exam: breasts symmetric, no dominant or suspicious mass, no skin or nipple changes, no axillary adenopathy and self exam is taught and encouraged.    Pelvic Exam: Genitalia  -- External  to follow consult note - Neurosurgery   Patient evaluated at bedside. She was sleeping and hard to arousal, most likely due to morphine and percocet given in short  interval.  When awake she participates properly in exam. Denies pain, headache, photophobia.  ID note appreciated. Galium scan pending.  PE: A&O x 3, NAD. Mild left  facial asymmetry. PERRL, EOMI.  Head: right craniectomy flat full, soft, nontender. Staples removed.  Ext:  LUE hemiparesis with withdrawal to noxious. 1/5 left shoulder strength. Triple flexion of LLE. Moving RUE and RLE with 5/5 strength and following commands.   Head CT of 6/14: mpression: Right frontal parietal temporal craniectomy. Interval   appearance of a right frontal temporal low density subdural collection   with distortion and mass effect on the previously placed overlying mesh.   Interval marked increase in size of a right frontal extracranial fluid   collection. Interval appearance of a tiny amount of subdural fluid seen   within the intrahemispheric fissure and along the inferior posterior   temporal lobe.    Subacute infarction in the right MCA/ICA and PCA distribution with   gyriform hyperdensity that  may represent spared cortex versus hemorrhage, unchanged.    Assessment: S/P day  right decompression craniectomy for R MCA territory infarc:t with left hemiparesis.  Plan: Need helmet for OOB and transportation.  Medical management per neurology  Continue keppra  PT/OT  Patient will need to follow up with Dr. Johnson for cranioplasty.  KARELY Johnson Genitalia: Normal appearance and hair distribution.  No lesions noted  -- Urethral Meatus: Normal size and location, no lesions or prolapse.    Pelvic Exam with speculum  -- Urethra: No masses, tenderness or scarring  -- Bartholin normal  -- Vagina: Normal general appearance, no discharge or lesions.  Adequate pelvic support.  No cystocele or rectocele found  -- Cervix: Normal appearance without lesions or discharge  -- Uterus: Normal size, contour, position, mobility, and support.  No tenderness  -- Adnexa:  No masses, tenderness, organomegaly or nodularity noted    Pap smear collected: was collected with broom  prepared,sent to lab for processing.     ASSESSMENT/PLAN:  1. Well woman exam with routine gynecological exam  Pap guidelines discussed, pap done.   Reviewed risks/benefits and bleeding patterns of OCPs discussed.   Rx sent to pharmacy of pt's choice.     2. Uterine leiomyoma, unspecified location  Will get u/s to evaluate uterus for fibroids.  Discussed will call with results when they are available.     Preventative topics discussed:Contraceptive counseling, Pap results in 7-10 days, patient will be notified, patient instructed to call in 2 wks if no notification. and Return for annual GYN exam in one year or earlier with any additional concerns.     Aminata Dobson, CNP    Supervising physician: María Araujo M.D.

## 2019-04-15 ENCOUNTER — OUTPATIENT (OUTPATIENT)
Dept: OUTPATIENT SERVICES | Facility: HOSPITAL | Age: 59
LOS: 1 days | End: 2019-04-15
Payer: COMMERCIAL

## 2019-04-15 ENCOUNTER — APPOINTMENT (OUTPATIENT)
Dept: NEUROSURGERY | Facility: CLINIC | Age: 59
End: 2019-04-15
Payer: COMMERCIAL

## 2019-04-15 ENCOUNTER — APPOINTMENT (OUTPATIENT)
Dept: CT IMAGING | Facility: HOSPITAL | Age: 59
End: 2019-04-15
Payer: COMMERCIAL

## 2019-04-15 VITALS
WEIGHT: 156 LBS | OXYGEN SATURATION: 97 % | SYSTOLIC BLOOD PRESSURE: 96 MMHG | DIASTOLIC BLOOD PRESSURE: 66 MMHG | BODY MASS INDEX: 24.48 KG/M2 | HEIGHT: 67 IN | RESPIRATION RATE: 16 BRPM | HEART RATE: 97 BPM

## 2019-04-15 DIAGNOSIS — Z90.12 ACQUIRED ABSENCE OF LEFT BREAST AND NIPPLE: Chronic | ICD-10-CM

## 2019-04-15 DIAGNOSIS — Z98.891 HISTORY OF UTERINE SCAR FROM PREVIOUS SURGERY: Chronic | ICD-10-CM

## 2019-04-15 DIAGNOSIS — Z41.9 ENCOUNTER FOR PROCEDURE FOR PURPOSES OTHER THAN REMEDYING HEALTH STATE, UNSPECIFIED: Chronic | ICD-10-CM

## 2019-04-15 PROCEDURE — 99215 OFFICE O/P EST HI 40 MIN: CPT

## 2019-04-15 PROCEDURE — 70450 CT HEAD/BRAIN W/O DYE: CPT

## 2019-04-15 PROCEDURE — 70450 CT HEAD/BRAIN W/O DYE: CPT | Mod: 26

## 2019-04-16 NOTE — REVIEW OF SYSTEMS
[Migraine Headache] : migraine headaches [As Noted in HPI] : as noted in HPI [Negative] : Heme/Lymph

## 2019-04-18 NOTE — HISTORY OF PRESENT ILLNESS
[FreeTextEntry1] :  58 year old woman with history of breast cancer, s/p TRAM flap reconstruction who was started on plavix and developed a large intramuscular hematoma in the rectus abdominis. She went for angiogram and thrombin was injected into two psuedoaneurysms. Post-procedure, the patient had altered mental status and LEFT sided weakness. CT showed large RIGHT MCA and small SELINA infarcts with mass effect.\par \par She is s/p decompressive RIGHT hemicraniectomy on 5/30/18. Her hospitalization was complicated by fevers and she was treated for UTI, bacterial vaginosis and possible some microaspiration. She improved clinically and was discharged to sub-acute rehab. \par \par \par OFFICE NOTE 11/12/18:\par Returns to the office for one month follow up.\par Remains on keppra 1500 mg bid.\par Denies seizures, dizziness, nausea, vomiting.\par Continued LEFT arm weakness, improving with physical therapy and occupational therapy.\par Reports intermittent headache since initial surgery.\par Incision well-healed, denies s/s infection.\par Has reported some blurry vision in both eyes since initial stroke in July. Has not followed up with ophthalmologist. \par \par OFFICE VISIT 10/1/18: \par Seen in the office last Monday postoperatively. \par She reported to [facility] ED last Sunday, 9/30, due to transient episode where she became unable to speak and was staring. It was witnessed by her  and he called EMS. She was loaded with keppra and her dose was increased to 1500 mg bid. \par She returns today and denies similar episodes. Denies seizures. Reports mild, occasional headaches relieved with tylenol. \par She is here for staple removal. Incision line intact with no s/s infection. She has not restarted ASA yet.\par Denies any signs of postop wound infection which could include but is not limited to redness/swelling/purulent drainage\par Denies CP/SOB/unilateral leg edema. Denies nausea, vomiting, dizziness, weakness. \par He is slowly introducing preop activities. Continues with occupational therapy and physical therapy.\par \par OFFICE VISIT 9/5/18:\par Patient states she has bee doing wel at home. Denies recent seizures, headaches, dizziness, nausea, vomiting. She has residual LEFT upper extremity weakness. She has been compliant with wearing her helmet.\par \par OFFICE VISIT 7/23/18: She has been doing well. She has significant left arm weakness, although she states her strength has somewhat improved from physical therapy. Denies left lower extremity weakness. She has a facial droop and gait imbalance which has resulted in falls when not assisted. Reports she has been eating and drinking regularly and denies difficulty swallowing. She does report some involuntary tremors in the RIGHT hand and the LEFT leg. She states she had EEG while in rehab, which was normal. She has been compliant with wearing her helmet and her surgical incision is healing well.

## 2019-04-18 NOTE — REASON FOR VISIT
[Follow-Up: _____] : a [unfilled] follow-up visit [Family Member] : family member [FreeTextEntry1] : TODAY'S VISIT:\tracey Returns with CT head without contrast for review done earlier today.\par She does report daily headaches, relieved by tylenol.\par Tremors remain the same.\par She reports persistent difficulty walking and unsteady gait. \par Denies dizziness, recent seizures.\par \par PREVIOUS OFFICE VISIT 3/25/19:\tracey Returns today for follow up s/p RIGHT cranioplasty on 9/27/18.\par She has had seizures postoperatively and is currently on keppra under the care of her neurologist, Dr. Alisha Suarez. Per , she has been hospitalized several times since November at [facility] and has had a significant physical decline since November.\par She now primarily uses a wheelchair.\par She also has persistent RIGHT arm tremor, which was not seen prior as well as occasional RIGHT facial twitching.\par The  states she has had imaging of her brain done while at [facility] but does not have disc. He states he was not told that imaging was normal.\par She has follow up with her neurologist, Dr. Suarez later this week.\par She is currently on vimpat, keppra and valproic acid for seizure management.\par She is currently in rehab at [facility] to help improve strength, but per her  he has not seen much improvement in the past 8 weeks she has been there.\par She continues to have LEFT arm and leg weakness.

## 2019-04-18 NOTE — DATA REVIEWED
[de-identified] : EXAM: CT BRAIN \par \par PROCEDURE DATE: 04/15/2019 \par \par \par \par INTERPRETATION: INDICATIONS: Seizures and headache status post cranioplasty \par \par TECHNIQUE: Serial axial images were obtained from the skull base to the \par vertex without the use of intravenous contrast. Sagittal and coronal images \par were reformatted. \par \par COMPARISON EXAMINATION: CT head 9/30/2018 \par \par FINDINGS: \par VENTRICLES AND SULCI: Parenchymal volume loss is commensurate patient age. \par However, there is dilation of the third andlateral ventricles slightly \par increased from prior exam due in part to ex-vacuo phenomenon from right \par cerebral volume loss related to chronic right MCA territory infarct \par involving the right frontal, temporal, parietal lobes. \par INTRA-AXIAL: There is significant encephalomalacia and gliosis of the right \par frontal, temporal, and parietal lobes due to large MCA infarct which is more \par well defined than on prior exam. There is asymmetrically smaller right \par cerebral peduncle consistent with Wallerian degeneration. Chronic left \par anterior thalamic lacunar infarct is again noted. There is no intracranial \par mass, acute hemorrhage, midline shift or acute transcortical infarct is seen. \par EXTRA-AXIAL: No extra-axial fluid collection is present. \par VISUALIZED SINUSES: No air-fluid levels are identified. \par VISUALIZED MASTOIDS: Clear. \par CALVARIUM: Status post right frontotemporoparietal craniectomy with \par cranioplasty and interval removal subgaleal drain. \par MISCELLANEOUS: None. \par \par IMPRESSION: \par 1. Evolving encephalomalacia and gliosis of the right frontal, temporal, \par parietal lobes related to prior MCA infarct with ex vacuo dilatation of the \par lateral ventricles and third ventricle, slightly increased from prior, and \par right Wallerian degeneration. \par 2. No acute intracranial hemorrhage or new large territory infarct. \par \par \par \par \par \par "Thank you for the opportunity to participate in the care of this patient." \par \par WILLIAMS DAMIAN M.D., RADIOLOGY RESIDENT \par This document has been electronically signed. \par BRUCE NGUYEN M.D., ATTENDING RADIOLOGIST \par This document has been electronically signed. Apr 15 2019 3:37PM \par

## 2019-04-18 NOTE — ADDENDUM
[FreeTextEntry1] : Re:	Esperanza Calloway  \par :	56 \par MRN:  [id number] \par  \par Ms. Calloway is a 59-year-old woman who presents for followup. She had large right hemispheric stroke requiring hemicraniectomy and then cranioplasty in 2018.  She has since then been in rehabilitation. She has had multiple seizures which now seem to be under control with Keppra.   \par  \par She initially had improved significantly in terms of neurological functioning after cranioplasty, including the ability to ambulate and was doing well at home.  However, she seems to have had some insidious recrudescence likely from a combination of her initial stroke, seizures, and deconditioning.  She is unable to walk at this point and she suffers from debilitating upper extremity tremors, particularly on the right side. She indicates that she has daily headaches as well. \par  \par Followup head CT scan was performed to work up these progressive symptoms, and there is some indication of mild increase in ventricular size.  To further evaluate this issue, I would like for her to obtain a lumbar puncture for opening pressure.  If the pressure is elevated, she may in fact have symptomatic hydrocephalus and be a candidate for  shunt.  If the pressure is low, then she may need further neurological workup or just therapy for her the suboptimal neurological rehabilitation.   \par  \par She will follow up with me after the results of her LP have been reported. \par  \par  \par  \par West Johnson MD \par  \par KELBY/[facility] #[id number] \par

## 2019-04-18 NOTE — PHYSICAL EXAM
[General Appearance - In No Acute Distress] : in no acute distress [General Appearance - Alert] : alert [Well-Healed] : well-healed [Normal Skin] : normal [Oriented To Time, Place, And Person] : oriented to person, place, and time [Cranial Nerves Oculomotor (III)] : extraocular motion intact [Cranial Nerves Optic (II)] : visual acuity intact bilaterally,  pupils equal round and reactive to light [Cranial Nerves Vestibulocochlear (VIII)] : hearing was intact bilaterally [Cranial Nerves Glossopharyngeal (IX)] : tongue and palate midline [Cranial Nerves Hypoglossal (XII)] : there was no tongue deviation with protrusion [Cranial Nerves Accessory (XI - Cranial And Spinal)] : head turning and shoulder shrug symmetric [Sclera] : the sclera and conjunctiva were normal [Extraocular Movements] : extraocular movements were intact [Outer Ear] : the ears and nose were normal in appearance [] : no respiratory distress [Neck Appearance] : the appearance of the neck was normal [Respiration, Rhythm And Depth] : normal respiratory rhythm and effort [Heart Rate And Rhythm] : heart rate was normal and rhythm regular [Skin Color & Pigmentation] : normal skin color and pigmentation [FreeTextEntry1] : RIGHT head [FreeTextEntry6] : decreased strength in LEFT upper and lower extremity 3/5\par persistent RIGHT arm tremor [FreeTextEntry5] : LEFT facial droop

## 2019-04-18 NOTE — ASSESSMENT
[FreeTextEntry1] : CT head done today reviewed by Dr. Johnson with patient, demonstrates ventriculomegaly.\par Recommend LP for evaluation of opening pressure for further evaluation.\par If opening pressure elevated, will consider  shunt placement.\par Will set up lumbar puncture with interventional radiology.\par Discussed plan with patient, patient's aide.\par Discussed plan of care with patient's , Richard Calloway. \par Multiple questions answered to patient and patient's  satisfaction.\par \par Agreement and understanding verbalized regarding plan of care.

## 2019-04-29 ENCOUNTER — APPOINTMENT (OUTPATIENT)
Dept: NEUROSURGERY | Facility: CLINIC | Age: 59
End: 2019-04-29
Payer: COMMERCIAL

## 2019-04-29 ENCOUNTER — OUTPATIENT (OUTPATIENT)
Dept: OUTPATIENT SERVICES | Facility: HOSPITAL | Age: 59
LOS: 1 days | End: 2019-04-29
Payer: COMMERCIAL

## 2019-04-29 ENCOUNTER — APPOINTMENT (OUTPATIENT)
Dept: INTERVENTIONAL RADIOLOGY/VASCULAR | Facility: HOSPITAL | Age: 59
End: 2019-04-29
Payer: COMMERCIAL

## 2019-04-29 VITALS
BODY MASS INDEX: 24.48 KG/M2 | RESPIRATION RATE: 14 BRPM | DIASTOLIC BLOOD PRESSURE: 75 MMHG | SYSTOLIC BLOOD PRESSURE: 105 MMHG | HEART RATE: 118 BPM | HEIGHT: 67 IN | OXYGEN SATURATION: 92 % | WEIGHT: 156 LBS | TEMPERATURE: 98.2 F

## 2019-04-29 DIAGNOSIS — R51 HEADACHE: ICD-10-CM

## 2019-04-29 DIAGNOSIS — Z98.891 HISTORY OF UTERINE SCAR FROM PREVIOUS SURGERY: Chronic | ICD-10-CM

## 2019-04-29 DIAGNOSIS — Z41.9 ENCOUNTER FOR PROCEDURE FOR PURPOSES OTHER THAN REMEDYING HEALTH STATE, UNSPECIFIED: Chronic | ICD-10-CM

## 2019-04-29 DIAGNOSIS — Z90.12 ACQUIRED ABSENCE OF LEFT BREAST AND NIPPLE: Chronic | ICD-10-CM

## 2019-04-29 PROCEDURE — 99215 OFFICE O/P EST HI 40 MIN: CPT

## 2019-04-29 PROCEDURE — 77003 FLUOROGUIDE FOR SPINE INJECT: CPT

## 2019-04-29 PROCEDURE — 62270 DX LMBR SPI PNXR: CPT

## 2019-04-29 PROCEDURE — 77003 FLUOROGUIDE FOR SPINE INJECT: CPT | Mod: 26

## 2019-04-30 PROBLEM — R51 HEADACHE: Status: ACTIVE | Noted: 2019-04-16

## 2019-04-30 NOTE — PHYSICAL EXAM
[General Appearance - Alert] : alert [General Appearance - In No Acute Distress] : in no acute distress [Oriented To Time, Place, And Person] : oriented to person, place, and time [Motor Tone] : muscle tone was normal in all four extremities [Motor Strength] : muscle strength was normal in all four extremities [Sclera] : the sclera and conjunctiva were normal [Outer Ear] : the ears and nose were normal in appearance [Neck Appearance] : the appearance of the neck was normal [] : no respiratory distress [Respiration, Rhythm And Depth] : normal respiratory rhythm and effort [Heart Rate And Rhythm] : heart rate was normal and rhythm regular [Abdomen Soft] : soft [Abnormal Walk] : normal gait [Skin Color & Pigmentation] : normal skin color and pigmentation

## 2019-05-02 NOTE — ADDENDUM
[FreeTextEntry1] : 	2019 \par  \par  \par  \par Re:	Esperanza Calloway  \par :	56 \par MRN:  [id number] \par  \par Ms. Calloway is a 59-year-old woman who presents for followup.  She was last seen in the office on April 15 at which time I noted that she had suboptimal neurological rehabilitation, and her CT head was notable for ventriculomegaly.  I recommended LP at that time in order to determine whether she was suffering the effects of high pressure hydrocephalus. \par  \par She presents today after undergoing LP, which showed an opening pressure of 19 cm H2O.  I discussed with Ms. Calloway and her  that an opening pressure of 19 is somewhat elevated but technically acceptable.  I discussed the possibility of performing a ventriculoperitoneal shunt in order to reduce the intracranial pressures and potentially help with her neurological recovery.  I discussed the details of a  shunt including possible risks including, but not limited to failure to improve neurologically, infection, bleeding, need for reoperation, abdominal perforation, need for removal, neurological injury, stroke, death.   \par  \par The patient and  understand these risks.  At this time, they would like to hold off on proceeding with  shunt.  I have indicated that although her intracranial pressures are somewhat elevated and there is evidence of hydrocephalus, that it is reasonable to hold off given that there is no guarantee she will improve neurologically with shunting. \par  \par I have recommended that they continue to consider their options, and Ms. Calloway will follow up with me in three months unless new symptoms arise sooner. \par  \par  \par West Johnson MD \par  of Neurosurgery \par [facility] [employer] [facility] \par Department of Neurosurgery \par [facility] \par [address] \par [facility], [address] Floor \[place] [address] \par Tel: [phone] \par  \par KELBY/georgi Doc[facility] #[id number] \par

## 2019-05-02 NOTE — ASSESSMENT
[FreeTextEntry1] : Discussed findings of ventriculomegaly on CT head. LP demonstrates elevated opening pressure.\par Discussed  shunt placement for hydrocephalus - risks, benefits and alternatives to  shunt discussed. Risks include but are not limited to bleeding, infection, no resolution of symptoms.\par Multiple questions answered to patient and patient's 's satisfaction.\par The patient and her  would like to consider options and do not want to proceed with  shunt placement at this time.\par Contact information provided if patient has any further questions or concerns.\par The patient will return to the office in 3 months for follow up (July 2019), or sooner if she decides to proceed with  shunt placement. \par \par Patient and patient's  verbalize agreement and understanding with plan. \par

## 2019-05-02 NOTE — REASON FOR VISIT
[Follow-Up: _____] : a [unfilled] follow-up visit [FreeTextEntry1] : Returns after LP.\par \par Opening pressure = 19\par \par She comes to the visit with her  to discuss treatment recommendations.\par She reports daily headaches, relieved by tylenol.\par She continues to have tremors. Patient's  reports her neurologist is increasing vimpat to help with tremors.

## 2019-06-18 NOTE — PROGRESS NOTE ADULT - SUBJECTIVE AND OBJECTIVE BOX
Thank you for completing pulmonary function testing today.  All results will be scanned into your epic results for your doctor to review.  Please resume taking all your current prescribed medications and diet as directed by your provider.   If you have not heard from your provider about your testing within two weeks and do not have a follow-up appointment scheduled with them please contact your provider about any questions you have concerning your testing.   Thank you  [facility] Pulmonary Function Lab    SUBJECTIVE: Pt seen and examined at bedside. No overnight events.   Patient awake and cooperative in am. Reports no abdominal pain, nausea or emesis.    Vital Signs Last 24 Hrs  T(C): 37 (2018 08:55), Max: 38.1 (2018 14:00)  T(F): 98.6 (2018 08:55), Max: 100.5 (2018 14:00)  HR: 74 (2018 12:00) (56 - 80)  BP: 170/84 (2018 12:00) (128/89 - 179/84)  BP(mean): 103 (2018 12:00) (92 - 125)  RR: 16 (2018 12:00) (9 - 25)  SpO2: 100% (2018 12:00) (99% - 100%)    PHYSICAL EXAM    Gen: NAD, awake, following commands, answering questions  CV: normocardic, hypertensive  Pulm: intubated  Abd: Soft, non-distended, non-tender throughout, no rebound or guarding  Ext: [facility]        LABS:                        8.2    10.3  )-----------( 344      ( 2018 05:53 )             26.1     06-06    147<H>  |  112<H>  |  11  ----------------------------<  145<H>  3.8   |  25  |  0.43<L>    Ca    9.0      2018 05:52  Phos  2.7     06-06  Mg     2.2     06-06        Urinalysis Basic - ( 2018 09:58 )    Color: Yellow / Appearance: Clear / S.020 / pH: x  Gluc: x / Ketone: NEGATIVE  / Bili: Negative / Urobili: 0.2 E.U./dL   Blood: x / Protein: NEGATIVE mg/dL / Nitrite: NEGATIVE   Leuk Esterase: NEGATIVE / RBC: x / WBC x   Sq Epi: x / Non Sq Epi: x / Bacteria: x        ASSESSMENT AND PLAN  58F with a hx of TRAM flap reconstruction on plavix now with a spontaneous right rectus expanding hematoma taken to IR for Rt groin access for dx angio, and rt abd percutaneous thrombin injection of 2 pseudoaneurysms in abd wall c/b herniation of cerebral tonsils, transferred to neurosurgery for decompressive hemicraniectomy     Abdominal exam benign, no signs of enlarging hematoma, Hgb stable  Hgb trend, transfuse as needed  No need for surgical intervention  Team 4c will follow  Discussed with chief resident

## 2019-09-23 ENCOUNTER — APPOINTMENT (OUTPATIENT)
Dept: NEUROSURGERY | Facility: CLINIC | Age: 59
End: 2019-09-23

## 2020-02-07 ENCOUNTER — APPOINTMENT (OUTPATIENT)
Dept: NEUROSURGERY | Facility: CLINIC | Age: 60
End: 2020-02-07
Payer: COMMERCIAL

## 2020-02-07 VITALS
SYSTOLIC BLOOD PRESSURE: 120 MMHG | DIASTOLIC BLOOD PRESSURE: 69 MMHG | OXYGEN SATURATION: 95 % | BODY MASS INDEX: 24.48 KG/M2 | RESPIRATION RATE: 16 BRPM | WEIGHT: 156 LBS | HEART RATE: 73 BPM | HEIGHT: 67 IN

## 2020-02-07 DIAGNOSIS — Z98.890 OTHER SPECIFIED POSTPROCEDURAL STATES: ICD-10-CM

## 2020-02-07 DIAGNOSIS — Z09 ENCOUNTER FOR FOLLOW-UP EXAMINATION AFTER COMPLETED TREATMENT FOR CONDITIONS OTHER THAN MALIGNANT NEOPLASM: ICD-10-CM

## 2020-02-07 DIAGNOSIS — R56.9 UNSPECIFIED CONVULSIONS: ICD-10-CM

## 2020-02-07 DIAGNOSIS — I69.354 HEMIPLEGIA AND HEMIPARESIS FOLLOWING CEREBRAL INFARCTION AFFECTING LEFT NON-DOMINANT SIDE: ICD-10-CM

## 2020-02-07 DIAGNOSIS — G91.9 HYDROCEPHALUS, UNSPECIFIED: ICD-10-CM

## 2020-02-07 PROCEDURE — 99215 OFFICE O/P EST HI 40 MIN: CPT

## 2020-02-07 NOTE — ASSESSMENT
[FreeTextEntry1] : GIven patient's prior clinical history of hydrocephalus, Recommend MRI brain without contrast with CSF flow study in one month.\par Return to the office after MRI brain complete.\par Once MRI complete, return to the office to review with Dr. Johnson.\par Education provided regarding plan of care.\par Patient and patient's friend verbalize agreement and understanding with plan.

## 2020-02-07 NOTE — PHYSICAL EXAM
[General Appearance - Alert] : alert [General Appearance - In No Acute Distress] : in no acute distress [Dry] : dry [Clean] : clean [No Drainage] : without drainage [Intact] : intact [Oriented To Time, Place, And Person] : oriented to person, place, and time [Sclera] : the sclera and conjunctiva were normal [Neck Appearance] : the appearance of the neck was normal [Outer Ear] : the ears and nose were normal in appearance [] : no respiratory distress [Respiration, Rhythm And Depth] : normal respiratory rhythm and effort [Abdomen Soft] : soft [Heart Rate And Rhythm] : heart rate was normal and rhythm regular [Skin Color & Pigmentation] : normal skin color and pigmentation [Abnormal Walk] : normal gait [FreeTextEntry1] : RIGHT  [FreeTextEntry5] : LEFT sided weakness

## 2021-01-20 NOTE — SWALLOW FEES ASSESSMENT ADULT - PHARYNGEAL PHASE COMMENTS
Area code 787 is [place]- we need to use interpretor services to call this area code.     I left voicemail for pt to return call to discuss results.    Swallow was triggered at the level of the pyriform sinuses with thin liquids, and inconsistently with solids. Hyolaryngeal elevation, epiglottic retroflexion and pharyngeal squeeze were intact. There was one episode of penetration of thin liquid. No aspiration was observed, although Pt was noted to cough frequently after thin liquid trials, and trace aspiration during the swallow cannot be ruled out d/t white-out during the swallow.

## 2022-05-08 NOTE — PROGRESS NOTE ADULT - PROBLEM SELECTOR PLAN 6
"Routing refill request to provider for review/approval because:  BP out of range      Requested Prescriptions   Pending Prescriptions Disp Refills     propranolol ER (INDERAL LA) 160 MG 24 hr capsule [Pharmacy Med Name: PROPRANOLOL HCL ER 160MG CP24] 90 capsule 0     Sig: TAKE 1 CAPSULE (160 MG) BY MOUTH DAILY       Beta-Blockers Protocol Failed - 5/6/2022  8:18 AM        Failed - Blood pressure under 140/90 in past 12 months     BP Readings from Last 3 Encounters:   01/03/22 (!) 143/97   12/27/20 126/84                 Passed - Patient is age 6 or older        Passed - Recent (12 mo) or future (30 days) visit within the authorizing provider's specialty     Patient has had an office visit with the authorizing provider or a provider within the authorizing providers department within the previous 12 mos or has a future within next 30 days. See \"Patient Info\" tab in inbasket, or \"Choose Columns\" in Meds & Orders section of the refill encounter.              Passed - Medication is active on med list           Erika Levin RN      "
SQH/SCDs.  No GI ppx indicated.
SQH/SCDs.  No GI ppx indicated.
F: no IVF indicated  E: Replete electrolytes PRN, K>4, Mg>2  N: Dysphagia
- replete prn   - no IVF  - mechanical soft, nectar thick
- replete prn   - no IVF  - mechanical soft, nectar thick
F: no IVF indicated  E: Replete electrolytes PRN, K>4, Mg>2  N: Dysphagia
F: no IVF indicated  E: Replete electrolytes PRN, K>4, Mg>2  N: Dysphagia
SQH/SCDs.  No GI ppx indicated.
- replete prn   - IVF @ 73  - NPO pending fees
F: no IVF indicated  E: Replete electrolytes PRN, K>4, Mg>2  N: Dysphagia

## 2022-11-19 NOTE — OCCUPATIONAL THERAPY INITIAL EVALUATION ADULT - PHYSICAL ASSIST/NONPHYSICAL ASSIST:DRESS UPPER BODY, OT EVAL
[facility] [place] [place] History & Physical     S/p incisional hernia repair     History Of Present Illness  Wilber is a 51 year old male with past medical history of hypertension, diabetes and colon cancer s/p resection was admitted post repair of incisional hernia with mesh. Patient is 1 year postop from colon resection and developed an increasingly problematic and symptomatic incisional hernia. Underwent hernia repair on 11/18. Seen and examined on POD#1, reported dizziness and nausea after attempting to ambulate. States that pain is reasonably well controlled but due to nausea has been unable to tolerate anything by mouth. Denies any chest pain or shortness of breath. Vitals were reviewed and unremarkable.     Review of Systems   Constitutional: Negative for activity change, appetite change, chills, fatigue and fever.   HENT: Negative for congestion, ear pain, facial swelling, sinus pressure, sore throat, tinnitus and trouble swallowing.    Eyes: Negative for discharge, redness, itching and visual disturbance.   Respiratory: Negative for apnea, cough, chest tightness, shortness of breath and wheezing.    Cardiovascular: Negative for chest pain, palpitations and leg swelling.   Gastrointestinal: Positive for abdominal pain and nausea. Negative for abdominal distention, constipation, diarrhea and vomiting.   Endocrine: Negative for cold intolerance, heat intolerance, polydipsia, polyphagia and polyuria.   Genitourinary: Negative for difficulty urinating, dysuria, flank pain, frequency and hematuria.   Musculoskeletal: Negative for arthralgias, back pain, gait problem and myalgias.   Skin: Negative for color change and rash.   Allergic/Immunologic: Negative for environmental allergies and food allergies.   Neurological: Positive for dizziness. Negative for syncope, speech difficulty, weakness and headaches.   Hematological: Negative for adenopathy. Does not bruise/bleed easily.   Psychiatric/Behavioral: Negative  for agitation, behavioral problems, confusion and sleep disturbance. The patient is not nervous/anxious.           Past Medical History  Past Medical History:   Diagnosis Date   • Colon cancer (CMS/HCC) 08/10/2021   • Colon polyps    • Diabetes mellitus (CMS/HCC)    • Erectile dysfunction due to arterial insufficiency    • Essential (primary) hypertension    • High cholesterol    • Induratio penis plastica    • Malignant neoplasm (CMS/HCC)    • Obesity    • Tachycardia         Surgical History  Past Surgical History:   Procedure Laterality Date   • ------------other-------------  08/30/2021    Laparoscopy with lysis of adhesions for greater than 45 minutes. Robotic converted to open extended right hemicolectomy. Primary repair of incisional hernia.    • Colon surgery Right 2021    hemicolectomy   • Colonoscopy diagnostic  02/17/2022   • Colonoscopy w biopsy  11/12/2019    tubular adenoma   • Colonoscopy w biopsy  08/10/2021    adenocarcinoma   • Hernia repair  08/30/2021   • Hernia repair  08/2008   • Hernia repair  1999   • Sleeve gastroplasty      2015    • Tonsillectomy          Social History  Social History     Tobacco Use   • Smoking status: Never Smoker   • Smokeless tobacco: Never Used   Vaping Use   • Vaping Use: never used   Substance Use Topics   • Alcohol use: Yes     Comment: occ   • Drug use: No       Family History  Family History   Problem Relation Age of Onset   • Diabetes Mother    • Hypertension Mother    • Heart disease Mother    • Hepatitis C Mother    • Diabetes Father    • Cancer, Breast Paternal Aunt         Allergies  ALLERGIES:  Patient has no known allergies.    Home Medications  Medications Prior to Admission   Medication Sig Dispense Refill   • NovoLIN 70/30 FlexPen (70-30) 100 UNIT/ML pen-injector Inject 50 Units into the skin daily.     • amLODIPine (NORVASC) 5 MG tablet Take 1 tablet by mouth daily. 30 tablet 1   • semaglutide,0.25 or 0.5 mg/DOSE, (OZEMPIC) (1.34 mg/ml) 0.25 or 0.5  MG/DOSE injection Inject into the skin every 7 days.     • fenofibrate 160 MG tablet TAKE 1 TABLET BY MOUTH EVERY DAY 90 tablet 0   • atorvastatin (LIPITOR) 40 MG tablet TAKE 1 TABLET BY MOUTH EVERY DAY 90 tablet 0   • lisinopril (ZESTRIL) 40 MG tablet TAKE 1 TABLET BY MOUTH EVERY DAY 90 tablet 0   • hydroCHLOROthiazide (HYDRODIURIL) 12.5 MG tablet TAKE 1 TABLET BY MOUTH EVERY DAY 90 tablet 1   • pentoxifylline (TRENtal) 400 MG CR tablet      • blood glucose (Accu-Chek Bonnie Plus) test strip Test blood sugar 1 times daily as directed. Diagnosis: diabetes type 2 insulin dependent. Meter: accu-chek bonnie 100 strip 11   • JARDIANCE 25 MG tablet Take 25 mg by mouth daily (before breakfast).         Inpatient Medications  Current Facility-Administered Medications   Medication Dose Route Frequency Provider Last Rate Last Admin   • acetaminophen (TYLENOL) tablet 1,000 mg  1,000 mg Oral 3 times per day Jose Alfredo Sahu MD   1,000 mg at 11/19/22 0547   • sodium chloride (PF) 0.9 % injection 2 mL  2 mL Intracatheter 2 times per day Jose Alfredo Sahu MD       • enoxaparin (LOVENOX) injection 40 mg  40 mg Subcutaneous Daily Jose Alfredo Sahu MD   40 mg at 11/19/22 1000   • amLODIPine (NORVASC) tablet 5 mg  5 mg Oral Daily Jose Alfredo Sahu MD   5 mg at 11/19/22 0959   • atorvastatin (LIPITOR) tablet 40 mg  40 mg Oral Daily Jose Alfredo Sahu MD   40 mg at 11/19/22 0959   • hydroCHLOROthiazide (HYDRODIURIL) tablet 12.5 mg  12.5 mg Oral Daily Jose Alfredo Sahu MD   12.5 mg at 11/19/22 1001   • lisinopril (ZESTRIL) tablet 40 mg  40 mg Oral Daily Jose Alfredo Sahu MD   40 mg at 11/19/22 0959   • insulin lispro (ADMELOG,HumaLOG) - Correction Dose   Subcutaneous 4 times per day Jose Alfredo Sahu MD   1 Units at 11/19/22 1101   • polyethylene glycol (MIRALAX) packet 17 g  17 g Oral Daily Malini Galvez MD   17 g at 11/19/22 1000     Current Facility-Administered Medications   Medication Dose Route Frequency Provider Last Rate Last Admin   • metoCLOPramide  (REGLAN) injection 10 mg  10 mg Intravenous Q6H PRN Kateryna Patel DO       • sodium chloride 0.9 % flush bag 25 mL  25 mL Intravenous PRN Jose Alfredo Sahu MD       • dextrose 50 % injection 25 g  25 g Intravenous PRN Jose Alfredo Sahu MD       • dextrose 50 % injection 12.5 g  12.5 g Intravenous PRN Jose Alfredo Sahu MD       • glucagon (GLUCAGEN) injection 1 mg  1 mg Intramuscular PRN Jose Alfredo Sahu MD       • dextrose (GLUTOSE) 40 % gel 15 g  15 g Oral PRN Jose Alfredo Sahu MD       • dextrose (GLUTOSE) 40 % gel 30 g  30 g Oral PRN Jose Alfredo Sahu MD       • nalbuphine (NUBAIN) injection 2.5 mg  2.5 mg Intravenous Q8H PRN Malini Galvez MD       • naLOXone (NARCAN) injection 0.1 mg  0.1 mg Intravenous PRN Malini Galvez MD       • ondansetron (ZOFRAN ODT) disintegrating tablet 4 mg  4 mg Oral Q6H PRN Malini Galvez MD        Or   • ondansetron (ZOFRAN) injection 4 mg  4 mg Intravenous Q12H PRN Malini Galvez MD   4 mg at 11/19/22 0547        Last Recorded Vitals  Visit Vitals  /69   Pulse 96   Temp 98.4 °F (36.9 °C) (Oral)   Resp 18   Ht 6' 1\" (1.854 m)   Wt 127 kg (280 lb)   SpO2 95%   BMI 36.94 kg/m²         Physical Exam  Vitals reviewed.   Constitutional:       General: He is not in acute distress.     Appearance: Normal appearance. He is not toxic-appearing.   HENT:      Head: Normocephalic and atraumatic.      Nose: Nose normal. No congestion.      Mouth/Throat:      Mouth: Mucous membranes are moist.      Pharynx: Oropharynx is clear.      Neck: Neck supple. No muscular tenderness.   Eyes:      Extraocular Movements: Extraocular movements intact.      Conjunctiva/sclera: Conjunctivae normal.      Pupils: Pupils are equal, round, and reactive to light.   Neck:      Vascular: No carotid bruit.   Cardiovascular:      Rate and Rhythm: Normal rate and regular rhythm.      Pulses: Normal pulses.      Heart sounds: No murmur heard.  Pulmonary:      Effort: Pulmonary effort is normal. No respiratory distress.       Breath sounds: Normal breath sounds. No stridor. No wheezing or rhonchi.   Abdominal:      Comments: Decreased bowel sounds, no significant distention, surgical incision intact with dressing in place, + OCTAVIO drains x 3 with serosanguinous output    Musculoskeletal:         General: Normal range of motion.      Comments: No lower extremity edema, no calf tenderness    Lymphadenopathy:      Cervical: No cervical adenopathy.   Skin:     General: Skin is warm and dry.      Findings: No bruising or rash.   Neurological:      General: No focal deficit present.      Mental Status: He is alert and oriented to person, place, and time. Mental status is at baseline.   Psychiatric:         Mood and Affect: Mood normal.         Behavior: Behavior normal.          Labs   Recent Results (from the past 24 hour(s))   GLUCOSE, BEDSIDE - POINT OF CARE    Collection Time: 11/18/22 12:26 PM   Result Value Ref Range    GLUCOSE, BEDSIDE - POINT OF CARE 142 (H) 70 - 99 mg/dL   GLUCOSE, BEDSIDE - POINT OF CARE    Collection Time: 11/18/22  6:55 PM   Result Value Ref Range    GLUCOSE, BEDSIDE - POINT OF CARE 150 (H) 70 - 99 mg/dL   GLUCOSE, BEDSIDE - POINT OF CARE    Collection Time: 11/19/22 12:18 AM   Result Value Ref Range    GLUCOSE, BEDSIDE - POINT OF CARE 186 (H) 70 - 99 mg/dL   Basic Metabolic Panel    Collection Time: 11/19/22  5:21 AM   Result Value Ref Range    Fasting Status      Sodium 137 135 - 145 mmol/L    Potassium 4.6 3.4 - 5.1 mmol/L    Chloride 106 97 - 110 mmol/L    Carbon Dioxide 23 21 - 32 mmol/L    Anion Gap 13 7 - 19 mmol/L    Glucose 210 (H) 70 - 99 mg/dL    BUN 19 6 - 20 mg/dL    Creatinine 0.98 0.67 - 1.17 mg/dL    Glomerular Filtration Rate >90 >=60    BUN/ Creatinine Ratio 19 7 - 25    Calcium 8.3 (L) 8.4 - 10.2 mg/dL   Magnesium    Collection Time: 11/19/22  5:21 AM   Result Value Ref Range    Magnesium 2.0 1.7 - 2.4 mg/dL   CBC No Differential    Collection Time: 11/19/22  5:21 AM   Result Value Ref Range    WBC  14.0 (H) 4.2 - 11.0 K/mcL    RBC 4.44 (L) 4.50 - 5.90 mil/mcL    HGB 13.2 13.0 - 17.0 g/dL    HCT 38.7 (L) 39.0 - 51.0 %    MCV 87.2 78.0 - 100.0 fl    MCH 29.7 26.0 - 34.0 pg    MCHC 34.1 32.0 - 36.5 g/dL     140 - 450 K/mcL    RDW-CV 12.7 11.0 - 15.0 %    RDW-SD 40.1 39.0 - 50.0 fL    NRBC 0 <=0 /100 WBC   GLUCOSE, BEDSIDE - POINT OF CARE    Collection Time: 11/19/22  5:56 AM   Result Value Ref Range    GLUCOSE, BEDSIDE - POINT OF CARE 226 (H) 70 - 99 mg/dL           Imaging  LAST ECHO/ECHO STRESS:  No valid procedures specified.  LAST MRI:  No results found for this or any previous visit.  LAST CT:  === 08/24/22 ===    CT ABDOMEN PELVIS WO CONTRAST    - Narrative -  DATE OF EXAM: 8/24/2022 7:07 AM    EXAMINATION: CT ABDOMEN PELVIS WO CONTRAST    COMPARISON: 08/21/2021    HISTORY: Incisional hernia.  Post several hernia repairs.    TECHNIQUE: Computed tomography images from above the diaphragms to below  the level of pelvis were obtained without intravenous contrast. Standard  dose reduction techniques such as automatic exposure control were utilized.  Reformatted sagittal and coronal images were created from axial images.    FINDINGS:  Visualized lung bases are clear.    Evaluation of the viscera, solid abdominal organs, and GI tract is limited  without intravenous contrast.    There is homogenous liver attenuation. There is no intra or extrahepatic  biliary ductal dilatation. The gallbladder is unremarkable.    The spleen, adrenal glands, and the pancreas are within normal limits.    There is no renal or ureteral calculus or hydronephrosis.    There are postoperative changes of gastric sleeve surgery.  There has been  right hemicolectomy.  There is no bowel obstruction.    The abdominal aorta has normal course and caliber.    There is no ascites, free intra-abdominal air, or intra-abdominal  lymphadenopathy.    The bladder appear normal. The prostate gland has normal size.    There is a small  fat-containing left inguinal hernia.    There is laxity of anterior midline abdominal wall with the hernia  inferiorly.  Transverse defect is 10 cm and the hernia sac contains bowel  loop and fat.    There is no lytic or blastic osseous lesion.    - Impression -  Anterior inferior abdominal wall hernia with 10 cm transverse defect.    Electronically Signed by: DIANA CHENG M.D.  Signed on: 8/24/2022 3:32 PM      === 04/19/22 ===    CTA CHEST PULMONARY EMBOLISM W CONTRAST    - Narrative -  EXAM: CTA CHEST PULMONARY EMBOLISM W CONTRAST    HISTORY:  PE suspected, high prob  Pt rescanned and re injected in attempt to make pulmonaries brighter    COMPARISON:  08/21/2021    TECHNIQUE:  CT angiogram of the chest was performed with IV contrast as per pulmonary  embolus protocol.  3-D Maximum Intensity Projections were performed on an  independent workstation with concurrent supervision of a radiologist.  Multiplanar reformats were performed and evaluated.    Contrast: Patient was administered 160 ml of Omnipaque 350without immediate  complications.    FINDINGS:    Patient was reinjected due to suboptimal injection bolus.  No gross  filling defect is seen within the central pulmonary arteries.  The  ascending aorta is mildly ectatic.  Post surgical changes are seen  involving the stomach.  No mediastinal or hilar adenopathy is present.  No  pleural effusions.  No lung consolidation is present.  Airways patent.  No  suspicious pulmonary masses are present.  Tiny nodule at the left lung base  is stable.    - Impression -  1.    No evidence for PE.    Electronically Signed by: GENNY DEMPSEY D.O.  Signed on: 4/19/2022 3:26 PM      === 08/21/21 ===    CT CHEST ABDOMEN PELVIS W CONTRAST    - Narrative -  EXAM: CT CHEST ABDOMEN PELVIS W CONTRAST    CLINICAL INDICATION: Recent diagnosis of colon cancer.    COMPARISON:  CT of the abdomen and pelvis dated 6/19/2021.    TECHNIQUE: CT of the chest, abdomen and pelvis was performed with  IV  contrast. Multiplanar reformats and reconstructions were performed and  assessed.    Contrast: Patient was administered 90ml of Omnipaque 300without immediate  complications. Oral contrast (Barium) was administered without  complications.    FINDINGS:  Chest Findings:    The thyroid is unremarkable. No enlarged thoracic nodes.    The heart is normal in size. Multivessel calcific atheromatous disease of  the native coronary arteries is noted. There is no pericardial effusion.  The mid ascending aorta measures 3.7 cm. The main pulmonary artery measures  3.2 cm. The esophagus is decompressed.    Evaluation of the lung parenchyma does not demonstrate suspicious  pulmonary masses.  No consolidation.  Central airways are patent.  No  pleural effusion.  No pneumothorax.    Changes of degenerative disc disease is noted.  No aggressive osseous  findings.    Abdomen/pelvic findings:    Liver is enlarged measuring 17.5 cm in length.  There is hepatic  steatosis.  No suspicious intrahepatic mass or intrahepatic biliary ductal  dilatation. The main portal vein is patent.    The gallbladder is present. No gallbladder wall thickening.    The pancreas appears unremarkable.  No significant pancreatic duct  dilatation or pancreatic atrophy.  No peripancreatic stranding or fluid  collection.    The spleen is normal in size.    The adrenal glands are unremarkable.    There is symmetric enhancement kidneys. There is no hydronephrosis.  There  are parapelvic cysts.  There is a cyst in superior pole of the left kidney  measuring 1.3 cm The bladder appears unremarkable.    Prostate is present.    Postsurgical changes of partial gastrectomy are noted. There is no  evidence of bowel obstruction. No acute process the colon. There is sigmoid  diverticulosis.  The terminal ileum and appendix are unremarkable.    The abdominal aorta is normal in caliber. No enlarged abdominal or pelvic  nodes. No free air.  No fluid collections.    Changes  of degenerative disc disease is noted.  No aggressive osseous  findings.   Postsurgical changes along the intra-abdominal wall are noted.    - Impression -  No evidence of metastatic disease.    Hepatomegaly.  Hepatic steatosis.    Electronically Signed by: IRMA HIDALGO MD  Signed on: 8/23/2021 10:25 AM  LAST EKG:  No results found for this or any previous visit (from the past 4464 hour(s)).  LAST X-RAY:  === 04/19/22 ===    XR CHEST PA OR AP 1 VIEW    - Narrative -  EXAM: XR CHEST PA OR AP 1 VIEW    CLINICAL INDICATION: Chest pain, shortness of breath    COMPARISON: 07/20/2018    FINDINGS: The heart size moderately enlarged.  Pulmonary hypoinflation is  present.  There is no focal lung consolidation, pleural effusion, or  pneumothorax.  Visualized osseous structures and upper abdomen are within  normal limits.    - Impression -  Mild cardiomegaly.  No evidence of an acute cardiac pulmonary process.    Electronically Signed by: CHRISTOPHER WADSWORTH MD  Signed on: 4/19/2022 2:32 PM      === 11/29/21 ===    XR LUMBAR SPINE 2 OR 3 VIEWS    - Narrative -  EXAMINATION: XR LUMBAR SPINE 2 OR 3 VIEWS    HISTORY: Back pain    COMPARISON: No prior study is available for comparison.    FINDINGS:    The spinal alignment is normal. The vertebral bodies appear normal in  height without evidence of compression fracture. The intervertebral discs  are normal in height. Mild lower lumbar facet arthropathy is noted. No soft  tissue abnormality is identified.    - Impression -  No compression deformity or subluxation. Mild lower lumbar facet  arthropathy. Preserved disc heights.    Electronically Signed by: PEYMAN VILCHIS MD  Signed on: 11/29/2021 11:47 AM      === 07/20/18 ===    XR CHEST 2V    - Narrative -  Accession #    [id number]    Portable Chest radiograph    CLINICAL HISTORY: Chest pain  high blood sugar    COMPARISON:  08/13/2015    TECHNIQUE: Portable chest radiograph is obtained.    FINDINGS:  The heart size is within normal  limits.  Trachea is in midline.  No focal lung mass or  consolidation.  No pneumothorax or pleural effusion.  Normal pulmonary vascularity.    - Impression -  No active cardiopulmonary process seen.      Transcribed By: JOSEPH  07/20/18 4:16 pm    Dictated By:            NIRAJ BARCENAS MD    Electronically Reviewed and Approved By:           NIRAJ BARCENAS MD  07/20/18 4:17 pm  LAST U/S:  No results found for this or any previous visit.      Assessment and Plan:  51 year old male with past medical history of hypertension, diabetes and colon cancer s/p resection was admitted post repair of incisional hernia with mesh.    1. Incisional Hernia without obstruction or gangrene  - s/p open retrorectus repair repain incisional hernia with mesh, bilateral component sepation on 11/18/22 by Dr. Farnsworth  - received cefazolin perioperatively  - pain control with tylenol q 8h scheduled and dilaudid PCA  - zofran and reglan PRN   - continue NPO as the patient was nauseous  - continue IV fluids until tolerating diet  - General surgery to follow    2. Essential Hypertension  - continue lisinopril and amlodipine  - monitor BP    3. Diabetes mellitus   - holding ozempic and jardiance  - continue accucheks and ISS  - monitor blood sugars     4. Leukocytosis  - WBC 14.0  - elevation reactive to surgery    5. Hx of Colon Cancer s/p resection     DVT prophylaxis: SCD's, Encourage Ambulation, Lovenox 40    Code Status:   Code Status Information     Code Status    Full Resuscitation         Primary Care Provider: Kingston Davila MD - updated with epic message     DO [facility]    11/19/2022     set-up required/nonverbal cues (demo/gestures)/1 person + 1 person to manage equipment/verbal cues

## 2024-04-06 NOTE — HISTORY OF PRESENT ILLNESS
[FreeTextEntry1] :  58 year old woman with history of breast cancer, s/p TRAM flap reconstruction who was started on plavix and developed a large intramuscular hematoma in the rectus abdominis. She went for angiogram and thrombin was injected into two psuedoaneurysms. Post-procedure, the patient had altered mental status and LEFT sided weakness. CT showed large RIGHT MCA and small SELINA infarcts with mass effect.\par \par She is s/p decompressive RIGHT hemicraniectomy on 5/30/18. Her hospitalization was complicated by fevers and she was treated for UTI, bacterial vaginosis and possible some microaspiration. She improved clinically and was discharged to sub-acute rehab. \par \par \par OFFICE NOTE 11/12/18:\par Returns to the office for one month follow up.\par Remains on keppra 1500 mg bid.\par Denies seizures, dizziness, nausea, vomiting.\par Continued LEFT arm weakness, improving with physical therapy and occupational therapy.\par Reports intermittent headache since initial surgery.\par Incision well-healed, denies s/s infection.\par Has reported some blurry vision in both eyes since initial stroke in July. Has not followed up with ophthalmologist. \par \par OFFICE VISIT 10/1/18: \par Seen in the office last Monday postoperatively. \par She reported to [facility] ED last Sunday, 9/30, due to transient episode where she became unable to speak and was staring. It was witnessed by her  and he called EMS. She was loaded with keppra and her dose was increased to 1500 mg bid. \par She returns today and denies similar episodes. Denies seizures. Reports mild, occasional headaches relieved with tylenol. \par She is here for staple removal. Incision line intact with no s/s infection. She has not restarted ASA yet.\par Denies any signs of postop wound infection which could include but is not limited to redness/swelling/purulent drainage\par Denies CP/SOB/unilateral leg edema. Denies nausea, vomiting, dizziness, weakness. \par He is slowly introducing preop activities. Continues with occupational therapy and physical therapy.\par \par OFFICE VISIT 9/5/18:\par Patient states she has bee doing wel at home. Denies recent seizures, headaches, dizziness, nausea, vomiting. She has residual LEFT upper extremity weakness. She has been compliant with wearing her helmet.\par \par OFFICE VISIT 7/23/18: She has been doing well. She has significant left arm weakness, although she states her strength has somewhat improved from physical therapy. Denies left lower extremity weakness. She has a facial droop and gait imbalance which has resulted in falls when not assisted. Reports she has been eating and drinking regularly and denies difficulty swallowing. She does report some involuntary tremors in the RIGHT hand and the LEFT leg. She states she had EEG while in rehab, which was normal. She has been compliant with wearing her helmet and her surgical incision is healing well.
04-Apr-2024 15:04

## 2024-06-13 NOTE — PROGRESS NOTE ADULT - PROBLEM SELECTOR PROBLEM 3
activity, Decreased strength, Decreased sensation, Increased pain, Decreased posture  Assessment: Pt reports pain has been \"manageable\" since last visit and that \"pain has been mostly resolved.\" Notes no increased discomfort with newly initiated rows and lat pulls as well cervical stretches and ROM exercises. Reports good compliance with HEP at this time. Notes very mild achiness at the base of the cervical spine at the conclusion of the session.  Treatment Diagnosis: pain, decreased L sided cervical and L shoulder AROM, decreased motor function C6-C7, decreased cervical joint mobility, cervical muscle tightness, numbness and tingling.  Therapy Prognosis: Good       Patient Education: [x] NA       Post-Pain Assessment:       Pain Rating (0-10 pain scale):   1-2/10   Location and pain description same as pre-treatment unless indicated.   Action: [x] NA   [] Perform HEP  [] Meds as prescribed  [] Modalities as prescribed   [] Call Physician     GOALS   Patient Goal(s): Patient Goals : Avoid surgery    Short Term Goals Completed by 3 weeks Goal Status   STG 1 The pt will demonstrate improved LUE strength in triceps and wrist flexors to 4/5 in order to lift/carry with decreased pain In progress     Long Term Goals Completed by 6 weeks Goal Status   LTG 1 The pt will demonstrate improved LUE strength 5/5 in order to lift/carry with decreased pain In progress   LTG 2 The pt will demo improved cervical and L shoulder AROM in order to increase ease of ADL's In progress   LTG 3 The pt will have a decrease in NDI score >/=7 points in order to increase functional activity tolerance In progress   LTG 4 The pt will have decreased pain </= 0/10 in order to increase ease with ADL's In progress   LTG 5 The pt will be independent/compliant with PT HEP in order to demonstrate self management of symptoms upon D/C In progress     Plan:  Frequency/Duration:  Plan  Plan Frequency: 2/wk  Plan weeks: 6 weeks  Current Treatment 
UTI (urinary tract infection)
Fever
UTI (urinary tract infection)
Fever
Fever
UTI (urinary tract infection)

## 2024-09-15 NOTE — DISCHARGE NOTE ADULT - PATIENT PORTAL LINK FT
Problem: Restraint, Nonviolent  Goal: Absence of Harm or Injury  Outcome: Ongoing, Progressing  Intervention: Implement Least Restrictive Safety Strategies  Recent Flowsheet Documentation  Taken 9/15/2024 1800 by Yenifer Park RN  Medical Device Protection:   IV pole/bag removed from visual field   torso covered   tubing secured  Less Restrictive Alternative:   bed alarm in use   calming techniques promoted   emotional support provided   environment adjusted   positive reinforcement provided   safety enhancements provided  De-Escalation Techniques:   appropriate behavior reinforced   increased round frequency   medication administered   quiet time facilitated   stimulation decreased   verbally redirected  Diversional Activities: television  Taken 9/15/2024 1600 by Yenifer Park RN  Medical Device Protection:   IV pole/bag removed from visual field   torso covered   tubing secured  Less Restrictive Alternative:   bed alarm in use   calming techniques promoted   emotional support provided   environment adjusted   positive reinforcement provided   safety enhancements provided  De-Escalation Techniques:   appropriate behavior reinforced   increased round frequency   medication administered   quiet time facilitated   reoriented   stimulation decreased   verbally redirected  Diversional Activities: television  Taken 9/15/2024 1400 by Yenifer Park RN  Medical Device Protection:   IV pole/bag removed from visual field   torso covered   tubing secured  Less Restrictive Alternative:   bed alarm in use   calming techniques promoted   emotional support provided   environment adjusted   positive reinforcement provided   safety enhancements provided  De-Escalation Techniques:   appropriate behavior reinforced   increased round frequency   medication administered   quiet time facilitated   stimulation decreased   verbally redirected  Diversional Activities: television  Taken 9/15/2024 1200 by Yenifer Park,  RN  Medical Device Protection:   IV pole/bag removed from visual field   torso covered   tubing secured  Less Restrictive Alternative:   bed alarm in use   calming techniques promoted   emotional support provided   environment adjusted   positive reinforcement provided   safety enhancements provided  De-Escalation Techniques:   appropriate behavior reinforced   increased round frequency   medication administered   quiet time facilitated   reoriented   stimulation decreased   verbally redirected  Diversional Activities: television  Taken 9/15/2024 1000 by Yenifer Park RN  Medical Device Protection:   IV pole/bag removed from visual field   torso covered   tubing secured  Less Restrictive Alternative:   bed alarm in use   calming techniques promoted   emotional support provided   environment adjusted   positive reinforcement provided   safety enhancements provided  De-Escalation Techniques:   appropriate behavior reinforced   increased round frequency   medication administered   quiet time facilitated   reoriented   stimulation decreased   verbally redirected  Diversional Activities: television  Taken 9/15/2024 0800 by Yenifer Park RN  Medical Device Protection:   IV pole/bag removed from visual field   torso covered   tubing secured  Less Restrictive Alternative:   bed alarm in use   calming techniques promoted   emotional support provided   environment adjusted   positive reinforcement provided   safety enhancements provided  De-Escalation Techniques:   increased round frequency   medication administered   quiet time facilitated   reoriented   stimulation decreased   verbally redirected  Diversional Activities: television  Intervention: Protect Dignity, Rights, and Personal Wellbeing  Recent Flowsheet Documentation  Taken 9/15/2024 1800 by Yenifer Park RN  Trust Relationship/Rapport:   care explained   reassurance provided  Taken 9/15/2024 1600 by Yenifer Park, RN  Trust Relationship/Rapport:   care  explained   reassurance provided  Taken 9/15/2024 1400 by Yenifer Park RN  Trust Relationship/Rapport:   care explained   reassurance provided  Taken 9/15/2024 1200 by Yenifer Park RN  Trust Relationship/Rapport:   care explained   reassurance provided  Taken 9/15/2024 1000 by Yenifer Park RN  Trust Relationship/Rapport:   care explained   reassurance provided  Taken 9/15/2024 0800 by Yenifer Park RN  Trust Relationship/Rapport:   care explained   reassurance provided  Intervention: Protect Skin and Joint Integrity  Recent Flowsheet Documentation  Taken 9/15/2024 1800 by Yenifer Park RN  Body Position:   turned   lower extremity elevated   neutral body alignment   neutral head position   weight shifting  Taken 9/15/2024 1600 by Yenifer Park RN  Body Position:   turned   lower extremity elevated   neutral body alignment   neutral head position   weight shifting  Taken 9/15/2024 1400 by Yenifer Park RN  Body Position:   turned   lower extremity elevated   neutral body alignment   neutral head position   weight shifting  Taken 9/15/2024 1200 by Yenifer Park RN  Body Position:   turned   lower extremity elevated   neutral body alignment   neutral head position   tilted   weight shifting  Taken 9/15/2024 1000 by Yenifer Park RN  Body Position:   turned   lower extremity elevated   neutral body alignment   neutral head position   tilted   weight shifting  Taken 9/15/2024 0800 by Yenifer Park RN  Body Position:   turned   lower extremity elevated   neutral body alignment   neutral head position   tilted   weight shifting   Goal Outcome Evaluation:  Plan of Care Reviewed With: patient        Progress: improving                                   You can access the [facility] Portal, offered by [facility], by registering with the following website: [email or web address]